# Patient Record
Sex: MALE | Race: BLACK OR AFRICAN AMERICAN | NOT HISPANIC OR LATINO | Employment: FULL TIME | URBAN - METROPOLITAN AREA
[De-identification: names, ages, dates, MRNs, and addresses within clinical notes are randomized per-mention and may not be internally consistent; named-entity substitution may affect disease eponyms.]

---

## 2017-02-06 ENCOUNTER — OFFICE VISIT (OUTPATIENT)
Dept: OTOLARYNGOLOGY | Facility: CLINIC | Age: 42
End: 2017-02-06
Payer: COMMERCIAL

## 2017-02-06 ENCOUNTER — TELEPHONE (OUTPATIENT)
Dept: OTOLARYNGOLOGY | Facility: CLINIC | Age: 42
End: 2017-02-06

## 2017-02-06 ENCOUNTER — HOSPITAL ENCOUNTER (OUTPATIENT)
Dept: RADIOLOGY | Facility: HOSPITAL | Age: 42
Discharge: HOME | End: 2017-02-06
Attending: NURSE PRACTITIONER
Payer: COMMERCIAL

## 2017-02-06 VITALS
BODY MASS INDEX: 27.4 KG/M2 | DIASTOLIC BLOOD PRESSURE: 85 MMHG | HEIGHT: 70 IN | SYSTOLIC BLOOD PRESSURE: 131 MMHG | HEART RATE: 90 BPM | WEIGHT: 191.38 LBS

## 2017-02-06 DIAGNOSIS — J32.9 RECURRENT SINUSITIS: ICD-10-CM

## 2017-02-06 DIAGNOSIS — K21.9 LPRD (LARYNGOPHARYNGEAL REFLUX DISEASE): ICD-10-CM

## 2017-02-06 DIAGNOSIS — R51.9 FACE PAIN: ICD-10-CM

## 2017-02-06 DIAGNOSIS — J31.0 CHRONIC RHINITIS: ICD-10-CM

## 2017-02-06 DIAGNOSIS — R51.9 SINUS HEADACHE: ICD-10-CM

## 2017-02-06 DIAGNOSIS — J32.9 RECURRENT SINUSITIS: Primary | ICD-10-CM

## 2017-02-06 PROCEDURE — 99999 PR PBB SHADOW E&M-EST. PATIENT-LVL IV: CPT | Mod: PBBFAC,,, | Performed by: NURSE PRACTITIONER

## 2017-02-06 PROCEDURE — 99204 OFFICE O/P NEW MOD 45 MIN: CPT | Mod: 25,S$GLB,, | Performed by: NURSE PRACTITIONER

## 2017-02-06 PROCEDURE — 70220 X-RAY EXAM OF SINUSES: CPT | Mod: TC,PO

## 2017-02-06 PROCEDURE — 31575 DIAGNOSTIC LARYNGOSCOPY: CPT | Mod: S$GLB,,, | Performed by: NURSE PRACTITIONER

## 2017-02-06 PROCEDURE — 70220 X-RAY EXAM OF SINUSES: CPT | Mod: 26,,, | Performed by: RADIOLOGY

## 2017-02-06 RX ORDER — PSEUDOEPHEDRINE HYDROCHLORIDE 60 MG/1
60 TABLET ORAL EVERY 4 HOURS PRN
COMMUNITY
End: 2017-11-30 | Stop reason: ALTCHOICE

## 2017-02-06 RX ORDER — AZELASTINE 1 MG/ML
1 SPRAY, METERED NASAL 2 TIMES DAILY
Qty: 30 ML | Refills: 12 | Status: SHIPPED | OUTPATIENT
Start: 2017-02-06 | End: 2017-09-11

## 2017-02-06 RX ORDER — FLUTICASONE PROPIONATE 50 MCG
1 SPRAY, SUSPENSION (ML) NASAL 2 TIMES DAILY
Qty: 16 G | Refills: 12 | Status: SHIPPED | OUTPATIENT
Start: 2017-02-06 | End: 2017-09-11

## 2017-02-06 NOTE — PATIENT INSTRUCTIONS
"DIFFERENT TYPES OF NASAL SPRAYS:  · Flonase (steroid spray) is best for stuffy, pressure, fullness.  · Astelin (antihistamine spray) is best for itchy, drippy, sneezy.  · Atrovent (ipratropium bromide) is best for chronic watery nasal drip ("leaky faucet" nose), which may worse with eating.    Nasal spray instructions:  Blow nose first gently to clean. Keep chin level with the floor (do not tilt head forward or back). Insert nasal spray taking caution to direct it AWAY from the middle wall inside the nose (septum) to avoid irritating nasal septum which could cause nosebleed.  Do not tilt spray up but rather flat and out along the roof of your mouth to spray. Angle the tip of the spray out slightly toward the direction of the ears; then spray. Do not take quick vigorous sniff but rather slow gentle inhalation while waiting for medication to absorb into nasal passages. Then administer second spray in same way.     EUSTACHIAN TUBE INSTRUCTIONS:  Nasal valsalva:  Pinch nose and with closed mouth try to "pop" air into ears through the back of the nose. Attempt this several times a day. The more popping you have, the more likely the fluid will resolve.     Nasal saline rinse kit (use Neti pot or O-CODES sinus rinse kit) -- use sterile water (boiled tap water which has cooled) or distilled bottled water. Add 1/4 teaspoon sea salt and a pinch of baking soda or a mixture packet from the maker of your sinus rinse kit.  Rinse through both sides of nose to cleanse sinus and nasal passages, bending forward with head tilted down. Keep your mouth open, without holding your breath. Squeeze bottle gently until solution starts draining from the opposite nasal passage. After bottle is empty, blow nose very gently, without pinching nose completely, to avoid pressure on eardrums.      Use a humidifier at your bedside to prevent drying and crusting at night.     Ponaris Nasal Emollient is used for the relief of: nasal congestion due to " "colds, nasal irritation, allergy exacerbations, nasal crusting. Specifically prepared iodized organic oils of pine, eucalyptus, peppermint, cajeput, and cottonseed. To order Ponaris: ask your pharmacist to order it for you or we carry it in our pharmacy downstairs on the first floor.     LARYNGOPHARYNGEAL REFLUX  (SILENT OR ATYPICAL REFLUX)    If you have any of the following symptoms you may have laryngopharyngeal reflux (LPR):  hoarseness, thick or too much mucus, chronic throat pain/irritation, chronic throat clearing, chronic cough, especially cough that wake you up from sleep, chronic "postnasal drip" without the need to blow your nose.     Many people with LPR do not have symptoms of heartburn. Compared to the esophagus, the voice box and the back of the throat are significantly more sensitive to the effects of acid on surrounding tissue. Acid passing quickly through the esophagus does not have a chance to irritate the area for too long.  However acid that pools in the throat or voice box can cause prolonged irritation resulting in the symptoms of LPR.    The symptoms of LPR can consist of a dry cough and the sensation of something being stuck in the throat.  Some people will complain of heartburn while others may have intermittent hoarseness or loss of voice.  Another major symptom of LPR is "postnasal drip."  Patients are often told symptoms are due to abnormal nasal drainage or sinus infection; however this is rarely the cause of chronic throat irritation. For post nasal drip to cause the complaints described, signs and symptoms of an active nasal infection should be present.     Treatments for LPR include:  postural changes, weight reduction, diet modification, medication to reduce stomach acid and promote normal motility, and surgery to prevent reflux. Most patients will begin to notice some relief in her symptoms about 2 weeks after starting the medication; however it is generally recommended the " medication should be continued for 2 months. If the symptoms completely resolve, the medication can then be tapered.  Some people will remain symptom free while others may have relapses which required treatment again.    Things you can do to prevent reflux include:  Do not smoke.  Smoking will cause reflux.  Avoid tight fitting clothes or belts around the waist.  Avoid eating at least 2 hours prior to bedtime.  In fact avoid eating your largest meal at night.  Weight loss.  For patient's with recent weight gain, shedding a few pounds is all that is required to improve reflux.  Avoid caffeine, cola beverages, citrus beverages, mints, alcoholic beverages, particularly at night, cheese, fried foods, spicy foods, eggs, and chocolate.  Sleep with the head of bed elevated at least 6 inches.    Take Nexium / Prilosec / Protonix / Zegerid (all PPIs) every morning on an empty stomach (30-60 minutes before eating). Then at bedtime take Zantac (H2-blocker) 300 mg.  All are available over-the-counter without a prescription. And see a Gastro doctor (GI) for further evaluation and continued management.      How Acid Reflux Affects Your Throat    Do you have to clear your throat or cough often? Are you hoarse? Do you have trouble swallowing? If you have these or other throat symptoms, you may have acid reflux. This occurs when stomach acid flows back up and irritates your throat.  Why you have throat symptoms  There are muscles (esophageal sphincters) at both ends of the tube that carries food to your stomach (the esophagus). These muscles relax to let food pass. Then they tighten to keep stomach acid down. When the lower esophageal sphincter (LES) doesnt tighten enough, acid can flow back (reflux) from your stomach into your esophagus. This may cause heartburn. In some cases the upper esophageal sphincter (UES) also doesnt work well. Then acid can travel higher and enter your throat (pharynx). In many cases, this causes throat  symptoms.  Common throat symptoms  · Need to clear your throat often  · Feeling like youre choking  · Long-term (chronic) cough  · Hoarseness  · Trouble swallowing  · Feel like you have a lump in your throat  · Sour or acid taste  · Sore throat that keeps coming back   Date Last Reviewed: 7/1/2016  © 5216-4484 [employer]. [address] [place] [address], [place] [address]. All rights reserved. This information is not intended as a substitute for professional medical care. Always follow your healthcare professional's instructions.

## 2017-02-06 NOTE — PROGRESS NOTES
Subjective:       Patient ID: Reymundo Gutierrez is a 41 y.o. male.    Chief Complaint: Sinus Problem; Facial Pain; Headache; and Otalgia    HPI   Patient reports continuous sinusitis symptoms X 6 months waxing and waning, but never resolves. He was treated for sinusitis by STEPHON Estrada with antibiotic and steroid shot on 12/15/16, then given steroid PO by Dr. Cardona a week later. Still having facial pain maxillary bilateral, headaches frontal, fatigue, nasal congestion, decreased appetite, PND. Taking Tylenol Sinus and Sudafed.     Review of Systems   Constitutional: Positive for appetite change (decrease) and fatigue.   HENT: Positive for congestion, postnasal drip and sinus pressure.         Face pain, bilateral maxillary   Eyes: Negative.    Respiratory: Negative.    Cardiovascular: Negative.    Gastrointestinal: Negative.    Musculoskeletal: Negative.    Skin: Negative.    Neurological: Positive for headaches (frontal).   Hematological: Negative.    Psychiatric/Behavioral: Negative.        Objective:      Physical Exam   Constitutional: He is oriented to person, place, and time. Vital signs are normal. He appears well-developed and well-nourished. He is cooperative. He does not appear ill. No distress.   HENT:   Head: Normocephalic and atraumatic.   Right Ear: Hearing, tympanic membrane, external ear and ear canal normal. Tympanic membrane is not erythematous. No middle ear effusion.   Left Ear: Hearing, tympanic membrane, external ear and ear canal normal. Tympanic membrane is not erythematous.  No middle ear effusion.   Nose: Mucosal edema, rhinorrhea (clear) and septal deviation present. Right sinus exhibits no maxillary sinus tenderness and no frontal sinus tenderness. Left sinus exhibits no maxillary sinus tenderness and no frontal sinus tenderness.   Mouth/Throat: Uvula is midline, oropharynx is clear and moist and mucous membranes are normal. Mucous membranes are not pale, not dry and not cyanotic. No oral  lesions. No oropharyngeal exudate, posterior oropharyngeal edema or posterior oropharyngeal erythema.   Eyes: Conjunctivae, EOM and lids are normal. Pupils are equal, round, and reactive to light. Right eye exhibits no discharge. Left eye exhibits no discharge. No scleral icterus.   Neck: Trachea normal and normal range of motion. Neck supple. No tracheal deviation present. No thyroid mass and no thyromegaly present.   Cardiovascular: Normal rate.    Pulmonary/Chest: Effort normal. No stridor. No respiratory distress. He has no wheezes.   Musculoskeletal: Normal range of motion.   Lymphadenopathy:        Head (right side): No submental, no submandibular, no tonsillar, no preauricular and no posterior auricular adenopathy present.        Head (left side): No submental, no submandibular, no tonsillar, no preauricular and no posterior auricular adenopathy present.     He has no cervical adenopathy.        Right cervical: No superficial cervical and no posterior cervical adenopathy present.       Left cervical: No superficial cervical and no posterior cervical adenopathy present.   Neurological: He is alert and oriented to person, place, and time. He has normal strength. Coordination and gait normal.   Skin: Skin is warm, dry and intact. No lesion and no rash noted. He is not diaphoretic. No cyanosis. No pallor.   Psychiatric: He has a normal mood and affect. His speech is normal and behavior is normal. Judgment and thought content normal. Cognition and memory are normal.   Nursing note and vitals reviewed.      Procedure: Flexible laryngoscopy    In order to fully examine the upper aerodigestive tract, including the larynx, in a patient with a hyperactive gag reflex, and suboptimal visualization with indirect mirror exam,  flexible endoscopy is required.   After explaining the procedure and obtaining verbal consent, a timeout was performed with the patient's participation according to the universal protocol. Both nasal  cavities were anesthetized with 4% Xylocaine spray mixed with Jose-Synephrine. The flexible laryngoscope  was inserted into the nasal cavity and advanced to visualize the nasal cavity, nasopharynx, the posterior oropharynx, hypopharynx, and the endolarynx with the  findings noted. The scope was removed and the procedure terminated. The patient tolerated this procedure well without apparent complication.     OVERALL FINDINGS  Nasopharynx - the torus is clear. There are no lesions of the posterior wall.   Oropharynx - no lesions of the tongue base. There is no obvious fullness or asymmetry.  Hypopharynx - there are no lesions of the pyriform sinuses or postcricoid region   Larynx - there are no lesions of the supraglottic or glottic larynx.  Vocal fold mobility is normal.     SPECIFIC FINDINGS  Adenoid tissue - normal   Nasopharynx & eustachian tube orifices - normal   Posterior pharyngeal wall - normal   Base of tongue - normal   Epiglottis - normal   Valleculae - normal   Pyriform sinuses - normal   False vocal cords - normal   True vocal cords - normal  Arytenoids - normal   Interarytenoid space - normal   Left middle meatus polyp    Left choana    Right middle meatus    Right choana    Larynx    Larynx    Assessment:     Chronic rhinitis    LPRD/GERD  Left nasal polyp  Otalgia, not otogenic, most likely referred from musculoskeletal etiology  Recurrent sinusitis (sinus imaging obtained today)   Plan:     Lauren Hollings[facility]    Sinus x-rays today -- will call with results as soon as available. Explained no mucopus available to culture during nasoendoscopy.  Laryngoscope photos given and reviewed in detail with patient. Recommend over-the-counter esomeprazole or omeprazole daily on an empty stomach and encouraged patient to establish care with GI for continued management. Handouts given on LPRD and GERD, and discussed at length with patient.

## 2017-02-06 NOTE — MR AVS SNAPSHOT
[facility]  [address] [facility] [address]  [facility] [address]  Phone: [phone]  Fax: [phone]                  Reymundo Gutierrez   2017 9:40 AM   Office Visit    Description:  Male : 1975   Provider:  Dimple Baez NP   Department:  [place] - ENT           Reason for Visit     Sinus Problem     Facial Pain     Headache     Otalgia           Diagnoses this Visit        Comments    Recurrent sinusitis    -  Primary     Chronic rhinitis                To Do List           Goals (5 Years of Data)     None       These Medications        Disp Refills Start End    fluticasone (FLONASE) 50 mcg/actuation nasal spray 16 g 12 2017    1 spray by Each Nare route 2 (two) times daily. - Each Nare    Pharmacy: [id number] [place] [address] - [address] Ph #: [phone]       azelastine (ASTELIN) 137 mcg (0.1 %) nasal spray 30 mL 12 2017    1 spray (137 mcg total) by Nasal route 2 (two) times daily. - Nasal    Pharmacy: [id number] [address] [place] - [address] Ph #: [phone]         [facility]     [facility] Nurse Care Line - 24/7 Assistance  Registered nurses in the [facility] provide clinical advisement, health education, appointment booking, and other advisory services.  Call for this free service at [phone].             Medications           Message regarding Medications     Verify the changes and/or additions to your medication regime listed below are the same as discussed with your clinician today.  If any of these changes or additions are incorrect, please notify your healthcare provider.        START taking these NEW medications        Refills    fluticasone (FLONASE) 50 mcg/actuation nasal spray 12    Si spray by Each Nare route 2 (two) times daily.    Class: Normal    Route: Each Nare    azelastine (ASTELIN) 137 mcg (0.1 %) nasal spray 12    Si spray (137 mcg total) by Nasal route 2 (two) times  "daily.    Class: Normal    Route: Nasal           Verify that the below list of medications is an accurate representation of the medications you are currently taking.  If none reported, the list may be blank. If incorrect, please contact your healthcare provider. Carry this list with you in case of emergency.           Current Medications     albuterol 90 mcg/actuation inhaler Inhale 2 puffs into the lungs every 6 (six) hours as needed for Wheezing.    fluticasone-salmeterol 100-50 mcg/dose (ADVAIR DISKUS) 100-50 mcg/dose diskus inhaler INHALE ONE DOSE BY MOUTH TWICE DAILY    GUAIFEN/PHENYLEPH/ACETAMINOPHN (TYLENOL SINUS SEVERE ORAL) Take by mouth once daily.    pseudoephedrine (SUDAFED) 60 MG tablet Take 60 mg by mouth every 4 (four) hours as needed for Congestion.    azelastine (ASTELIN) 137 mcg (0.1 %) nasal spray 1 spray (137 mcg total) by Nasal route 2 (two) times daily.    fluticasone (FLONASE) 50 mcg/actuation nasal spray 1 spray by Each Nare route 2 (two) times daily.           Clinical Reference Information           Your Vitals Were     BP Pulse Height Weight BMI    131/85 90 5' 10" (1.778 m) 86.8 kg (191 lb 5.8 oz) 27.46 kg/m2      Blood Pressure          Most Recent Value    BP  131/85      Allergies as of 2/6/2017     No Known Allergies      Immunizations Administered on Date of Encounter - 2/6/2017     None      Orders Placed During Today's Visit     Future Labs/Procedures Expected by Expires    X-Ray Sinuses Min 3 Views  2/6/2017 2/6/2018      Instructions    DIFFERENT TYPES OF NASAL SPRAYS:  · Flonase (steroid spray) is best for stuffy, pressure, fullness.  · Astelin (antihistamine spray) is best for itchy, drippy, sneezy.  · Atrovent (ipratropium bromide) is best for chronic watery nasal drip ("leaky faucet" nose), which may worse with eating.    Nasal spray instructions:  Blow nose first gently to clean. Keep chin level with the floor (do not tilt head forward or back). Insert nasal spray taking " "caution to direct it AWAY from the middle wall inside the nose (septum) to avoid irritating nasal septum which could cause nosebleed.  Do not tilt spray up but rather flat and out along the roof of your mouth to spray. Angle the tip of the spray out slightly toward the direction of the ears; then spray. Do not take quick vigorous sniff but rather slow gentle inhalation while waiting for medication to absorb into nasal passages. Then administer second spray in same way.     EUSTACHIAN TUBE INSTRUCTIONS:  Nasal valsalva:  Pinch nose and with closed mouth try to "pop" air into ears through the back of the nose. Attempt this several times a day. The more popping you have, the more likely the fluid will resolve.     Nasal saline rinse kit (use Neti pot or [employer] sinus rinse kit) -- use sterile water (boiled tap water which has cooled) or distilled bottled water. Add 1/4 teaspoon sea salt and a pinch of baking soda or a mixture packet from the maker of your sinus rinse kit.  Rinse through both sides of nose to cleanse sinus and nasal passages, bending forward with head tilted down. Keep your mouth open, without holding your breath. Squeeze bottle gently until solution starts draining from the opposite nasal passage. After bottle is empty, blow nose very gently, without pinching nose completely, to avoid pressure on eardrums.      Use a humidifier at your bedside to prevent drying and crusting at night.     Ponaris Nasal Emollient is used for the relief of: nasal congestion due to colds, nasal irritation, allergy exacerbations, nasal crusting. Specifically prepared iodized organic oils of pine, eucalyptus, peppermint, cajeput, and cottonseed. To order Ponaris: ask your pharmacist to order it for you or we carry it in our pharmacy downstairs on the first floor.     LARYNGOPHARYNGEAL REFLUX  (SILENT OR ATYPICAL REFLUX)    If you have any of the following symptoms you may have laryngopharyngeal reflux (LPR):  hoarseness, " "thick or too much mucus, chronic throat pain/irritation, chronic throat clearing, chronic cough, especially cough that wake you up from sleep, chronic "postnasal drip" without the need to blow your nose.     Many people with LPR do not have symptoms of heartburn. Compared to the esophagus, the voice box and the back of the throat are significantly more sensitive to the effects of acid on surrounding tissue. Acid passing quickly through the esophagus does not have a chance to irritate the area for too long.  However acid that pools in the throat or voice box can cause prolonged irritation resulting in the symptoms of LPR.    The symptoms of LPR can consist of a dry cough and the sensation of something being stuck in the throat.  Some people will complain of heartburn while others may have intermittent hoarseness or loss of voice.  Another major symptom of LPR is "postnasal drip."  Patients are often told symptoms are due to abnormal nasal drainage or sinus infection; however this is rarely the cause of chronic throat irritation. For post nasal drip to cause the complaints described, signs and symptoms of an active nasal infection should be present.     Treatments for LPR include:  postural changes, weight reduction, diet modification, medication to reduce stomach acid and promote normal motility, and surgery to prevent reflux. Most patients will begin to notice some relief in her symptoms about 2 weeks after starting the medication; however it is generally recommended the medication should be continued for 2 months. If the symptoms completely resolve, the medication can then be tapered.  Some people will remain symptom free while others may have relapses which required treatment again.    Things you can do to prevent reflux include:  Do not smoke.  Smoking will cause reflux.  Avoid tight fitting clothes or belts around the waist.  Avoid eating at least 2 hours prior to bedtime.  In fact avoid eating your largest meal " at night.  Weight loss.  For patient's with recent weight gain, shedding a few pounds is all that is required to improve reflux.  Avoid caffeine, cola beverages, citrus beverages, mints, alcoholic beverages, particularly at night, cheese, fried foods, spicy foods, eggs, and chocolate.  Sleep with the head of bed elevated at least 6 inches.    Take Nexium / Prilosec / Protonix / Zegerid (all PPIs) every morning on an empty stomach (30-60 minutes before eating). Then at bedtime take Zantac (H2-blocker) 300 mg.  All are available over-the-counter without a prescription. And see a Gastro doctor (GI) for further evaluation and continued management.      How Acid Reflux Affects Your Throat    Do you have to clear your throat or cough often? Are you hoarse? Do you have trouble swallowing? If you have these or other throat symptoms, you may have acid reflux. This occurs when stomach acid flows back up and irritates your throat.  Why you have throat symptoms  There are muscles (esophageal sphincters) at both ends of the tube that carries food to your stomach (the esophagus). These muscles relax to let food pass. Then they tighten to keep stomach acid down. When the lower esophageal sphincter (LES) doesnt tighten enough, acid can flow back (reflux) from your stomach into your esophagus. This may cause heartburn. In some cases the upper esophageal sphincter (UES) also doesnt work well. Then acid can travel higher and enter your throat (pharynx). In many cases, this causes throat symptoms.  Common throat symptoms  · Need to clear your throat often  · Feeling like youre choking  · Long-term (chronic) cough  · Hoarseness  · Trouble swallowing  · Feel like you have a lump in your throat  · Sour or acid taste  · Sore throat that keeps coming back   Date Last Reviewed: 7/1/2016  © 5303-0197 [employer]. [address] [place] [address], [place] [address]. All rights reserved. This information is not intended as a substitute  for professional medical care. Always follow your healthcare professional's instructions.             Language Assistance Services     ATTENTION: Language assistance services are available, free of charge. Please call [phone].      ATENCIÓN: Si habdameon ordonez, tiene a villafuerte disposición servicios gratuitos de asistencia lingüística. Llame al [phone].     CHÚ Ý: N?u b?n nói Ti?ng Vi?t, có các d?ch v? h? tr? ngôn ng? mi?n phí dành cho b?n. G?i s? [phone].         [facility] complies with applicable Federal civil rights laws and does not discriminate on the basis of race, color, national origin, age, disability, or sex.

## 2017-02-06 NOTE — TELEPHONE ENCOUNTER
----- Message from Dimple Baez NP sent at 2/6/2017  1:47 PM CST -----  No sinus infection. Continue both nasal sprays for allergy symptoms.

## 2017-03-09 ENCOUNTER — OFFICE VISIT (OUTPATIENT)
Dept: FAMILY MEDICINE | Facility: CLINIC | Age: 42
End: 2017-03-09
Payer: COMMERCIAL

## 2017-03-09 VITALS
WEIGHT: 190.38 LBS | SYSTOLIC BLOOD PRESSURE: 131 MMHG | TEMPERATURE: 98 F | DIASTOLIC BLOOD PRESSURE: 74 MMHG | HEIGHT: 70 IN | BODY MASS INDEX: 27.25 KG/M2 | HEART RATE: 79 BPM

## 2017-03-09 DIAGNOSIS — J06.9 VIRAL URI WITH COUGH: Primary | ICD-10-CM

## 2017-03-09 DIAGNOSIS — G56.01 CARPAL TUNNEL SYNDROME, RIGHT: ICD-10-CM

## 2017-03-09 PROCEDURE — 99213 OFFICE O/P EST LOW 20 MIN: CPT | Mod: S$GLB,,,

## 2017-03-09 PROCEDURE — 99999 PR PBB SHADOW E&M-EST. PATIENT-LVL III: CPT | Mod: PBBFAC,,,

## 2017-03-09 PROCEDURE — 1160F RVW MEDS BY RX/DR IN RCRD: CPT | Mod: S$GLB,,,

## 2017-03-09 RX ORDER — PROMETHAZINE HYDROCHLORIDE AND DEXTROMETHORPHAN HYDROBROMIDE 6.25; 15 MG/5ML; MG/5ML
5 SYRUP ORAL 3 TIMES DAILY PRN
Qty: 180 ML | Refills: 0 | Status: SHIPPED | OUTPATIENT
Start: 2017-03-09 | End: 2017-03-19

## 2017-03-09 RX ORDER — METHYLPREDNISOLONE 4 MG/1
TABLET ORAL
Qty: 1 PACKAGE | Refills: 0 | Status: SHIPPED | OUTPATIENT
Start: 2017-03-09 | End: 2017-03-15

## 2017-03-09 NOTE — PROGRESS NOTES
Subjective:       Patient ID: Reymundo Gutierrez is a 41 y.o. male.    Chief Complaint: Fever and Generalized Body Aches    HPI   Resents with a one-day complaint of nasal congestion and cough he says his symptoms are intermittent and moderate in intensity.  He voices an associated fever but states he did not check his temperature so is unable to quantify his temperature.  He denies any shortness of breath, wheezing, or LAMB.  He cannot identify any exacerbating or mitigating factors.  He states he was exposed to the flu work.     Patient also voices a several month complaint of right wrist pain he describes as an intermittent moderate to severe intensity burning sensation that extends down into his hands.  He voices some associated numbness in his fingertips.  He says the pain gets worse at night when he is trying to sleep he says the pain wakes him frequently.  He voices a history of doing work that requires repetitive motion.      Review of Systems   Constitutional: Positive for fever. Negative for activity change, appetite change, fatigue and unexpected weight change.   HENT: Positive for congestion.    Eyes: Negative.    Respiratory: Positive for cough. Negative for chest tightness, shortness of breath and wheezing.    Cardiovascular: Negative for chest pain, palpitations and leg swelling.   Gastrointestinal: Negative for constipation, diarrhea, nausea and vomiting.   Endocrine: Negative.    Genitourinary: Negative.    Musculoskeletal: Positive for arthralgias (right wrist ).   Skin: Negative for color change.   Allergic/Immunologic: Negative.    Neurological: Negative for dizziness, weakness and light-headedness.   Hematological: Negative.    Psychiatric/Behavioral: Negative for sleep disturbance.         Objective:      Physical Exam   Constitutional: He is oriented to person, place, and time. He appears well-developed and well-nourished. No distress.   HENT:   Head: Normocephalic and atraumatic. Hair is  normal.   Right Ear: Hearing, tympanic membrane, external ear and ear canal normal.   Left Ear: Hearing, tympanic membrane, external ear and ear canal normal.   Nose: Mucosal edema and rhinorrhea present. No nose lacerations, sinus tenderness, nasal deformity, septal deviation or nasal septal hematoma. No epistaxis.  No foreign bodies. Right sinus exhibits no maxillary sinus tenderness and no frontal sinus tenderness. Left sinus exhibits no maxillary sinus tenderness and no frontal sinus tenderness.   Mouth/Throat: Uvula is midline, oropharynx is clear and moist and mucous membranes are normal.   Eyes: Conjunctivae are normal. Pupils are equal, round, and reactive to light. Right eye exhibits no discharge. Left eye exhibits no discharge.   Neck: Trachea normal, normal range of motion and phonation normal. Neck supple. No tracheal deviation present.   Cardiovascular: Normal rate, regular rhythm, normal heart sounds and intact distal pulses.  Exam reveals no gallop and no friction rub.    No murmur heard.  Pulmonary/Chest: Effort normal and breath sounds normal. No respiratory distress. He has no decreased breath sounds. He has no wheezes. He has no rhonchi. He has no rales. He exhibits no tenderness.   Musculoskeletal: Normal range of motion.        Right wrist: He exhibits normal range of motion, no tenderness, no bony tenderness, no swelling, no effusion, no crepitus, no deformity and no laceration.   positive tinnel's test   Lymphadenopathy:        Head (right side): No submental, no submandibular, no tonsillar, no preauricular, no posterior auricular and no occipital adenopathy present.        Head (left side): No submental, no submandibular, no tonsillar, no preauricular, no posterior auricular and no occipital adenopathy present.     He has no cervical adenopathy.        Right cervical: No superficial cervical, no deep cervical and no posterior cervical adenopathy present.       Left cervical: No superficial  cervical, no deep cervical and no posterior cervical adenopathy present.   Neurological: He is alert and oriented to person, place, and time. He exhibits normal muscle tone. GCS eye subscore is 4. GCS verbal subscore is 5. GCS motor subscore is 6.   Skin: Skin is warm and dry. No rash noted. He is not diaphoretic. No erythema. No pallor.   Psychiatric: He has a normal mood and affect. His speech is normal and behavior is normal. Judgment and thought content normal.       Assessment:       1. Viral URI with cough    2. Carpal tunnel syndrome, right          Plan:   Viral URI with cough  -     POCT INFLUENZA A/B, Negative  -     promethazine-dextromethorphan (PROMETHAZINE-DM) 6.25-15 mg/5 mL Syrp; Take 5 mLs by mouth 3 (three) times daily as needed.  Dispense: 180 mL; Refill: 0  -     methylPREDNISolone (MEDROL DOSEPACK) 4 mg tablet; use as directed  Dispense: 1 Package; Refill: 0    Carpal tunnel syndrome, right  -     Nerve conduction test; Future          Disclaimer: This note is prepared using voice recognition software.  As such there may be errors in the dictation.  It has not been proofread.

## 2017-03-09 NOTE — LETTER
March 9, 2017      [facility]  [address] [address]  Phone: [phone]  Fax: [phone]       Patient: Jarad Gutierrez   YOB: 1975  Date of Visit: 03/09/2017    To Whom It May Concern:    Jarad was at [facility] on 03/09/2017. He may return to work/school on 03/11/2017 with no restrictions. If you have any questions or concerns, or if I can be of further assistance, please do not hesitate to contact me.    Sincerely,    AMY Leal

## 2017-09-11 ENCOUNTER — OFFICE VISIT (OUTPATIENT)
Dept: FAMILY MEDICINE | Facility: CLINIC | Age: 42
End: 2017-09-11
Payer: COMMERCIAL

## 2017-09-11 ENCOUNTER — NURSE TRIAGE (OUTPATIENT)
Dept: ADMINISTRATIVE | Facility: CLINIC | Age: 42
End: 2017-09-11

## 2017-09-11 ENCOUNTER — TELEPHONE (OUTPATIENT)
Dept: FAMILY MEDICINE | Facility: CLINIC | Age: 42
End: 2017-09-11

## 2017-09-11 ENCOUNTER — LAB VISIT (OUTPATIENT)
Dept: LAB | Facility: HOSPITAL | Age: 42
End: 2017-09-11
Attending: NURSE PRACTITIONER
Payer: COMMERCIAL

## 2017-09-11 VITALS
HEIGHT: 71 IN | DIASTOLIC BLOOD PRESSURE: 80 MMHG | HEART RATE: 110 BPM | BODY MASS INDEX: 26.93 KG/M2 | SYSTOLIC BLOOD PRESSURE: 120 MMHG | WEIGHT: 192.38 LBS | TEMPERATURE: 99 F

## 2017-09-11 DIAGNOSIS — R06.02 SOB (SHORTNESS OF BREATH): ICD-10-CM

## 2017-09-11 DIAGNOSIS — R00.0 TACHYCARDIA: ICD-10-CM

## 2017-09-11 DIAGNOSIS — R23.0 CYANOSIS: ICD-10-CM

## 2017-09-11 DIAGNOSIS — D72.829 LEUKOCYTOSIS, UNSPECIFIED TYPE: Primary | ICD-10-CM

## 2017-09-11 DIAGNOSIS — R07.9 CHEST PAIN, UNSPECIFIED TYPE: ICD-10-CM

## 2017-09-11 DIAGNOSIS — J45.901 ASTHMA WITH SEVERE ASTHMA ATTACK: ICD-10-CM

## 2017-09-11 DIAGNOSIS — J45.31 MILD PERSISTENT ASTHMA WITH ACUTE EXACERBATION: ICD-10-CM

## 2017-09-11 DIAGNOSIS — Z09 FOLLOW UP: Primary | ICD-10-CM

## 2017-09-11 LAB
ANION GAP SERPL CALC-SCNC: 13 MMOL/L
BASOPHILS # BLD AUTO: 0 K/UL
BASOPHILS NFR BLD: 0 %
BUN SERPL-MCNC: 15 MG/DL
CALCIUM SERPL-MCNC: 9.8 MG/DL
CHLORIDE SERPL-SCNC: 107 MMOL/L
CO2 SERPL-SCNC: 19 MMOL/L
CREAT SERPL-MCNC: 1.1 MG/DL
DIFFERENTIAL METHOD: ABNORMAL
EOSINOPHIL # BLD AUTO: 0 K/UL
EOSINOPHIL NFR BLD: 0 %
ERYTHROCYTE [DISTWIDTH] IN BLOOD BY AUTOMATED COUNT: 13.1 %
EST. GFR  (AFRICAN AMERICAN): >60 ML/MIN/1.73 M^2
EST. GFR  (NON AFRICAN AMERICAN): >60 ML/MIN/1.73 M^2
GLUCOSE SERPL-MCNC: 123 MG/DL
HCT VFR BLD AUTO: 43.5 %
HGB BLD-MCNC: 15.5 G/DL
LYMPHOCYTES # BLD AUTO: 0.9 K/UL
LYMPHOCYTES NFR BLD: 6.2 %
MCH RBC QN AUTO: 30.6 PG
MCHC RBC AUTO-ENTMCNC: 35.6 G/DL
MCV RBC AUTO: 86 FL
MONOCYTES # BLD AUTO: 0.2 K/UL
MONOCYTES NFR BLD: 1.2 %
NEUTROPHILS # BLD AUTO: 12.9 K/UL
NEUTROPHILS NFR BLD: 92.3 %
PLATELET # BLD AUTO: 254 K/UL
PMV BLD AUTO: 11.1 FL
POTASSIUM SERPL-SCNC: 4.6 MMOL/L
RBC # BLD AUTO: 5.07 M/UL
SODIUM SERPL-SCNC: 139 MMOL/L
TSH SERPL DL<=0.005 MIU/L-ACNC: 0.84 UIU/ML
WBC # BLD AUTO: 14.02 K/UL

## 2017-09-11 PROCEDURE — 85025 COMPLETE CBC W/AUTO DIFF WBC: CPT

## 2017-09-11 PROCEDURE — 93010 ELECTROCARDIOGRAM REPORT: CPT | Mod: S$GLB,,, | Performed by: INTERNAL MEDICINE

## 2017-09-11 PROCEDURE — 99999 PR PBB SHADOW E&M-EST. PATIENT-LVL IV: CPT | Mod: PBBFAC,,, | Performed by: NURSE PRACTITIONER

## 2017-09-11 PROCEDURE — 3008F BODY MASS INDEX DOCD: CPT | Mod: S$GLB,,, | Performed by: NURSE PRACTITIONER

## 2017-09-11 PROCEDURE — 99213 OFFICE O/P EST LOW 20 MIN: CPT | Mod: S$GLB,,, | Performed by: NURSE PRACTITIONER

## 2017-09-11 PROCEDURE — 36415 COLL VENOUS BLD VENIPUNCTURE: CPT | Mod: PO

## 2017-09-11 PROCEDURE — 84443 ASSAY THYROID STIM HORMONE: CPT

## 2017-09-11 PROCEDURE — 80048 BASIC METABOLIC PNL TOTAL CA: CPT

## 2017-09-11 PROCEDURE — 93005 ELECTROCARDIOGRAM TRACING: CPT | Mod: S$GLB,,, | Performed by: NURSE PRACTITIONER

## 2017-09-11 RX ORDER — DOXYCYCLINE 100 MG/1
100 CAPSULE ORAL 2 TIMES DAILY
Qty: 20 CAPSULE | Refills: 0 | Status: SHIPPED | OUTPATIENT
Start: 2017-09-11 | End: 2017-09-21

## 2017-09-11 RX ORDER — FLUTICASONE PROPIONATE AND SALMETEROL 100; 50 UG/1; UG/1
1 POWDER RESPIRATORY (INHALATION) 2 TIMES DAILY
COMMUNITY
End: 2017-09-11

## 2017-09-11 RX ORDER — FLUTICASONE PROPIONATE AND SALMETEROL 250; 50 UG/1; UG/1
1 POWDER RESPIRATORY (INHALATION) 2 TIMES DAILY
Qty: 60 EACH | Refills: 0 | Status: SHIPPED | OUTPATIENT
Start: 2017-09-11 | End: 2019-12-03 | Stop reason: SDUPTHER

## 2017-09-11 NOTE — TELEPHONE ENCOUNTER
BG elevated but not at diabetic level; labs non fasting and pt has had steroids; watch diet, limit steroid use, if possible.

## 2017-09-11 NOTE — PROGRESS NOTES
Subjective:       Patient ID: Reymundo Gutierrez is a 42 y.o. male.    Chief Complaint: Follow-up (asthma)  Pt in today for ER follow up asthma attack. Pt states was seen at [facility] ER last night after experiencing SOB, CP, chest tightness. Pt states given breathing treatments x 3, IV steroids, IV magnesium and had CXR, which was unremarkable. Also used albuterol inhaler and had breathing treatment at home. CBC per [facility] showed elevated WBCs. Pt states advised to stay but chose to be discharged. Pt states this AM lips appeared purple, had SOB, chest tightness, dizziness; did not report to the ER;resolved. Pt states has had 3 different antibiotics in the past 2 w, including levaquin; has also had multiple steroid treatments. Pt uses albuterol, advair currently. Prescribed prednisone, naprosyn at discharge.  Pt advised to report to ER immediately if symptoms persist. Pt has no other complaints today.  Past Medical History:   Diagnosis Date    Asthma      Social History     Social History    Marital status: Single     Spouse name: N/A    Number of children: N/A    Years of education: N/A     Occupational History         Social History Main Topics    Smoking status: Former Smoker    Smokeless tobacco: Never Used    Alcohol use No    Drug use: No    Sexual activity: Not on file     History reviewed. No pertinent surgical history.    HPI  Review of Systems   Constitutional: Negative.    HENT: Negative.    Eyes: Negative.    Respiratory: Positive for cough, chest tightness, shortness of breath and wheezing.    Cardiovascular: Negative.    Gastrointestinal: Negative.    Endocrine: Negative.    Genitourinary: Negative.    Musculoskeletal: Negative.    Skin: Positive for color change (Lips this AM).   Allergic/Immunologic: Negative.    Neurological: Negative.    Psychiatric/Behavioral: Negative.        Objective:      Physical Exam   Constitutional: He is oriented to person, place, and time. He appears well-developed  and well-nourished.   HENT:   Head: Normocephalic.   Right Ear: External ear normal.   Left Ear: External ear normal.   Nose: Nose normal.   Mouth/Throat: Oropharynx is clear and moist.   Eyes: Conjunctivae are normal. Pupils are equal, round, and reactive to light.   Neck: Normal range of motion. Neck supple.   Cardiovascular: Normal rate, regular rhythm and normal heart sounds.    Pulmonary/Chest: Effort normal and breath sounds normal.   Abdominal: Soft. Bowel sounds are normal.   Musculoskeletal: Normal range of motion.   Neurological: He is alert and oriented to person, place, and time.   Skin: Skin is warm and dry.   Psychiatric: He has a normal mood and affect. His behavior is normal. Judgment and thought content normal.   Nursing note and vitals reviewed.      Assessment:       1. Follow up    2. Mild persistent asthma with acute exacerbation    3. Chest pain, unspecified type    4. SOB (shortness of breath)    5. Cyanosis    6. Asthma with severe asthma attack    7. Tachycardia        Plan:           Reymundo was seen today for follow-up.    Diagnoses and all orders for this visit:    Follow up  Mild persistent asthma with acute exacerbation  Chest pain, unspecified type  SOB (shortness of breath)  Cyanosis  Comments:  Lips purple  Asthma with severe asthma attack  Tachycardia  -     CBC auto differential; Future  -     IN OFFICE EKG 12-LEAD (to [place])  -     Ambulatory referral to Pulmonology  -     CT Chest W Wo Contrast; Future  -     Basic metabolic panel; Future  -     TSH; Future  -     fluticasone-salmeterol 250-50 mcg/dose (ADVAIR) 250-50 mcg/dose diskus inhaler; Inhale 1 puff into the lungs 2 (two) times daily. Controller  Report to ER immediately if symptoms persist or worsen  Continue current medication

## 2017-09-11 NOTE — LETTER
September 11, 2017      [facility]  [address]] [address]  Phone: [phone]  Fax: [phone]       Patient: Reymundo Gutierrez   YOB: 1975  Date of Visit: 09/11/2017    To Whom It May Concern:    Jayson Gutierrez  was at [facility] on 09/11/2017. He may return to work/school on 09/12/17 with no restrictions. If you have any questions or concerns, or if I can be of further assistance, please do not hesitate to contact me.    Sincerely,    Taylor Baker LPN

## 2017-09-14 DIAGNOSIS — J01.10 ACUTE FRONTAL SINUSITIS, RECURRENCE NOT SPECIFIED: ICD-10-CM

## 2017-09-14 RX ORDER — ALBUTEROL SULFATE 90 UG/1
2 AEROSOL, METERED RESPIRATORY (INHALATION) EVERY 6 HOURS PRN
Qty: 18 G | Refills: 12 | Status: SHIPPED | OUTPATIENT
Start: 2017-09-14 | End: 2019-05-02

## 2017-09-14 NOTE — TELEPHONE ENCOUNTER
----- Message from Landy Pereira sent at 9/14/2017 11:08 AM CDT -----  Contact: WifeTarik DavilaMyoorb-[phone]   Would like a rx called in for inhale, pt is currently out.  Please call back at [phone].  Thx-AMH           Pt Uses:  .  [facility]  [address]   dameon Hebert

## 2017-11-06 ENCOUNTER — OFFICE VISIT (OUTPATIENT)
Dept: FAMILY MEDICINE | Facility: CLINIC | Age: 42
End: 2017-11-06
Payer: COMMERCIAL

## 2017-11-06 VITALS
WEIGHT: 196 LBS | TEMPERATURE: 98 F | HEART RATE: 89 BPM | HEIGHT: 70 IN | BODY MASS INDEX: 28.06 KG/M2 | DIASTOLIC BLOOD PRESSURE: 80 MMHG | SYSTOLIC BLOOD PRESSURE: 119 MMHG

## 2017-11-06 DIAGNOSIS — J32.9 SINUSITIS, UNSPECIFIED CHRONICITY, UNSPECIFIED LOCATION: Primary | ICD-10-CM

## 2017-11-06 PROCEDURE — 99213 OFFICE O/P EST LOW 20 MIN: CPT | Mod: 25,S$GLB,, | Performed by: NURSE PRACTITIONER

## 2017-11-06 PROCEDURE — 96372 THER/PROPH/DIAG INJ SC/IM: CPT | Mod: S$GLB,,, | Performed by: FAMILY MEDICINE

## 2017-11-06 PROCEDURE — 99999 PR PBB SHADOW E&M-EST. PATIENT-LVL III: CPT | Mod: PBBFAC,,, | Performed by: NURSE PRACTITIONER

## 2017-11-06 RX ORDER — DEXAMETHASONE SODIUM PHOSPHATE 4 MG/ML
4 INJECTION, SOLUTION INTRA-ARTICULAR; INTRALESIONAL; INTRAMUSCULAR; INTRAVENOUS; SOFT TISSUE
Status: COMPLETED | OUTPATIENT
Start: 2017-11-06 | End: 2017-11-06

## 2017-11-06 RX ORDER — METHYLPREDNISOLONE 4 MG/1
TABLET ORAL
Qty: 1 PACKAGE | Refills: 0 | Status: SHIPPED | OUTPATIENT
Start: 2017-11-06 | End: 2017-11-06 | Stop reason: ALTCHOICE

## 2017-11-06 RX ADMIN — DEXAMETHASONE SODIUM PHOSPHATE 4 MG: 4 INJECTION, SOLUTION INTRA-ARTICULAR; INTRALESIONAL; INTRAMUSCULAR; INTRAVENOUS; SOFT TISSUE at 11:11

## 2017-11-06 NOTE — PROGRESS NOTES
Subjective:       Patient ID: Reymundo Gutierrez is a 42 y.o. male.    Chief Complaint: Sinus Problem    Sinus Problem   This is a new problem. The current episode started in the past 7 days. The problem has been gradually worsening since onset. There has been no fever. The pain is mild. Associated symptoms include congestion, coughing, headaches and sinus pressure. Pertinent negatives include no ear pain, shortness of breath or sore throat. Treatments tried: mucinex. The treatment provided no relief.       Review of Systems   Constitutional: Negative for fatigue, fever and unexpected weight change.   HENT: Positive for congestion and sinus pressure. Negative for ear pain and sore throat.    Eyes: Negative.  Negative for pain and visual disturbance.   Respiratory: Positive for cough. Negative for shortness of breath.    Cardiovascular: Negative for chest pain and palpitations.   Gastrointestinal: Negative for abdominal pain, diarrhea, nausea and vomiting.   Genitourinary: Negative for dysuria and frequency.   Musculoskeletal: Negative for arthralgias and myalgias.   Skin: Negative for color change and rash.   Neurological: Positive for headaches. Negative for dizziness.   Psychiatric/Behavioral: Negative for sleep disturbance. The patient is not nervous/anxious.        Vitals:    11/06/17 1124   BP: 119/80   Pulse: 89   Temp: 98.4 °F (36.9 °C)       Objective:     Current Outpatient Prescriptions   Medication Sig Dispense Refill    albuterol 90 mcg/actuation inhaler Inhale 2 puffs into the lungs every 6 (six) hours as needed for Wheezing. 18 g 12    fluticasone-salmeterol 250-50 mcg/dose (ADVAIR) 250-50 mcg/dose diskus inhaler Inhale 1 puff into the lungs 2 (two) times daily. Controller 60 each 0    pseudoephedrine (SUDAFED) 60 MG tablet Take 60 mg by mouth every 4 (four) hours as needed for Congestion.       No current facility-administered medications for this visit.        Physical Exam   Constitutional: He  is oriented to person, place, and time. He appears well-developed. No distress.   HENT:   Head: Normocephalic and atraumatic.   Right Ear: Tympanic membrane is bulging.   Left Ear: Tympanic membrane is bulging.   Nose: Mucosal edema and rhinorrhea present.   Mouth/Throat: Posterior oropharyngeal edema (post nasal mucus) present.   Eyes: EOM are normal. Pupils are equal, round, and reactive to light.   Neck: Normal range of motion. Neck supple.   Cardiovascular: Normal rate and regular rhythm.    Pulmonary/Chest: Effort normal and breath sounds normal.   Musculoskeletal: Normal range of motion.   Neurological: He is alert and oriented to person, place, and time.   Skin: Skin is warm and dry. No rash noted.   Psychiatric: He has a normal mood and affect. Thought content normal.   Nursing note and vitals reviewed.      Assessment:       1. Sinusitis, unspecified chronicity, unspecified location        Plan:   Sinusitis, unspecified chronicity, unspecified location  -     dexamethasone injection 4 mg; Inject 1 mL (4 mg total) into the muscle one time.    Other orders  -     Discontinue: methylPREDNISolone (MEDROL DOSEPACK) 4 mg tablet; use as directed  Dispense: 1 Package; Refill: 0        Return if symptoms worsen or fail to improve.

## 2017-11-30 ENCOUNTER — OFFICE VISIT (OUTPATIENT)
Dept: FAMILY MEDICINE | Facility: CLINIC | Age: 42
End: 2017-11-30
Payer: COMMERCIAL

## 2017-11-30 VITALS
DIASTOLIC BLOOD PRESSURE: 83 MMHG | HEART RATE: 93 BPM | HEIGHT: 70 IN | TEMPERATURE: 98 F | SYSTOLIC BLOOD PRESSURE: 138 MMHG | WEIGHT: 207 LBS | BODY MASS INDEX: 29.63 KG/M2

## 2017-11-30 DIAGNOSIS — J01.01 ACUTE RECURRENT MAXILLARY SINUSITIS: Primary | ICD-10-CM

## 2017-11-30 PROCEDURE — 99999 PR PBB SHADOW E&M-EST. PATIENT-LVL III: CPT | Mod: PBBFAC,,, | Performed by: NURSE PRACTITIONER

## 2017-11-30 PROCEDURE — 99213 OFFICE O/P EST LOW 20 MIN: CPT | Mod: S$GLB,,, | Performed by: NURSE PRACTITIONER

## 2017-11-30 RX ORDER — AMOXICILLIN AND CLAVULANATE POTASSIUM 875; 125 MG/1; MG/1
1 TABLET, FILM COATED ORAL EVERY 12 HOURS
Qty: 20 TABLET | Refills: 0 | Status: SHIPPED | OUTPATIENT
Start: 2017-11-30 | End: 2017-12-10

## 2017-11-30 NOTE — PATIENT INSTRUCTIONS

## 2017-11-30 NOTE — PROGRESS NOTES
"Subjective:      Patient ID: Reymundo Gutierrez is a 42 y.o. male.    Chief Complaint: Sinus Problem    Sinus Problem   This is a recurrent problem. The current episode started more than 1 year ago. The problem has been waxing and waning since onset. There has been no fever. The pain is moderate. Associated symptoms include congestion, coughing (productive of green sputum), ear pain, headaches, a hoarse voice and sinus pressure. Pertinent negatives include no chills, diaphoresis, shortness of breath or sore throat. Past treatments include antibiotics and oral decongestants. The treatment provided mild relief.     Review of Systems   Constitutional: Negative for chills, diaphoresis, fatigue and fever.   HENT: Positive for congestion, ear pain, hoarse voice, postnasal drip, rhinorrhea, sinus pain and sinus pressure. Negative for facial swelling and sore throat.    Eyes: Positive for discharge and redness.   Respiratory: Positive for cough (productive of green sputum). Negative for shortness of breath and wheezing.    Cardiovascular: Negative for chest pain, palpitations and leg swelling.   Gastrointestinal: Negative.    Endocrine: Negative.    Genitourinary: Negative.    Musculoskeletal: Negative.    Skin: Negative for rash and wound.   Neurological: Positive for headaches. Negative for weakness and numbness.   Psychiatric/Behavioral: The patient is not nervous/anxious.        Objective:     /83   Pulse 93   Temp 98.3 °F (36.8 °C) (Oral)   Ht 5' 10" (1.778 m)   Wt 93.9 kg (207 lb 0.2 oz)   BMI 29.70 kg/m²     Physical Exam   Constitutional: He is oriented to person, place, and time. He appears well-developed and well-nourished.   HENT:   Head: Normocephalic.   Right Ear: Tympanic membrane is injected and retracted.   Left Ear: Tympanic membrane is injected, erythematous and bulging. A middle ear effusion is present.   Nose: Mucosal edema (Nasal mucosa erythematous and boggy with exudate.) and rhinorrhea " present. Right sinus exhibits maxillary sinus tenderness. Right sinus exhibits no frontal sinus tenderness. Left sinus exhibits maxillary sinus tenderness. Left sinus exhibits no frontal sinus tenderness.   Mouth/Throat: Posterior oropharyngeal edema (post nasal drainage) present. No oropharyngeal exudate or posterior oropharyngeal erythema.   Eyes: Pupils are equal, round, and reactive to light.   Cardiovascular: Normal rate, regular rhythm and normal heart sounds.    Pulmonary/Chest: Effort normal and breath sounds normal. No respiratory distress. He has no wheezes. He has no rales.   Lymphadenopathy:     He has no cervical adenopathy.   Neurological: He is alert and oriented to person, place, and time.   Skin: Skin is warm and dry. No rash noted.   Psychiatric: He has a normal mood and affect. His behavior is normal. Judgment and thought content normal.   Vitals reviewed.    Assessment:     1. Acute recurrent maxillary sinusitis        Plan:   Acute recurrent maxillary sinusitis  -     Ambulatory referral to ENT  -     brompheniramine-pseudoephedrine-dextromethorphan (DIMETAPP DM) 1-15-5 mg/5 mL Elix; Take 15 mLs by mouth every 6 (six) hours as needed.  Dispense: 600 mL; Refill: 0  -     amoxicillin-clavulanate 875-125mg (AUGMENTIN) 875-125 mg per tablet; Take 1 tablet by mouth every 12 (twelve) hours.  Dispense: 20 tablet; Refill: 0    Symptom care discussed and educational handout provided  Report to ER if symptoms worsen    Return if symptoms worsen or fail to improve.

## 2017-12-01 ENCOUNTER — TELEPHONE (OUTPATIENT)
Dept: FAMILY MEDICINE | Facility: CLINIC | Age: 42
End: 2017-12-01

## 2017-12-01 NOTE — TELEPHONE ENCOUNTER
----- Message from Celeste Medina sent at 11/30/2017  4:48 PM CST -----  Contact: Lola (pt's s/o)   Lola called and stated she needed to speak to the nurse. She stated that she is at the pharmacy and they do not have the Dimetapp Dm in stock and the pt needs an alternate medication.     [facility] [id number]  [facility] - [address]  [address]  [place] [address]  Phone: [phone] Fax: [phone]    She can be reached at [phone].    Thanks,  TF

## 2017-12-06 ENCOUNTER — OFFICE VISIT (OUTPATIENT)
Dept: OTOLARYNGOLOGY | Facility: CLINIC | Age: 42
End: 2017-12-06
Payer: COMMERCIAL

## 2017-12-06 VITALS
HEIGHT: 70 IN | WEIGHT: 213.38 LBS | BODY MASS INDEX: 30.55 KG/M2 | HEART RATE: 97 BPM | SYSTOLIC BLOOD PRESSURE: 148 MMHG | DIASTOLIC BLOOD PRESSURE: 102 MMHG

## 2017-12-06 DIAGNOSIS — G50.1 ATYPICAL FACE PAIN: ICD-10-CM

## 2017-12-06 DIAGNOSIS — K21.9 LPRD (LARYNGOPHARYNGEAL REFLUX DISEASE): ICD-10-CM

## 2017-12-06 DIAGNOSIS — J32.9 RECURRENT SINUS INFECTIONS: Primary | ICD-10-CM

## 2017-12-06 DIAGNOSIS — R09.89 CHRONIC THROAT CLEARING: ICD-10-CM

## 2017-12-06 DIAGNOSIS — G89.29 CHRONIC NONINTRACTABLE HEADACHE, UNSPECIFIED HEADACHE TYPE: ICD-10-CM

## 2017-12-06 DIAGNOSIS — R51.9 CHRONIC NONINTRACTABLE HEADACHE, UNSPECIFIED HEADACHE TYPE: ICD-10-CM

## 2017-12-06 PROCEDURE — 99999 PR PBB SHADOW E&M-EST. PATIENT-LVL IV: CPT | Mod: PBBFAC,,, | Performed by: NURSE PRACTITIONER

## 2017-12-06 PROCEDURE — 99214 OFFICE O/P EST MOD 30 MIN: CPT | Mod: S$GLB,,, | Performed by: NURSE PRACTITIONER

## 2017-12-06 NOTE — LETTER
December 6, 2017      Fco Bonner NP  [address] [address] [address]           [place] [facility]  [address] [facility] [address] [place] [address]  Phone: [phone]  Fax: [phone]          Patient: Reymundo Gutierrez   MR Number: [id number]   YOB: 1975   Date of Visit: 12/6/2017       Dear Fco Bonner:    Thank you for referring Reymundo Gutierrez to me for evaluation. Attached you will find relevant portions of my assessment and plan of care.    If you have questions, please do not hesitate to call me. I look forward to following Reymundo Gutierrez along with you.    Sincerely,    Dimple Baez NP    Enclosure  CC:  No Recipients    If you would like to receive this communication electronically, please contact [email or web address] or [phone] to request more information on [facility] Link access.    For providers and/or their staff who would like to refer a patient to [facility], please contact us through our one-stop-shop provider referral line, Siddharth Chou, at [phone].    If you feel you have received this communication in error or would no longer like to receive these types of communications, please e-mail [email or web address]

## 2017-12-06 NOTE — PATIENT INSTRUCTIONS
We are going to obtain a CT scan of your sinuses to find out whether your symptoms are due to your sinuses.   If your sinuses are shown to be open and clear on the CT scan, then depending on your other symptoms, the best specialist to resolve your symptoms may be pulmonologist, allergist, neurologist or gastroenterologist.     1. Nasal allergies -- Typical constellation of symptoms seen with nasal allergies: itchy, red, watery eyes; itchy, red, watery nose; excessive sneezing; excessive stuffiness. See an allergist or take daily allergy medications.     2. Silent reflux -- Typical constellation of symptoms seen with silent reflux: post-nasal drip sensation with absence of significant runny nose or nasal congestion, sensation of thick or too much mucus in the back of throat, raspy voice, frequent throat clearing, lump in the back of throat, frequent sore throats. See a gastroenterologist or take daily reflux medications.     3. Sinus Infection -- Typical constellation of symptoms seen with acute bacterial sinus infection are:  Green-gold, foul-smelling, foul-tasting mucus from nose and throat, inability to breathe through nose, inability to smell or taste well, facial pain and swelling, dental pain, headaches around eyes, sore throat and productive cough. Sinus imaging is needed to rule out infection.    4. Pulmonary (Lung) issue -- See a pulmonologist (lung specialist) to manage your asthma/breathing issues.    5. Neurologist (headache specialist) -- If your sinuses are clear, yet you are having facial pain and headaches, then you need to see one of our neurologists who specialize in headaches.        Last time you were here, your scope exam revealed evidence of acid reflux around the back of your vocal cords. This is How Acid Reflux Affects Your Throat:    Do you have to clear your throat or cough often? Are you hoarse? Do you have trouble swallowing? If you have these or other throat symptoms, you may have acid  "reflux. This occurs when stomach acid flows back up and irritates your throat.  Why you have throat symptoms  There are muscles (esophageal sphincters) at both ends of the tube that carries food to your stomach (the esophagus). These muscles relax to let food pass. Then they tighten to keep stomach acid down. When the lower esophageal sphincter (LES) doesnt tighten enough, acid can flow back (reflux) from your stomach into your esophagus. This may cause heartburn. In some cases the upper esophageal sphincter (UES) also doesnt work well. Then acid can travel higher and enter your throat (pharynx). In many cases, this causes throat symptoms.  Common throat symptoms  · Need to clear your throat often  · Feeling like youre choking  · Long-term (chronic) cough  · Hoarseness  · Trouble swallowing  · Feel like you have a lump in your throat  · Sour or acid taste  · Sore throat that keeps coming back     LARYNGOPHARYNGEAL REFLUX  (SILENT OR ATYPICAL REFLUX)    If you have any of the following symptoms you may have laryngopharyngeal reflux (LPR):  hoarseness, thick or too much mucus, chronic throat pain/irritation, chronic throat clearing, chronic cough, especially cough that wake you up from sleep, chronic "postnasal drip" without the need to blow your nose.     Many people with LPR do not have symptoms of heartburn. Compared to the esophagus, the voice box and the back of the throat are significantly more sensitive to the effects of acid on surrounding tissue. Acid passing quickly through the esophagus does not have a chance to irritate the area for too long.  However acid that pools in the throat or voice box can cause prolonged irritation resulting in the symptoms of LPR.    The symptoms of LPR can consist of a dry cough and the sensation of something being stuck in the throat.  Some people will complain of heartburn while others may have intermittent hoarseness or loss of voice.  Another major symptom of LPR is " ""postnasal drip."  Patients are often told symptoms are due to abnormal nasal drainage or sinus infection; however this is rarely the cause of chronic throat irritation. For post nasal drip to cause the complaints described, signs and symptoms of an active nasal infection should be present.     Treatments for LPR include:  postural changes, weight reduction, diet modification, medication to reduce stomach acid and promote normal motility, and surgery to prevent reflux. Most patients will begin to notice some relief in her symptoms about 2 weeks after starting the medication; however it is generally recommended the medication should be continued for 2 months. If the symptoms completely resolve, the medication can then be tapered.  Some people will remain symptom free while others may have relapses which required treatment again.    Things you can do to prevent reflux include:  Do not smoke.  Smoking will cause reflux.  Avoid tight fitting clothes or belts around the waist.  Avoid eating at least 2 hours prior to bedtime.  In fact avoid eating your largest meal at night.  Weight loss.  For patient's with recent weight gain, shedding a few pounds is all that is required to improve reflux.  Avoid caffeine, cola beverages, citrus beverages, mints, alcoholic beverages, particularly at night, cheese, fried foods, spicy foods, eggs, and chocolate.  Sleep with the head of bed elevated at least 6 inches.    Recommendations:    Take Nexium / Prilosec (PPI) every morning on an empty stomach (30-60 minutes before eating) 40 MG.   At bedtime take Zantac (H2-blocker) 150-300 mg.    After 4-8 weeks, with significant symptomatic improvement, you may begin weaning your reflux medications down:  Nexium / Prilosec 40 mg --> to 20 mg (over-the-counter strength)  Zantac 300 mg --> to 150 mg (over-the-counter stength)  See a Gastro doctor (GI) for refractory symptoms and continued management.      For sinus relief, there are DIFFERENT " "TYPES OF NASAL SPRAYS prescribed:  · Flonase / fluticasone (steroid spray) is best for stuffy, pressure, fullness.  · Astelin / azelastine (antihistamine spray) is best for itchy, drippy, sneezy.  · Atrovent / ipratropium is best for chronic watery nasal drip ("leaky faucet" nose), which may worsen with eating.    Nasal spray instructions:  Blow nose first gently to clean. Keep chin level with the floor (do not tilt head forward or back). Insert nasal spray taking caution to direct it AWAY from the middle wall inside the nose (septum) to avoid irritating nasal septum which could cause nosebleed.  Do not tilt spray up but rather flat and out along the roof of your mouth to spray. Angle the tip of the spray out slightly toward the direction of the ears; then spray. Do not take quick vigorous sniff but rather slow gentle inhalation while waiting for medication to absorb into nasal passages. Then administer second spray in same way.     Nasal saline rinse kit (use Neti pot or [employer] sinus rinse kit) -- use sterile water (boiled tap water which has cooled) or distilled bottled water. Add 1/4 teaspoon sea salt and a pinch of baking soda or a mixture packet from the maker of your sinus rinse kit.  Rinse through both sides of nose to cleanse sinus and nasal passages, bending forward with head tilted down. Keep your mouth open, without holding your breath. Squeeze bottle gently until solution starts draining from the opposite nasal passage. After bottle is empty, blow nose very gently, without pinching nose completely, to avoid pressure on eardrums.      Ponaris Nasal Emollient is used for the relief of: nasal congestion due to colds, nasal irritation, allergy exacerbations, nasal crusting. Specifically prepared iodized organic oils of pine, eucalyptus, peppermint, cajeput, and cottonseed. To order Ponaris: ask your pharmacist to order it for you or we carry it in our pharmacy downstairs on the first floor.             "

## 2017-12-06 NOTE — PROGRESS NOTES
Subjective:       Patient ID: Reymundo Gutierrez is a 42 y.o. male.    Chief Complaint: No chief complaint on file.    HPI   Patient seen by me 10 months ago for recurrent sinus infections. Sinus imaging was negative for sinusitis at that time. He has continued to take antibiotics recurrently for sinus infections. He has been prescribed 5 antibiotics so far this year for sinusitis.   His symptoms include: facial pain maxillary bilateral, headaches frontal and occipital, fatigue, nasal congestion, decreased appetite, PND, throat clearing, cough, choking. Taking Tylenol Sinus and Sudafed.     Review of Systems   Constitutional: Positive for appetite change (decrease) and fatigue.   HENT: Positive for congestion, postnasal drip, sinus pain and sinus pressure. Negative for facial swelling, rhinorrhea, sneezing and sore throat.         Face pain, bilateral maxillary  Constant throat clearing   Eyes: Negative.  Negative for discharge, redness and itching.   Respiratory: Positive for cough and choking.    Cardiovascular: Negative.    Gastrointestinal: Negative.    Musculoskeletal: Negative.    Skin: Negative.    Neurological: Positive for headaches (frontal).   Hematological: Negative.    Psychiatric/Behavioral: Negative.        Objective:      Physical Exam   Constitutional: He is oriented to person, place, and time. Vital signs are normal. He appears well-developed and well-nourished. He is cooperative. He does not appear ill. No distress.   HENT:   Head: Normocephalic and atraumatic.   Right Ear: Hearing, tympanic membrane, external ear and ear canal normal. Tympanic membrane is not erythematous. No middle ear effusion.   Left Ear: Hearing, tympanic membrane, external ear and ear canal normal. Tympanic membrane is not erythematous.  No middle ear effusion.   Nose: Mucosal edema and septal deviation present. No rhinorrhea. Right sinus exhibits maxillary sinus tenderness and frontal sinus tenderness. Left sinus exhibits  maxillary sinus tenderness and frontal sinus tenderness.   Mouth/Throat: Uvula is midline, oropharynx is clear and moist and mucous membranes are normal. Mucous membranes are not pale, not dry and not cyanotic. No oral lesions. No oropharyngeal exudate, posterior oropharyngeal edema or posterior oropharyngeal erythema.   Eyes: Conjunctivae, EOM and lids are normal. Pupils are equal, round, and reactive to light. Right eye exhibits no discharge. Left eye exhibits no discharge. No scleral icterus.   Neck: Trachea normal and normal range of motion. Neck supple. No tracheal deviation present. No thyroid mass and no thyromegaly present.   Cardiovascular: Normal rate.    Pulmonary/Chest: Effort normal. No stridor. No respiratory distress. He has no wheezes.   Musculoskeletal: Normal range of motion.   Lymphadenopathy:        Head (right side): No submental, no submandibular, no tonsillar, no preauricular and no posterior auricular adenopathy present.        Head (left side): No submental, no submandibular, no tonsillar, no preauricular and no posterior auricular adenopathy present.     He has no cervical adenopathy.        Right cervical: No superficial cervical and no posterior cervical adenopathy present.       Left cervical: No superficial cervical and no posterior cervical adenopathy present.   Neurological: He is alert and oriented to person, place, and time. He has normal strength. Coordination and gait normal.   Skin: Skin is warm, dry and intact. No lesion and no rash noted. He is not diaphoretic. No cyanosis. No pallor.   Psychiatric: He has a normal mood and affect. His speech is normal and behavior is normal. Judgment and thought content normal. Cognition and memory are normal.   Nursing note and vitals reviewed.      Assessment:     Chronic rhinitis    LPRD/GERD  Recurrent sinusitis (sinus imaging obtained today)   Plan:     Continue Flonase & Astelin BID. Daily nasal saline rinsing.     CT Maxillofacial/Sinus  -- will call patient with results as soon as available.   Laryngoscope photos reviewed from February. Recommend GI. Handouts given on LPRD and GERD, and discussed at length with patient.   If CT sinus negative, will need to see neurologist for headache/face pain.   Greater than 50% face-to-face counseling of 35 minute visit spent in review of all of above.

## 2017-12-15 ENCOUNTER — HOSPITAL ENCOUNTER (OUTPATIENT)
Dept: RADIOLOGY | Facility: HOSPITAL | Age: 42
Discharge: HOME | End: 2017-12-15
Attending: NURSE PRACTITIONER
Payer: COMMERCIAL

## 2017-12-15 DIAGNOSIS — J32.9 RECURRENT SINUS INFECTIONS: ICD-10-CM

## 2017-12-15 PROCEDURE — 70486 CT MAXILLOFACIAL W/O DYE: CPT | Mod: TC,PO

## 2017-12-15 PROCEDURE — 70486 CT MAXILLOFACIAL W/O DYE: CPT | Mod: 26,,, | Performed by: RADIOLOGY

## 2019-04-17 ENCOUNTER — TELEPHONE (OUTPATIENT)
Dept: ORTHOPEDICS | Facility: CLINIC | Age: 44
End: 2019-04-17

## 2019-04-17 NOTE — TELEPHONE ENCOUNTER
----- Message from Teresa Whittaker sent at 4/17/2019 12:37 PM CDT -----  Type:  Sooner Apoointment Request    Caller is requesting a sooner appointment.   Name of Caller:  Deanna   When is the first available appointment?     Symptoms:  Former pt   Best Call Back Number:  [phone] Additional Information:  Worker comp

## 2019-05-02 ENCOUNTER — OFFICE VISIT (OUTPATIENT)
Dept: ORTHOPEDICS | Facility: CLINIC | Age: 44
End: 2019-05-02
Payer: COMMERCIAL

## 2019-05-02 VITALS
HEIGHT: 70 IN | WEIGHT: 213.38 LBS | BODY MASS INDEX: 30.55 KG/M2 | SYSTOLIC BLOOD PRESSURE: 143 MMHG | HEART RATE: 99 BPM | DIASTOLIC BLOOD PRESSURE: 95 MMHG

## 2019-05-02 DIAGNOSIS — M24.532 CONTRACTURE OF LEFT WRIST JOINT: Primary | ICD-10-CM

## 2019-05-02 PROCEDURE — 99213 OFFICE O/P EST LOW 20 MIN: CPT | Mod: S$GLB,,, | Performed by: ORTHOPAEDIC SURGERY

## 2019-05-02 PROCEDURE — 99999 PR PBB SHADOW E&M-EST. PATIENT-LVL III: CPT | Mod: PBBFAC,,, | Performed by: ORTHOPAEDIC SURGERY

## 2019-05-02 PROCEDURE — 99213 PR OFFICE/OUTPT VISIT, EST, LEVL III, 20-29 MIN: ICD-10-PCS | Mod: S$GLB,,, | Performed by: ORTHOPAEDIC SURGERY

## 2019-05-02 PROCEDURE — 99999 PR PBB SHADOW E&M-EST. PATIENT-LVL III: ICD-10-PCS | Mod: PBBFAC,,, | Performed by: ORTHOPAEDIC SURGERY

## 2019-05-02 RX ORDER — PROMETHAZINE HYDROCHLORIDE 25 MG/1
25 TABLET ORAL
COMMUNITY
Start: 2019-03-18 | End: 2019-12-03 | Stop reason: ALTCHOICE

## 2019-05-02 RX ORDER — DULOXETIN HYDROCHLORIDE 60 MG/1
60 CAPSULE, DELAYED RELEASE ORAL
COMMUNITY
Start: 2019-03-25 | End: 2019-12-03 | Stop reason: ALTCHOICE

## 2019-05-02 RX ORDER — KETOCONAZOLE 20 MG/ML
SHAMPOO, SUSPENSION TOPICAL
COMMUNITY
Start: 2018-12-17 | End: 2019-12-03 | Stop reason: ALTCHOICE

## 2019-05-02 RX ORDER — FLUTICASONE PROPIONATE 50 MCG
1 SPRAY, SUSPENSION (ML) NASAL
COMMUNITY
Start: 2019-01-07 | End: 2019-09-30 | Stop reason: SDUPTHER

## 2019-05-02 RX ORDER — ALBUTEROL SULFATE 0.83 MG/ML
2.5 SOLUTION RESPIRATORY (INHALATION)
COMMUNITY
Start: 2017-05-21 | End: 2021-02-25 | Stop reason: SDUPTHER

## 2019-05-03 NOTE — PROGRESS NOTES
Mr. Gutierrez returns to clinic today.  Has a history of a left hand crush injury with contractures of fingers and his wrist.  He is here today for further evaluation.  He is still having some pain to his wrist as well as dysfunction in the hand    Physical exam:  Examination of the left wrist and hand reveals that he does have well-healed incisions overlying the wrist and hand.  He still holds his hand in a slightly flexed position.  His wrist is also contracted in approximately 30° of flexion. He is able to flex his fingers to within 1 cm of his distal palmar crease but lacks full extension by approximately 20-30 degrees. He is very limited to no range of motion of the wrist. He does report grossly intact sensation in the median radial and ulnar distributions.  He has capillary refill less than 2 sec in his fingers.    Assessment:  Left wrist contracture post crush injury    Plan:    1.  I have discussed long-term plans with the patient. I have discussed the fact that I feel that he is nearing the end of his treatment possibilities.  The only further treatment that I feel may be of any benefit to him would be a wrist arthrodesis with his wrist in 5-10 degrees of extension rather than in 30° of flexion.  I feel that this may improve his overall function of his hand but that this would be a limited improvement.  He states that he would like to consider surgery at a further date and therefore we will continue to follow this    2.  To follow up with me in approximately 2-3 months.

## 2019-05-07 ENCOUNTER — PATIENT MESSAGE (OUTPATIENT)
Dept: ORTHOPEDICS | Facility: CLINIC | Age: 44
End: 2019-05-07

## 2019-07-10 ENCOUNTER — OFFICE VISIT (OUTPATIENT)
Dept: ORTHOPEDICS | Facility: CLINIC | Age: 44
End: 2019-07-10
Payer: COMMERCIAL

## 2019-07-10 ENCOUNTER — HOSPITAL ENCOUNTER (OUTPATIENT)
Dept: RADIOLOGY | Facility: HOSPITAL | Age: 44
Discharge: HOME | End: 2019-07-10
Attending: ORTHOPAEDIC SURGERY
Payer: COMMERCIAL

## 2019-07-10 VITALS
BODY MASS INDEX: 30.49 KG/M2 | DIASTOLIC BLOOD PRESSURE: 86 MMHG | SYSTOLIC BLOOD PRESSURE: 140 MMHG | WEIGHT: 213 LBS | HEIGHT: 70 IN | HEART RATE: 94 BPM

## 2019-07-10 DIAGNOSIS — S67.42XD CRUSHING INJURY OF LEFT WRIST AND HAND, SUBSEQUENT ENCOUNTER: Primary | ICD-10-CM

## 2019-07-10 DIAGNOSIS — M19.132 POST-TRAUMATIC ARTHRITIS OF WRIST, LEFT: ICD-10-CM

## 2019-07-10 DIAGNOSIS — S67.42XD CRUSHING INJURY OF LEFT WRIST AND HAND, SUBSEQUENT ENCOUNTER: ICD-10-CM

## 2019-07-10 PROBLEM — S67.42XA: Status: ACTIVE | Noted: 2019-07-10

## 2019-07-10 PROCEDURE — 99213 OFFICE O/P EST LOW 20 MIN: CPT | Mod: S$GLB,,, | Performed by: ORTHOPAEDIC SURGERY

## 2019-07-10 PROCEDURE — 99999 PR PBB SHADOW E&M-EST. PATIENT-LVL IV: ICD-10-PCS | Mod: PBBFAC,,, | Performed by: ORTHOPAEDIC SURGERY

## 2019-07-10 PROCEDURE — 99999 PR PBB SHADOW E&M-EST. PATIENT-LVL IV: CPT | Mod: PBBFAC,,, | Performed by: ORTHOPAEDIC SURGERY

## 2019-07-10 PROCEDURE — 73110 XR WRIST COMPLETE 3 VIEWS LEFT: ICD-10-PCS | Mod: 26,LT,, | Performed by: RADIOLOGY

## 2019-07-10 PROCEDURE — 73110 X-RAY EXAM OF WRIST: CPT | Mod: 26,LT,, | Performed by: RADIOLOGY

## 2019-07-10 PROCEDURE — 99213 PR OFFICE/OUTPT VISIT, EST, LEVL III, 20-29 MIN: ICD-10-PCS | Mod: S$GLB,,, | Performed by: ORTHOPAEDIC SURGERY

## 2019-07-10 PROCEDURE — 73110 X-RAY EXAM OF WRIST: CPT | Mod: TC,PO,LT

## 2019-07-10 RX ORDER — MUPIROCIN 20 MG/G
OINTMENT TOPICAL
Status: CANCELLED | OUTPATIENT
Start: 2019-07-10

## 2019-07-10 RX ORDER — LIDOCAINE HYDROCHLORIDE 10 MG/ML
1 INJECTION, SOLUTION EPIDURAL; INFILTRATION; INTRACAUDAL; PERINEURAL ONCE
Status: CANCELLED | OUTPATIENT
Start: 2019-07-10 | End: 2019-07-10

## 2019-07-10 NOTE — PATIENT INSTRUCTIONS
Surgery Instructions:     Your surgery is scheduled on 8/6/2019  at the [facility] [address] [facility] 1st floor, second entrance.    The pre-op department will be in contact with you prior to your procedure to review medications and instructions.       Nothing to eat or drink after midnight prior to day of surgery.    The surgery center will contact you the day prior to surgery to advise you of your arrival time for surgery.     Your post op appointment is scheduled on 8/21/2019 at 9:00 AM.

## 2019-07-10 NOTE — LETTER
Work Status Summary - Page 1 of 2  ______________________________________________________________________    Date :  July 10, 2019 Carrier :    To :  Fax # :    ______________________________________________________________________    Patient Name: Reymundo Gutierrez   YOB: 1975   Employer:    Occupation:    Date of Injury:    Diagnosis:    ______________________________________________________________________     [   ] ABLE to work (pre-injury work level / full duty)    [   ] NOT ABLE to work at present  Estimated release to return to work:      [   ] ABLE to work --- transitional duty (as follows):   [   ] Sedentary Work: Lifting 10 lbs. maximum and occasionally lifting and/or  carrying articles such as dockets, ledgers and small tools. Although a sedentary  job is defined as one which involves sitting, a certain amount of walking and  standing is often necessary in carrying out job duties. Jobs are sedentary if  walking and standing are required only occasionally and other sedentary criteria  are met.      [   ] Light Work: Lifting 20 lbs. maximum with frequent lifting and/or carrying of  objects weighing up to 10 lbs. Even though the weight lifted may be only a  negligible amount, a job is in this category when it requires walking or standing  to a significant degree, or when it involves sitting most of the time with a degree  of pushing and pulling of arm and/or leg controls.      [   ] Medium Work: Lifting 50 lbs. maximum with frequent lifting and/or carrying  of objects weighing up to 25 lbs.      [   ] Heavy Work: Lifting 100 lbs. maximum with frequent lifting and/or carrying  of objects weighing up to 50 lbs.      [   ] Very Heavy Work:  Lifting objects in excess of 100 lbs. with frequent lifting  and/or carrying of objects weighing 50 lbs or more.                   THERAPY RECOMMENDATIONS:   [   ] Physical Therapy     Visits per week:   Duration (in weeks):        [   ] Occupational  Therapy  Visits per week:   Duration (in weeks):        Recommended Follow Up:    Primary Care Physician in:   days General Surgeon in:   days   Orthopedist in:   days Ophthalmologist in:   days     Other:  (list other follow up recommendation here)   days     Prescribed Medications:       Comments:           ______________________________________________________________________  Provider Signature / Print Name / Date / Time       Work Status Summary - Page 2 of 2    Form No. 3291   (Rev 6/21/16)   [employer]

## 2019-07-10 NOTE — H&P
7/10/2019    Chief Complaint:  Chief Complaint   Patient presents with    Wrist Problem     fu left wrist : contracture after crush injury       HPI:  Reymundo Gutierrez is a 44 y.o. male, who presents to clinic today has a history of a crushing injury to his left hand and wrist. He has undergone significant courses of therapy and multiple surgical procedures to improve his pain and function.  He continues to have a flexion deformity of the wrist and significant pain in his wrist any time he attempts to flex or extend it.  He is here today for further evaluation and treatment options.    PMHX:  Past Medical History:   Diagnosis Date    Asthma        PSHX:  History reviewed. No pertinent surgical history.    FMHX:  History reviewed. No pertinent family history.    SOCHX:  Social History     Tobacco Use    Smoking status: Former Smoker    Smokeless tobacco: Never Used   Substance Use Topics    Alcohol use: No       ALLERGIES:  Patient has no known allergies.    CURRENT MEDICATIONS:  Current Outpatient Medications on File Prior to Visit   Medication Sig Dispense Refill    albuterol (PROVENTIL) 2.5 mg /3 mL (0.083 %) nebulizer solution Inhale 2.5 mg into the lungs.      DULoxetine (CYMBALTA) 60 MG capsule Take 60 mg by mouth.      fluticasone propionate (FLONASE ALLERGY RELIEF) 50 mcg/actuation nasal spray 1 spray by Nasal route.      ketoconazole (NIZORAL) 2 % shampoo Apply topically.      promethazine (PHENERGAN) 25 MG tablet Take 25 mg by mouth.      fluticasone-salmeterol 250-50 mcg/dose (ADVAIR) 250-50 mcg/dose diskus inhaler Inhale 1 puff into the lungs 2 (two) times daily. Controller 60 each 0     No current facility-administered medications on file prior to visit.        REVIEW OF SYSTEMS:  Review of Systems   Constitutional: Negative.    HENT: Negative for hearing loss, nosebleeds and sore throat.    Respiratory: Negative for shortness of breath and wheezing.    Cardiovascular: Negative for chest  "pain and palpitations.   Gastrointestinal: Negative for heartburn, nausea and vomiting.   Genitourinary: Negative for dysuria and urgency.   Skin: Negative.    Neurological: Negative for seizures, loss of consciousness and weakness.       GENERAL PHYSICAL EXAM:   BP (!) 140/86   Pulse 94   Ht 5' 10" (1.778 m)   Wt 96.6 kg (213 lb)   BMI 30.56 kg/m²    GEN: well developed, well nourished, no acute distress   HENT: Normocephalic, atraumatic   EYES: No discharge, conjunctiva normal   NECK: Supple, non-tender   PULM: No wheezing, no respiratory distress   CV: RRR   ABD: Soft, non-tender    ORTHO EXAM:   Examination of the left wrist and hand reveals that all wounds are well healed.  There is no edema.  There is no erythema.  Palpation produces mild tenderness over the carpus itself.  There is no other tenderness. He does have sensation which is intact in the median radial and ulnar distributions.  His capillary refill less than 2 sec in all the digits.  Wrist range of motion is limited.  It is -20 degrees of extension to 30° of flexion. This leaves him with approximately 10 degree arc of motion. The flex position of the wrist is causing difficulty with flexion of his fingers and tight grasp.    RADIOLOGY:   X-rays of the left wrist were taken in clinic today. He is noted have evidence of the previous fractures.  There is significant changes to the articular surfaces of the radial carpal joint. This is consistent with severe posttraumatic arthritis.    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  I have discussed treatment with the patient. I have discussed the possibility of arthrodesis of the left wrist in a more functional position to both improve function and to decrease pain. I had a long discussion with the patient about the risks associated with this procedure including nonunion of the fusion, recurrence of complex regional pain syndrome, failure of the plate " or screws, and no improvement in function or actually decrease in function.  The patient has expressed an understanding of all these risks and has provided informed consent    2.  I will proceed with left wrist arthrodesis under general anesthesia    3.  Will follow up with me 2 weeks postoperatively

## 2019-08-05 ENCOUNTER — ANESTHESIA EVENT (OUTPATIENT)
Dept: SURGERY | Facility: HOSPITAL | Age: 44
End: 2019-08-05
Payer: COMMERCIAL

## 2019-08-05 DIAGNOSIS — S67.42XD CRUSHING INJURY OF LEFT WRIST AND HAND, SUBSEQUENT ENCOUNTER: ICD-10-CM

## 2019-08-05 DIAGNOSIS — M25.532 LEFT WRIST PAIN: Primary | ICD-10-CM

## 2019-08-06 ENCOUNTER — HOSPITAL ENCOUNTER (OUTPATIENT)
Facility: HOSPITAL | Age: 44
Discharge: HOME | End: 2019-08-06
Attending: ORTHOPAEDIC SURGERY | Admitting: ORTHOPAEDIC SURGERY
Payer: COMMERCIAL

## 2019-08-06 ENCOUNTER — ANESTHESIA (OUTPATIENT)
Dept: SURGERY | Facility: HOSPITAL | Age: 44
End: 2019-08-06
Payer: COMMERCIAL

## 2019-08-06 ENCOUNTER — HOSPITAL ENCOUNTER (OUTPATIENT)
Dept: RADIOLOGY | Facility: HOSPITAL | Age: 44
Discharge: HOME | End: 2019-08-06
Attending: ORTHOPAEDIC SURGERY | Admitting: ORTHOPAEDIC SURGERY
Payer: COMMERCIAL

## 2019-08-06 DIAGNOSIS — M19.132 POST-TRAUMATIC ARTHRITIS OF WRIST, LEFT: ICD-10-CM

## 2019-08-06 DIAGNOSIS — M25.532 LEFT WRIST PAIN: ICD-10-CM

## 2019-08-06 DIAGNOSIS — S67.42XD CRUSHING INJURY OF LEFT WRIST AND HAND, SUBSEQUENT ENCOUNTER: ICD-10-CM

## 2019-08-06 PROCEDURE — C1713 ANCHOR/SCREW BN/BN,TIS/BN: HCPCS | Mod: PO | Performed by: ORTHOPAEDIC SURGERY

## 2019-08-06 PROCEDURE — 27200750 HC INSULATED NEEDLE/ STIMUPLEX: Mod: PO | Performed by: ANESTHESIOLOGY

## 2019-08-06 PROCEDURE — 76942 ECHO GUIDE FOR BIOPSY: CPT | Mod: PO | Performed by: ANESTHESIOLOGY

## 2019-08-06 PROCEDURE — 87075 CULTR BACTERIA EXCEPT BLOOD: CPT

## 2019-08-06 PROCEDURE — 25000003 PHARM REV CODE 250: Mod: PO | Performed by: ANESTHESIOLOGY

## 2019-08-06 PROCEDURE — 63600175 PHARM REV CODE 636 W HCPCS: Mod: PO | Performed by: NURSE ANESTHETIST, CERTIFIED REGISTERED

## 2019-08-06 PROCEDURE — D9220A PRA ANESTHESIA: Mod: ,,, | Performed by: ANESTHESIOLOGY

## 2019-08-06 PROCEDURE — 63600175 PHARM REV CODE 636 W HCPCS: Mod: PO | Performed by: ANESTHESIOLOGY

## 2019-08-06 PROCEDURE — 71000033 HC RECOVERY, INTIAL HOUR: Mod: PO | Performed by: ORTHOPAEDIC SURGERY

## 2019-08-06 PROCEDURE — 76000 FLUOROSCOPY <1 HR PHYS/QHP: CPT | Mod: TC,PO

## 2019-08-06 PROCEDURE — 36000709 HC OR TIME LEV III EA ADD 15 MIN: Mod: PO | Performed by: ORTHOPAEDIC SURGERY

## 2019-08-06 PROCEDURE — 27800903 OPTIME MED/SURG SUP & DEVICES OTHER IMPLANTS: Mod: PO | Performed by: ORTHOPAEDIC SURGERY

## 2019-08-06 PROCEDURE — 20680 REMOVAL OF IMPLANT DEEP: CPT | Mod: 51,,, | Performed by: ORTHOPAEDIC SURGERY

## 2019-08-06 PROCEDURE — 76942 ECHO GUIDE FOR BIOPSY: CPT | Mod: 26,,, | Performed by: ANESTHESIOLOGY

## 2019-08-06 PROCEDURE — 25000003 PHARM REV CODE 250: Mod: PO | Performed by: NURSE ANESTHETIST, CERTIFIED REGISTERED

## 2019-08-06 PROCEDURE — S0020 INJECTION, BUPIVICAINE HYDRO: HCPCS | Mod: PO | Performed by: ANESTHESIOLOGY

## 2019-08-06 PROCEDURE — 27200651 HC AIRWAY, LMA: Mod: PO | Performed by: NURSE ANESTHETIST, CERTIFIED REGISTERED

## 2019-08-06 PROCEDURE — 37000009 HC ANESTHESIA EA ADD 15 MINS: Mod: PO | Performed by: ORTHOPAEDIC SURGERY

## 2019-08-06 PROCEDURE — 71000015 HC POSTOP RECOV 1ST HR: Mod: PO | Performed by: ORTHOPAEDIC SURGERY

## 2019-08-06 PROCEDURE — 71000016 HC POSTOP RECOV ADDL HR: Mod: PO | Performed by: ORTHOPAEDIC SURGERY

## 2019-08-06 PROCEDURE — 37000008 HC ANESTHESIA 1ST 15 MINUTES: Mod: PO | Performed by: ORTHOPAEDIC SURGERY

## 2019-08-06 PROCEDURE — 25810 PR FUSION/GRAFT WRIST JOINT: ICD-10-PCS | Mod: LT,,, | Performed by: ORTHOPAEDIC SURGERY

## 2019-08-06 PROCEDURE — 87070 CULTURE OTHR SPECIMN AEROBIC: CPT

## 2019-08-06 PROCEDURE — 36000708 HC OR TIME LEV III 1ST 15 MIN: Mod: PO | Performed by: ORTHOPAEDIC SURGERY

## 2019-08-06 PROCEDURE — 64415 PR NERVE BLOCK INJ, ANES/STEROID, BRACHIAL PLEXUS, INCL IMAG GUIDANCE: ICD-10-PCS | Mod: 59,LT,, | Performed by: ANESTHESIOLOGY

## 2019-08-06 PROCEDURE — A4216 STERILE WATER/SALINE, 10 ML: HCPCS | Mod: PO | Performed by: ANESTHESIOLOGY

## 2019-08-06 PROCEDURE — 64415 NJX AA&/STRD BRCH PLXS IMG: CPT | Mod: 59,LT,, | Performed by: ANESTHESIOLOGY

## 2019-08-06 PROCEDURE — 20680 PR REMOVAL DEEP IMPLANT: ICD-10-PCS | Mod: 51,,, | Performed by: ORTHOPAEDIC SURGERY

## 2019-08-06 PROCEDURE — 76942 PR U/S GUIDANCE FOR NEEDLE GUIDANCE: ICD-10-PCS | Mod: 26,,, | Performed by: ANESTHESIOLOGY

## 2019-08-06 PROCEDURE — D9220A PRA ANESTHESIA: ICD-10-PCS | Mod: ,,, | Performed by: ANESTHESIOLOGY

## 2019-08-06 PROCEDURE — 27201423 OPTIME MED/SURG SUP & DEVICES STERILE SUPPLY: Mod: PO | Performed by: ORTHOPAEDIC SURGERY

## 2019-08-06 PROCEDURE — 25810 ARTHRD WRST ILIAC/OTH AGRFT: CPT | Mod: LT,,, | Performed by: ORTHOPAEDIC SURGERY

## 2019-08-06 PROCEDURE — 64415 NJX AA&/STRD BRCH PLXS IMG: CPT | Mod: PO | Performed by: ANESTHESIOLOGY

## 2019-08-06 PROCEDURE — C9290 INJ, BUPIVACAINE LIPOSOME: HCPCS | Mod: PO | Performed by: ANESTHESIOLOGY

## 2019-08-06 DEVICE — SCREW CORTEX 3.5MM X 18MM: Type: IMPLANTABLE DEVICE | Site: WRIST | Status: FUNCTIONAL

## 2019-08-06 DEVICE — SCREW CORTEX 3.5MM X 20MM: Type: IMPLANTABLE DEVICE | Site: WRIST | Status: FUNCTIONAL

## 2019-08-06 DEVICE — SCREW CORTEX 2.7X18MM: Type: IMPLANTABLE DEVICE | Site: WRIST | Status: FUNCTIONAL

## 2019-08-06 DEVICE — IMPLANTABLE DEVICE: Type: IMPLANTABLE DEVICE | Site: WRIST | Status: FUNCTIONAL

## 2019-08-06 DEVICE — SCREW BONE CORTICAL ST 2.7X24: Type: IMPLANTABLE DEVICE | Site: WRIST | Status: FUNCTIONAL

## 2019-08-06 DEVICE — SCREW STRDRV REC T8 2.7X14 SS: Type: IMPLANTABLE DEVICE | Site: WRIST | Status: FUNCTIONAL

## 2019-08-06 DEVICE — SCREW CORTEX 3.5 X 24: Type: IMPLANTABLE DEVICE | Site: WRIST | Status: FUNCTIONAL

## 2019-08-06 DEVICE — SCREW BONE CORTICAL ST 2.7X22: Type: IMPLANTABLE DEVICE | Site: WRIST | Status: FUNCTIONAL

## 2019-08-06 DEVICE — PUTTY DBX 2.5CC: Type: IMPLANTABLE DEVICE | Site: WRIST | Status: FUNCTIONAL

## 2019-08-06 RX ORDER — GLYCOPYRROLATE 0.2 MG/ML
INJECTION INTRAMUSCULAR; INTRAVENOUS
Status: DISCONTINUED | OUTPATIENT
Start: 2019-08-06 | End: 2019-08-06

## 2019-08-06 RX ORDER — ONDANSETRON 2 MG/ML
INJECTION INTRAMUSCULAR; INTRAVENOUS
Status: DISCONTINUED | OUTPATIENT
Start: 2019-08-06 | End: 2019-08-06

## 2019-08-06 RX ORDER — HYDROMORPHONE HYDROCHLORIDE 2 MG/ML
0.2 INJECTION, SOLUTION INTRAMUSCULAR; INTRAVENOUS; SUBCUTANEOUS EVERY 5 MIN PRN
Status: DISCONTINUED | OUTPATIENT
Start: 2019-08-06 | End: 2019-08-06 | Stop reason: HOSPADM

## 2019-08-06 RX ORDER — AMITRIPTYLINE HYDROCHLORIDE 10 MG/1
10 TABLET, FILM COATED ORAL NIGHTLY
Qty: 21 TABLET | Refills: 0 | Status: SHIPPED | OUTPATIENT
Start: 2019-08-06 | End: 2019-08-28 | Stop reason: SDUPTHER

## 2019-08-06 RX ORDER — LIDOCAINE HYDROCHLORIDE 10 MG/ML
1 INJECTION, SOLUTION EPIDURAL; INFILTRATION; INTRACAUDAL; PERINEURAL ONCE
Status: DISCONTINUED | OUTPATIENT
Start: 2019-08-06 | End: 2019-08-06 | Stop reason: HOSPADM

## 2019-08-06 RX ORDER — LIDOCAINE HCL/PF 100 MG/5ML
SYRINGE (ML) INTRAVENOUS
Status: DISCONTINUED | OUTPATIENT
Start: 2019-08-06 | End: 2019-08-06

## 2019-08-06 RX ORDER — MIDAZOLAM HYDROCHLORIDE 1 MG/ML
INJECTION, SOLUTION INTRAMUSCULAR; INTRAVENOUS
Status: DISCONTINUED | OUTPATIENT
Start: 2019-08-06 | End: 2019-08-06

## 2019-08-06 RX ORDER — BUPIVACAINE HYDROCHLORIDE 2.5 MG/ML
INJECTION, SOLUTION EPIDURAL; INFILTRATION; INTRACAUDAL
Status: DISCONTINUED | OUTPATIENT
Start: 2019-08-06 | End: 2019-08-06 | Stop reason: HOSPADM

## 2019-08-06 RX ORDER — LIDOCAINE HYDROCHLORIDE 10 MG/ML
INJECTION, SOLUTION EPIDURAL; INFILTRATION; INTRACAUDAL; PERINEURAL
Status: DISCONTINUED | OUTPATIENT
Start: 2019-08-06 | End: 2019-08-06 | Stop reason: HOSPADM

## 2019-08-06 RX ORDER — PROPOFOL 10 MG/ML
VIAL (ML) INTRAVENOUS
Status: DISCONTINUED | OUTPATIENT
Start: 2019-08-06 | End: 2019-08-06

## 2019-08-06 RX ORDER — OXYCODONE HYDROCHLORIDE 5 MG/1
5 TABLET ORAL
Status: DISCONTINUED | OUTPATIENT
Start: 2019-08-06 | End: 2019-08-06 | Stop reason: HOSPADM

## 2019-08-06 RX ORDER — SODIUM CHLORIDE, SODIUM LACTATE, POTASSIUM CHLORIDE, CALCIUM CHLORIDE 600; 310; 30; 20 MG/100ML; MG/100ML; MG/100ML; MG/100ML
10 INJECTION, SOLUTION INTRAVENOUS CONTINUOUS
Status: DISCONTINUED | OUTPATIENT
Start: 2019-08-06 | End: 2019-08-06 | Stop reason: HOSPADM

## 2019-08-06 RX ORDER — OXYCODONE HYDROCHLORIDE 15 MG/1
15 TABLET ORAL EVERY 4 HOURS PRN
Qty: 22 TABLET | Refills: 0 | Status: SHIPPED | OUTPATIENT
Start: 2019-08-06 | End: 2019-08-12 | Stop reason: SDUPTHER

## 2019-08-06 RX ORDER — ONDANSETRON 4 MG/1
8 TABLET, ORALLY DISINTEGRATING ORAL EVERY 8 HOURS PRN
Qty: 6 TABLET | Refills: 0 | Status: SHIPPED | OUTPATIENT
Start: 2019-08-06 | End: 2019-12-03 | Stop reason: ALTCHOICE

## 2019-08-06 RX ORDER — KETAMINE HYDROCHLORIDE 100 MG/ML
INJECTION, SOLUTION INTRAMUSCULAR; INTRAVENOUS
Status: DISCONTINUED | OUTPATIENT
Start: 2019-08-06 | End: 2019-08-06

## 2019-08-06 RX ORDER — MUPIROCIN 20 MG/G
OINTMENT TOPICAL
Status: DISCONTINUED | OUTPATIENT
Start: 2019-08-06 | End: 2019-08-06 | Stop reason: HOSPADM

## 2019-08-06 RX ORDER — SODIUM CHLORIDE 0.9 % (FLUSH) 0.9 %
3 SYRINGE (ML) INJECTION EVERY 8 HOURS
Status: DISCONTINUED | OUTPATIENT
Start: 2019-08-06 | End: 2019-08-06 | Stop reason: HOSPADM

## 2019-08-06 RX ORDER — FENTANYL CITRATE 50 UG/ML
INJECTION, SOLUTION INTRAMUSCULAR; INTRAVENOUS
Status: DISCONTINUED | OUTPATIENT
Start: 2019-08-06 | End: 2019-08-06

## 2019-08-06 RX ORDER — CEFAZOLIN SODIUM 2 G/50ML
2 SOLUTION INTRAVENOUS
Status: DISCONTINUED | OUTPATIENT
Start: 2019-08-06 | End: 2019-08-06 | Stop reason: HOSPADM

## 2019-08-06 RX ORDER — ASCORBIC ACID 500 MG
500 TABLET ORAL DAILY
Qty: 21 TABLET | Refills: 0 | COMMUNITY
Start: 2019-08-06 | End: 2019-08-08

## 2019-08-06 RX ORDER — PHENYLEPHRINE HYDROCHLORIDE 10 MG/ML
INJECTION INTRAVENOUS
Status: DISCONTINUED | OUTPATIENT
Start: 2019-08-06 | End: 2019-08-06

## 2019-08-06 RX ORDER — FENTANYL CITRATE 50 UG/ML
25 INJECTION, SOLUTION INTRAMUSCULAR; INTRAVENOUS EVERY 5 MIN PRN
Status: COMPLETED | OUTPATIENT
Start: 2019-08-06 | End: 2019-08-06

## 2019-08-06 RX ORDER — ACETAMINOPHEN 10 MG/ML
INJECTION, SOLUTION INTRAVENOUS
Status: DISCONTINUED | OUTPATIENT
Start: 2019-08-06 | End: 2019-08-06

## 2019-08-06 RX ORDER — DIPHENHYDRAMINE HYDROCHLORIDE 50 MG/ML
25 INJECTION INTRAMUSCULAR; INTRAVENOUS EVERY 6 HOURS PRN
Status: DISCONTINUED | OUTPATIENT
Start: 2019-08-06 | End: 2019-08-06 | Stop reason: HOSPADM

## 2019-08-06 RX ORDER — MIDAZOLAM HYDROCHLORIDE 1 MG/ML
2 INJECTION INTRAMUSCULAR; INTRAVENOUS ONCE
Status: COMPLETED | OUTPATIENT
Start: 2019-08-06 | End: 2019-08-06

## 2019-08-06 RX ORDER — SODIUM CHLORIDE, SODIUM LACTATE, POTASSIUM CHLORIDE, CALCIUM CHLORIDE 600; 310; 30; 20 MG/100ML; MG/100ML; MG/100ML; MG/100ML
500 INJECTION, SOLUTION INTRAVENOUS ONCE
Status: COMPLETED | OUTPATIENT
Start: 2019-08-06 | End: 2019-08-06

## 2019-08-06 RX ORDER — SODIUM CHLORIDE, SODIUM LACTATE, POTASSIUM CHLORIDE, CALCIUM CHLORIDE 600; 310; 30; 20 MG/100ML; MG/100ML; MG/100ML; MG/100ML
INJECTION, SOLUTION INTRAVENOUS CONTINUOUS PRN
Status: DISCONTINUED | OUTPATIENT
Start: 2019-08-06 | End: 2019-08-06

## 2019-08-06 RX ORDER — BUPIVACAINE HYDROCHLORIDE 5 MG/ML
INJECTION, SOLUTION EPIDURAL; INTRACAUDAL
Status: COMPLETED | OUTPATIENT
Start: 2019-08-06 | End: 2019-08-06

## 2019-08-06 RX ORDER — MEPERIDINE HYDROCHLORIDE 50 MG/ML
12.5 INJECTION INTRAMUSCULAR; INTRAVENOUS; SUBCUTANEOUS ONCE AS NEEDED
Status: DISCONTINUED | OUTPATIENT
Start: 2019-08-06 | End: 2019-08-06 | Stop reason: HOSPADM

## 2019-08-06 RX ORDER — CEFAZOLIN SODIUM 1 G/3ML
INJECTION, POWDER, FOR SOLUTION INTRAMUSCULAR; INTRAVENOUS
Status: DISCONTINUED | OUTPATIENT
Start: 2019-08-06 | End: 2019-08-06

## 2019-08-06 RX ORDER — FENTANYL CITRATE 50 UG/ML
50 INJECTION, SOLUTION INTRAMUSCULAR; INTRAVENOUS ONCE
Status: COMPLETED | OUTPATIENT
Start: 2019-08-06 | End: 2019-08-06

## 2019-08-06 RX ORDER — EPHEDRINE SULFATE 50 MG/ML
INJECTION, SOLUTION INTRAVENOUS
Status: DISCONTINUED | OUTPATIENT
Start: 2019-08-06 | End: 2019-08-06

## 2019-08-06 RX ADMIN — PHENYLEPHRINE HYDROCHLORIDE 50 MCG: 10 INJECTION, SOLUTION INTRAMUSCULAR; INTRAVENOUS; SUBCUTANEOUS at 08:08

## 2019-08-06 RX ADMIN — PROPOFOL 50 MG: 10 INJECTION, EMULSION INTRAVENOUS at 08:08

## 2019-08-06 RX ADMIN — BUPIVACAINE HYDROCHLORIDE 10 ML: 5 INJECTION, SOLUTION EPIDURAL; INTRACAUDAL; PERINEURAL at 08:08

## 2019-08-06 RX ADMIN — PHENYLEPHRINE HYDROCHLORIDE 100 MCG: 10 INJECTION, SOLUTION INTRAMUSCULAR; INTRAVENOUS; SUBCUTANEOUS at 10:08

## 2019-08-06 RX ADMIN — ACETAMINOPHEN 1000 MG: 10 INJECTION, SOLUTION INTRAVENOUS at 08:08

## 2019-08-06 RX ADMIN — OXYCODONE HYDROCHLORIDE 5 MG: 5 TABLET ORAL at 12:08

## 2019-08-06 RX ADMIN — PHENYLEPHRINE HYDROCHLORIDE 100 MCG: 10 INJECTION, SOLUTION INTRAMUSCULAR; INTRAVENOUS; SUBCUTANEOUS at 08:08

## 2019-08-06 RX ADMIN — PHENYLEPHRINE HYDROCHLORIDE 100 MCG: 10 INJECTION, SOLUTION INTRAMUSCULAR; INTRAVENOUS; SUBCUTANEOUS at 09:08

## 2019-08-06 RX ADMIN — FENTANYL CITRATE 25 MCG: 50 INJECTION, SOLUTION INTRAMUSCULAR; INTRAVENOUS at 10:08

## 2019-08-06 RX ADMIN — MIDAZOLAM HYDROCHLORIDE 2 MG: 1 INJECTION, SOLUTION INTRAMUSCULAR; INTRAVENOUS at 08:08

## 2019-08-06 RX ADMIN — LIDOCAINE HYDROCHLORIDE 75 MG: 20 INJECTION PARENTERAL at 08:08

## 2019-08-06 RX ADMIN — ONDANSETRON 4 MG: 2 INJECTION, SOLUTION INTRAMUSCULAR; INTRAVENOUS at 10:08

## 2019-08-06 RX ADMIN — EPHEDRINE SULFATE 5 MG: 50 INJECTION, SOLUTION INTRAMUSCULAR; INTRAVENOUS; SUBCUTANEOUS at 10:08

## 2019-08-06 RX ADMIN — SODIUM CHLORIDE, SODIUM LACTATE, POTASSIUM CHLORIDE, AND CALCIUM CHLORIDE: .6; .31; .03; .02 INJECTION, SOLUTION INTRAVENOUS at 08:08

## 2019-08-06 RX ADMIN — PROPOFOL 100 MG: 10 INJECTION, EMULSION INTRAVENOUS at 09:08

## 2019-08-06 RX ADMIN — FENTANYL CITRATE 25 MCG: 50 INJECTION INTRAMUSCULAR; INTRAVENOUS at 12:08

## 2019-08-06 RX ADMIN — FENTANYL CITRATE: 50 INJECTION INTRAMUSCULAR; INTRAVENOUS at 07:08

## 2019-08-06 RX ADMIN — BUPIVACAINE 20 ML: 13.3 INJECTION, SUSPENSION, LIPOSOMAL INFILTRATION at 08:08

## 2019-08-06 RX ADMIN — CEFAZOLIN 2 G: 1 INJECTION, POWDER, FOR SOLUTION INTRAVENOUS at 08:08

## 2019-08-06 RX ADMIN — FENTANYL CITRATE 50 MCG: 50 INJECTION, SOLUTION INTRAMUSCULAR; INTRAVENOUS at 08:08

## 2019-08-06 RX ADMIN — GLYCOPYRROLATE 0.2 MG: 0.2 INJECTION, SOLUTION INTRAMUSCULAR; INTRAVENOUS at 09:08

## 2019-08-06 RX ADMIN — FENTANYL CITRATE 50 MCG: 50 INJECTION, SOLUTION INTRAMUSCULAR; INTRAVENOUS at 10:08

## 2019-08-06 RX ADMIN — SODIUM CHLORIDE, SODIUM LACTATE, POTASSIUM CHLORIDE, AND CALCIUM CHLORIDE 500 ML: .6; .31; .03; .02 INJECTION, SOLUTION INTRAVENOUS at 08:08

## 2019-08-06 RX ADMIN — PROPOFOL 250 MG: 10 INJECTION, EMULSION INTRAVENOUS at 08:08

## 2019-08-06 RX ADMIN — KETAMINE HYDROCHLORIDE 25 MG: 100 INJECTION, SOLUTION, CONCENTRATE INTRAMUSCULAR; INTRAVENOUS at 08:08

## 2019-08-06 RX ADMIN — EPHEDRINE SULFATE 10 MG: 50 INJECTION, SOLUTION INTRAMUSCULAR; INTRAVENOUS; SUBCUTANEOUS at 09:08

## 2019-08-06 RX ADMIN — MUPIROCIN: 20 OINTMENT TOPICAL at 07:08

## 2019-08-06 RX ADMIN — MIDAZOLAM HYDROCHLORIDE 2 MG: 1 INJECTION, SOLUTION INTRAMUSCULAR; INTRAVENOUS at 07:08

## 2019-08-06 RX ADMIN — KETAMINE HYDROCHLORIDE 25 MG: 100 INJECTION, SOLUTION, CONCENTRATE INTRAMUSCULAR; INTRAVENOUS at 09:08

## 2019-08-06 NOTE — DISCHARGE INSTRUCTIONS
Procedure: Left wrist arthrodesis/fusion    1. Keep the splint clean, dry, and in place until follow-up. Do not take it off and do not get it wet.    2. Please keep the left upper extremity elevated for the 1st 24-48 hours to prevent further swelling    3. Flexion and extension of the exposed fingers is allowed but do not attempt to lift or push off with the left arm or hand    4.  Please keep the sling to the left arm in place for the 1st 48-72 hours to protect your arm and hand.  He can remove the sling whenever you have complete control of the left arm and hand.  Please to not allow the arm to hang in a dependent position for more than 10-15 minutes at a time to prevent swelling.    5. Pain medication and nausea medication have been prescribed. Please take them as necessary.      Exparel(bupivacaine) has been injected to provide approximately 72 hours of reduced pain after your surgery.  Do not remove the bracelet for five days  Report to your doctor as soon as possible the following:   Restlessness   Anxiety   Speech problems,    Lightheadedness   Numbness and tingling of the mouth and lips   Seizures    Metallic taste   Blurred vision   Tremors    Twitching   Depression   Extreme drowsiness  Avoid additional use of local anesthetics (such as dental procedures) for five days (96 hours)     6. If there are any questions or concerns please call Dr. Colvin's office.    7. Follow-up with Dr. Colvin in 2 weeks

## 2019-08-06 NOTE — ANESTHESIA POSTPROCEDURE EVALUATION
Anesthesia Post Evaluation    Patient: Reymundo Gutierrez    Procedure(s) Performed: Procedure(s) (LRB):  FUSION, JOINT, WRIST (Left)  BONE GRAFT LEG (Left)    Final Anesthesia Type: general  Patient location during evaluation: PACU  Patient participation: Yes- Able to Participate  Level of consciousness: sedated and awake  Post-procedure vital signs: reviewed and stable  Pain management: adequate  Airway patency: patent  PONV status at discharge: No PONV  Anesthetic complications: no      Cardiovascular status: blood pressure returned to baseline  Respiratory status: spontaneous ventilation  Hydration status: euvolemic  Follow-up not needed.          Vitals Value Taken Time   /70 8/6/2019 11:52 AM   Temp 36.6 °C (97.8 °F) 8/6/2019 11:37 AM   Pulse 90 8/6/2019 11:52 AM   Resp 16 8/6/2019 11:52 AM   SpO2 98 % 8/6/2019 11:52 AM         No case tracking events are documented in the log.      Pain/Kalie Score: Pain Rating Prior to Med Admin: 0 (8/6/2019  7:36 AM)  Kalie Score: 9 (8/6/2019 11:37 AM)

## 2019-08-06 NOTE — INTERVAL H&P NOTE
The patient has been examined and the H&P has been reviewed:    I concur with the findings and no changes have occurred since H&P was written.    Anesthesia/Surgery risks, benefits and alternative options discussed and understood by patient/family.          Active Hospital Problems    Diagnosis  POA    Crushing injury of left wrist and hand [S67.42XA]  Yes      Resolved Hospital Problems   No resolved problems to display.

## 2019-08-06 NOTE — DISCHARGE SUMMARY
[facility]  Discharge Note  Short Stay    Admit Date: 8/6/2019    Discharge Date and Time: No discharge date for patient encounter.     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       Discharged Condition: good    Hospital Course: Patient was admitted for an outpatient procedure and tolerated the procedure well with no complications.    Final Diagnoses: Same as principal problem.    Disposition: Home or Self Care    Follow up/Patient Instructions:    Medications:  Reconciled Home Medications:      Medication List      START taking these medications    amitriptyline 10 MG tablet  Commonly known as:  ELAVIL  Take 1 tablet (10 mg total) by mouth every evening for 21 days     ascorbic acid (vitamin C) 500 MG tablet  Commonly known as:  VITAMIN C  Take 1 tablet (500 mg total) by mouth once daily. for 21 days     ondansetron 4 MG Tbdl  Commonly known as:  ZOFRAN-ODT  Take 2 tablets (8 mg total) by mouth every 8 (eight) hours as needed.     oxyCODONE 15 MG Tab  Commonly known as:  ROXICODONE  Take 1 tablet (15 mg total) by mouth every 4 (four) hours as needed for Pain.        CONTINUE taking these medications    albuterol 2.5 mg /3 mL (0.083 %) nebulizer solution  Commonly known as:  PROVENTIL  Inhale 2.5 mg into the lungs.     DULoxetine 60 MG capsule  Commonly known as:  CYMBALTA  Take 60 mg by mouth.     FLONASE ALLERGY RELIEF 50 mcg/actuation nasal spray  Generic drug:  fluticasone propionate  1 spray by Nasal route.     fluticasone-salmeterol 250-50 mcg/dose 250-50 mcg/dose diskus inhaler  Commonly known as:  ADVAIR  Inhale 1 puff into the lungs 2 (two) times daily. Controller     ketoconazole 2 % shampoo  Commonly known as:   NIZORAL  Apply topically.     promethazine 25 MG tablet  Commonly known as:  PHENERGAN  Take 25 mg by mouth.          Discharge Procedure Orders   [facility] FOR HOME USE     Order Specific Question Answer Comments   Vendor: Other (use comments) [facility]   Expected Date of Delivery: 8/6/2019      Diet general     Activity as tolerated     Keep surgical extremity elevated     Lifting restrictions     Call MD for:  temperature >100.4     Call MD for:  persistent nausea and vomiting     Call MD for:  severe uncontrolled pain     Call MD for:  difficulty breathing, headache or visual disturbances     Call MD for:  redness, tenderness, or signs of infection (pain, swelling, redness, odor or green/yellow discharge around incision site)     Call MD for:  hives     Call MD for:  persistent dizziness or light-headedness     Call MD for:  extreme fatigue     Leave dressing on - Keep it clean, dry, and intact until clinic visit         Discharge Procedure Orders (must include Diet, Follow-up, Activity):   Discharge Procedure Orders (must include Diet, Follow-up, Activity)   [facility] FOR HOME USE     Order Specific Question Answer Comments   Vendor: Other (use comments) [facility]   Expected Date of Delivery: 8/6/2019      Diet general     Activity as tolerated     Keep surgical extremity elevated     Lifting restrictions     Call MD for:  temperature >100.4     Call MD for:  persistent nausea and vomiting     Call MD for:  severe uncontrolled pain     Call MD for:  difficulty breathing, headache or visual disturbances     Call MD for:  redness, tenderness, or signs of infection (pain, swelling, redness, odor or green/yellow discharge around incision site)     Call MD for:  hives     Call MD for:  persistent dizziness or light-headedness     Call MD for:  extreme fatigue     Leave dressing on - Keep it clean, dry, and intact until clinic visit

## 2019-08-06 NOTE — TRANSFER OF CARE
"Anesthesia Transfer of Care Note    Patient: Reymundo Gutierrez    Procedure(s) Performed: Procedure(s) (LRB):  FUSION, JOINT, WRIST (Left)  BONE GRAFT LEG (Left)    Patient location: PACU    Anesthesia Type: general    Transport from OR: Transported from OR on room air with adequate spontaneous ventilation    Post pain: adequate analgesia    Post assessment: no apparent anesthetic complications and tolerated procedure well    Post vital signs: stable    Level of consciousness: awake    Nausea/Vomiting: no nausea/vomiting    Complications: none    Transfer of care protocol was followed      Last vitals:   Visit Vitals  /79 (BP Location: Right arm, Patient Position: Lying)   Pulse 88   Temp 36.2 °C (97.1 °F) (Temporal)   Resp 16   Ht 5' 10" (1.778 m)   Wt 98.4 kg (217 lb)   SpO2 98%   BMI 31.14 kg/m²     "

## 2019-08-06 NOTE — ANESTHESIA PREPROCEDURE EVALUATION
08/06/2019  Reymundo Gutierrez is a 44 y.o., male.    Anesthesia Evaluation    I have reviewed the Patient Summary Reports.    I have reviewed the Nursing Notes.      Review of Systems  Anesthesia Hx:  No problems with previous Anesthesia Denies Hx of Anesthetic complications    Social:  Non-Smoker    Cardiovascular:   Denies Hypertension.  Denies MI.  Denies CAD.    Denies CABG/stent.   Denies Angina.    Pulmonary:   Denies COPD.  Denies Asthma.  Denies Recent URI.    Renal/:   Denies Chronic Renal Disease.     Hepatic/GI:   Denies GERD. Denies Liver Disease.    Neurological:   Denies TIA. Denies CVA. Denies Seizures.    Endocrine:   Denies Diabetes. Denies Hypothyroidism.    Psych:   Denies Psychiatric History.          Physical Exam  General:  Well nourished    Airway/Jaw/Neck:  Airway Findings: Mouth Opening: Normal Tongue: Normal  General Airway Assessment: Adult, Good  Mallampati: II  Improves to II with phonation.  TM Distance: 4-6 cm      Dental:  Dental Findings: In tact   Chest/Lungs:  Chest/Lungs Findings: Clear to auscultation, Normal Respiratory Rate     Heart/Vascular:  Heart Findings: Rate: Normal  Rhythm: Regular Rhythm  Sounds: Normal  Heart murmur: negative       Mental Status:  Mental Status Findings:  Cooperative, Alert and Oriented         Anesthesia Plan  Type of Anesthesia, risks & benefits discussed:  Anesthesia Type:  general, regional  Patient's Preference:   Intra-op Monitoring Plan: standard ASA monitors  Intra-op Monitoring Plan Comments:   Post Op Pain Control Plan:   Post Op Pain Control Plan Comments:   Induction:   IV  Beta Blocker:  Patient is not currently on a Beta-Blocker (No further documentation required).       Informed Consent: Patient understands risks and agrees with Anesthesia plan.  Questions answered. Anesthesia consent signed with patient.  ASA Score: 2      Day of Surgery Review of History & Physical: I have interviewed and examined the patient. I have reviewed the patient's H&P dated:  There are no significant changes.  H&P update referred to the surgeon.         Ready For Surgery From Anesthesia Perspective.

## 2019-08-06 NOTE — OP NOTE
Reymundo Gutierrez  1975    DATE OF SURGERY: 8/6/2019     PRE-OPERATIVE DIAGNOSIS:  Posttraumatic left wrist arthritis    POST-OPERATIVE DIAGNOSIS:  Posttraumatic left wrist arthritis     ANESTHESIA TYPE:  General with a single-shot supraclavicular block    BLOOD LOSS:  Approximately 25-50 cc    TOURNIQUET TIME:  2 hr and 13 min for the left arm, 11 min for the left leg    SURGEON: Dr Colvin    ASSISTANT:  Radha Lockett    PROCEDURE:    1.Left total wrist arthrodesis with allograft from the left proximal tibia  2.  Left wrist hardware removal deep  3.  Left wrist extensor tenolysis    IMPLANTS:  Synthes short angle locking wrist arthrodesis plate     SPECIMENS:  Left wrist hardware for culture    INDICATION:     Mr. Gutierrez has a history of a crushing injury to his left wrist and hand.  He had pain in his wrist with an inability to flex or extend.  He was contracted with 30° of flexion at the wrist causing him difficulty with daily activities.  Due to the pain in his difficulty in flexing and extending his wrist and hand I discussed the possibility of wrist arthrodesis.  After discussion of the risks and benefits of that surgical procedure informed consent was obtained.    PROCEDURE IN DETAIL:     Mr. Gutierrez was transported to the operating room and was placed supine on the operating room table. All appropriate points were padded. The left arm and hand as well as his left leg was prepped and draped in the normal sterile fashion. Time out was called. The correct patient, correct operative site, correct procedure, antibiotic administration which consisted of 2 g of Ancef, and allergies to medications which are to Patient has no known allergies.  were reviewed. Time in was then called.     Attention was turned to the left wrist. An incision was made over the dorsal wrist. This encompassed the previous incision and wound.  Incision was carried through the skin.  Subcutaneous tissues were dissected sharply.   The extensor retinaculum and extensors were identified.  There was severe scarring around the extensor tendons.  Extensor tenolysis was performed at the level of the wrist. The retinaculum and joint capsule were then incised.  Over the distal radius there was noted to be a dorsal plate.  The dorsal plate was removed and sent for culture.  The EPL tendon was also identified. The 4th extensor compartment tendons were freed up.  The EPL was also freed up and a tenolysis was performed of the EPL tendon as well. The joint capsule and retinaculum were elevated radially and ulnarly.  The radiocarpal, midcarpal and carpometacarpal joint of the 3rd metacarpal were all visualized.  They were all freed up.  There was noted to be severe arthritis overlying all the surfaces of each of these bones.  A high-speed bur and rongeur were used to decorticate the radiocarpal joint, the midcarpal joint, and the 3rd CMC joint. With good decortication performed the wrist was placed in a position of approximately 20° of extension. A short angle wrist arthrodesis plate was positioned over the 3rd metacarpal and radius.  There was noted be excellent positioning of the wrist as well as the plate.    At that point attention was turned to the left knee. A 2-3 cm incision was made over Gerdy's tubercle.  The incision was carried through the skin.  Subcutaneous tissues were dissected with tenotomy scissors.  A 3 sided osteotomy was made directly over Gerdy's tubercle in the proximal tibia. This was elevated up in a trapdoor method.  Approximately 5-10 cc of cancellous bone graft were harvested from the proximal tibia. The harvest site was then filled with 5 cc of DBX putty.  This filled the void relatively well.  The trapdoor was then replaced and was fixed in place with 0 Vicryl suture in the periosteum.  The wound was then gently irrigated.  The wound was closed with a combination of 2-0 Vicryl subcutaneous sutures and 2-0 nylon superficial  sutures.  The wound was dressed with Xeroform, gauze pad, and Tegaderm dressing.    Attention was turned back to the wrist.  The bone graft which was harvested from the knee was then packed into the radiocarpal, midcarpal and carpometacarpal joints. The decorticated portion of the dorsum of all these bones were also packed with bone graft.  The plate was then positioned overlying the 3rd metacarpal and radius.  A single screw was placed into the 3rd metacarpal and into the radius proximally.  Plate position wrist position and compression were all confirmed under fluoroscopy.  Three additional proximal screws were placed 2 additional distal screws were placed.  All these had excellent purchase.  A final screw was placed into the capitate through the plate.  With the construct completed fluoroscopic imaging was obtained.  Plate position, screw length, wrist position and compression across the wrist joint was all excellent. The tourniquet was let down at that point hemostasis was obtained.  The periosteum and joint capsule were closed over a portion of the plate to provide a smooth surface for the tendons to glide.  The extensor retinaculum was then repaired with 0 Vicryl suture. The wound was then closed with a combination of 3-0 Vicryl subcutaneous sutures and 3-0 nylon superficial sutures. The wound was then dressed with Xeroform, gauze padding, cast padding and a short-arm volar splint was placed.      The patient was awakened from anesthesia and was transported to the recovery room in stable condition. All lap, needle, sponge, and equipment counts were correct at the end of the case.    POST-OPERATIVE PLAN:     Patient will keep the splint in place full time for 2 weeks which time he will follow-up with me.  His sutures will be removed at that time. I will place him into a short-arm cast.  We will begin range of motion of the fingers with therapy within the 1st 2 weeks.

## 2019-08-06 NOTE — ANESTHESIA PROCEDURE NOTES
Peripheral Block    Patient location during procedure: holding area   Block not for primary anesthetic.  Reason for block: at surgeon's request and post-op pain management   Post-op Pain Location: ARTHRITIS OF LEFT WRIST, FUSION OF LEFT WRIST   Post-traumatic arthritis of wrist, left (M19.132)  Start time: 8/6/2019 7:30 AM  Timeout: 8/6/2019 7:30 AM   End time: 8/6/2019 7:40 AM    Staffing  Authorizing Provider: Akshat Faye MD  Performing Provider: Akshat Faye MD    Preanesthetic Checklist  Completed: patient identified, site marked, surgical consent, pre-op evaluation, timeout performed, IV checked, risks and benefits discussed and monitors and equipment checked  Peripheral Block  Patient position: supine  Prep: ChloraPrep  Patient monitoring: cardiac monitor, heart rate, continuous pulse ox, continuous capnometry and frequent blood pressure checks  Block type: supraclavicular  Laterality: left  Injection technique: single shot  Needle  Needle type: Stimuplex   Needle gauge: 21 G  Needle length: 2 in  Needle localization: ultrasound guidance  Needle insertion depth: 2 cm   -ultrasound image captured on disc.  Assessment  Injection assessment: negative parasthesia, local visualized surrounding nerve and negative aspiration  Paresthesia pain: none  Heart rate change: no  Slow fractionated injection: yes  Additional Notes  EXPAREL 20 ml

## 2019-08-07 VITALS
DIASTOLIC BLOOD PRESSURE: 74 MMHG | HEART RATE: 80 BPM | SYSTOLIC BLOOD PRESSURE: 109 MMHG | BODY MASS INDEX: 31.07 KG/M2 | TEMPERATURE: 97 F | HEIGHT: 70 IN | RESPIRATION RATE: 16 BRPM | OXYGEN SATURATION: 98 % | WEIGHT: 217 LBS

## 2019-08-08 ENCOUNTER — TELEPHONE (OUTPATIENT)
Dept: ORTHOPEDICS | Facility: CLINIC | Age: 44
End: 2019-08-08

## 2019-08-08 ENCOUNTER — OFFICE VISIT (OUTPATIENT)
Dept: ORTHOPEDICS | Facility: CLINIC | Age: 44
End: 2019-08-08
Payer: COMMERCIAL

## 2019-08-08 ENCOUNTER — PATIENT MESSAGE (OUTPATIENT)
Dept: ORTHOPEDICS | Facility: CLINIC | Age: 44
End: 2019-08-08

## 2019-08-08 VITALS
WEIGHT: 216.94 LBS | SYSTOLIC BLOOD PRESSURE: 129 MMHG | TEMPERATURE: 99 F | DIASTOLIC BLOOD PRESSURE: 87 MMHG | HEART RATE: 114 BPM | BODY MASS INDEX: 31.06 KG/M2 | HEIGHT: 70 IN

## 2019-08-08 DIAGNOSIS — M19.132 POST-TRAUMATIC ARTHRITIS OF WRIST, LEFT: Primary | ICD-10-CM

## 2019-08-08 PROCEDURE — 99999 PR PBB SHADOW E&M-EST. PATIENT-LVL III: ICD-10-PCS | Mod: PBBFAC,,, | Performed by: ORTHOPAEDIC SURGERY

## 2019-08-08 PROCEDURE — 99999 PR PBB SHADOW E&M-EST. PATIENT-LVL III: CPT | Mod: PBBFAC,,, | Performed by: ORTHOPAEDIC SURGERY

## 2019-08-08 PROCEDURE — 99024 PR POST-OP FOLLOW-UP VISIT: ICD-10-PCS | Mod: S$GLB,,, | Performed by: ORTHOPAEDIC SURGERY

## 2019-08-08 PROCEDURE — 99024 POSTOP FOLLOW-UP VISIT: CPT | Mod: S$GLB,,, | Performed by: ORTHOPAEDIC SURGERY

## 2019-08-08 NOTE — TELEPHONE ENCOUNTER
I called pt and asked him to come in now to see Dr Colvin to address patient's concerns for bleeding from surgical site and leg hematoma.  Pt stated he cannot come in now because he is in [place].  Pt stated he can come in to clinic by 2 pm.  I informed pt that I will call him back after seeking advice from Dr Colvin in regards to seeing Dr Colvin today.  Pt verbalized understanding.

## 2019-08-08 NOTE — TELEPHONE ENCOUNTER
I called pt and informed him to come in today at 3pm to see Dr Colvin.  Pt verbalized understanding and no questions.

## 2019-08-08 NOTE — PROGRESS NOTES
Mr. Gutierrez returns to clinic today.  He is approximately 2 days status post left wrist arthrodesis with bone graft from the left knee.  He had some bleeding through his dressing on his wrist and he had a hematoma to his left knee prompting his visit today.  States that both the wrist and his knee are stable.  He is having mild to moderate pain. He is here today for evaluation due to the bleeding through his dressing on his wrist    Physical exam:  Examination of the left upper extremity reveals that there is a well-fitting volar splint in place.  There is a moderate amount of sanguinous drainage on the dressing. This is all hole drainage there appears to be no new drainage.  The area of drainage is approximately half-dollar size.  He does have mild edema over the fingers and over the forearm.  There is no skin erythema noted. He does have sensation intact in the median radial and ulnar distributions.    Examination of the left lower extremity reveals that the dressing to the left leg has a very small amount of sanguinous drainage.  There is a small hematoma around the incision site.  There is no tension of any of the compartments of the leg.  There is no surrounding erythema but there is mild ecchymosis.  Palpation over the calf does not produce significant tenderness.    Assessment:  Status post left wrist arthrodesis with bone graft from the left knee    Plan:    1.  The dressings to the left wrist and knee were overwrapped today.    2.  He will continue limited to nonweightbearing to the left upper extremity.    3.  Will continue weight-bearing as tolerated to the left lower extremity    4.  Will follow up with me in 10-12 days for repeat evaluation of his left wrist and knee with an x-ray of the left wrist out of his splint

## 2019-08-09 LAB — BACTERIA SPEC AEROBE CULT: NO GROWTH

## 2019-08-12 ENCOUNTER — PATIENT MESSAGE (OUTPATIENT)
Dept: ORTHOPEDICS | Facility: CLINIC | Age: 44
End: 2019-08-12

## 2019-08-12 ENCOUNTER — NURSE TRIAGE (OUTPATIENT)
Dept: ADMINISTRATIVE | Facility: CLINIC | Age: 44
End: 2019-08-12

## 2019-08-12 RX ORDER — OXYCODONE HYDROCHLORIDE 15 MG/1
15 TABLET ORAL EVERY 4 HOURS PRN
Qty: 22 TABLET | Refills: 0 | Status: SHIPPED | OUTPATIENT
Start: 2019-08-12 | End: 2019-08-19 | Stop reason: SDUPTHER

## 2019-08-12 RX ORDER — OXYCODONE HYDROCHLORIDE 15 MG/1
15 TABLET ORAL EVERY 4 HOURS PRN
Qty: 22 TABLET | Refills: 0 | Status: CANCELLED | OUTPATIENT
Start: 2019-08-12

## 2019-08-13 LAB — BACTERIA SPEC ANAEROBE CULT: NORMAL

## 2019-08-13 NOTE — TELEPHONE ENCOUNTER
Patient called to report the following:     -would like to know where medication was sent   -pt advised     Reason for Disposition   Caller has medication question only, adult not sick, and triager answers question    Protocols used: MEDICATION QUESTION CALL-A-AH

## 2019-08-19 ENCOUNTER — PATIENT MESSAGE (OUTPATIENT)
Dept: ORTHOPEDICS | Facility: CLINIC | Age: 44
End: 2019-08-19

## 2019-08-19 ENCOUNTER — TELEPHONE (OUTPATIENT)
Dept: FAMILY MEDICINE | Facility: CLINIC | Age: 44
End: 2019-08-19

## 2019-08-19 DIAGNOSIS — Z00.00 ROUTINE HEALTH MAINTENANCE: Primary | ICD-10-CM

## 2019-08-19 RX ORDER — OXYCODONE HYDROCHLORIDE 15 MG/1
15 TABLET ORAL EVERY 4 HOURS PRN
Qty: 22 TABLET | Refills: 0 | Status: CANCELLED | OUTPATIENT
Start: 2019-08-19

## 2019-08-19 NOTE — TELEPHONE ENCOUNTER
Pt requesting refill of pain medication stating he has 7 pills left and does not want to run completely out.  Pt has a follow up appointment on 8/21.

## 2019-08-20 ENCOUNTER — PATIENT MESSAGE (OUTPATIENT)
Dept: ORTHOPEDICS | Facility: CLINIC | Age: 44
End: 2019-08-20

## 2019-08-20 RX ORDER — OXYCODONE HYDROCHLORIDE 15 MG/1
15 TABLET ORAL EVERY 4 HOURS PRN
Qty: 22 TABLET | Refills: 0 | Status: CANCELLED | OUTPATIENT
Start: 2019-08-19

## 2019-08-21 ENCOUNTER — OFFICE VISIT (OUTPATIENT)
Dept: ORTHOPEDICS | Facility: CLINIC | Age: 44
End: 2019-08-21
Payer: COMMERCIAL

## 2019-08-21 ENCOUNTER — HOSPITAL ENCOUNTER (OUTPATIENT)
Dept: RADIOLOGY | Facility: HOSPITAL | Age: 44
Discharge: HOME | End: 2019-08-21
Attending: ORTHOPAEDIC SURGERY
Payer: COMMERCIAL

## 2019-08-21 VITALS
WEIGHT: 216 LBS | SYSTOLIC BLOOD PRESSURE: 138 MMHG | DIASTOLIC BLOOD PRESSURE: 93 MMHG | BODY MASS INDEX: 30.92 KG/M2 | HEIGHT: 70 IN | HEART RATE: 100 BPM

## 2019-08-21 DIAGNOSIS — M25.532 LEFT WRIST PAIN: ICD-10-CM

## 2019-08-21 DIAGNOSIS — M19.132 POST-TRAUMATIC ARTHRITIS OF WRIST, LEFT: ICD-10-CM

## 2019-08-21 DIAGNOSIS — Z98.1 HX OF ARTHRODESIS: Primary | ICD-10-CM

## 2019-08-21 PROCEDURE — 73110 X-RAY EXAM OF WRIST: CPT | Mod: TC,PO,LT

## 2019-08-21 PROCEDURE — 73110 XR WRIST COMPLETE 3 VIEWS LEFT: ICD-10-PCS | Mod: 26,LT,, | Performed by: RADIOLOGY

## 2019-08-21 PROCEDURE — 99999 PR PBB SHADOW E&M-EST. PATIENT-LVL III: ICD-10-PCS | Mod: PBBFAC,,, | Performed by: ORTHOPAEDIC SURGERY

## 2019-08-21 PROCEDURE — 29075 PR APPLY FOREARM CAST: ICD-10-PCS | Mod: 58,LT,S$GLB, | Performed by: ORTHOPAEDIC SURGERY

## 2019-08-21 PROCEDURE — 99999 PR PBB SHADOW E&M-EST. PATIENT-LVL III: CPT | Mod: PBBFAC,,, | Performed by: ORTHOPAEDIC SURGERY

## 2019-08-21 PROCEDURE — 29075 APPL CST ELBW FNGR SHORT ARM: CPT | Mod: 58,LT,S$GLB, | Performed by: ORTHOPAEDIC SURGERY

## 2019-08-21 PROCEDURE — 99024 PR POST-OP FOLLOW-UP VISIT: ICD-10-PCS | Mod: S$GLB,,, | Performed by: ORTHOPAEDIC SURGERY

## 2019-08-21 PROCEDURE — 99024 POSTOP FOLLOW-UP VISIT: CPT | Mod: S$GLB,,, | Performed by: ORTHOPAEDIC SURGERY

## 2019-08-21 PROCEDURE — 73110 X-RAY EXAM OF WRIST: CPT | Mod: 26,LT,, | Performed by: RADIOLOGY

## 2019-08-21 RX ORDER — OXYCODONE HYDROCHLORIDE 5 MG/1
5 TABLET ORAL EVERY 4 HOURS PRN
Qty: 22 TABLET | Refills: 0 | Status: SHIPPED | OUTPATIENT
Start: 2019-08-21 | End: 2019-08-21

## 2019-08-21 RX ORDER — OXYCODONE HYDROCHLORIDE 5 MG/1
5 TABLET ORAL EVERY 4 HOURS PRN
Qty: 22 TABLET | Refills: 0 | Status: SHIPPED | OUTPATIENT
Start: 2019-08-21 | End: 2019-08-28 | Stop reason: SDUPTHER

## 2019-08-21 NOTE — TELEPHONE ENCOUNTER
----- Message from Laz Basilio sent at 8/21/2019  8:53 AM CDT -----  Contact: Jordi Garcia  Type:  Pharmacy Calling to Clarify an RX    Name of Caller:  Jordi  Pharmacy Name:    [id number] - [place], [place] - [address]  [address]  [place] [address]  Phone: [phone] Fax: [phone]  Prescription Name:  oxyCODONE (ROXICODONE) 5 MG immediate release tablet  What do they need to clarify?:  Does not have medication in stock until Friday, potentially later  Best Call Back Number:  [phone]  Additional Information:  NA

## 2019-08-22 NOTE — PROGRESS NOTES
Mr. Gutierrez returns to clinic today.  He is approximately 2 weeks status post left wrist arthrodesis.  States that he is still having pain but is slowly improving.    Physical exam:  Examination of the left wrist reveals that there is mild edema.  The incision is healing well.  There is no erythema or drainage at the incision site.  There are sutures in place. He does report intact sensation in the median radial and ulnar distributions.    Examination the left knee reveals that there are sutures in place.  There is minimal edema.  There is no erythema or drainage.    Radiology:  X-rays of the left wrist were taken in clinic today. He is noted have a well-positioned wrist arthrodesis plate.  The arthrodesis of the wrist remains stable.    Assessment:  Status post left wrist arthrodesis    Plan:    1.  I did refill his pain medication    2.  Sutures were removed and Steri-Strips are placed    3.  Was placed into a short-arm cast    4.  He will begin therapy for range of motion of his fingers    5.  He will follow up with me in 2-3 weeks for repeat evaluation with an x-ray of the left wrist out of the cast

## 2019-08-23 ENCOUNTER — TELEPHONE (OUTPATIENT)
Dept: ORTHOPEDICS | Facility: CLINIC | Age: 44
End: 2019-08-23

## 2019-08-23 NOTE — TELEPHONE ENCOUNTER
----- Message from Jen Padilla sent at 8/23/2019  3:38 PM CDT -----  Good afternoon,  Please see the email below from the patients workers comp carrier.    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 This work status wasn't specific, so just wanted to ensure we clarify that. Please advise when time permits, and have a great weekend.            BLAKE VALENTINE  Technical  I  [employer]  [place]  (P) [phone] (F) [phone]   [email or web address]  Mailing Address: [address], [place], [place], [address]

## 2019-08-26 ENCOUNTER — PATIENT MESSAGE (OUTPATIENT)
Dept: ORTHOPEDICS | Facility: CLINIC | Age: 44
End: 2019-08-26

## 2019-08-26 ENCOUNTER — TELEPHONE (OUTPATIENT)
Dept: ORTHOPEDICS | Facility: CLINIC | Age: 44
End: 2019-08-26

## 2019-08-26 DIAGNOSIS — M19.132 POST-TRAUMATIC ARTHRITIS OF WRIST, LEFT: ICD-10-CM

## 2019-08-26 DIAGNOSIS — S67.42XD CRUSHING INJURY OF LEFT WRIST AND HAND, SUBSEQUENT ENCOUNTER: Primary | ICD-10-CM

## 2019-08-26 RX ORDER — OXYCODONE HYDROCHLORIDE 5 MG/1
5 TABLET ORAL EVERY 4 HOURS PRN
Qty: 22 TABLET | Refills: 0 | Status: CANCELLED | OUTPATIENT
Start: 2019-08-26

## 2019-08-26 RX ORDER — AMITRIPTYLINE HYDROCHLORIDE 10 MG/1
10 TABLET, FILM COATED ORAL NIGHTLY
Qty: 21 TABLET | Refills: 0 | Status: CANCELLED | OUTPATIENT
Start: 2019-08-26 | End: 2019-09-16

## 2019-08-26 NOTE — TELEPHONE ENCOUNTER
Please see attached message from patient.  Please advise:    ---- Message -----     From: Reymundo Gutierrez     Sent: 8/26/2019 10:44 AM CDT       To: Escobar Colvin MD  Subject: Prescription    Good morning, by him going down on my dosage for my pain pills from 15 to 5 mg, I've been hurting more and and trying to take more tylenol to keep from taking all my pain meds,  but it's not working, it still hurts... I would like to recommend a referral for pain management  and a refill on my pain medication thank you

## 2019-08-27 ENCOUNTER — TELEPHONE (OUTPATIENT)
Dept: REHABILITATION | Facility: HOSPITAL | Age: 44
End: 2019-08-27

## 2019-08-27 RX ORDER — AMITRIPTYLINE HYDROCHLORIDE 10 MG/1
10 TABLET, FILM COATED ORAL NIGHTLY
Qty: 21 TABLET | Refills: 0 | Status: CANCELLED | OUTPATIENT
Start: 2019-08-27 | End: 2019-09-17

## 2019-08-27 RX ORDER — OXYCODONE HYDROCHLORIDE 5 MG/1
5 TABLET ORAL EVERY 4 HOURS PRN
Qty: 22 TABLET | Refills: 0 | Status: CANCELLED | OUTPATIENT
Start: 2019-08-27

## 2019-08-27 NOTE — TELEPHONE ENCOUNTER
I informed pt that Dr Colvin submitted referral to pain management per patient's request.  Dr Colvin asking if pt wants to be referred to [facility] or [facility].  Pt stated he wanted to go to [facility] for pain management.  I informed pt that Dr Colvin has patient's referral in our system.  Pt verbalized understanding and had no other questions.

## 2019-08-28 ENCOUNTER — PATIENT MESSAGE (OUTPATIENT)
Dept: ORTHOPEDICS | Facility: CLINIC | Age: 44
End: 2019-08-28

## 2019-08-28 ENCOUNTER — CLINICAL SUPPORT (OUTPATIENT)
Dept: REHABILITATION | Facility: HOSPITAL | Age: 44
End: 2019-08-28
Attending: ORTHOPAEDIC SURGERY
Payer: COMMERCIAL

## 2019-08-28 DIAGNOSIS — R60.0 EDEMA OF HAND: ICD-10-CM

## 2019-08-28 DIAGNOSIS — M19.132 POST-TRAUMATIC ARTHRITIS OF WRIST, LEFT: Primary | ICD-10-CM

## 2019-08-28 DIAGNOSIS — M25.642 STIFFNESS OF FINGER JOINT OF LEFT HAND: ICD-10-CM

## 2019-08-28 DIAGNOSIS — R29.898 WEAKNESS OF LEFT HAND: ICD-10-CM

## 2019-08-28 DIAGNOSIS — S67.42XD CRUSHING INJURY OF LEFT WRIST AND HAND, SUBSEQUENT ENCOUNTER: ICD-10-CM

## 2019-08-28 DIAGNOSIS — S67.42XS CRUSHING INJURY OF LEFT WRIST AND HAND, SEQUELA: ICD-10-CM

## 2019-08-28 PROCEDURE — 97166 OT EVAL MOD COMPLEX 45 MIN: CPT | Mod: PO

## 2019-08-28 PROCEDURE — 97110 THERAPEUTIC EXERCISES: CPT | Mod: PO

## 2019-08-28 RX ORDER — OXYCODONE HYDROCHLORIDE 5 MG/1
5 TABLET ORAL EVERY 4 HOURS PRN
Qty: 22 TABLET | Refills: 0 | Status: CANCELLED | OUTPATIENT
Start: 2019-08-28

## 2019-08-28 RX ORDER — AMITRIPTYLINE HYDROCHLORIDE 10 MG/1
10 TABLET, FILM COATED ORAL NIGHTLY
Qty: 21 TABLET | Refills: 0 | Status: SHIPPED | OUTPATIENT
Start: 2019-08-28 | End: 2019-10-01 | Stop reason: SDUPTHER

## 2019-08-28 RX ORDER — AMITRIPTYLINE HYDROCHLORIDE 10 MG/1
10 TABLET, FILM COATED ORAL NIGHTLY
Qty: 21 TABLET | Refills: 0 | Status: CANCELLED | OUTPATIENT
Start: 2019-08-28 | End: 2019-09-18

## 2019-08-28 RX ORDER — OXYCODONE HYDROCHLORIDE 5 MG/1
5 TABLET ORAL EVERY 4 HOURS PRN
Qty: 22 TABLET | Refills: 0 | Status: SHIPPED | OUTPATIENT
Start: 2019-08-28 | End: 2019-09-11 | Stop reason: ALTCHOICE

## 2019-08-28 NOTE — PLAN OF CARE
[facility] Occupational Therapy  Initial Evaluation     Date: 8/28/2019  Patient: Reymundo Gutierrez  Chart Number: [id number]    Therapy Diagnosis: Stiffness L hand, edema L hand, weakness L hand,       Physician: Escobar Colvin MD    Physician Orders:   Note    Please begin range of motion of the fingers of his left hand.     Frequency:  2 times per week     Duration:  4 weeks     Diagnosis:  Status post left wrist arthrodesis          Medical Diagnosis: Z98.1 (ICD-10-CM) - Hx of arthrodesis  Evaluation Date: 8/28/2019  Insurance Authorization period Expiration: 8/20/20  Plan of Care Expiration Period: 9/24/19   Next Re-assessment: 30 days (9/27/19) and/or 10th visit   Date of Return to MD: 9/11/19    Visit # / Visits Authortized: 1 / 12  Time In: 0806  Time Out: 0846  Total Billable Time: 46 minutes    Precautions: Standard and short arm cast in place LUE.  Surgery: 8/06/19  PROCEDURE:    1.  Left total wrist arthrodesis with allograft from the left proximal tibia  2.  Left wrist hardware removal deep  3.  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    COMPARISON:  7/10/2019    FINDINGS:  There is been revision of the internal fixation at the wrist.  The anterior plate and screws remain in place.  The posterior plate has been replaced by long plate that extends from distal shaft of the radius to proximal 3rd metacarpal  fixed to the radius and metacarpal 5 multiple screws and a mid screw appearing fixed to the carpals.  There is further narrowing of the space between the distal ulna and the carpals.  Ununited displaced fracture of the ulnar styloid noted as before.  The radial fracture is partially obscured by the plates but does appear to have undergone some interval healing since the prior exam being not as apparent today.Distal radius is also somewhat indistinguishable from some of the proximal carpals and could be fused.      Impression     Findings as detailed above.  RevIsion of the dorsal/posterior plate.  Further narrowing of the ulnar carpal joint       Previous Therapy: O/P hand therapy at another facility prior.    Patient's Goals for Therapy: Get L hand/UE more functional    Pain:  Functional Pain Scale Rating 0-10:   7-8/10 on average  5/10 at best  9/10 at worst  Location: L wrist ulnar and radial side and dorsum L wrist and thumb  Description: ache/throbbing  Aggravating Factors: Movement  Easing Factors: Elevation/rest.    Occupation:    Title: [employer] work, [employer]  Former work status: Full   Current work status: Not working (light duty not being offered at this time)  Physical demand: very heavy      Functional Limitations/Social History:    Previous functional status includes: Independent with all ADLs/IADLs.    Current FunctionalStatus   Home/Living environment : Pt lives with family    Limitation of Functional Status as follows:   ADLs/IADLs: severed difficulty overall reported with basic ADL/IADL tasks.               Pt reports the most difficulty with any bilateral hand tasks, cutting food, unable/not attempting any carrying with L hand.   Also reports significant difficulty with if he were to attempt any of his previous Work and or Leisure tasks with LUE.  See Quick Dash results below for further related to UE function.    Past Medical History/Physical Systems Review:   Reymundo Gutierrez   has a past medical history of Asthma.    Reymundo Gutierrez  has a past surgical history that includes Wrist surgery (Left) and Bone graft (Left, 8/6/2019).    Reymundo has a current medication list which includes the following prescription(s): albuterol, amitriptyline, duloxetine, fluticasone propionate, fluticasone-salmeterol 250-50 mcg/dose, ketoconazole, ondansetron, oxycodone, and promethazine.    Review of patient's allergies indicates:  No Known Allergies       Objective     Observation/Inspection:  Previous incisions healed, cast in place    Sensation: Some decreased light touch reported L hand palmar surfaces  Wound/Incision: unable to assess, cast in place  Scar: unable to assess, cast in place       Edema:          Circumferential (in cm) L R             Long Proximal Phalanx (mid) 7.0 6.7      AROM Hand: ext/flex IP level (unble to assess MCP level (cast in place)   Left PIPs Left DIPs   1st +20/15 -   2nd -30/75  0/25   3rd -35/97  0/25   4th -40/90 0/15   5th -50/75 0/35       AROM Wrist/Forearm:               Left     Wrist Ext/Flex: cast/fusion NT    Wrist RD/UD: cast/fusion NT    Forearm Pron/Sup: cast in place NT      AROM Elbow:                Left          Elbow Ext/Flex: WNL              Manual Muscle Test: NT                                       and Pinch Strength: NTat this time due to post operative status.       Quick DASH Survey    Therapist reviewed Quick DASH scores for Reymundo Gutierrez on 8/28/2019.   Quick DASH documents entered into [employer] - see Media section for original.    The Quick DASH Questionnaire- The following scores are based on patient reported assessment at the time of the initial occupational therapy evaluation:    Activity:  1. Open a tight jar: 4 Difficulty  2. Do heavy household chores: 3 Difficulty  3. Carry a shopping bag or briefcase: 2 Difficulty  4. Wash your back: 5 Difficulty  5.Use a knife to cut food: 5 Difficulty  6.. Recreational activities requiring  force through arm: 5 Difficulty  7. Social Limitation: 5 Limited  8. Work/ADL Limitation: 5 Limited  Severity of Symptoms (over the past week)  9. Pain: 4  10. [place]lin  11. Sleeping Limitation: 4 Difficulty    Limitation Score: 79.54%    Scale  1= NO (Difficulty or Limitatons)  2= Mild (Difficulty/Limitations)  3= Moderate (Difficulty/Limitations)  4= Severe (Difficulty/Limitations)  5= Extreme/Unable and/or can't sleep (Difficulty/Limitations)           Treatment     Treatment Time In: 820  Treatment Time Out: 846  Total Treatment time separate from Evaluation time: 26 min    Reymundo received therapeutic exercises for 25 minutes including PROM digits 1-5 L hand x10 each jt flex and ext, and Initial Home Exercise Program Instruction.      Home Exercise Program/Education:  Issued HEP (see patient instructions in EMR) and educated on modality use for pain management . Exercises were reviewed and Reymundo was able to demonstrate them prior to the end of the session.   Pt received a written copy of exercises to perform at home. Reymundo demonstrated good  understanding of the education provided.  Pt was advised to perform these exercises free of pain, and to stop performing them if pain occurs.    Patient/Family Education: role of OT, goals for OT, scheduling/cancellations - pt verbalized understanding. Discussed insurance limitations with patient.    Additional Education provided: elevate LUE prn for edema and light retrograde massage (no lotion)    Assessment     Reymundo Gutierrez is a 44 y.o. male referred to outpatient occupational/hand therapy and presents with a medical diagnosis of L wrist Hx of arthrodesis, resulting in, pain, edema, decreased A/PROM, strength, and functional use of LUE and demonstrates limitations as described in the chart below.     The patient's rehab potential is good for the goals listed below.    No anticipated barriers to occupational therapy.  Pt has no cultural, educational or language  barriers to learning provided.    Profile and History Assessment of Occupational Performance Level of Clinical Decision Making Complexity Score   Occupational Profile:   Reymundo Gutierrez is a 44 y.o. male who was Independent with all ADL/IADL prior to onset of symptoms/injury . Pt is currently  severly limited with LUE. Reymundo Gutierrez  has difficulty with most ADL and IADL tasks affecting his/her daily functional abilities. His/her main goal for therapy is Regain Independent use of LUE.    Comorbidities:   N/A  Medical and Therapy History Review:   Expanded               Performance Deficits    Physical:  Joint Mobility  Muscle Power/Strength  Muscle Endurance  Skin Integrity/Scar Formation  Edema   Strength  Pinch Strength  Gross Motor Coordination  Fine Motor Coordination    Cognitive:  No Deficits    Psychosocial:    No Deficits     Clinical Decision Making:  moderate    Assessment Process:  Detailed Assessments    Modification/Need for Assistance:  Minimal-Moderate Modifications/Assistance    Intervention Selection:  Several Treatment Options       moderate  Based on PMHX, co morbidities , data from assessments and functional level of assistance required with task and clinical presentation directly impacting function.       The following goals were discussed with the patient and patient is in agreement with them as to be addressed in the treatment plan.     Goals:     Short Term Goals: (30 days, 9/27/19, or 10th visit) unless otherwise noted below.  1. Pt will be independent with HEP in 2 visits.  2. Pt will report decreased pain to a 6/10 at worst in LUE with ADLs in order to increase function/use of UE.   3. Pt will increase PIP level AROM flexion and ext L hand digits 2-5 by 10 degrees each to increase function L hand.  4. Pt will increase L thumb IP flexion ext AROM by 5 degrees each to increase function L hand.    Long Term Goals: (by discharge)  1. Pt will report decreased pain to 3/10 with  ADLs to allow for increased function/use of UE.   2. Pt will exhibit grossly WNL AROM of L hand to allow for Independent use of for all ADL/IADL tasks.  3. Pt will exhibit WFL  strength to allow a firm grasp during ADL/IADL (cooking utensils, tool use, carrying groceries, steering wheel, etc.)  4. Pt will exhibit WFL lateral pinch strength to allow writing,opening containers, and turning keys.  5. Pt will report no greater than Min difficulty with all Quick DASH assessment items.    Plan   Plan of care expiration:  9/27/19     Outpatient Occupational Therapy 2 times weekly through current poc expiration date, to include the following interventions:     Moist heat, cold packs, paraffin, fluidotherapy, edema control, scar mobilization/scar massage, manual therapy/joint mobilizations,A/PROM, therapeutic exercises/activities, strengthening, desensitization/sensory re-education, orthotic fitting/fabrication/training PRN, joint protections/energry conservation/adaptive equipment/activity modification, HEP/education as well as any other treatments deemed necessary based on the patient's needs or progress.     Updates Next Visit: To review HEP understanding and compliance and progress with OT tx as tolerated.    YURY Escamilla, BENIT

## 2019-08-28 NOTE — PROGRESS NOTES
Occupational Therapy Daily Treatment Note     Name: Reymundo Pichardo [facility] Number: [id number]    Therapy Diagnosis:  Stiffness L hand, edema L hand, weakness L hand,      Encounter Diagnoses   Name Primary?    Stiffness of finger joint of left hand Yes    Edema of hand     Weakness of left hand     Post-traumatic arthritis of wrist, left     Crushing injury of left wrist and hand, sequela      Physician: Escobar Colvin MD    Physician orders:   Note     Please begin range of motion of the fingers of his left hand.     Frequency:  2 times per week     Duration:  4 weeks     Diagnosis:  Status post left wrist arthrodesis             Visit Date: 8/30/2019    Medical Diagnosis: Z98.1 (ICD-10-CM) - Hx of arthrodesis  Evaluation Date: 8/28/2019  Insurance Authorization period Expiration: 8/20/20  Plan of Care Expiration Period: 9/24/19   Next Re-assessment: 30 days (9/27/19) and/or 10th visit   Date of Return to MD: 9/11/19     Visit # / Visits Authortized: 2 / 12  Time In: 0807  Time Out: 0854    Total Billable Time: 47 minutes with 47 min one on one.    Surgical Procedure and Date: Surgery: 8/06/19  PROCEDURE:    1.  Left total wrist arthrodesis with allograft from the left proximal tibia  2.  Left wrist hardware removal deep  3.  Left wrist extensor tenolysis     IMPLANTS:  Synthes short angle locking wrist arthrodesis plate                 Precautions: Standard and short arm cast in place LUE.    S/P sx: 3 weeks, 3 days    Subjective     Pt reports: Hand still feels very stiff  he was compliant with HEP.   Response to previous treatment: Good tolerance.    Functional change: None reported    Pain:  Functional Pain Scale Rating 0-10:   7-8/10 on average  5/10 at best  9/10 at worst  Location: L wrist ulnar and radial side and dorsum L wrist and thumb  Description: ache/throbbing  Aggravating Factors: Movement  Easing Factors: Elevation/rest.       Objective     Reymundo received the following manual therapy  techniques for capsular massage and STM volar plate digits 1-5 x 5 min     Reymundo received therapeutic exercises for 40 minutes, including:  Place and hold ext digits over red flexbar 3 min  Place and hold composite fist on towel 3 min   3x30 sec PROM flex and ext each digit all jts R hand   Towel scrunches x50 reps      AROM Hand: ext/flex IP level (unble to assess MCP level (cast in place)    Left PIPs Left DIPs   1st +22/15 (+2, 0) -   2nd -28/80 (+2,+2) NT LM  0/25   3rd -35/97 (+7, -5) NT LM 0/25   4th -40/90 (0, 0) NT LM  0/15   5th -50/84 (0, +9) NT LM 0/35          Home Exercises and Education Provided     Education provided:    Cont current HEP.    Written Home Exercises Provided:  Patient instructed to cont prior HEP.    Exercises were reviewed and Reymundo was able to demonstrate them prior to the end of the session.  Reymundo demonstrated good  understanding of the education provided.   .   See EMR under Media for exercises provided today and/or prior visit..        Assessment     Pt would continue to benefit from skilled OT. Pt with improved AROM overall and tolerating tx well with no increased pain reported.    - Progress towards goals: STG #1 has been met.    Reymundo is progressing well towards his goals and there are no updates to goals at this time. Pt prognosis continues with good rehab potential.     Pt will continue to benefit from skilled outpatient occupational therapy to address the deficits listed in the problem list on initial evaluation in order to maximize pt's level of independence in the home and community.     Anticipated barriers to occupational therapy: None    Pt's spiritual, cultural and educational needs considered and pt agreeable to plan of care and goals.    Goals:    Short Term Goals: (30 days, 9/27/19, or 10th visit) unless otherwise noted below.  1. Pt will be independent with HEP in 2 visits. (Met 8/30/19).  2. Pt will report decreased pain to a 6/10 at worst in LUE with ADLs in  order to increase function/use of UE.   3. Pt will increase PIP level AROM flexion and ext L hand digits 2-5 by 10 degrees each to increase function L hand.  4. Pt will increase L thumb IP flexion ext AROM by 5 degrees each to increase function L hand.     Long Term Goals: (by discharge)  1. Pt will report decreased pain to 3/10 with ADLs to allow for increased function/use of UE.   2. Pt will exhibit grossly WNL AROM of L hand to allow for Independent use of for all ADL/IADL tasks.  3. Pt will exhibit WFL  strength to allow a firm grasp during ADL/IADL (cooking utensils, tool use, carrying groceries, steering wheel, etc.)  4. Pt will exhibit WFL lateral pinch strength to allow writing,opening containers, and turning keys.  5. Pt will report no greater than Min difficulty with all Quick DASH assessment items.       Plan   Continue Occupational Therapy 2  times per week through current [facility] expiration date of 9/27/19, in order to to decrease pain and edema, and increase A/PROM, strength, and functional use of  Left upper extremity.    Updates/Grading for next session: Cont to progress with ROM L hand    YURY Escamilla, CODY

## 2019-08-30 ENCOUNTER — CLINICAL SUPPORT (OUTPATIENT)
Dept: REHABILITATION | Facility: HOSPITAL | Age: 44
End: 2019-08-30
Payer: COMMERCIAL

## 2019-08-30 DIAGNOSIS — M19.132 POST-TRAUMATIC ARTHRITIS OF WRIST, LEFT: ICD-10-CM

## 2019-08-30 DIAGNOSIS — R29.898 WEAKNESS OF LEFT HAND: ICD-10-CM

## 2019-08-30 DIAGNOSIS — M25.642 STIFFNESS OF FINGER JOINT OF LEFT HAND: Primary | ICD-10-CM

## 2019-08-30 DIAGNOSIS — S67.42XS CRUSHING INJURY OF LEFT WRIST AND HAND, SEQUELA: ICD-10-CM

## 2019-08-30 DIAGNOSIS — R60.0 EDEMA OF HAND: ICD-10-CM

## 2019-08-30 PROCEDURE — 97110 THERAPEUTIC EXERCISES: CPT | Mod: PO

## 2019-08-30 NOTE — PROGRESS NOTES
Occupational Therapy Daily Treatment Note     Name: Reymundo Pichardo [facility] Number: [id number]    Therapy Diagnosis: Stiffness L hand, edema L hand, weakness L hand,        Encounter Diagnoses   Name Primary?    Stiffness of finger joint of left hand Yes    Edema of hand     Weakness of left hand     Post-traumatic arthritis of wrist, left     Crushing injury of left wrist and hand, sequela      Physician: Escobar Colvin MD    Physician orders:   Note     Please begin range of motion of the fingers of his left hand.     Frequency:  2 times per week     Duration:  4 weeks     Diagnosis:  Status post left wrist arthrodesis            Visit Date: 9/3/2019    Medical Diagnosis: Z98.1 (ICD-10-CM) - Hx of arthrodesis  Evaluation Date: 8/28/2019  Insurance Authorization period Expiration: 8/20/20  Plan of Care Expiration Period: 9/24/19   Next Re-assessment: 30 days (9/27/19) and/or 10th visit   Date of Return to MD: 9/11/19     Visit # / Visits Authortized: 3 / 12  Time In: 0814  Time Out: 0900    Total Billable Time: 46minutes with 46 min one on one.    Surgical Procedure and Date: Surgery: 8/06/19  PROCEDURE:    1.  Left total wrist arthrodesis with allograft from the left proximal tibia  2.  Left wrist hardware removal deep  3.  Left wrist extensor tenolysis     IMPLANTS:  Synthes short angle locking wrist arthrodesis plate                 Precautions: Standard and short arm cast in place LUE.    S/P sx: 4 weeks    Subjective     Pt reports: His pain is decreased some and he can use is hand better for very light tasks.    he was compliant with HEP.   Response to previous treatment: Good tolerance.    Functional change: Pt. can use is hand better for very light tasks.  Pain:  Functional Pain Scale Rating 0-10:   7/10 on average  5/10 at best  8/10 at worst  Location: L wrist dorsal forearm and hand.  Description: ache/throbbing  Aggravating Factors: Movement  Easing Factors: Elevation/rest.       Objective      Reymundo received the following manual therapy techniques for capsular massage and STM volar plate digits 1-5 x 3 min     Reymundo received therapeutic exercises for 25 minutes, including:  Place and hold composite ext and flexion/hook fist 2x10 each.  PROM each digit 1-5 flexion and ext each jt 2x10 sec each  Towel scrunches x50 reps    Pt performed therapeutic activities x 15 min, including:  Transfer into container with large, med and small pom poms x 3 each     AROM Hand: ext/flex IP level (unble to assess MCP level (cast in place)    Left PIPs Left DIPs   1st +22/20 (0, 0) -   2nd -33/81 (-5,+1) NT LM  0/25   3rd -20/97 (+15, 0) NT LM 0/25   4th -45/95 (-5, +5) NT LM  0/15   5th -55/84 (-5, +11) NT LM 0/35          Home Exercises and Education Provided     Education provided:    Cont current HEP.    Written Home Exercises Provided:  Patient instructed to cont prior HEP.    Exercises were reviewed and Reymundo was able to demonstrate them prior to the end of the session.  Reymundo demonstrated good  understanding of the education provided.   .   See EMR under Media for exercises provided today and/or prior visit..        Assessment     Pt would continue to benefit from skilled OT. Pt with improved AROM with flexion most digits, but some loss of extension pre-tx today. Additional place and hold ex for ext and PROM completed this date in order to address further.    - Progress towards goals: STG #1 has been met.    Reymundo is progressing well towards his goals and there are no updates to goals at this time. Pt prognosis continues with good rehab potential.     Pt will continue to benefit from skilled outpatient occupational therapy to address the deficits listed in the problem list on initial evaluation in order to maximize pt's level of independence in the home and community.     Anticipated barriers to occupational therapy: None    Pt's spiritual, cultural and educational needs considered and pt agreeable to plan of care  and goals.    Goals:    Short Term Goals: (30 days, 9/27/19, or 10th visit) unless otherwise noted below.  1. Pt will be independent with HEP in 2 visits. (Met 8/30/19).  2. Pt will report decreased pain to a 6/10 at worst in LUE with ADLs in order to increase function/use of UE.   3. Pt will increase PIP level AROM flexion and ext L hand digits 2-5 by 10 degrees each to increase function L hand.  4. Pt will increase L thumb IP flexion ext AROM by 5 degrees each to increase function L hand.     Long Term Goals: (by discharge)  1. Pt will report decreased pain to 3/10 with ADLs to allow for increased function/use of UE.   2. Pt will exhibit grossly WNL AROM of L hand to allow for Independent use of for all ADL/IADL tasks.  3. Pt will exhibit WFL  strength to allow a firm grasp during ADL/IADL (cooking utensils, tool use, carrying groceries, steering wheel, etc.)  4. Pt will exhibit WFL lateral pinch strength to allow writing,opening containers, and turning keys.  5. Pt will report no greater than Min difficulty with all Quick DASH assessment items.       Plan   Continue Occupational Therapy 2  times per week through current poc expiration date of 9/27/19, in order to to decrease pain and edema, and increase A/PROM, strength, and functional use of  Left upper extremity.    Updates/Grading for next session: Cont to progress with ROM L hand    YURY Escamilla, CODY

## 2019-09-02 NOTE — TELEPHONE ENCOUNTER
----- Message from Yessy Madrigal sent at 9/11/2017  4:52 PM CDT -----  Contact: self   Patient would like to know test results. Please call back at [phone].      Thanks,  Yessy Madrigal     Upper Respiratory Infection :  Viral sinusitis  Possible strep         If fever or symptoms persist  or worsen over next 48h return to UC or ER and follow-up with PCP with in 5-7 days     RX:  await throat swab   FLONASE  Opposite HAND opposite nostril   OTC  Nasal saline 2-3 puffs 3-4 times per day ×5 days  Mucinex/guaifenesin 400 mg 3 times a day ×7 days    Increase fluid : NOT water, NOT Gatorade  for 48 h  V8 splash   Pedialyte 6-8 ounces every hour ×1-2 days  Clear soup 3-4 times per day x 3 day ( caution if on  Salt restricted diet)  Coconut water    Probiotics:  Choose one, take 2 time per day for length of Rx  Florostor  florogjen  Acidophilus    Patient Education     Sinus Headache    The sinuses are air-filled spaces within the bones of the face. They connect to the inside of the nose. Sinusitis is an inflammation of the tissue lining the sinus cavity. Sinus inflammation can occur during a cold or hay fever (allergies to pollens and other particles in the air) and cause symptoms of sinus congestion and fullness and perhaps a low-grade fever. An infection is usually present when there is also facial pain or headache and green or yellow drainage from the nose or into the back of the throat (postnasal drip). Antibiotics are often prescribed to treat this condition.  Sinus headache may cause pain in different places, depending on which sinuses are infected. There may be pain in the temples, forehead, top of the head, behind or around the eye, across the cheekbone, or into the upper teeth.  You may find that changing your position, sitting upright or lying down, will bring some relief.  Home care  The following guidelines will help you care for yourself at home:  · Drink plenty of water, hot tea, and other liquids to stay well hydrated. This thins the mucus and helps your sinuses drain.  · Apply heat to the painful areas of the face. Use a towel soaked in hot water. Or  the shower with the  hot spray on your face. This is a good way to inhale warm water vapor and get heat on your face at the same time. Cover your mouth and nose with your hands so you can still breathe as you do this.  · Use a cool mist vaporizer at night. Suck on peppermint, menthol, or eucalyptus hard candies during the day.  · An expectorant containing guaifenesin helps thin the mucus. It also helps your sinuses drain.  · You may use over-the-counter decongestants unless a similar medicine was prescribed. Nasal sprays or drops work the fastest. Use one that contains phenylephrine or oxymetazoline. First blow your nose gently to remove mucus. Then apply the spray or drops. Don't use decongestant nasal sprays or drops more often than the label says or for more than 3 days. This can make symptoms worse. Nasal sprays or drops prescribed by your doctor typically do not have these limits. Check with your doctor or pharmacist. You may also use oral tablets containing pseudoephedrine. Side effects from oral decongestants tend to be worse than with nasal sprays or drops, and may keep you from using them. Many sinus remedies combine ingredients, which may increase side effects. Also, if you are taking a combination medicine with another medicine, be sure you are not taking a double dose of anything by mistake. Read the labels or ask the pharmacist for help. Talk with your doctor before using decongestants if you have high blood pressure, heart disease, glaucoma, or prostate trouble.  · Antihistamines may help if allergies are causing your sinusitis. You can get chlorpheniramine and diphenhydramine over the counter, but these can cause drowsiness. Don't use these if you have glaucoma or if you are a man with trouble urinating due to an enlarged prostate. Over-the-counter antihistamines containing loratidine and cetirizine cause less drowsiness and may be a better choice for daytime use.  · When allergies cause your sinusitis, a saline nasal  rinse may give relief. A saline nasal rinse reduces swelling and clears excess mucus. This allows sinuses to drain. Prepackaged kits are available at most [facility]. These contain premixed salt packets and an irrigation device. If antibiotics have been prescribed to treat an acute sinus infection, talk with your doctor before using a nasal rinse to be sure it is safe for you.  · You may use over-the-counter medicine to control pain and fever, unless another pain medicine was prescribed. Talk with your doctor before using acetaminophen or ibuprofen if you have chronic liver or kidney disease. Also talk with your doctor if you have ever had a stomach ulcer. Aspirin should never be used in anyone under 18 years of age who has a fever. It may cause a life-threatening condition called Reye syndrome.  · If antibiotics were given, finish all of them, even if you are feeling better after a few days.  Follow-up care  Follow up with your healthcare provider, or as advised if your symptoms aren't better in 1 week.  Call 911  Call 911 if any of these occur:  · Unusual drowsiness or confusion  · Swelling of the forehead or eyelids  · Vision problems including blurred or double vision  · Seizure  When to seek medical advice  Call your healthcare provider right away if any of these occur:  · Facial pain or headache becomes more severe  · Stiff neck  · Fever of 100.4º F (38º C) or higher, or as directed by your healthcare provider  · Bleeding from the nose or throat  Date Last Reviewed: 10/1/2016  © 3497-9411 [employer]. [address] [place] [address]. All rights reserved. This information is not intended as a substitute for professional medical care. Always follow your healthcare professional's instructions.         Patient Education     Anatomy of the Inner Ear    There are two parts of the inner ear. One part (hearing canal) is for hearing. The other part (balance canal) is for balance.  The canals are  filled with a fluid called endolymph. The level of this fluid is maintained by a small organ called the endolymphatic sac. In the hearing canal, sound waves cause vibrations in the endolymph. The inner ear detects these sound waves and sends nerve impulses to the brain. The sound we hear is a result of the brain's interpretation of these nerve impulses.  In the balance canals, change in position causes movement of the fluid. This movement is detected in the balance portion of the inner ear, and nerve impulses are sent to the brain.  The endolymphatic sac keeps inner ear fluid at a constant level. The balance canals collect balance information. The hearing canal collects sound information. The hearing and balance nerves carry information to the brain from both parts of the inner ear.  Date Last Reviewed: 10/1/2016  © 2151-8929 [employer]. [address] [place] [address]. All rights reserved. This information is not intended as a substitute for professional medical care. Always follow your healthcare professional's instructions.

## 2019-09-03 ENCOUNTER — PATIENT MESSAGE (OUTPATIENT)
Dept: ORTHOPEDICS | Facility: CLINIC | Age: 44
End: 2019-09-03

## 2019-09-03 ENCOUNTER — TELEPHONE (OUTPATIENT)
Dept: ORTHOPEDICS | Facility: CLINIC | Age: 44
End: 2019-09-03

## 2019-09-03 ENCOUNTER — CLINICAL SUPPORT (OUTPATIENT)
Dept: REHABILITATION | Facility: HOSPITAL | Age: 44
End: 2019-09-03
Payer: COMMERCIAL

## 2019-09-03 DIAGNOSIS — M19.132 POST-TRAUMATIC ARTHRITIS OF WRIST, LEFT: ICD-10-CM

## 2019-09-03 DIAGNOSIS — R60.0 EDEMA OF HAND: ICD-10-CM

## 2019-09-03 DIAGNOSIS — M25.642 STIFFNESS OF FINGER JOINT OF LEFT HAND: Primary | ICD-10-CM

## 2019-09-03 DIAGNOSIS — S67.42XS CRUSHING INJURY OF LEFT WRIST AND HAND, SEQUELA: ICD-10-CM

## 2019-09-03 DIAGNOSIS — R29.898 WEAKNESS OF LEFT HAND: ICD-10-CM

## 2019-09-03 PROCEDURE — 97110 THERAPEUTIC EXERCISES: CPT | Mod: PO

## 2019-09-03 PROCEDURE — 97530 THERAPEUTIC ACTIVITIES: CPT | Mod: PO

## 2019-09-03 NOTE — TELEPHONE ENCOUNTER
I informed pt that he can come in tomorrow at 9:00 AM to have cast replaced by Dr Colvin.  Pt agreed to come in for 9:00 AM for cast replacement.

## 2019-09-03 NOTE — PROGRESS NOTES
Occupational Therapy Daily Treatment Note     Name: Reymundo Pichardo [facility] Number: [id number]    Therapy Diagnosis:  Stiffness L hand, edema L hand, weakness L hand,      Physician: Escobar Colvin MD    Physician orders:   Note     Please begin range of motion of the fingers of his left hand.     Frequency:  2 times per week     Duration:  4 weeks     Diagnosis:  Status post left wrist arthrodesis            Visit Date: 9/6/2019    Medical Diagnosis: Z98.1 (ICD-10-CM) - Hx of arthrodesis  Evaluation Date: 8/28/2019  Insurance Authorization period Expiration: 8/20/20  Plan of Care Expiration Period: 9/24/19   Next Re-assessment: 30 days (9/27/19) and/or 10th visit   Date of Return to MD: 9/11/19     Visit # / Visits Authortized: 4 / 12  Time In: 0808  Time Out: 0850    Total Billable Time: 42 minutes with 42 min one on one.    Surgical Procedure and Date: Surgery: 8/06/19  PROCEDURE:    1.  Left total wrist arthrodesis with allograft from the left proximal tibia  2.  Left wrist hardware removal deep  3.  Left wrist extensor tenolysis     IMPLANTS:  Synthes short angle locking wrist arthrodesis plate                 Precautions: Standard and short arm cast in place LUE.    S/P sx: 4 weeks, 3 days    Subjective     Pt reports: Had his cast replaced early due to some discomfort at thumb.  he was compliant with HEP.   Response to previous treatment: Good tolerance.    Functional change: Pt. can use is hand better for very light tasks.  Pain:  Functional Pain Scale Rating 0-10:   7/10 on average  5/10 at best  8/10 at worst  Location: L wrist dorsal forearm and hand.  Description: ache/throbbing  Aggravating Factors: Movement  Easing Factors: Elevation/rest.       Objective   Reymundo received therapeutic exercises for 30 minutes, including:  Place and hold composite ext and flexion/hook fist 2x10 each.  PROM each digit 1-5 flexion and ext each jt 2x10 sec each  Towel scrunches x50 reps into max digit flexion and  x50 reps into max digit ext  Static stretch composite ext digits 2-5 x 3 min.  Static stretch composite flexion/hook fist digits 2-5 x 3 min.      AROM Hand: ext/flex IP level (unble to assess MCP level (cast in place)    Left PIPs Left DIPs   1st +26/22 (+4, +2) -   2nd -32/88 (+1,+7) NT LM  0/25   3rd LM -20/97 (+15, 0) NT LM 0/25   4th LM -45/95 (-5, +5) NT LM  0/15   5th  LM -55/84 (-5, +11) NT LM 0/35          Home Exercises and Education Provided     Education provided:    Cont current HEP.    Written Home Exercises Provided:  Patient instructed to cont prior HEP.    Exercises were reviewed and Reymundo was able to demonstrate them prior to the end of the session.  Reymundo demonstrated good  understanding of the education provided.   .   See EMR under Media for exercises provided today and/or prior visit..        Assessment     Pt would continue to benefit from skilled OT. Pt with some continued improvement at 1st and second digits with both flexion and extension per ROM measurements taken this date. .     - Progress towards goals: STG #1 has been met.    Reymundo is progressing well towards his goals and there are no updates to goals at this time. Pt prognosis continues with good rehab potential.     Pt will continue to benefit from skilled outpatient occupational therapy to address the deficits listed in the problem list on initial evaluation in order to maximize pt's level of independence in the home and community.     Anticipated barriers to occupational therapy: None    Pt's spiritual, cultural and educational needs considered and pt agreeable to plan of care and goals.    Goals:    Short Term Goals: (30 days, 9/27/19, or 10th visit) unless otherwise noted below.  1. Pt will be independent with HEP in 2 visits. (Met 8/30/19).  2. Pt will report decreased pain to a 6/10 at worst in LUE with ADLs in order to increase function/use of UE.   3. Pt will increase PIP level AROM flexion and ext L hand digits 2-5 by 10  degrees each to increase function L hand.  4. Pt will increase L thumb IP flexion ext AROM by 5 degrees each to increase function L hand.     Long Term Goals: (by discharge)  1. Pt will report decreased pain to 3/10 with ADLs to allow for increased function/use of UE.   2. Pt will exhibit grossly WNL AROM of L hand to allow for Independent use of for all ADL/IADL tasks.  3. Pt will exhibit WFL  strength to allow a firm grasp during ADL/IADL (cooking utensils, tool use, carrying groceries, steering wheel, etc.)  4. Pt will exhibit WFL lateral pinch strength to allow writing,opening containers, and turning keys.  5. Pt will report no greater than Min difficulty with all Quick DASH assessment items.       Plan   Continue Occupational Therapy 2  times per week through current [facility] expiration date of 9/27/19, in order to to decrease pain and edema, and increase A/PROM, strength, and functional use of  Left upper extremity.    Updates/Grading for next session: Cont to progress with ROM L hand    YURY Escamilla, BENIT

## 2019-09-04 ENCOUNTER — PATIENT MESSAGE (OUTPATIENT)
Dept: ORTHOPEDICS | Facility: CLINIC | Age: 44
End: 2019-09-04

## 2019-09-06 ENCOUNTER — CLINICAL SUPPORT (OUTPATIENT)
Dept: REHABILITATION | Facility: HOSPITAL | Age: 44
End: 2019-09-06
Payer: COMMERCIAL

## 2019-09-06 DIAGNOSIS — R29.898 WEAKNESS OF LEFT HAND: ICD-10-CM

## 2019-09-06 DIAGNOSIS — M19.132 POST-TRAUMATIC ARTHRITIS OF WRIST, LEFT: ICD-10-CM

## 2019-09-06 DIAGNOSIS — S67.42XS CRUSHING INJURY OF LEFT WRIST AND HAND, SEQUELA: ICD-10-CM

## 2019-09-06 DIAGNOSIS — M25.642 STIFFNESS OF FINGER JOINT OF LEFT HAND: Primary | ICD-10-CM

## 2019-09-06 DIAGNOSIS — R60.0 EDEMA OF HAND: ICD-10-CM

## 2019-09-06 PROCEDURE — 97110 THERAPEUTIC EXERCISES: CPT | Mod: PO

## 2019-09-06 NOTE — PROGRESS NOTES
Occupational Therapy Daily Treatment Note     Name: Reymundo Pichardo [facility] Number: [id number]    Therapy Diagnosis:  Stiffness L hand, edema L hand, weakness L hand,      Physician: Esocbar Colvin MD    Physician orders:   Note     Please begin range of motion of the fingers of his left hand.     Frequency:  2 times per week     Duration:  4 weeks     Diagnosis:  Status post left wrist arthrodesis            Visit Date: 9/10/2019    Medical Diagnosis: Z98.1 (ICD-10-CM) - Hx of arthrodesis  Evaluation Date: 8/28/2019  Insurance Authorization period Expiration: 8/20/20  Plan of Care Expiration Period: 9/24/19   Next Re-assessment: 30 days (9/27/19) and/or 10th visit   Date of Return to MD: 9/11/19     Visit # / Visits Authortized: 5 / 12  Time In: 0810  Time Out: 0853  Total Billable Time: 43 minutes with 43 min of one on one.    Surgical Procedure and Date: Surgery: 8/06/19  PROCEDURE:    1.  Left total wrist arthrodesis with allograft from the left proximal tibia  2.  Left wrist hardware removal deep  3.  Left wrist extensor tenolysis     IMPLANTS:  Synthes short angle locking wrist arthrodesis plate                 Precautions: Standard and short arm cast in place LUE.    S/P sx: 5 weeks    Subjective     Pt reports: Pain levels unchanged per pt report.  he was compliant with HEP.   Response to previous treatment: Good tolerance.    Functional change: None reported this date    Pain:  Functional Pain Scale Rating 0-10:   7/10 on average  5/10 at best  8/10 at worst  Location: L wrist dorsal forearm and hand.  Description: ache/throbbing  Aggravating Factors: Movement  Easing Factors: Elevation/rest.       Objective   Reymundo received therapeutic exercises for 30 minutes, including:  Place and hold composite ext and flexion/hook fist 2x10 each.  PROM each digit 1-5 flexion and ext each jt 2x10 sec each  Towel scrunches x50 reps into max digit flexion and x50 reps into max digit ext  Static stretch composite  ext digits 2-5 x 3 min.  Static stretch composite flexion/hook fist digits 2-5 x 3 min.      AROM Hand: ext/flex IP level (unble to assess MCP level (cast in place)    Left PIPs Left DIPs   1st LM+26/22 (+4,+2) -   2nd -31/89 (+1,+1) NT LM  0/25   3rd LM -20/97 (+15, 0) NT LM 0/25   4th LM -45/95 (-5, +5) NT LM  0/15   5th  LM -55/84 (-5, +11) NT LM 0/35          Home Exercises and Education Provided     Education provided:    Cont current HEP.    Written Home Exercises Provided:  Patient instructed to cont prior HEP.    Exercises were reviewed and Reymundo was able to demonstrate them prior to the end of the session.  Reymundo demonstrated good  understanding of the education provided.   .   See EMR under Media for exercises provided today and/or prior visit..        Assessment     Pt would continue to benefit from skilled OT. Pt continues with some improved AROM 1 degree with flexion and ext of at 2nd digit PIP.     - Progress towards goals: STG #1 has been met.    Reymundo is progressing well towards his goals and there are no updates to goals at this time. Pt prognosis continues with good rehab potential.     Pt will continue to benefit from skilled outpatient occupational therapy to address the deficits listed in the problem list on initial evaluation in order to maximize pt's level of independence in the home and community.     Anticipated barriers to occupational therapy: None    Pt's spiritual, cultural and educational needs considered and pt agreeable to plan of care and goals.    Goals:    Short Term Goals: (30 days, 9/27/19, or 10th visit) unless otherwise noted below.  1. Pt will be independent with HEP in 2 visits. (Met 8/30/19).  2. Pt will report decreased pain to a 6/10 at worst in LUE with ADLs in order to increase function/use of UE.   3. Pt will increase PIP level AROM flexion and ext L hand digits 2-5 by 10 degrees each to increase function L hand.  4. Pt will increase L thumb IP flexion ext AROM by 5  degrees each to increase function L hand.     Long Term Goals: (by discharge)  1. Pt will report decreased pain to 3/10 with ADLs to allow for increased function/use of UE.   2. Pt will exhibit grossly WNL AROM of L hand to allow for Independent use of for all ADL/IADL tasks.  3. Pt will exhibit WFL  strength to allow a firm grasp during ADL/IADL (cooking utensils, tool use, carrying groceries, steering wheel, etc.)  4. Pt will exhibit WFL lateral pinch strength to allow writing,opening containers, and turning keys.  5. Pt will report no greater than Min difficulty with all Quick DASH assessment items.       Plan   Continue Occupational Therapy 2  times per week through current [facility] expiration date of 9/27/19, in order to to decrease pain and edema, and increase A/PROM, strength, and functional use of  Left upper extremity.    Updates/Grading for next session: Cont to progress with ROM L hand    YURY Escamilla, BENIT

## 2019-09-09 NOTE — PROGRESS NOTES
Occupational Therapy Daily Treatment Note     Name: Reymundo Pichardo [facility] Number: [id number]    Therapy Diagnosis:  Stiffness L hand, edema L hand, weakness L hand,      Physician: Escobar Colvin MD    Physician orders:   Note     Please begin range of motion of the fingers of his left hand.     Frequency:  2 times per week     Duration:  4 weeks     Diagnosis:  Status post left wrist arthrodesis            Visit Date: 9/11/2019    Medical Diagnosis: Z98.1 (ICD-10-CM) - Hx of arthrodesis  Evaluation Date: 8/28/2019  Insurance Authorization period Expiration: 8/20/20  Plan of Care Expiration Period: 9/24/19   Next Re-assessment: 30 days (9/27/19) and/or 10th visit   Date of Return to MD: 9/11/19     Visit # / Visits Authortized: 5 / 12  Time In: 0808  Time Out: 0850    Total Billable Time: 42 minutes with 42 min one on one.    Surgical Procedure and Date: Surgery: 8/06/19  PROCEDURE:    1.  Left total wrist arthrodesis with allograft from the left proximal tibia  2.  Left wrist hardware removal deep  3.  Left wrist extensor tenolysis     IMPLANTS:  Synthes short angle locking wrist arthrodesis plate                 Precautions: Standard and short arm cast in place LUE.    S/P sx: 4 weeks, 3 days    Subjective     Pt reports: Had his cast replaced early due to some discomfort at thumb.  he was compliant with HEP.   Response to previous treatment: Good tolerance.    Functional change: Pt. can use is hand better for very light tasks.  Pain:  Functional Pain Scale Rating 0-10:   7/10 on average  5/10 at best  8/10 at worst  Location: L wrist dorsal forearm and hand.  Description: ache/throbbing  Aggravating Factors: Movement  Easing Factors: Elevation/rest.       Objective   Reymundo received therapeutic exercises for 30 minutes, including:  Place and hold composite ext and flexion/hook fist 2x10 each.  PROM each digit 1-5 flexion and ext each jt 2x10 sec each  Towel scrunches x50 reps into max digit flexion and  x50 reps into max digit ext  Static stretch composite ext digits 2-5 x 3 min.  Static stretch composite flexion/hook fist digits 2-5 x 3 min.      AROM Hand: ext/flex IP level (unble to assess MCP level (cast in place)    Left PIPs Left DIPs   1st +26/22 (+4, +2) -   2nd -32/88 (+1,+7) NT LM  0/25   3rd LM -20/97 (+15, 0) NT LM 0/25   4th LM -45/95 (-5, +5) NT LM  0/15   5th  LM -55/84 (-5, +11) NT LM 0/35          Home Exercises and Education Provided     Education provided:    Cont current HEP.    Written Home Exercises Provided:  Patient instructed to cont prior HEP.    Exercises were reviewed and Reymundo was able to demonstrate them prior to the end of the session.  Reymundo demonstrated good  understanding of the education provided.   .   See EMR under Media for exercises provided today and/or prior visit..        Assessment     Pt would continue to benefit from skilled OT. Pt with some continued improvement at 1st and second digits with both flexion and extension per ROM measurements taken this date. .     - Progress towards goals: STG #1 has been met.    Reymundo is progressing well towards his goals and there are no updates to goals at this time. Pt prognosis continues with good rehab potential.     Pt will continue to benefit from skilled outpatient occupational therapy to address the deficits listed in the problem list on initial evaluation in order to maximize pt's level of independence in the home and community.     Anticipated barriers to occupational therapy: None    Pt's spiritual, cultural and educational needs considered and pt agreeable to plan of care and goals.    Goals:    Short Term Goals: (30 days, 9/27/19, or 10th visit) unless otherwise noted below.  1. Pt will be independent with HEP in 2 visits. (Met 8/30/19).  2. Pt will report decreased pain to a 6/10 at worst in LUE with ADLs in order to increase function/use of UE.   3. Pt will increase PIP level AROM flexion and ext L hand digits 2-5 by 10  degrees each to increase function L hand.  4. Pt will increase L thumb IP flexion ext AROM by 5 degrees each to increase function L hand.     Long Term Goals: (by discharge)  1. Pt will report decreased pain to 3/10 with ADLs to allow for increased function/use of UE.   2. Pt will exhibit grossly WNL AROM of L hand to allow for Independent use of for all ADL/IADL tasks.  3. Pt will exhibit WFL  strength to allow a firm grasp during ADL/IADL (cooking utensils, tool use, carrying groceries, steering wheel, etc.)  4. Pt will exhibit WFL lateral pinch strength to allow writing,opening containers, and turning keys.  5. Pt will report no greater than Min difficulty with all Quick DASH assessment items.       Plan   Continue Occupational Therapy 2  times per week through current [facility] expiration date of 9/27/19, in order to to decrease pain and edema, and increase A/PROM, strength, and functional use of  Left upper extremity.    Updates/Grading for next session: Cont to progress with ROM L hand    YURY Hunt, BNEIT

## 2019-09-10 ENCOUNTER — CLINICAL SUPPORT (OUTPATIENT)
Dept: REHABILITATION | Facility: HOSPITAL | Age: 44
End: 2019-09-10
Payer: COMMERCIAL

## 2019-09-10 DIAGNOSIS — S67.42XS CRUSHING INJURY OF LEFT WRIST AND HAND, SEQUELA: ICD-10-CM

## 2019-09-10 DIAGNOSIS — M25.642 STIFFNESS OF FINGER JOINT OF LEFT HAND: Primary | ICD-10-CM

## 2019-09-10 DIAGNOSIS — Z98.1 HX OF ARTHRODESIS: ICD-10-CM

## 2019-09-10 DIAGNOSIS — R60.0 EDEMA OF HAND: ICD-10-CM

## 2019-09-10 DIAGNOSIS — M19.132 POST-TRAUMATIC ARTHRITIS OF WRIST, LEFT: ICD-10-CM

## 2019-09-10 DIAGNOSIS — S67.42XD CRUSHING INJURY OF LEFT WRIST AND HAND, SUBSEQUENT ENCOUNTER: Primary | ICD-10-CM

## 2019-09-10 DIAGNOSIS — R29.898 WEAKNESS OF LEFT HAND: ICD-10-CM

## 2019-09-10 PROCEDURE — 97110 THERAPEUTIC EXERCISES: CPT | Mod: PO

## 2019-09-10 NOTE — PROGRESS NOTES
Occupational Therapy Daily Treatment Note     Name: Reymundo Pichardo [facility] [place] Number: [id number]    Therapy Diagnosis:  Stiffness L hand, edema L hand, weakness L hand,      Physician: Escobar Colvin MD    Physician orders:   Note     Please begin range of motion of the fingers of his left hand.     Frequency:  2 times per week     Duration:  4 weeks     Diagnosis:  Status post left wrist arthrodesis            Visit Date: 9/11/2019    Medical Diagnosis: Z98.1 (ICD-10-CM) - Hx of arthrodesis  Evaluation Date: 8/28/2019  Insurance Authorization period Expiration: 8/20/20  Plan of Care Expiration Period: 9/24/19   Next Re-assessment: 30 days (9/27/19) and/or 10th visit   Date of Return to MD: 9/11/19     Visit # / Visits Authortized: 6/ 12  Time In: 0808  Time Out: 0842  Total Billable Time: 30 minutes with 30 min of one on one.    Surgical Procedure and Date: Surgery: 8/06/19  PROCEDURE:    1.  Left total wrist arthrodesis with allograft from the left proximal tibia  2.  Left wrist hardware removal deep  3.  Left wrist extensor tenolysis     IMPLANTS:  Synthes short angle locking wrist arthrodesis plate                 Precautions: Standard and short arm cast in place LUE.    S/P sx: (5 weeks and 1 day)    Subjective     Pt reports: Pain levels unchanged per pt report.  he was compliant with HEP.   Response to previous treatment: Good tolerance.    Functional change: None reported this date    Pain:  Functional Pain Scale Rating 0-10:   7/10 on average  5/10 at best  8/10 at worst  Location: L wrist dorsal forearm and hand.  Description: ache/throbbing  Aggravating Factors: Movement  Easing Factors: Elevation/rest.       Objective   Reymundo received  therapeutic exercises for 25 minutes, including:  Place and hold composite ext and flexion/hook fist 2x10 each.  PROM each digit 1-5 flexion and ext each jt 2x10 sec each  Towel scrunches x50 reps into max digit flexion and x50 reps into max digit ext  Static  stretch composite ext digits 2-5 x 3 min.  Static stretch composite flexion/hook fist digits 2-5 x 3 min.  Digit ab/add 5x10.  Fingerlifts 5x10    AROM Hand: ext/flex IP level (unble to assess MCP level (cast in place)    Left PIPs Left DIPs   1st LM+26/22 (+4,+2) -   2nd NT/-31/90 (NT,+1) NT LM  0/25   3rd LM -20/97 (+15, 0) NT LM 0/25   4th LM -45/95 (-5, +5) NT LM  0/15   5th  LM -55/84 (-5, +11) NT LM 0/35          Home Exercises and Education Provided     Education provided:    Cont current HEP.    Written Home Exercises Provided:  Patient instructed to cont prior HEP.    Exercises were reviewed and Reymundo was able to demonstrate them prior to the end of the session.  Reymundo demonstrated good  understanding of the education provided.   .   See EMR under Media for exercises provided today and/or prior visit.       Assessment     Pt would continue to benefit from skilled OT. Pt continues with some improved AROM and additional degree flexion and ext of at 2nd digit PIP.     - Progress towards goals: STG #1 has been met.    Reymundo is progressing well towards his goals and there are no updates to goals at this time. Pt prognosis continues with good rehab potential.     Pt will continue to benefit from skilled outpatient occupational therapy to address the deficits listed in the problem list on initial evaluation in order to maximize pt's level of independence in the home and community.     Anticipated barriers to occupational therapy: None    Pt's spiritual, cultural and educational needs considered and pt agreeable to plan of care and goals.    Goals:  Short Term Goals: (30 days, 9/27/19, or 10th visit) unless otherwise noted below.  1. Pt will be independent with HEP in 2 visits. (Met 8/30/19).  2. Pt will report decreased pain to a 6/10 at worst in LUE with ADLs in order to increase function/use of UE.   3. Pt will increase PIP level AROM flexion and ext L hand digits 2-5 by 10 degrees each to increase function L  hand.  4. Pt will increase L thumb IP flexion ext AROM by 5 degrees each to increase function L hand.     Long Term Goals: (by discharge)  1. Pt will report decreased pain to 3/10 with ADLs to allow for increased function/use of UE.   2. Pt will exhibit grossly WNL AROM of L hand to allow for Independent use of for all ADL/IADL tasks.  3. Pt will exhibit WFL  strength to allow a firm grasp during ADL/IADL (cooking utensils, tool use, carrying groceries, steering wheel, etc.)  4. Pt will exhibit WFL lateral pinch strength to allow writing,opening containers, and turning keys.  5. Pt will report no greater than Min difficulty with all Quick DASH assessment items.       Plan   Continue Occupational Therapy 2  times per week through current poc expiration date of 9/27/19, in order to to decrease pain and edema, and increase A/PROM, strength, and functional use of  Left upper extremity.    Updates/Grading for next session: Pt for follow-up with MD later this date. To follow for any further orders post visit. Cont to progress with ROM L hand    YURY Escamilla, CODY

## 2019-09-11 ENCOUNTER — OFFICE VISIT (OUTPATIENT)
Dept: ORTHOPEDICS | Facility: CLINIC | Age: 44
End: 2019-09-11
Payer: COMMERCIAL

## 2019-09-11 ENCOUNTER — CLINICAL SUPPORT (OUTPATIENT)
Dept: REHABILITATION | Facility: HOSPITAL | Age: 44
End: 2019-09-11
Payer: COMMERCIAL

## 2019-09-11 ENCOUNTER — HOSPITAL ENCOUNTER (OUTPATIENT)
Dept: RADIOLOGY | Facility: HOSPITAL | Age: 44
Discharge: HOME | End: 2019-09-11
Attending: ORTHOPAEDIC SURGERY
Payer: COMMERCIAL

## 2019-09-11 VITALS
WEIGHT: 216.06 LBS | BODY MASS INDEX: 30.93 KG/M2 | DIASTOLIC BLOOD PRESSURE: 95 MMHG | SYSTOLIC BLOOD PRESSURE: 144 MMHG | HEIGHT: 70 IN | HEART RATE: 102 BPM

## 2019-09-11 DIAGNOSIS — M19.132 POST-TRAUMATIC ARTHRITIS OF WRIST, LEFT: ICD-10-CM

## 2019-09-11 DIAGNOSIS — S67.42XS CRUSHING INJURY OF LEFT WRIST AND HAND, SEQUELA: ICD-10-CM

## 2019-09-11 DIAGNOSIS — S67.42XS CRUSHING INJURY OF LEFT WRIST AND HAND, SEQUELA: Primary | ICD-10-CM

## 2019-09-11 DIAGNOSIS — M25.642 STIFFNESS OF FINGER JOINT OF LEFT HAND: Primary | ICD-10-CM

## 2019-09-11 DIAGNOSIS — R29.898 WEAKNESS OF LEFT HAND: ICD-10-CM

## 2019-09-11 DIAGNOSIS — S67.42XD CRUSHING INJURY OF LEFT WRIST AND HAND, SUBSEQUENT ENCOUNTER: ICD-10-CM

## 2019-09-11 DIAGNOSIS — R60.0 EDEMA OF HAND: ICD-10-CM

## 2019-09-11 DIAGNOSIS — Z98.1 HX OF ARTHRODESIS: ICD-10-CM

## 2019-09-11 PROCEDURE — 97110 THERAPEUTIC EXERCISES: CPT | Mod: PO

## 2019-09-11 PROCEDURE — 99024 POSTOP FOLLOW-UP VISIT: CPT | Mod: S$GLB,,, | Performed by: ORTHOPAEDIC SURGERY

## 2019-09-11 PROCEDURE — 99999 PR PBB SHADOW E&M-EST. PATIENT-LVL III: CPT | Mod: PBBFAC,,, | Performed by: ORTHOPAEDIC SURGERY

## 2019-09-11 PROCEDURE — 99024 PR POST-OP FOLLOW-UP VISIT: ICD-10-PCS | Mod: S$GLB,,, | Performed by: ORTHOPAEDIC SURGERY

## 2019-09-11 PROCEDURE — 73110 X-RAY EXAM OF WRIST: CPT | Mod: TC,PO,LT

## 2019-09-11 PROCEDURE — 73110 X-RAY EXAM OF WRIST: CPT | Mod: 26,LT,, | Performed by: RADIOLOGY

## 2019-09-11 PROCEDURE — 99999 PR PBB SHADOW E&M-EST. PATIENT-LVL III: ICD-10-PCS | Mod: PBBFAC,,, | Performed by: ORTHOPAEDIC SURGERY

## 2019-09-11 PROCEDURE — 73110 XR WRIST COMPLETE 3 VIEWS LEFT: ICD-10-PCS | Mod: 26,LT,, | Performed by: RADIOLOGY

## 2019-09-11 RX ORDER — HYDROCODONE BITARTRATE AND ACETAMINOPHEN 7.5; 325 MG/1; MG/1
1 TABLET ORAL EVERY 6 HOURS PRN
Qty: 22 TABLET | Refills: 0 | Status: SHIPPED | OUTPATIENT
Start: 2019-09-11 | End: 2019-09-11

## 2019-09-11 RX ORDER — HYDROCODONE BITARTRATE AND ACETAMINOPHEN 7.5; 325 MG/1; MG/1
1 TABLET ORAL EVERY 6 HOURS PRN
Qty: 22 TABLET | Refills: 0 | Status: SHIPPED | OUTPATIENT
Start: 2019-09-11 | End: 2019-09-18

## 2019-09-12 RX ORDER — OXYCODONE HYDROCHLORIDE 5 MG/1
5 TABLET ORAL EVERY 4 HOURS PRN
Qty: 22 TABLET | Refills: 0 | OUTPATIENT
Start: 2019-09-12

## 2019-09-12 NOTE — PROGRESS NOTES
Mr Gutierrez returns to clinic today.  Has a history of left wrist arthrodesis due to a crush injury to his left hand.  He states that he has been taking vitamin-C, amitriptyline, and his pain medications.  He states that he is still having significant pain about his wrist and hand.  He has been working with therapy and feels as if he is gaining significant amount of function to his hand.    Physical exam:  Examination left forearm wrist and hand reveals that all the incisions are well healed.  There is still mild edema.  There is no erythema or wound breakdown.  He does have mild tenderness to palpation over the dorsum and volar side of the hand and wrist. Wrist position is maintained.  He is able to flex and extend his fingers with significantly more motion that was previously possible.  He does have capillary refill less than 2 sec in all the digits.    Radiology:  X-rays of the left hand/wrist were taken in clinic today. He is noted to have arthrodesis plate which is well fixed in position. The carpus appears to be healing across the arthrodesis sites.    Assessment:  Status post left wrist arthrodesis    Plan:    1.  He was placed into a Velcro splint today    2.  He will continue to work with therapy aggressively on his motion    3.  He does have an appointment with pain management next week    4.  I will refill his pain medication today to allow him to get to pain management next week    5.  He will also begin taking his previously prescribed gabapentin    6.  He will follow up with me in 3-4 weeks for repeat evaluation with an x-ray of the left wrist

## 2019-09-16 NOTE — PROGRESS NOTES
Occupational Therapy Daily Treatment Note     Name: Reymundo Pichardo [facility] Number: [id number]    Therapy Diagnosis:  Stiffness L hand, edema L hand, weakness L hand,      Physician: Escobar Colvin MD    Physician orders:   Note     Please begin range of motion of the fingers of his left hand.     Frequency:  2 times per week     Duration:  4 weeks     Diagnosis:  Status post left wrist arthrodesis            Visit Date: 9/17/2019    Medical Diagnosis: Z98.1 (ICD-10-CM) - Hx of arthrodesis  Evaluation Date: 8/28/2019  Insurance Authorization period Expiration: 8/20/20  Plan of Care Expiration Period: 9/24/19   Next Re-assessment: 30 days (9/27/19) and/or 10th visit   Date of Return to MD: 10/9/19     Visit # / Visits Authortized: 7 / 12  Time In: 0810  Time Out: 0855  Total Billable Time: 40  minutes with 40 of 1 on 1.    Surgical Procedure and Date: Surgery: 8/06/19  PROCEDURE:    1.  Left total wrist arthrodesis with allograft from the left proximal tibia  2.  Left wrist hardware removal deep  3.  Left wrist extensor tenolysis     IMPLANTS:  Synthes short angle locking wrist arthrodesis plate                 Precautions: Standard and velcro splint now being used.  S/P sx: 6 weeks    Subjective     Pt reports: Pain levels unchanged per pt report.  he was compliant with HEP.   Response to previous treatment: Good tolerance.    Functional change: None reported this date    Pain:  Functional Pain Scale Rating 0-10:   7/10 on average  5/10 at best  710 at worst  Location: L wrist dorsal forearm and hand.  Description: ache/throbbing/burning  Aggravating Factors: Movement  Easing Factors: Elevation/rest.       Objective   Moist heat x 5 min pre    Reymundo received  therapeutic exercises for 30 minutes, including:  Place and hold composite ext and flexion/hook fist 2x10 each.  PROM each digit 1-5 flexion and ext each jt 2x10 sec each  Towel scrunches x50 reps into max digit flexion and x50 reps into max digit  ext  Static stretch composite ext digits 2-5 x 3 min.  Static stretch composite flexion/hook fist digits 2-5 x 3 min.  1 min holds composite fist and ext x 5      AROM Hand: ext/flex IP level (unble to assess MCP level (cast in place)  Eval date  Left PIPs Left DIPs   1st +20/15 -   2nd -30/75  0/25   3rd -35/97  0/25   4th -40/90 0/15   5th -50/75 0/35                  MCPS           PIPs  DIPs   1st -25/50           +20/25 -   2nd -9/43             - /80   0/25   3rd -23/55            -/100 0/30   4th -5/48              -/100 0/30   5th +15/22           -/100 0/42          Home Exercises and Education Provided     Education provided:    Cont current HEP. And begin more complete/full composite flexion and extension of digits now that vasu has been removed    Written Home Exercises Provided:  Patient instructed to cont prior HEP.    Exercises were reviewed and Reymundo was able to demonstrate them prior to the end of the session.  Reymundo demonstrated good  understanding of the education provided.   .   See EMR under Media for exercises provided today and/or prior visit.       Assessment     Pt would continue to benefit from skilled OT.Cast has been removed and MCP level AROM assessed this date. PT with good response to tx with some improved mobility noted post tx today.    - Progress towards goals: STG #1 has been met.    Reymundo is progressing well towards his goals and there are no updates to goals at this time. Pt prognosis continues with good rehab potential.     Pt will continue to benefit from skilled outpatient occupational therapy to address the deficits listed in the problem list on initial evaluation in order to maximize pt's level of independence in the home and community.     Anticipated barriers to occupational therapy: None    Pt's spiritual, cultural and educational needs considered and pt agreeable to plan of care and goals.    Goals:  Short Term Goals: (30 days, 9/27/19, or 10th visit) unless otherwise  noted below.  1. Pt will be independent with HEP in 2 visits. (Met 8/30/19).  2. Pt will report decreased pain to a 6/10 at worst in LUE with ADLs in order to increase function/use of UE.   3. Pt will increase PIP level AROM flexion and ext L hand digits 2-5 by 10 degrees each to increase function L hand.  4. Pt will increase L thumb IP flexion ext AROM by 5 degrees each to increase function L hand.     Long Term Goals: (by discharge)  1. Pt will report decreased pain to 3/10 with ADLs to allow for increased function/use of UE.   2. Pt will exhibit grossly WNL AROM of L hand to allow for Independent use of for all ADL/IADL tasks.  3. Pt will exhibit WFL  strength to allow a firm grasp during ADL/IADL (cooking utensils, tool use, carrying groceries, steering wheel, etc.)  4. Pt will exhibit WFL lateral pinch strength to allow writing,opening containers, and turning keys.  5. Pt will report no greater than Min difficulty with all Quick DASH assessment items.       Plan   Continue Occupational Therapy 2  times per week through current poc expiration date of 9/27/19, in order to to decrease pain and edema, and increase A/PROM, strength, and functional use of  Left upper extremity.    Updates/Grading for next session: Cont to progress with ROM L hand    YURY Escamilla, CODY

## 2019-09-17 ENCOUNTER — CLINICAL SUPPORT (OUTPATIENT)
Dept: REHABILITATION | Facility: HOSPITAL | Age: 44
End: 2019-09-17
Payer: COMMERCIAL

## 2019-09-17 DIAGNOSIS — R29.898 WEAKNESS OF LEFT HAND: ICD-10-CM

## 2019-09-17 DIAGNOSIS — R60.0 EDEMA OF HAND: ICD-10-CM

## 2019-09-17 DIAGNOSIS — S67.42XS CRUSHING INJURY OF LEFT WRIST AND HAND, SEQUELA: ICD-10-CM

## 2019-09-17 DIAGNOSIS — M25.642 STIFFNESS OF FINGER JOINT OF LEFT HAND: Primary | ICD-10-CM

## 2019-09-17 DIAGNOSIS — M19.132 POST-TRAUMATIC ARTHRITIS OF WRIST, LEFT: ICD-10-CM

## 2019-09-17 PROCEDURE — 97110 THERAPEUTIC EXERCISES: CPT | Mod: PO

## 2019-09-18 ENCOUNTER — OFFICE VISIT (OUTPATIENT)
Dept: PAIN MEDICINE | Facility: CLINIC | Age: 44
End: 2019-09-18
Payer: COMMERCIAL

## 2019-09-18 VITALS
SYSTOLIC BLOOD PRESSURE: 156 MMHG | WEIGHT: 216.06 LBS | DIASTOLIC BLOOD PRESSURE: 100 MMHG | BODY MASS INDEX: 30.93 KG/M2 | HEIGHT: 70 IN | HEART RATE: 107 BPM

## 2019-09-18 DIAGNOSIS — G90.512 COMPLEX REGIONAL PAIN SYNDROME TYPE 1 OF LEFT UPPER EXTREMITY: Primary | ICD-10-CM

## 2019-09-18 DIAGNOSIS — G89.4 CHRONIC PAIN SYNDROME: ICD-10-CM

## 2019-09-18 PROCEDURE — 99203 OFFICE O/P NEW LOW 30 MIN: CPT | Mod: S$GLB,,, | Performed by: ANESTHESIOLOGY

## 2019-09-18 PROCEDURE — 99999 PR PBB SHADOW E&M-EST. PATIENT-LVL III: CPT | Mod: PBBFAC,,, | Performed by: ANESTHESIOLOGY

## 2019-09-18 PROCEDURE — 99203 PR OFFICE/OUTPT VISIT, NEW, LEVL III, 30-44 MIN: ICD-10-PCS | Mod: S$GLB,,, | Performed by: ANESTHESIOLOGY

## 2019-09-18 PROCEDURE — 99999 PR PBB SHADOW E&M-EST. PATIENT-LVL III: ICD-10-PCS | Mod: PBBFAC,,, | Performed by: ANESTHESIOLOGY

## 2019-09-18 RX ORDER — HYDROCODONE BITARTRATE AND ACETAMINOPHEN 5; 325 MG/1; MG/1
1 TABLET ORAL
Qty: 5 TABLET | Refills: 0 | Status: SHIPPED | OUTPATIENT
Start: 2019-09-18 | End: 2019-09-23

## 2019-09-18 RX ORDER — HYDROCODONE BITARTRATE AND ACETAMINOPHEN 5; 325 MG/1; MG/1
1 TABLET ORAL EVERY 12 HOURS PRN
Qty: 14 TABLET | Refills: 0 | Status: SHIPPED | OUTPATIENT
Start: 2019-09-18 | End: 2019-09-25

## 2019-09-18 RX ORDER — IBUPROFEN 800 MG/1
800 TABLET ORAL 2 TIMES DAILY PRN
Qty: 40 TABLET | Refills: 1 | Status: SHIPPED | OUTPATIENT
Start: 2019-09-18 | End: 2019-10-28 | Stop reason: SDUPTHER

## 2019-09-18 NOTE — PROGRESS NOTES
[facility] New Patient Evaluation    Referred by: Dr. Colvin  Reason for referral: wrist pain    CC:   Chief Complaint   Patient presents with    Crushing injury left wrist, pain      No flowsheet data found.    HPI:   Reymundo Gutierrez is a 44 y.o. male who complains of wrist pain     Has a history of left wrist arthrodesis due to a crush injury to his left hand.  He states that he has been taking vitamin-C, amitriptyline, and his pain medications.  He states that he is still having significant pain about his wrist and hand.  He has been working with therapy and feels as if he is gaining significant amount of function to his hand.    Onset: 12/27/17  Inciting Event: crush injury between fork lift and door of a truck If yes, Date of injury: 12/27/17  Progression: since onset, pain is stable  Current Pain Score: 8/10  Typical Range: 6-8/10  Timing: constant  Quality: aching, burning, electric, hot, tight  Radiation: no  Associated numbness or weakness: yes numbness, tingling, weakness  Exacerbated by: touching, extension, flexion  Allievated by: laying down, medications, PT  Is Pain Level Acceptable?: No    Previous Therapies:  PT/OT: yes, PT and desensitization  HEP:   Interventions:   - left stellate ganglion block with Dr. Jarrell on 5/21/18, 6/18/18, and 7/23/18  Surgery:  8/6/19 - Left total wrist arthrodesis with allograft from the left proximal tibia  Medications:   - NSAIDS:   - MSK Relaxants:   - TCAs: amitriptyline  - SNRIs:   - Topicals: cymbalta (stopped)  - Anticonvulsants: gabapentin 300mg TID  - Opioids: hydrocodone 7.5/325mg    History:    Current Outpatient Medications:     albuterol (PROVENTIL) 2.5 mg /3 mL (0.083 %) nebulizer solution, Inhale 2.5 mg into the lungs., Disp: , Rfl:     amitriptyline (ELAVIL) 10 MG tablet, Take 1 tablet (10 mg total) by mouth every evening for 21 days, Disp: 21 tablet, Rfl: 0    buPROPion (WELLBUTRIN XL) 300 MG 24 hr tablet, Take 1 tablet (300 mg  total) by mouth every morning, Disp: 30 tablet, Rfl: 2    DULoxetine (CYMBALTA) 60 MG capsule, Take 60 mg by mouth., Disp: , Rfl:     DULoxetine (CYMBALTA) 60 MG capsule, Take 1 capsule (60 mg total) by mouth every morning, Disp: 30 capsule, Rfl: 2    fluticasone propionate (FLONASE ALLERGY RELIEF) 50 mcg/actuation nasal spray, 1 spray by Nasal route., Disp: , Rfl:     ketoconazole (NIZORAL) 2 % shampoo, Apply topically., Disp: , Rfl:     promethazine (PHENERGAN) 25 MG tablet, Take 25 mg by mouth., Disp: , Rfl:     fluticasone-salmeterol 250-50 mcg/dose (ADVAIR) 250-50 mcg/dose diskus inhaler, Inhale 1 puff into the lungs 2 (two) times daily. Controller, Disp: 60 each, Rfl: 0    HYDROcodone-acetaminophen (NORCO) 5-325 mg per tablet, Take 1 tablet by mouth every 12 (twelve) hours as needed for Pain., Disp: 14 tablet, Rfl: 0    HYDROcodone-acetaminophen (NORCO) 5-325 mg per tablet, Take 1 tablet by mouth every 24 hours as needed for Pain., Disp: 5 tablet, Rfl: 0    ibuprofen (ADVIL,MOTRIN) 800 MG tablet, Take 1 tablet (800 mg total) by mouth 2 (two) times daily as needed for Pain., Disp: 40 tablet, Rfl: 1    ondansetron (ZOFRAN-ODT) 4 MG TbDL, Take 2 tablets (8 mg total) by mouth every 8 (eight) hours as needed., Disp: 6 tablet, Rfl: 0    Past Medical History:   Diagnosis Date    Asthma     Crushing injury of left wrist and hand 7/10/2019       Past Surgical History:   Procedure Laterality Date    BONE GRAFT LEG Left 8/6/2019    Performed by [facility]vin [facility] at [facility]    FUSION, JOINT, WRIST Left 8/6/2019    Performed by [facility] at [facility] OR    WRIST SURGERY Left     x3       History reviewed. No pertinent family history.    Social History     Socioeconomic History    Marital status: Single     Spouse name: Not on file    Number of children: Not on file    Years of education: Not on file    Highest education level: Not on file   Occupational History    Not on file   Social Needs  "   Financial resource strain: Not on file    Food insecurity:     Worry: Not on file     Inability: Not on file    Transportation needs:     Medical: Not on file     Non-medical: Not on file   Tobacco Use    Smoking status: Former Smoker    Smokeless tobacco: Never Used   Substance and Sexual Activity    Alcohol use: No    Drug use: No    Sexual activity: Not on file   Lifestyle    Physical activity:     Days per week: Not on file     Minutes per session: Not on file    Stress: Not on file   Relationships    Social connections:     Talks on phone: Not on file     Gets together: Not on file     Attends Christianity service: Not on file     Active member of club or organization: Not on file     Attends meetings of clubs or organizations: Not on file     Relationship status: Not on file   Other Topics Concern    Not on file   Social History Narrative    Not on file       Review of patient's allergies indicates:  No Known Allergies    Review of Systems:  General ROS: positive for  - fatigue  Psychological ROS: negative for - hostility  Hematological and Lymphatic ROS: negative for - bleeding problems  Endocrine ROS: negative for - unexpected weight changes  Respiratory ROS: positive for - cough  Cardiovascular ROS: no chest pain or dyspnea on exertion  Gastrointestinal ROS: no abdominal pain, change in bowel habits, or black or bloody stools  Musculoskeletal ROS: positive for - muscular weakness  Neurological ROS: negative for - bowel and bladder control changes  Dermatological ROS: negative for rash    Physical Exam:  Vitals:    09/18/19 1003   BP: (!) 156/100   Pulse: 107   Weight: 98 kg (216 lb 0.8 oz)   Height: 5' 10" (1.778 m)   PainSc:   8   PainLoc: Wrist     Body mass index is 31 kg/m².     Gen: NAD  Psych: mood appropriate for given condition  CV: 2+ radial pulse  HEENT: anicteric   Respiratory: non labored  Abd: soft nt, nd  Skin: intact, + allodynia, + temperature asymmetry  Sensation: intact to lt " touch bilaterally in c4-t1   ROM: decreased hand intrinsics on the left  Tone:  Normal at elbow, wrist and shoulder   Palpation: tender cervical paraspinals, levator scapula and trapezius non tender bicipital tendon    Imaging:  Xray left wrist 9/11/19  FINDINGS:  There has been ORIF of the left wrist, with volar plate screw device of the distal radius, and a dorsal plate screw device extending from the distal radial shaft to the mid shaft of the 3rd metacarpal.  Chronic distal radius and ulnar styloid fractures are present.  All hardware components are intact and engaged.  There is no change in alignment.  Moderate dorsal soft tissue swelling is present which has decreased.    Labs:  BMP  Lab Results   Component Value Date     09/11/2017    K 3.9 09/11/2017     09/11/2017    CO2 22 09/11/2017    BUN 21 09/11/2017    CREATININE 1.06 09/11/2017    CALCIUM 9.5 09/11/2017    ANIONGAP 9 09/11/2017    ESTGFRAFRICA >60 09/11/2017    EGFRNONAA >60 09/11/2017     Lab Results   Component Value Date    ALT 36 09/11/2017    AST 21 09/11/2017    ALKPHOS 42 09/11/2017    BILITOT 0.6 09/11/2017       Assessment:  Problem List Items Addressed This Visit        Neuro    Complex regional pain syndrome type 1 of left upper extremity - Primary    Chronic pain syndrome          Treatment Plan:  44 y.o. year old male presents to the office with left hand pain.  He injured his hand on 12/27/17 when his hand suffered crush injury between a forklift and a truck.  He is s/p left total wrist arthrodesis with allograft from the left proximal tibia on 8/6/19.  He reports good relief of his pain that he was feeling prior to the surgery, but now continues to have his typical baseline pain which was present since the injury.  He is s/p left stellate ganglion block with Dr. Jarrell on 5/21/18, 6/18/18, and 7/23/18.  Per progress notes it appears that his symptoms of CRPS improved following the injections.  Today he subjectively c/o  hyperesthesia, temperature asymmetry, decreased ROM, weakness, and clamminess/sweating changes in his left hand.  On exam I appreciate temperature asymmetry, contracture with decreased ROM of his digits, and allodynia to light touch.  He is currently doing PT with desensitization therapy.      In terms of medication management, he says he ran out of hydrocodone that was previously prescribed on 9/11/19 and says he has taken one of his mother's morphine tablets because he was in pain but ran out of his hydrocodone yesterday.  Also says he would likely test positive for marijuana and ectasy as he was around people who were smoking and someone may have slipped a pill into his drink.  He defers UDS today.  I don't recommend further opioid treatment chronically.  Discussed that opioids are not indicated for chronic non-malignant pain as the risks of dependence, addiction, potential hyperalgesia, sedation, medication interactions, respiratory depression and even death outweigh any benefit.  Given that he has been taking 15mg of hydrocodone and does show some signs of withdrawal today with tachycardia, runny nose, cough, and fatigue will do taper off of opioids: hydrocodone 5mg po bid prn x7 days followed by hydrocodone 5mg po qdaily prn x5 days then stop.   I offered referral to addiction services but he declines.  He will continue amitriptyline 10mg qhs and increase gabapentin from 300mg TID to 600mg TID.  Recommend to repeat stellate ganglion block as it appears to work in the past.  Also discussed SCS trial with henny BRITTANY for his CRPS and gave him information regarding it today.     Procedures: recommend to repeat stellate ganglion injection  PT/OT/HEP: continue PT and desensitization therapy  Medications: taper hydrocodone 5mg po bid prn x7 days followed by hydrocodone 5mg po qdaily prn x5 days then stop.  Start ibuprofen 800mg po bid prn.  Continue amitriptyline 10mg qhs and increase gabapentin from 300mg to 600mg  TID  Labs: Reviewed and medications are appropriately dosed for current hepatorenal function.  Imaging: No additional recommended at this time.    : Reviewed    Vincent Alejandre M.D.  Interventional Pain Medicine / Anesthesiology    PRESCRIPTIONS  Total Prescriptions: 31   Total Private Pay: 0   Fill Date ID Written Drug Qty Days Prescriber Rx # Pharmacy Refill Daily Dose * Pymt Type    09/11/2019  2   09/11/2019  Hydrocodone-Acetamin 7.5-325  22.00 6 Br Dud  [id number]   [facility] (1869)  0  27.50 [place]   08/29/2019  2   08/28/2019  Oxycodone Hcl 5 MG Tablet  22.00 4 Br Dud  [id number]   [facility] (1869)  0  41.25 [place]   08/21/2019  2   08/21/2019  Oxycodone Hcl 5 MG Tablet  22.00 4 Br Dud  [id number]   [facility] (1869)  0  41.25 [place]   08/13/2019  2   08/12/2019  Oxycodone Hcl 15 MG Tablet  22.00 4 Br Dud  [id number]   [facility] (1869)  0  123.75 [place]   08/06/2019  2   08/06/2019  Oxycodone Hcl 15 MG Tablet  22.00 4 Br Dud  [id number]   [facility] (1869)  0  123.75 [place]   02/07/2019  1   02/05/2019  Hydrocodone-Acetamin 7.5-325  22.00 8 Ja Mor  [id number]   [place] (0065)  0  20.62 [place]   01/25/2019  1   01/24/2019  Hydrocodone-Acetamin 7.5-325  22.00 4 Br Dud  [id number]   [place] (0065)  0  41.25 [place]   01/11/2019  1   01/10/2019  Oxycodone Hcl 5 MG Tablet  22.00 4 Ja Mor  [id number]   [place] (0065)  0  41.25 [place]   01/04/2019  1   01/03/2019  Oxycodone Hcl 5 MG Tablet  22.00 4 Br Dud  [id number]   [place] (0065)  0  41.25 [place]   12/19/2018  1   12/18/2018  Oxycodone Hcl 5 MG Tablet  33.00 6 Br Dud  [id number]   [place] (0065)  0  41.25 [place]   12/11/2018  1   12/10/2018  Oxycodone Hcl 10 MG Tablet  33.00 6 Br Dud  [id number]   [place] (0065)  0  82.50 MME  [place]   11/28/2018  1   11/28/2018  Oxycodone Hcl 10 MG Tablet  44.00 8 Br Dud  [id number]   [place] (0065)  0  82.50 MME  [place]   11/02/2018  1   11/01/2018   Oxycodone-Acetaminophen 5-325  90.00 30 Ja Jc  [id number]   [place] (0065)  0  22.50 [place]   10/04/2018  1   10/04/2018  Oxycodone-Acetaminophen 5-325  90.00 30 Ja Jc  [id number]   [place] (0065)  0  22.50 MME  [place]   09/04/2018  1   09/04/2018  Oxycodone-Acetaminophen 5-325  90.00 30 Ja Jc  [id number]   [place] (0065)  0  22.50 MME  [place]   08/03/2018  1   08/03/2018  Oxycodone-Acetaminophen 5-325  90.00 30 Ja Jc  [id number]   [place] (0065)  0  22.50 MME  [place]   07/05/2018  1   07/03/2018  Oxycodone-Acetaminophen 5-325  90.00 30 Ja Jc  [id number]   [place] (0065)  0  22.50 MME  [place]   06/06/2018  1   06/05/2018  Oxycodone-Acetaminophen 5-325  90.00 30 Ja Jc  [id number]   [place] (0065)  0  22.50 MME  [place]   05/09/2018  1   05/09/2018  Oxycodone-Acetaminophen 5-325  90.00 30 Ja Jc  [id number]   [place] (0065)  0  22.50 MME  [place]   04/23/2018  1   04/10/2018  Oxycodone-Acetaminophen 5-325  45.00 15 Ja Jc  [id number]   [place] (0065)  0  22.50 MME  [place]   03/22/2018  1   03/22/2018  Oxycodone-Acetaminophen 5-325  90.00 30 Ja Jc  [id number]   [place] (0065)  0  22.50 MME  [place]   03/14/2018  1   03/13/2018  Oxycodone Hcl 5 MG Tablet  22.00 8 Br Dud  [id number]   [place] (0065)  0  20.62 MME  [place]   03/08/2018  1   03/07/2018  Hydrocodone-Acetamin 7.5-325  12.00 4 Be Sma  [id number]   [place] (0065)  0  22.50 MME  [place]   02/27/2018  1   02/27/2018  Hydrocodone-Acetamin 7.5-325  22.00 4 Br Dud  [id number]   [place] (0065)  0  41.25 MME  [place]   02/21/2018  1   02/20/2018  Tramadol Hcl 50 MG Tablet  33.00 11 Br Dud  [id number]   [place] (0065)  0  15.00 MME  [place]   02/15/2018  1   02/15/2018  Hydrocodone-Acetamin 7.5-325  22.00 3 Br Dud  [id number]   [place] (0065)  0  55.00 MME  [place]   02/07/2018  1   02/06/2018  Oxycodone Hcl 5 MG Tablet  33.00 6 Br Dud  [id number]   [place] (0065)  0  41.25 [place]   01/31/2018  1   01/30/2018  Oxycodone Hcl 5 MG  Tablet  33.00 6 Br Dud  [id number]   [place] (0065)  0  41.25 MME  [place]   01/19/2018  1   01/16/2018  Oxycodone Hcl 15 MG Tablet  44.00 8 Br Dud  [id number]   [place] (0065)  0  123.75 MME  [place]   01/12/2018  1   01/09/2018  Oxycodone Hcl 15 MG Tablet  44.00 8 Br Dud  [id number]   [place] (0065)  0  123.75 MME  [place]   01/04/2018  1   01/04/2018  Oxycodone Hcl 15 MG Tablet  44.00 8 Br Dud  [id number]   [place] (0065)  0  123.75 MME  [place]

## 2019-09-20 ENCOUNTER — CLINICAL SUPPORT (OUTPATIENT)
Dept: REHABILITATION | Facility: HOSPITAL | Age: 44
End: 2019-09-20
Payer: COMMERCIAL

## 2019-09-20 DIAGNOSIS — R29.898 WEAKNESS OF LEFT HAND: ICD-10-CM

## 2019-09-20 DIAGNOSIS — R60.0 EDEMA OF HAND: ICD-10-CM

## 2019-09-20 DIAGNOSIS — S67.42XS CRUSHING INJURY OF LEFT WRIST AND HAND, SEQUELA: ICD-10-CM

## 2019-09-20 DIAGNOSIS — M19.132 POST-TRAUMATIC ARTHRITIS OF WRIST, LEFT: ICD-10-CM

## 2019-09-20 DIAGNOSIS — M25.642 STIFFNESS OF FINGER JOINT OF LEFT HAND: Primary | ICD-10-CM

## 2019-09-20 PROCEDURE — 97140 MANUAL THERAPY 1/> REGIONS: CPT | Mod: PO

## 2019-09-20 PROCEDURE — 97110 THERAPEUTIC EXERCISES: CPT | Mod: PO

## 2019-09-20 NOTE — PROGRESS NOTES
Occupational Therapy Daily Treatment Note     Name: Reymundo [facility] [id number]    Therapy Diagnosis:  Stiffness L hand, edema L hand, weakness L hand,      Physician: Escobar Colvin MD    Physician orders:   Note     Please begin range of motion of the fingers of his left hand.     Frequency:  2 times per week     Duration:  4 weeks     Diagnosis:  Status post left wrist arthrodesis            Visit Date: 9/20/2019    Medical Diagnosis: Z98.1 (ICD-10-CM) - Hx of arthrodesis  Evaluation Date: 8/28/2019  Insurance Authorization period Expiration: 8/20/20  Plan of Care Expiration Period: 9/24/19   Next Re-assessment: 30 days (9/27/19) and/or 10th visit   Date of Return to MD: 10/9/19     Visit # / Visits Authortized: 8 / 12  Time In: 0808  Time Out: 0842  Total Billable Time: 30  minutes with 30 of 1 on 1.    Surgical Procedure and Date: Surgery: 8/06/19  PROCEDURE:    1.  Left total wrist arthrodesis with allograft from the left proximal tibia  2.  Left wrist hardware removal deep  3.  Left wrist extensor tenolysis     IMPLANTS:  Synthes short angle locking wrist arthrodesis plate                 Precautions: Standard and velcro splint now being used.  S/P sx: 6 weeks    Subjective     Pt reports: Pain levels unchanged per pt report.  he was compliant with HEP.   Response to previous treatment: Good tolerance.    Functional change: None reported this date    Pain:  Functional Pain Scale Rating 0-10:   7/10 on average  5/10 at best  710 at worst  Location: L wrist dorsal forearm and hand.  Description: ache/throbbing/burning  Aggravating Factors: Movement  Easing Factors: Elevation/rest.       Objective   Moist heat x 5 min pre with hand propped in composite fist for gentle stretch and 5 min cold pack post.    Manual therapy x 8 min, scar massage/desensitization tech.    Reymundo received  therapeutic exercises for 15 minutes, including:  Place and hold composite ext and flexion/hook fist 2x10  each.  PROM each digit 1-5 flexion and ext each jt 2x10 sec each  Towel scrunches x50 reps into max digit flexion and x50 reps into max digit ext (NT)  Static stretch composite ext digits 2-5 x 3 min.  Static stretch composite flexion/hook fist digits 2-5 x 3 min.  1 min holds composite fist and ext x 5 (NT)    5th MCP flexion increased from 22 to 40 degrees this date.    Home Exercises and Education Provided     Education provided:    Cont current HEP. And begin more complete/full composite flexion and extension of digits now that vasu has been removed    Written Home Exercises Provided:  Patient instructed to cont prior HEP.    Exercises were reviewed and Reymundo was able to demonstrate them prior to the end of the session.  Reymundo demonstrated good  understanding of the education provided.   .   See EMR under Media for exercises provided today and/or prior visit.       Assessment     Pt would continue to benefit from skilled OT.Cast has been removed and MCP level AROM assessed this date. PT with good response to tx with some improved mobility noted post tx today.    - Progress towards goals: STG #1 has been met.    Reymundo is progressing well towards his goals and there are no updates to goals at this time. Pt prognosis continues with good rehab potential.     Pt will continue to benefit from skilled outpatient occupational therapy to address the deficits listed in the problem list on initial evaluation in order to maximize pt's level of independence in the home and community.     Anticipated barriers to occupational therapy: None    Pt's spiritual, cultural and educational needs considered and pt agreeable to plan of care and goals.    Goals:  Short Term Goals: (30 days, 9/27/19, or 10th visit) unless otherwise noted below.  1. Pt will be independent with HEP in 2 visits. (Met 8/30/19).  2. Pt will report decreased pain to a 6/10 at worst in LUE with ADLs in order to increase function/use of UE.   3. Pt will increase  PIP level AROM flexion and ext L hand digits 2-5 by 10 degrees each to increase function L hand.  4. Pt will increase L thumb IP flexion ext AROM by 5 degrees each to increase function L hand.     Long Term Goals: (by discharge)  1. Pt will report decreased pain to 3/10 with ADLs to allow for increased function/use of UE.   2. Pt will exhibit grossly WNL AROM of L hand to allow for Independent use of for all ADL/IADL tasks.  3. Pt will exhibit WFL  strength to allow a firm grasp during ADL/IADL (cooking utensils, tool use, carrying groceries, steering wheel, etc.)  4. Pt will exhibit WFL lateral pinch strength to allow writing,opening containers, and turning keys.  5. Pt will report no greater than Min difficulty with all Quick DASH assessment items.       Plan   Continue Occupational Therapy 2  times per week through current [facility] expiration date of 9/27/19, in order to to decrease pain and edema, and increase A/PROM, strength, and functional use of  Left upper extremity.    Updates/Grading for next session: Cont to progress with ROM L hand    YURY Escamilla, CODY

## 2019-09-26 ENCOUNTER — PATIENT MESSAGE (OUTPATIENT)
Dept: PAIN MEDICINE | Facility: CLINIC | Age: 44
End: 2019-09-26

## 2019-09-27 ENCOUNTER — CLINICAL SUPPORT (OUTPATIENT)
Dept: REHABILITATION | Facility: HOSPITAL | Age: 44
End: 2019-09-27
Payer: COMMERCIAL

## 2019-09-27 ENCOUNTER — LAB VISIT (OUTPATIENT)
Dept: LAB | Facility: HOSPITAL | Age: 44
End: 2019-09-27
Attending: FAMILY MEDICINE
Payer: MEDICAID

## 2019-09-27 DIAGNOSIS — M25.642 STIFFNESS OF FINGER JOINT OF LEFT HAND: Primary | ICD-10-CM

## 2019-09-27 DIAGNOSIS — Z00.00 ROUTINE HEALTH MAINTENANCE: ICD-10-CM

## 2019-09-27 DIAGNOSIS — S67.42XS CRUSHING INJURY OF LEFT WRIST AND HAND, SEQUELA: ICD-10-CM

## 2019-09-27 DIAGNOSIS — M19.132 POST-TRAUMATIC ARTHRITIS OF WRIST, LEFT: ICD-10-CM

## 2019-09-27 DIAGNOSIS — R60.0 EDEMA OF HAND: ICD-10-CM

## 2019-09-27 DIAGNOSIS — R29.898 WEAKNESS OF LEFT HAND: ICD-10-CM

## 2019-09-27 LAB
ALBUMIN SERPL BCP-MCNC: 4 G/DL (ref 3.5–5.2)
ALP SERPL-CCNC: 78 U/L (ref 55–135)
ALT SERPL W/O P-5'-P-CCNC: 22 U/L (ref 10–44)
ANION GAP SERPL CALC-SCNC: 8 MMOL/L (ref 8–16)
AST SERPL-CCNC: 16 U/L (ref 10–40)
BASOPHILS # BLD AUTO: 0.04 K/UL (ref 0–0.2)
BASOPHILS NFR BLD: 0.4 % (ref 0–1.9)
BILIRUB SERPL-MCNC: 0.5 MG/DL (ref 0.1–1)
BUN SERPL-MCNC: 10 MG/DL (ref 6–20)
CALCIUM SERPL-MCNC: 9.5 MG/DL (ref 8.7–10.5)
CHLORIDE SERPL-SCNC: 107 MMOL/L (ref 95–110)
CHOLEST SERPL-MCNC: 153 MG/DL (ref 120–199)
CHOLEST/HDLC SERPL: 3.6 {RATIO} (ref 2–5)
CO2 SERPL-SCNC: 23 MMOL/L (ref 23–29)
CREAT SERPL-MCNC: 1.2 MG/DL (ref 0.5–1.4)
DIFFERENTIAL METHOD: NORMAL
EOSINOPHIL # BLD AUTO: 0.5 K/UL (ref 0–0.5)
EOSINOPHIL NFR BLD: 4.8 % (ref 0–8)
ERYTHROCYTE [DISTWIDTH] IN BLOOD BY AUTOMATED COUNT: 13 % (ref 11.5–14.5)
EST. GFR  (AFRICAN AMERICAN): >60 ML/MIN/1.73 M^2
EST. GFR  (NON AFRICAN AMERICAN): >60 ML/MIN/1.73 M^2
GLUCOSE SERPL-MCNC: 96 MG/DL (ref 70–110)
HCT VFR BLD AUTO: 43.4 % (ref 40–54)
HDLC SERPL-MCNC: 43 MG/DL (ref 40–75)
HDLC SERPL: 28.1 % (ref 20–50)
HGB BLD-MCNC: 14.8 G/DL (ref 14–18)
IMM GRANULOCYTES # BLD AUTO: 0.04 K/UL (ref 0–0.04)
IMM GRANULOCYTES NFR BLD AUTO: 0.4 % (ref 0–0.5)
LDLC SERPL CALC-MCNC: 91.2 MG/DL (ref 63–159)
LYMPHOCYTES # BLD AUTO: 2.6 K/UL (ref 1–4.8)
LYMPHOCYTES NFR BLD: 27.2 % (ref 18–48)
MCH RBC QN AUTO: 30.9 PG (ref 27–31)
MCHC RBC AUTO-ENTMCNC: 34.1 G/DL (ref 32–36)
MCV RBC AUTO: 91 FL (ref 82–98)
MONOCYTES # BLD AUTO: 0.9 K/UL (ref 0.3–1)
MONOCYTES NFR BLD: 9.6 % (ref 4–15)
NEUTROPHILS # BLD AUTO: 5.6 K/UL (ref 1.8–7.7)
NEUTROPHILS NFR BLD: 57.6 % (ref 38–73)
NONHDLC SERPL-MCNC: 110 MG/DL
NRBC BLD-RTO: 0 /100 WBC
PLATELET # BLD AUTO: 256 K/UL (ref 150–350)
PMV BLD AUTO: 11.5 FL (ref 9.2–12.9)
POTASSIUM SERPL-SCNC: 4.1 MMOL/L (ref 3.5–5.1)
PROT SERPL-MCNC: 7.3 G/DL (ref 6–8.4)
RBC # BLD AUTO: 4.79 M/UL (ref 4.6–6.2)
SODIUM SERPL-SCNC: 138 MMOL/L (ref 136–145)
TRIGL SERPL-MCNC: 94 MG/DL (ref 30–150)
TSH SERPL DL<=0.005 MIU/L-ACNC: 1.17 UIU/ML (ref 0.4–4)
WBC # BLD AUTO: 9.67 K/UL (ref 3.9–12.7)

## 2019-09-27 PROCEDURE — 85025 COMPLETE CBC W/AUTO DIFF WBC: CPT

## 2019-09-27 PROCEDURE — 84443 ASSAY THYROID STIM HORMONE: CPT

## 2019-09-27 PROCEDURE — 36415 COLL VENOUS BLD VENIPUNCTURE: CPT | Mod: PO

## 2019-09-27 PROCEDURE — 97110 THERAPEUTIC EXERCISES: CPT | Mod: PO

## 2019-09-27 PROCEDURE — 80061 LIPID PANEL: CPT

## 2019-09-27 PROCEDURE — 80053 COMPREHEN METABOLIC PANEL: CPT

## 2019-09-27 RX ORDER — HYDROCODONE BITARTRATE AND ACETAMINOPHEN 5; 325 MG/1; MG/1
1 TABLET ORAL
Qty: 3 TABLET | Refills: 0 | Status: SHIPPED | OUTPATIENT
Start: 2019-09-27 | End: 2019-09-30

## 2019-09-27 NOTE — PLAN OF CARE
Occupational Therapy Daily Treatment/Reassessment Note     Name: Reymundo Pichardo [facility] Number: [id number]    Therapy Diagnosis:  Stiffness L hand, edema L hand, weakness L hand,      Physician: Escobar Colvin MD    Physician orders:   Note     Please begin range of motion of the fingers of his left hand.     Frequency:  2 times per week     Duration:  4 weeks     Diagnosis:  Status post left wrist arthrodesis            Visit Date: 9/27/2019    Medical Diagnosis: Z98.1 (ICD-10-CM) - Hx of arthrodesis  Evaluation Date: 8/28/2019  Insurance Authorization period Expiration: 8/20/20  Plan of Care Expiration Period: 10/22/19  Next Re-assessment: 30 days (10/27/19) and/or 10th visit   Date of Return to MD: 10/9/19     Visit # / Visits Authortized: 9 / 12  Time In: 1504  Time Out: 1554  Total Billable Time: 50 minutes - 10 min hot/cold packs = 40 min of 1 on 1.    Surgical Procedure and Date: Surgery: 8/06/19  PROCEDURE:    1.  Left total wrist arthrodesis with allograft from the left proximal tibia  2.  Left wrist hardware removal deep  3.  Left wrist extensor tenolysis     IMPLANTS:  Synthes short angle locking wrist arthrodesis plate                 Precautions: Standard and velcro splint now being used.  S/P sx: 7 weeks and 3 days.    Subjective     Pt reports: Pain levels unchanged per pt report.  he was compliant with HEP.   Response to previous treatment: Good tolerance.    Functional change: None reported this date    Pain:  Functional Pain Scale Rating 0-10:   7/10 on average  5/10 at best  710 at worst  Location: L wrist dorsal forearm and hand.  Description: ache/throbbing/burning  Aggravating Factors: Movement  Easing Factors: Elevation/rest.       Objective   Moist heat x 5 min pre with hand propped in composite fist for gentle stretch and 5 min cold pack post.    Reymundo received  therapeutic exercises for 25 minutes, including:  Place and hold composite ext and flexion/hook fist 2x10 each.  PROM  each digit 1-5 flexion and ext each jt 2x10 sec each  Towel scrunches x50 reps into max digit flexion and x50 reps into max digit ext   Static stretch composite ext digits 2-5 x 3 min.  Static stretch composite flexion/hook fist digits 2-5 x 3 min.  1 min holds composite fist and ext x 5.  In hand manipulation tip to palm 3 beans x 10 sets, with 2 sets of palm to tip.    AROM 5th MCP flexion increased from 40 degrees at best last visit and 45 degrees today.    AROM Hand: flex PIP/DIPlevel    Left PIPs Left DIPs   1st 30 -   2nd 85  26   3rd 107 30   4th 104 20   5th 100 40          Home Exercises and Education Provided     Education provided:  Cont current HEP.     Written Home Exercises Provided:  Patient instructed to cont prior HEP.    Exercises were reviewed and Reymundo was able to demonstrate them prior to the end of the session.  Reymundo demonstrated good  understanding of the education provided.   .   See EMR under Media for exercises provided today and/or prior visit.       Assessment     Pt would continue to benefit from skilled OT. Good overall gains cont. To focus more on DIP level flexion now.  - Progress towards goals: see Reassessment below for current goal status and new STGs set in order today in order to further progress.    Reymundo is progressing well towards his goals and there are no updates to goals at this time. Pt prognosis continues with good rehab potential.     Pt will continue to benefit from skilled outpatient occupational therapy to address the deficits listed in the problem list on initial evaluation in order to maximize pt's level of independence in the home and community.     Anticipated barriers to occupational therapy: None    Pt's spiritual, cultural and educational needs considered and pt agreeable to plan of care and goals.    Goals:  Short Term Goals: (30 days, 9/27/19, or 10th visit) unless otherwise noted below.  1. Pt will be independent with HEP in 2 visits. (Met 8/30/19).  2. Pt  will report decreased pain to a 6/10 at worst in LUE with ADLs in order to increase function/use of UE (Not met; still up to 7/10)  3. Pt will increase PIP level AROM flexion and ext L hand digits 2-5 by 10 degrees each to increase function L hand. (Met)  4. Pt will increase L thumb IP flexion ext AROM by 5 degrees each to increase function L hand. (Met)    New STGs to be met in the next 30 days (or by 10/27/19 or 10 more visits, 19th visit)  5. Pt will increase DIP level AROM flexion L hand digits 2-5 by 10 degrees each (36, 40, 30, 50) to increase function L hand.   6. Pt will increase L thumb IP flexion AROM by 10 more degrees (to 40 degrees) to increase function L hand.    Long Term Goals: (by discharge)  1. Pt will report decreased pain to 3/10 with ADLs to allow for increased function/use of UE. (Ongoing)  2. Pt will exhibit grossly WNL AROM of L hand to allow for Independent use of for all ADL/IADL tasks.(Ongoing)  3. Pt will exhibit WFL  strength to allow a firm grasp during ADL/IADL (cooking utensils, tool use, carrying groceries, steering wheel, etc.) (Ongoing)  4. Pt will exhibit WFL lateral pinch strength to allow writing,opening containers, and turning keys. (Ongoing)  5. Pt will report no greater than Min difficulty with all Quick DASH assessment items. (Ongoing)       Plan   Continue Occupational Therapy 2  times per week for 4 more weeks effective 9/25/19, in order to to decrease pain and edema, and increase A/PROM, strength, and functional use of  Left upper extremity.    Updates/Grading for next session: Cont to progress with ROM L hand    YURY Escamilla, BENIT

## 2019-09-27 NOTE — TELEPHONE ENCOUNTER
reviewed. He should have been due for refill on the 25th.  So if he just ran out today he must have been taking less than bid prn.  I will finish taper but will be for hydrocodone 5/325mg qdaily prn #3 tabs then should stop.

## 2019-09-30 ENCOUNTER — CLINICAL SUPPORT (OUTPATIENT)
Dept: REHABILITATION | Facility: HOSPITAL | Age: 44
End: 2019-09-30
Attending: ORTHOPAEDIC SURGERY
Payer: COMMERCIAL

## 2019-09-30 ENCOUNTER — OFFICE VISIT (OUTPATIENT)
Dept: FAMILY MEDICINE | Facility: CLINIC | Age: 44
End: 2019-09-30
Payer: MEDICAID

## 2019-09-30 VITALS
HEART RATE: 93 BPM | WEIGHT: 217 LBS | TEMPERATURE: 98 F | DIASTOLIC BLOOD PRESSURE: 87 MMHG | HEIGHT: 70 IN | SYSTOLIC BLOOD PRESSURE: 136 MMHG | BODY MASS INDEX: 31.07 KG/M2

## 2019-09-30 DIAGNOSIS — Z00.00 ROUTINE CHECK-UP: Primary | ICD-10-CM

## 2019-09-30 DIAGNOSIS — M25.642 STIFFNESS OF FINGER JOINT OF LEFT HAND: Primary | ICD-10-CM

## 2019-09-30 DIAGNOSIS — R29.898 WEAKNESS OF LEFT HAND: ICD-10-CM

## 2019-09-30 DIAGNOSIS — R60.0 EDEMA OF HAND: ICD-10-CM

## 2019-09-30 DIAGNOSIS — Z23 IMMUNIZATION DUE: ICD-10-CM

## 2019-09-30 DIAGNOSIS — M19.132 POST-TRAUMATIC ARTHRITIS OF WRIST, LEFT: ICD-10-CM

## 2019-09-30 DIAGNOSIS — S67.42XS CRUSHING INJURY OF LEFT WRIST AND HAND, SEQUELA: ICD-10-CM

## 2019-09-30 PROCEDURE — 99999 PR PBB SHADOW E&M-EST. PATIENT-LVL III: CPT | Mod: PBBFAC,,, | Performed by: FAMILY MEDICINE

## 2019-09-30 PROCEDURE — 90471 IMMUNIZATION ADMIN: CPT | Mod: PBBFAC,PO

## 2019-09-30 PROCEDURE — 99396 PR PREVENTIVE VISIT,EST,40-64: ICD-10-PCS | Mod: S$PBB,,, | Performed by: FAMILY MEDICINE

## 2019-09-30 PROCEDURE — 99213 OFFICE O/P EST LOW 20 MIN: CPT | Mod: PBBFAC,PO | Performed by: FAMILY MEDICINE

## 2019-09-30 PROCEDURE — 97110 THERAPEUTIC EXERCISES: CPT | Mod: PO

## 2019-09-30 PROCEDURE — 99396 PREV VISIT EST AGE 40-64: CPT | Mod: S$PBB,,, | Performed by: FAMILY MEDICINE

## 2019-09-30 PROCEDURE — 99999 PR PBB SHADOW E&M-EST. PATIENT-LVL III: ICD-10-PCS | Mod: PBBFAC,,, | Performed by: FAMILY MEDICINE

## 2019-09-30 PROCEDURE — 97140 MANUAL THERAPY 1/> REGIONS: CPT | Mod: PO

## 2019-09-30 RX ORDER — MONTELUKAST SODIUM 10 MG/1
10 TABLET ORAL NIGHTLY
Qty: 30 TABLET | Refills: 11 | Status: SHIPPED | OUTPATIENT
Start: 2019-09-30 | End: 2019-10-31

## 2019-09-30 RX ORDER — ALBUTEROL SULFATE 90 UG/1
2 AEROSOL, METERED RESPIRATORY (INHALATION) EVERY 6 HOURS PRN
Qty: 18 G | Refills: 11 | Status: SHIPPED | OUTPATIENT
Start: 2019-09-30 | End: 2020-03-12 | Stop reason: SDUPTHER

## 2019-09-30 RX ORDER — FLUTICASONE PROPIONATE 50 MCG
2 SPRAY, SUSPENSION (ML) NASAL DAILY
Qty: 16 G | Refills: 11 | Status: SHIPPED | OUTPATIENT
Start: 2019-09-30 | End: 2020-11-30 | Stop reason: SDUPTHER

## 2019-09-30 NOTE — PROGRESS NOTES
The patient presents today for general health evaluation and counseling      Past Medical History:  Past Medical History:   Diagnosis Date    Asthma     Crushing injury of left wrist and hand 7/10/2019     Past Surgical History:   Procedure Laterality Date    BONE GRAFT Left 8/6/2019    Procedure: BONE GRAFT LEG;  Surgeon: Escobar Colvin MD;  Location: [facility];  Service: Orthopedics;  Laterality: Left;    WRIST SURGERY Left     x3     Review of patient's allergies indicates:  No Known Allergies  Current Outpatient Medications on File Prior to Visit   Medication Sig Dispense Refill    albuterol (PROVENTIL) 2.5 mg /3 mL (0.083 %) nebulizer solution Inhale 2.5 mg into the lungs.      buPROPion (WELLBUTRIN XL) 300 MG 24 hr tablet Take 1 tablet (300 mg total) by mouth every morning 30 tablet 2    DULoxetine (CYMBALTA) 60 MG capsule Take 60 mg by mouth.      HYDROcodone-acetaminophen (NORCO) 5-325 mg per tablet Take 1 tablet by mouth every 24 hours as needed for Pain. 3 tablet 0    ibuprofen (ADVIL,MOTRIN) 800 MG tablet Take 1 tablet (800 mg total) by mouth 2 (two) times daily as needed for Pain. 40 tablet 1    ketoconazole (NIZORAL) 2 % shampoo Apply topically.      [DISCONTINUED] fluticasone propionate (FLONASE ALLERGY RELIEF) 50 mcg/actuation nasal spray 1 spray by Nasal route.      amitriptyline (ELAVIL) 10 MG tablet Take 1 tablet (10 mg total) by mouth every evening for 21 days 21 tablet 0    DULoxetine (CYMBALTA) 60 MG capsule Take 1 capsule (60 mg total) by mouth every morning (Patient not taking: Reported on 9/30/2019) 30 capsule 2    fluticasone-salmeterol 250-50 mcg/dose (ADVAIR) 250-50 mcg/dose diskus inhaler Inhale 1 puff into the lungs 2 (two) times daily. Controller 60 each 0    ondansetron (ZOFRAN-ODT) 4 MG TbDL Take 2 tablets (8 mg total) by mouth every 8 (eight) hours as needed. (Patient not taking: Reported on 9/30/2019) 6 tablet 0    promethazine (PHENERGAN) 25 MG tablet Take 25 mg  by mouth.       No current facility-administered medications on file prior to visit.      Social History     Socioeconomic History    Marital status: Single     Spouse name: Not on file    Number of children: Not on file    Years of education: Not on file    Highest education level: Not on file   Occupational History    Not on file   Social Needs    Financial resource strain: Not on file    Food insecurity:     Worry: Not on file     Inability: Not on file    Transportation needs:     Medical: Not on file     Non-medical: Not on file   Tobacco Use    Smoking status: Former Smoker    Smokeless tobacco: Never Used   Substance and Sexual Activity    Alcohol use: No    Drug use: No    Sexual activity: Not on file   Lifestyle    Physical activity:     Days per week: Not on file     Minutes per session: Not on file    Stress: Not on file   Relationships    Social connections:     Talks on phone: Not on file     Gets together: Not on file     Attends Voodoo service: Not on file     Active member of club or organization: Not on file     Attends meetings of clubs or organizations: Not on file     Relationship status: Not on file   Other Topics Concern    Not on file   Social History Narrative    Not on file     History reviewed. No pertinent family history.      ROS:GENERAL: No fever, chills, fatigability or weight loss.  SKIN: No rashes, itching or changes in color or texture of skin.  HEAD: No headaches or recent head trauma.EYES: Visual acuity fine. No photophobia, ocular pain or diplopia.EARS: Denies ear pain, discharge or vertigo.NOSE: No loss of smell, no epistaxis or postnasal drip.MOUTH & THROAT: No hoarseness or change in voice. No excessive gum bleeding.NODES: Denies swollen glands.  CHEST: Denies LAMB, cyanosis, wheezing, cough and sputum production.  CARDIOVASCULAR: Denies chest pain, PND, orthopnea or reduced exercise tolerance.  ABDOMEN: Appetite fine. No weight loss. Denies diarrhea,  abdominal pain, hematemesis or blood in stool.  URINARY: No flank pain, dysuria or hematuria.  PERIPHERAL VASCULAR: No claudication or cyanosis.  MUSCULOSKELETAL: See above.  NEUROLOGIC: No history of seizures, paralysis, alteration of gait or coordination.  PE:    HEAD: Normocephalic, atraumatic.EYES: PERRL. EOMI.   EARS: TM's intact. Light reflex normal. No retraction or perforation.   NOSE: Mucosa pink. Airway clear.MOUTH & THROAT: No tonsillar enlargement. No pharyngeal erythema or exudate. No stridor.  NODES: No cervical, axillary or inguinal lymph node enlargement.  CHEST: Lungs clear to auscultation.  CARDIOVASCULAR: Normal S1, S2. No rubs, murmurs or gallops.  ABDOMEN: Bowel sounds normal. Not distended. Soft. No tenderness or masses.  MUSCULOSKELETAL: Left forearm wrist deformity NEUROLOGIC: Cranial Nerves: II-XII grossly intact.  Motor: 5/5 strength major flexors/extensors.  DTR's: Knees, Ankles 2+ and equal bilaterally; downgoing toes.  Sensory: Intact to light touch distally.  Gait & Posture: Normal gait and fine motion. No cerebellar signs.   LUZ: No masses   Impression:Routine health check  Plan:Lab eval  Rec diet and ex recs  Rev age appropriate screenings    There are no preventive care reminders to display for this patient.

## 2019-09-30 NOTE — PROGRESS NOTES
Occupational Therapy Daily Treatment/Reassessment Note      Name: Reymundo Pichardo [facility] Number: [id number]     Therapy Diagnosis:  Stiffness L hand, edema L hand, weakness L hand,       Physician: Escobar Colvin MD     Physician orders:       Note     Please begin range of motion of the fingers of his left hand.     Frequency:  2 times per week     Duration:  4 weeks     Diagnosis:  Status post left wrist arthrodesis               Visit Date: 10/2/19     Medical Diagnosis: Z98.1 (ICD-10-CM) - Hx of arthrodesis  Evaluation Date: 8/28/2019  Insurance Authorization period Expiration: 8/20/20  Plan of Care Expiration Period: 10/22/19  Next Re-assessment: 30 days (10/27/19) and/or 10th visit   Date of Return to MD: 10/9/19    Visit # / Visits Authortized: 11 / 12  Time In: 1540  Time Out: 1635  Total Billable Time:  55 minutes  min.     Surgical Procedure and Date: Surgery: 8/06/19  PROCEDURE:    1.  Left total wrist arthrodesis with allograft from the left proximal tibia  2.  Left wrist hardware removal deep  3.  Left wrist extensor tenolysis     IMPLANTS:  Synthes short angle locking wrist arthrodesis plate                                                  Precautions: Standard and velcro splint now being used.  S/P sx: 7 weeks and 5 days     Subjective      Pt reports: Pain levels unchanged per pt report.  he was compliant with HEP.   Response to previous treatment: Good tolerance.     Functional change: None reported this date     Pain:  Functional Pain Scale Rating 0-10:   7/10 on average  5/10 at best  710 at worst  Location: L wrist dorsal forearm and hand.  Description: ache/throbbing/burning  Aggravating Factors: Movement  Easing Factors: Elevation/rest.        Objective   Fluidotherapy 10 min L wrist/hand.    Manual therapy capsular massage for 8 min  MCPs and PIPs L hand.     Reymundo received  therapeutic exercises for 15 minutes, including:  Place and hold composite ext and flexion/hook fist 2x10  each.  PROM each digit 1-5 flexion and ext each jt 3x10 sec each  Towel scrunches x50 reps into max digit flexion and x50 reps into max digit ext   1 min holds composite fist and ext x 5.  Jt blcoking PIP/DIP flexion with medium dowel x 25 each..  Yellow foam for digit adduction 5x10.     AROM Hand: ext PIP levelflex DIPlevel     Left PIP ext Left DIP flex   1st NT -   2nd -25 30   3rd -30 33   4th -35 33   5th -45 58            Home Exercises and Education Provided      Education provided:  Cont current HEP.      Written Home Exercises Provided:  Patient instructed to cont prior HEP.     Exercises were reviewed and Reymundo was able to demonstrate them prior to the end of the session.  Reymundo demonstrated good  understanding of the education provided.   .   See EMR under Media for exercises provided today and/or prior visit.        Assessment      Pt would continue to benefit from skilled OT. Good overall gains cont; good response to tx today..  - Progress towards goals:     Reymundo is progressing well towards his goals and there are no updates to goals at this time. Pt prognosis continues with good rehab potential.      Pt will continue to benefit from skilled outpatient occupational therapy to address the deficits listed in the problem list on initial evaluation in order to maximize pt's level of independence in the home and community.      Anticipated barriers to occupational therapy: None     Pt's spiritual, cultural and educational needs considered and pt agreeable to plan of care and goals.     Goals:  Short Term Goals: (30 days, 9/27/19, or 10th visit) unless otherwise noted below.  1. Pt will be independent with HEP in 2 visits. (Met 8/30/19).  2. Pt will report decreased pain to a 6/10 at worst in LUE with ADLs in order to increase function/use of UE (Not met; still up to 7/10)  3. Pt will increase PIP level AROM flexion and ext L hand digits 2-5 by 10 degrees each to increase function L hand. (Met)  4. Pt will  increase L thumb IP flexion ext AROM by 5 degrees each to increase function L hand. (Met)     New STGs to be met in the next 30 days (or by 10/27/19 or 10 more visits, 19th visit)  5. Pt will increase DIP level AROM flexion L hand digits 2-5 by 10 degrees each (36, 40, 30, 50) to increase function L hand.   6. Pt will increase L thumb IP flexion AROM by 10 more degrees (to 40 degrees) to increase function L hand.     Long Term Goals: (by discharge)  1. Pt will report decreased pain to 3/10 with ADLs to allow for increased function/use of UE. (Ongoing)  2. Pt will exhibit grossly WNL AROM of L hand to allow for Independent use of for all ADL/IADL tasks.(Ongoing)  3. Pt will exhibit WFL  strength to allow a firm grasp during ADL/IADL (cooking utensils, tool use, carrying groceries, steering wheel, etc.) (Ongoing)  4. Pt will exhibit WFL lateral pinch strength to allow writing,opening containers, and turning keys. (Ongoing)  5. Pt will report no greater than Min difficulty with all Quick DASH assessment items. (Ongoing)        Plan   Continue Occupational Therapy 2  times per week through current poc expiration of 10/22/19, in order to to decrease pain and edema, and increase A/PROM, strength, and functional use of  Left upper extremity.     Updates/Grading for next session: Cont to progress with ROM L hand     YURY Escamilla, CODY

## 2019-09-30 NOTE — PROGRESS NOTES
Occupational Therapy Daily Treatment/Reassessment Note      Name: Reymundo Pichardo [facility] Number: [id number]     Therapy Diagnosis:  Stiffness L hand, edema L hand, weakness L hand,       Physician: Escobar Colvin MD     Physician orders:       Note     Please begin range of motion of the fingers of his left hand.     Frequency:  2 times per week     Duration:  4 weeks     Diagnosis:  Status post left wrist arthrodesis               Visit Date: 9/27/2019     Medical Diagnosis: Z98.1 (ICD-10-CM) - Hx of arthrodesis  Evaluation Date: 8/28/2019  Insurance Authorization period Expiration: 8/20/20  Plan of Care Expiration Period: 10/22/19  Next Re-assessment: 30 days (10/27/19) and/or 10th visit   Date of Return to MD: 10/9/19     Visit # / Visits Authortized: 10 / 12  Time In: 0809  Time Out: 0859  Total Billable Time: 30 minutes - 5 min moist heat  =25  min.     Surgical Procedure and Date: Surgery: 8/06/19  PROCEDURE:    1.  Left total wrist arthrodesis with allograft from the left proximal tibia  2.  Left wrist hardware removal deep  3.  Left wrist extensor tenolysis     IMPLANTS:  Synthes short angle locking wrist arthrodesis plate                                                  Precautions: Standard and velcro splint now being used.  S/P sx: 7 weeks and 5 days     Subjective      Pt reports: Pain levels unchanged per pt report.  he was compliant with HEP.   Response to previous treatment: Good tolerance.     Functional change: None reported this date     Pain:  Functional Pain Scale Rating 0-10:   7/10 on average  5/10 at best  710 at worst  Location: L wrist dorsal forearm and hand.  Description: ache/throbbing/burning  Aggravating Factors: Movement  Easing Factors: Elevation/rest.        Objective   Moist heat x 5 min pre with hand propped in composite fist for gentle stretch    Manual therapy capsular massage x 8 min  MCPs and PIPs L hand.     Reymundo received  therapeutic exercises for 10 minutes,  including:  Place and hold composite ext and flexion/hook fist 2x10 each.  PROM each digit 1-5 flexion and ext each jt 2x10 sec each  Towel scrunches x50 reps into max digit flexion and x50 reps into max digit ext   Static stretch composite ext digits 2-5 x 3 min.  Static stretch composite flexion/hook fist digits 2-5 x 3 min.  1 min holds composite fist and ext x 5.  Jt blcoking PIP/DIP flexion with medium dowel x 25 each..     AROM Hand: flex PIP/DIPlevel     Left PIPs Left DIPs   1st     2nd 90 post    3rd     4th     5th              Home Exercises and Education Provided      Education provided:  Cont current HEP.      Written Home Exercises Provided:  Patient instructed to cont prior HEP.     Exercises were reviewed and Reymundo was able to demonstrate them prior to the end of the session.  Reymundo demonstrated good  understanding of the education provided.   .   See EMR under Media for exercises provided today and/or prior visit.        Assessment      Pt would continue to benefit from skilled OT. Good overall gains cont; good response to jt blocking exercises today.  - Progress towards goals:     Reymundo is progressing well towards his goals and there are no updates to goals at this time. Pt prognosis continues with good rehab potential.      Pt will continue to benefit from skilled outpatient occupational therapy to address the deficits listed in the problem list on initial evaluation in order to maximize pt's level of independence in the home and community.      Anticipated barriers to occupational therapy: None     Pt's spiritual, cultural and educational needs considered and pt agreeable to plan of care and goals.     Goals:  Short Term Goals: (30 days, 9/27/19, or 10th visit) unless otherwise noted below.  1. Pt will be independent with HEP in 2 visits. (Met 8/30/19).  2. Pt will report decreased pain to a 6/10 at worst in LUE with ADLs in order to increase function/use of UE (Not met; still up to 7/10)  3. Pt  will increase PIP level AROM flexion and ext L hand digits 2-5 by 10 degrees each to increase function L hand. (Met)  4. Pt will increase L thumb IP flexion ext AROM by 5 degrees each to increase function L hand. (Met)     New STGs to be met in the next 30 days (or by 10/27/19 or 10 more visits, 19th visit)  5. Pt will increase DIP level AROM flexion L hand digits 2-5 by 10 degrees each (36, 40, 30, 50) to increase function L hand.   6. Pt will increase L thumb IP flexion AROM by 10 more degrees (to 40 degrees) to increase function L hand.     Long Term Goals: (by discharge)  1. Pt will report decreased pain to 3/10 with ADLs to allow for increased function/use of UE. (Ongoing)  2. Pt will exhibit grossly WNL AROM of L hand to allow for Independent use of for all ADL/IADL tasks.(Ongoing)  3. Pt will exhibit WFL  strength to allow a firm grasp during ADL/IADL (cooking utensils, tool use, carrying groceries, steering wheel, etc.) (Ongoing)  4. Pt will exhibit WFL lateral pinch strength to allow writing,opening containers, and turning keys. (Ongoing)  5. Pt will report no greater than Min difficulty with all Quick DASH assessment items. (Ongoing)        Plan   Continue Occupational Therapy 2  times per week through current poc expiration of 10/22/19, in order to to decrease pain and edema, and increase A/PROM, strength, and functional use of  Left upper extremity.     Updates/Grading for next session: Cont to progress with ROM L hand     YURY Escamilla, BENIT

## 2019-10-01 RX ORDER — AMITRIPTYLINE HYDROCHLORIDE 10 MG/1
10 TABLET, FILM COATED ORAL NIGHTLY
Qty: 21 TABLET | Refills: 0 | Status: CANCELLED | OUTPATIENT
Start: 2019-10-01 | End: 2019-10-22

## 2019-10-02 ENCOUNTER — CLINICAL SUPPORT (OUTPATIENT)
Dept: REHABILITATION | Facility: HOSPITAL | Age: 44
End: 2019-10-02
Payer: COMMERCIAL

## 2019-10-02 DIAGNOSIS — S67.42XS CRUSHING INJURY OF LEFT WRIST AND HAND, SEQUELA: ICD-10-CM

## 2019-10-02 DIAGNOSIS — R60.0 EDEMA OF HAND: ICD-10-CM

## 2019-10-02 DIAGNOSIS — M25.642 STIFFNESS OF FINGER JOINT OF LEFT HAND: Primary | ICD-10-CM

## 2019-10-02 DIAGNOSIS — M19.132 POST-TRAUMATIC ARTHRITIS OF WRIST, LEFT: ICD-10-CM

## 2019-10-02 DIAGNOSIS — R29.898 WEAKNESS OF LEFT HAND: ICD-10-CM

## 2019-10-02 PROCEDURE — 97140 MANUAL THERAPY 1/> REGIONS: CPT | Mod: PO

## 2019-10-02 PROCEDURE — 97110 THERAPEUTIC EXERCISES: CPT | Mod: PO

## 2019-10-02 PROCEDURE — 97022 WHIRLPOOL THERAPY: CPT | Mod: PO

## 2019-10-04 RX ORDER — AMITRIPTYLINE HYDROCHLORIDE 10 MG/1
10 TABLET, FILM COATED ORAL NIGHTLY
Qty: 21 TABLET | Refills: 0 | Status: SHIPPED | OUTPATIENT
Start: 2019-10-04 | End: 2019-12-27

## 2019-10-07 ENCOUNTER — PATIENT MESSAGE (OUTPATIENT)
Dept: PAIN MEDICINE | Facility: CLINIC | Age: 44
End: 2019-10-07

## 2019-10-08 ENCOUNTER — TELEPHONE (OUTPATIENT)
Dept: PAIN MEDICINE | Facility: CLINIC | Age: 44
End: 2019-10-08

## 2019-10-08 DIAGNOSIS — S67.42XS CRUSHING INJURY OF LEFT WRIST AND HAND, SEQUELA: Primary | ICD-10-CM

## 2019-10-08 NOTE — TELEPHONE ENCOUNTER
Recommend stellate ganglion injection.  If he is taking gabapentin 600mg TID, he can increase to 900mg TID as tolerated.

## 2019-10-08 NOTE — TELEPHONE ENCOUNTER
Patient states that he is on Ibprofen and it's not helping he has appointments on tomorrow with Orthopedic and Physical Therapy. He wants to know if he can have something else.

## 2019-10-08 NOTE — TELEPHONE ENCOUNTER
From Dr Alejandre :  Please let Charlie Matt know if he would like stellate block as it had worked well for him in the past we are happy to offer it.  Will be left side.

## 2019-10-09 ENCOUNTER — HOSPITAL ENCOUNTER (OUTPATIENT)
Dept: RADIOLOGY | Facility: HOSPITAL | Age: 44
Discharge: HOME | End: 2019-10-09
Attending: ORTHOPAEDIC SURGERY
Payer: COMMERCIAL

## 2019-10-09 ENCOUNTER — OFFICE VISIT (OUTPATIENT)
Dept: ORTHOPEDICS | Facility: CLINIC | Age: 44
End: 2019-10-09
Payer: COMMERCIAL

## 2019-10-09 ENCOUNTER — CLINICAL SUPPORT (OUTPATIENT)
Dept: REHABILITATION | Facility: HOSPITAL | Age: 44
End: 2019-10-09
Payer: COMMERCIAL

## 2019-10-09 VITALS
DIASTOLIC BLOOD PRESSURE: 99 MMHG | HEART RATE: 81 BPM | WEIGHT: 216.94 LBS | SYSTOLIC BLOOD PRESSURE: 148 MMHG | HEIGHT: 70 IN | BODY MASS INDEX: 31.06 KG/M2

## 2019-10-09 DIAGNOSIS — R29.898 WEAKNESS OF LEFT HAND: ICD-10-CM

## 2019-10-09 DIAGNOSIS — M19.132 POST-TRAUMATIC ARTHRITIS OF WRIST, LEFT: ICD-10-CM

## 2019-10-09 DIAGNOSIS — S67.42XS CRUSHING INJURY OF LEFT WRIST AND HAND, SEQUELA: ICD-10-CM

## 2019-10-09 DIAGNOSIS — M25.642 STIFFNESS OF FINGER JOINT OF LEFT HAND: Primary | ICD-10-CM

## 2019-10-09 DIAGNOSIS — R60.0 EDEMA OF HAND: ICD-10-CM

## 2019-10-09 DIAGNOSIS — M19.132 POST-TRAUMATIC ARTHRITIS OF WRIST, LEFT: Primary | ICD-10-CM

## 2019-10-09 PROCEDURE — 73110 X-RAY EXAM OF WRIST: CPT | Mod: TC,PO,LT

## 2019-10-09 PROCEDURE — 97530 THERAPEUTIC ACTIVITIES: CPT | Mod: PO

## 2019-10-09 PROCEDURE — 73110 XR WRIST COMPLETE 3 VIEWS LEFT: ICD-10-PCS | Mod: 26,LT,, | Performed by: RADIOLOGY

## 2019-10-09 PROCEDURE — 99999 PR PBB SHADOW E&M-EST. PATIENT-LVL III: CPT | Mod: PBBFAC,,, | Performed by: ORTHOPAEDIC SURGERY

## 2019-10-09 PROCEDURE — 99024 PR POST-OP FOLLOW-UP VISIT: ICD-10-PCS | Mod: S$GLB,,, | Performed by: ORTHOPAEDIC SURGERY

## 2019-10-09 PROCEDURE — 99024 POSTOP FOLLOW-UP VISIT: CPT | Mod: S$GLB,,, | Performed by: ORTHOPAEDIC SURGERY

## 2019-10-09 PROCEDURE — 97110 THERAPEUTIC EXERCISES: CPT | Mod: PO

## 2019-10-09 PROCEDURE — 73110 X-RAY EXAM OF WRIST: CPT | Mod: 26,LT,, | Performed by: RADIOLOGY

## 2019-10-09 PROCEDURE — 97018 PARAFFIN BATH THERAPY: CPT | Mod: PO

## 2019-10-09 PROCEDURE — 99999 PR PBB SHADOW E&M-EST. PATIENT-LVL III: ICD-10-PCS | Mod: PBBFAC,,, | Performed by: ORTHOPAEDIC SURGERY

## 2019-10-09 NOTE — PLAN OF CARE
Occupational Therapy Daily Treatment and Updated POC      Name: Reymundo Pichardo [facility] Number: [id number]     Therapy Diagnosis:  Stiffness L hand, edema L hand, weakness L hand,       Physician: Escobar Colvin MD     Physician orders:       Note     Please begin range of motion of the fingers of his left hand.     Frequency:  2 times per week     Duration:  4 weeks     Diagnosis:  Status post left wrist arthrodesis         See Updated POC, fq/duration below.     Visit Date: 10/9/19     Medical Diagnosis: Z98.1 (ICD-10-CM) - Hx of arthrodesis  Evaluation Date: 8/28/2019  Insurance Authorization period Expiration: 8/20/20  Plan of Care Expiration Period: 10/22/19  Next Re-assessment: 30 days (10/27/19) and/or 10th visit   Date of Return to MD: Pt saw MD earlier this date. Cont OT Tx recommended. Pt will follow-up with MD in approx 8 weeks.    Visit # / Visits Authortized: 12 / 12  Time In: 0837  Time Out: 0930  Total Billable Time: 53 minutes.     Surgical Procedure and Date: Surgery: 8/06/19  PROCEDURE:    1.  Left total wrist arthrodesis with allograft from the left proximal tibia  2.  Left wrist hardware removal deep  3.  Left wrist extensor tenolysis     IMPLANTS:  Synthes short angle locking wrist arthrodesis plate                                                  Precautions: Standard and velcro splint now being used.  S/P sx: > 8 weeks     Subjective      Pt reports: Pain levels unchanged per pt report.  he was compliant with HEP.   Response to previous treatment: Good tolerance.     Functional change: None reported this date     Pain:   Functional Pain Scale Rating 0-10:   7/10 on average  5/10 at best  710 at worst  Location: L wrist dorsal forearm and hand.  Description: ache/throbbing/burning  Aggravating Factors: Movement  Easing Factors: Elevation/rest.        Objective   Paraffin:  10 min L wrist/hand.     Reymundo received  therapeutic exercises for 10 minutes, including:  Place and hold composite  extension/composite fist, and hook fist x 3 min each in wax post removal.  PROM each digit 1-5 flexion and ext each jt 3x10 sec each    Reymundo received  therapeutic activities x 10 minutes, including  -Hook fist grasp with yellow foam block 5x10  -PHG level 1 silver spring 5x10  -Yellow clothespin lateral pinch 5x10  -Yellow clothespin tripod pinch pinch 5x10     and Pinch Strength (in pounds, psi's):   Left Right    II 31 90    III     Lateral     Tripod     Tip          Home Exercises and Education Provided      Education provided:  Cont current HEP.      Written Home Exercises Provided:  Patient instructed to cont prior HEP.     Exercises were reviewed and Reymundo was able to demonstrate them prior to the end of the session.  Reymundo demonstrated good  understanding of the education provided.   .   See EMR under Media for exercises provided today and/or prior visit.        Assessment      Pt would continue to benefit from skilled OT. Pt with significant impairments with L hand strength. Grasp ability/ROM continues to improve; will cont to progress with ROM nd strengthening as tolerated/able.    - Progress towards goals: see below     Reymundo is progressing well towards his goals. New STG #7 below and LTG #5 below established this date in order to progress pt with functional strength L hand.  Pt prognosis continues with good rehab potential.      Pt will continue to benefit from skilled outpatient occupational therapy to address the deficits listed in the problem list on initial evaluation in order to maximize pt's level of independence in the home and community.      Anticipated barriers to occupational therapy: None     Pt's spiritual, cultural and educational needs considered and pt agreeable to plan of care and goals.     Goals:  Short Term Goals: (30 days, 9/27/19, or 10th visit) unless otherwise noted below.  1. Pt will be independent with HEP in 2 visits. (Met 8/30/19).  2. Pt will report decreased  pain to a 6/10 at worst in LUE with ADLs in order to increase function/use of UE (Not met; still up to 7/10)  3. Pt will increase PIP level AROM flexion and ext L hand digits 2-5 by 10 degrees each to increase function L hand. (Met)  4. Pt will increase L thumb IP flexion ext AROM by 5 degrees each to increase function L hand. (Met)    New STGs to be met in the next 30 days (or by 10/27/19 or 10 more visits, 19th visit)  5. Pt will increase DIP level AROM flexion L hand digits 2-5 by 10 degrees each (36, 40, 30, 50) to increase function L hand. (ongoing)  6. Pt will increase L thumb IP flexion AROM by 10 more degrees (to 40 degrees) to increase function L hand. (ongoing)  7. Pt will at least 40# max  strength L hand in order to increase function with grasp during ADL/IADL tasks (cooking utensils, tool use, carrying groceries, steering wheel, etc.) (new/established 10/9/19)      Long Term Goals: (by discharge)  1. Pt will report decreased pain to 3/10 with ADLs to allow for increased function/use of UE. (Ongoing)  2. Pt will exhibit grossly WNL AROM of L hand to allow for Independent use of for all ADL/IADL tasks.(Ongoing)  3. Pt will exhibit WFL  strength to allow a firm grasp during ADL/IADL (cooking utensils, tool use, carrying groceries, steering wheel, etc.) (Ongoing)  4. Pt will exhibit WFL lateral pinch strength to allow writing,opening containers, and turning keys. (Ongoing)  5. Pt will report no greater than Min difficulty with all Quick DASH assessment items. (Ongoing)  6. Pt will at least 50# max  strength L hand in order to increase function with grasp during ADL/IADL tasks (cooking utensils, tool use, carrying groceries, steering wheel, etc.) (new/established 10/9/19)     Plan   Updated POC: Continue Occupational Therapy 2  times per week for 6 more weeks effective 10/9/19 (through 11/20/19)  in order to to decrease pain and edema, and increase A/PROM, strength, and functional use of Left  upper extremity.     Updates/Grading for next session: Post re-auth/approval, cont to progress with ROM and strength of L hand     YURY Escamilla, CHT

## 2019-10-09 NOTE — PROGRESS NOTES
Mr. Gutierrez returns to clinic today.  Has a history of left wrist arthrodesis.  He is slowly improving with therapy.  He has been sent to pain management in a are considering a stellate ganglion block. He has no new complaints    Physical exam:  Examination of the left wrist and hand reveals that the incisions are well healed.  There is no significant edema.  There is no erythema.  Palpation produces mild to moderate tenderness throughout the wrist and hand.  He is able to flex to within 1 cm of his distal palmar crease and he is able to extend to within 30° of full extension. He does have capillary refill less than 2 sec in all the digits.    Radiology:  X-rays of the left wrist were taken in clinic today.  He is noted to have arthrodesis plate in place.  There is progression of the arthrodesis across the carpus.  The volar plate remains well fixed    Assessment:  Status post left wrist arthrodesis    Plan:    1.  He will continue to work with therapy for desensitization strengthening and range of motion    2.  Will continue to follow-up with Pain Management    3.  He will follow up with me in 8 weeks with repeat x-ray of the left wrist at which point I feel that the patient will most likely be at his maximum medical improvement

## 2019-10-10 ENCOUNTER — PATIENT MESSAGE (OUTPATIENT)
Dept: FAMILY MEDICINE | Facility: CLINIC | Age: 44
End: 2019-10-10

## 2019-10-10 DIAGNOSIS — G90.519 CRPS (COMPLEX REGIONAL PAIN SYNDROME), UPPER LIMB: ICD-10-CM

## 2019-10-10 DIAGNOSIS — G90.512 COMPLEX REGIONAL PAIN SYNDROME TYPE 1 OF LEFT UPPER EXTREMITY: Primary | ICD-10-CM

## 2019-10-10 DIAGNOSIS — J32.9 SINUSITIS, UNSPECIFIED CHRONICITY, UNSPECIFIED LOCATION: Primary | ICD-10-CM

## 2019-10-10 RX ORDER — SODIUM CHLORIDE, SODIUM LACTATE, POTASSIUM CHLORIDE, CALCIUM CHLORIDE 600; 310; 30; 20 MG/100ML; MG/100ML; MG/100ML; MG/100ML
INJECTION, SOLUTION INTRAVENOUS CONTINUOUS
Status: CANCELLED | OUTPATIENT
Start: 2019-10-22

## 2019-10-21 DIAGNOSIS — M51.36 DDD (DEGENERATIVE DISC DISEASE), LUMBAR: Primary | ICD-10-CM

## 2019-10-21 NOTE — PROGRESS NOTES
Occupational Therapy Daily Treatment Note     Name: Reymundo Pichardo [facility] Number: [id number]    Therapy Diagnosis:   Encounter Diagnoses   Name Primary?    Stiffness of finger joint of left hand Yes    Edema of hand     Weakness of left hand     Post-traumatic arthritis of wrist, left     Crushing injury of left wrist and hand, sequela      Physician: Escobar Colvin MD    Visit Date: 10/23/2019    Physician orders:         Note     Please begin range of motion of the fingers of his left hand.     Frequency:  2 times per week     Duration:  4 weeks     Diagnosis:  Status post left wrist arthrodesis         See Updated POC, fq/duration below.     Visit Date: 10/9/19     Medical Diagnosis: Z98.1 (ICD-10-CM) - Hx of arthrodesis  Evaluation Date: 8/28/2019  Insurance Authorization period Expiration: 8/20/20  Plan of Care Expiration Period: 10/22/19  Next Re-assessment: 30 days (10/27/19) and/or 10th visit   Date of Return to MD: Pt saw MD earlier this date. Cont OT Tx recommended. Pt will follow-up with MD in approx 8 weeks.     Visit # / Visits Authortized: 13 / 24  Time In: 0820 (pt arrived 20 min this am)  Time Out: 0900  Total Billable Time: 30 minutes.     Surgical Procedure and Date: Surgery: 8/06/19  PROCEDURE:    1.  Left total wrist arthrodesis with allograft from the left proximal tibia  2.  Left wrist hardware removal deep  3.  Left wrist extensor tenolysis     IMPLANTS:  Synthes short angle locking wrist arthrodesis plate               Precautions: Standard and velcro splint now being used.  S/P sx: > 8 weeks     Subjective      Pt reports: Pain levels unchanged per pt report.  he was compliant with HEP.   Response to previous treatment: Good tolerance.     Functional change: None reported this date     Pain:   Functional Pain Scale Rating 0-10:   7/10 on average  5/10 at best  710 at worst  Location: L wrist dorsal forearm and hand.  Description: ache/throbbing/burning  Aggravating Factors:  Movement  Easing Factors: Elevation/rest.        Objective     Moist heat x8 min with hand in composite fist.     Reymundo received  therapeutic exercises for 10 minutes, including:  Pace and hold composite extension/composite fist 3x10 each  PROM each digit 1-5 flexion and ext each jt 3x10 sec each      Reymundo received  therapeutic activities x 10 minutes, including  -Grasp dowel x50 large dowel and small dowel.  - yellow digiciser 5x10  - red clothespin lateral pinch 5x10  - red clothespin tripod pinch pinch 5x10  - thumbciser 5x10      and Pinch Strength (in pounds, psi's):    Left Right    II LM 31 90    III       Lateral       Tripod       Tip             Home Exercises and Education Provided      Education provided:  Cont current HEP.      Written Home Exercises Provided:  Patient instructed to cont prior HEP.     Exercises were reviewed and Reymundo was able to demonstrate them prior to the end of the session.  Reymundo demonstrated good  understanding of the education provided.   .   See EMR under Media for exercises provided today and/or prior visit.        Assessment      Pt would continue to benefit from skilled OT.   - Progress towards goals: see below     Reymundo is progressing well towards his goals.      Pt will continue to benefit from skilled outpatient occupational therapy to address the deficits listed in the problem list on initial evaluation in order to maximize pt's level of independence in the home and community.      Anticipated barriers to occupational therapy: None     Pt's spiritual, cultural and educational needs considered and pt agreeable to plan of care and goals.     Goals:  Short Term Goals: (30 days, 9/27/19, or 10th visit) unless otherwise noted below.  1. Pt will be independent with HEP in 2 visits. (Met 8/30/19).  2. Pt will report decreased pain to a 6/10 at worst in LUE with ADLs in order to increase function/use of UE (Not met; still up to 7/10)  3. Pt will increase PIP level  AROM flexion and ext L hand digits 2-5 by 10 degrees each to increase function L hand. (Met)  4. Pt will increase L thumb IP flexion ext AROM by 5 degrees each to increase function L hand. (Met)     New STGs to be met in the next 30 days (or by 10/27/19 or 10 more visits, 19th visit)  5. Pt will increase DIP level AROM flexion L hand digits 2-5 by 10 degrees each (36, 40, 30, 50) to increase function L hand. (ongoing)  6. Pt will increase L thumb IP flexion AROM by 10 more degrees (to 40 degrees) to increase function L hand. (ongoing)  7. Pt will at least 40# max  strength L hand in order to increase function with grasp during ADL/IADL tasks (cooking utensils, tool use, carrying groceries, steering wheel, etc.) (new/established 10/9/19)        Long Term Goals: (by discharge)  1. Pt will report decreased pain to 3/10 with ADLs to allow for increased function/use of UE. (Ongoing)  2. Pt will exhibit grossly WNL AROM of L hand to allow for Independent use of for all ADL/IADL tasks.(Ongoing)  3. Pt will exhibit WFL  strength to allow a firm grasp during ADL/IADL (cooking utensils, tool use, carrying groceries, steering wheel, etc.) (Ongoing)  4. Pt will exhibit WFL lateral pinch strength to allow writing,opening containers, and turning keys. (Ongoing)  5. Pt will report no greater than Min difficulty with all Quick DASH assessment items. (Ongoing)  6. Pt will at least 50# max  strength L hand in order to increase function with grasp during ADL/IADL tasks (cooking utensils, tool use, carrying groceries, steering wheel, etc.) (new/established 10/9/19)     Plan   Updated POC: Continue Occupational Therapy 2  times per week for 6 more weeks effective 10/9/19 (through 11/20/19)  in order to to decrease pain and edema, and increase A/PROM, strength, and functional use of Left upper extremity.     Updates/Grading for next session: Progress with OT as tolereated.

## 2019-10-22 ENCOUNTER — HOSPITAL ENCOUNTER (OUTPATIENT)
Dept: RADIOLOGY | Facility: HOSPITAL | Age: 44
Discharge: HOME | End: 2019-10-22
Attending: ANESTHESIOLOGY | Admitting: ANESTHESIOLOGY
Payer: MEDICAID

## 2019-10-22 ENCOUNTER — HOSPITAL ENCOUNTER (OUTPATIENT)
Facility: HOSPITAL | Age: 44
Discharge: HOME | End: 2019-10-22
Attending: ANESTHESIOLOGY | Admitting: ANESTHESIOLOGY
Payer: MEDICAID

## 2019-10-22 DIAGNOSIS — G90.519 CRPS (COMPLEX REGIONAL PAIN SYNDROME), UPPER LIMB: Primary | ICD-10-CM

## 2019-10-22 DIAGNOSIS — M51.36 DDD (DEGENERATIVE DISC DISEASE), LUMBAR: ICD-10-CM

## 2019-10-22 DIAGNOSIS — G90.512 COMPLEX REGIONAL PAIN SYNDROME TYPE 1 OF LEFT UPPER EXTREMITY: ICD-10-CM

## 2019-10-22 PROCEDURE — 25000003 PHARM REV CODE 250: Mod: PO | Performed by: ANESTHESIOLOGY

## 2019-10-22 PROCEDURE — 76000 FLUOROSCOPY <1 HR PHYS/QHP: CPT | Mod: TC,PO

## 2019-10-22 PROCEDURE — 99152 MOD SED SAME PHYS/QHP 5/>YRS: CPT | Mod: ,,, | Performed by: ANESTHESIOLOGY

## 2019-10-22 PROCEDURE — 99152 PR MOD CONSCIOUS SEDATION, SAME PHYS, 5+ YRS, FIRST 15 MIN: ICD-10-PCS | Mod: ,,, | Performed by: ANESTHESIOLOGY

## 2019-10-22 PROCEDURE — 64510 N BLOCK STELLATE GANGLION: CPT | Mod: LT,,, | Performed by: ANESTHESIOLOGY

## 2019-10-22 PROCEDURE — 64510 N BLOCK STELLATE GANGLION: CPT | Mod: PO | Performed by: ANESTHESIOLOGY

## 2019-10-22 PROCEDURE — 63600175 PHARM REV CODE 636 W HCPCS: Mod: PO | Performed by: ANESTHESIOLOGY

## 2019-10-22 PROCEDURE — 64510 PR INJECT NERV BLCK,STELLATE GANGLION: ICD-10-PCS | Mod: LT,,, | Performed by: ANESTHESIOLOGY

## 2019-10-22 RX ORDER — LIDOCAINE HYDROCHLORIDE AND EPINEPHRINE 10; 10 MG/ML; UG/ML
INJECTION, SOLUTION INFILTRATION; PERINEURAL
Status: DISCONTINUED | OUTPATIENT
Start: 2019-10-22 | End: 2019-10-22 | Stop reason: HOSPADM

## 2019-10-22 RX ORDER — MIDAZOLAM HYDROCHLORIDE 2 MG/2ML
INJECTION, SOLUTION INTRAMUSCULAR; INTRAVENOUS
Status: DISCONTINUED | OUTPATIENT
Start: 2019-10-22 | End: 2019-10-22 | Stop reason: HOSPADM

## 2019-10-22 RX ORDER — SODIUM CHLORIDE, SODIUM LACTATE, POTASSIUM CHLORIDE, CALCIUM CHLORIDE 600; 310; 30; 20 MG/100ML; MG/100ML; MG/100ML; MG/100ML
INJECTION, SOLUTION INTRAVENOUS CONTINUOUS
Status: DISCONTINUED | OUTPATIENT
Start: 2019-10-22 | End: 2019-10-22 | Stop reason: HOSPADM

## 2019-10-22 RX ORDER — LIDOCAINE HYDROCHLORIDE 10 MG/ML
INJECTION, SOLUTION EPIDURAL; INFILTRATION; INTRACAUDAL; PERINEURAL
Status: DISCONTINUED | OUTPATIENT
Start: 2019-10-22 | End: 2019-10-22 | Stop reason: HOSPADM

## 2019-10-22 RX ORDER — BUPIVACAINE HYDROCHLORIDE 2.5 MG/ML
INJECTION, SOLUTION EPIDURAL; INFILTRATION; INTRACAUDAL
Status: DISCONTINUED | OUTPATIENT
Start: 2019-10-22 | End: 2019-10-22 | Stop reason: HOSPADM

## 2019-10-22 RX ADMIN — SODIUM CHLORIDE, SODIUM LACTATE, POTASSIUM CHLORIDE, AND CALCIUM CHLORIDE: .6; .31; .03; .02 INJECTION, SOLUTION INTRAVENOUS at 08:10

## 2019-10-22 NOTE — OP NOTE
PROCEDURE DATE: 10/22/2019    PROCEDURE: left side stellate ganglion block utilizing fluoroscopy    DIAGNOSIS: CRPS type 1 left upper extremity  Post Op diagnosis: Same    PHYSICIAN: Vincent Alejandre MD    MEDICATIONS INJECTED:    1) Test dose:  Lidocaine 1% with 1:200,000 epinephrine, 4 ml total  2) Treatment dose: Bupivicane 0.25%, 5ml total    LOCAL ANESTHETIC INJECTED:  Lidocaine 1%. 1ml per site.    SEDATION MEDICATIONS: versed 2mg    ESTIMATED BLOOD LOSS:  none    COMPLICATIONS:  none    TECHNIQUE:   A time-out taken to identify patient and procedure side prior to starting the procedure. The patient was placed in supine position, prepped and draped in the usual sterile fashion using ChloraPrep and sterile towels.  The area to be injected was determined under fluoroscopic guidance in AP and oblique view. The fluoroscope was rotated to a left-side oblique view at which point the uncinate process of the C6 vertebra was identified. Carotid artery was palpated and insured not in path of needle placement. Local anesthetic was given by raising a wheel and going down to the hub of a 25-gauge 1.5 inch needle.  In oblique view, a 3.5 inch 25-gauge bent-tip spinal needle was introduced and followed until it made contact with the uncinate process just anterior to the C6 foramen. After negative aspiration for blood or air, contrast dye was injected to confirm appropriate placement and that there was no vascular uptake, and this was also performed under live digital subtraction.  The test dose as noted above was given over 4 minutes and there were no signs of HR or BP changes, no tinnitus or circumoral numbness. Again, aspiration was negative for blood or air and the treatment medication was then given slowly over 5 minutes. The patient tolerated the procedure well.    The patient was monitored after the procedure. The patient was given post procedure and discharge instructions to follow at home. The patient was discharged in a  stable condition.

## 2019-10-22 NOTE — H&P
[facility]  History & Physical - Short Stay  Pain Management       SUBJECTIVE:     Procedure: Procedure(s) (LRB):  Block, Nerve STELLATE GANGLION (Left)    Chief Complaint/Reason for Admission:  Complex regional pain syndrome type 1 of left upper extremity [G90.512]    PTA Medications   Medication Sig    albuterol (PROVENTIL) 2.5 mg /3 mL (0.083 %) nebulizer solution Inhale 2.5 mg into the lungs.    albuterol (VENTOLIN HFA) 90 mcg/actuation inhaler Inhale 2 puffs into the lungs every 6 (six) hours as needed for Wheezing. Rescue    amitriptyline (ELAVIL) 10 MG tablet Take 1 tablet (10 mg total) by mouth every evening for 21 days    fluticasone propionate (FLONASE ALLERGY RELIEF) 50 mcg/actuation nasal spray Use 2 sprays (100 mcg total) by Each Nostril route once daily.    fluticasone-salmeterol 250-50 mcg/dose (ADVAIR) 250-50 mcg/dose diskus inhaler Inhale 1 puff into the lungs 2 (two) times daily. Controller    ibuprofen (ADVIL,MOTRIN) 800 MG tablet Take 1 tablet (800 mg total) by mouth 2 (two) times daily as needed for Pain.    ketoconazole (NIZORAL) 2 % shampoo Apply topically.    montelukast (SINGULAIR) 10 mg tablet Take 1 tablet (10 mg total) by mouth every evening.    ondansetron (ZOFRAN-ODT) 4 MG TbDL Take 2 tablets (8 mg total) by mouth every 8 (eight) hours as needed.    promethazine (PHENERGAN) 25 MG tablet Take 25 mg by mouth.    buPROPion (WELLBUTRIN XL) 300 MG 24 hr tablet Take 1 tablet (300 mg total) by mouth every morning    DULoxetine (CYMBALTA) 60 MG capsule Take 60 mg by mouth.    DULoxetine (CYMBALTA) 60 MG capsule Take 1 capsule (60 mg total) by mouth every morning       Review of patient's allergies indicates:  No Known Allergies    Past Medical History:   Diagnosis Date    Asthma     Crushing injury of left wrist and hand 7/10/2019     Past Surgical History:   Procedure Laterality Date    BONE GRAFT Left 8/6/2019    Procedure: BONE GRAFT LEG;  Surgeon: Escobar  DILLON Colvin MD;  Location: [facility];  Service: Orthopedics;  Laterality: Left;    FRACTURE SURGERY      WRIST SURGERY Left     x3     History reviewed. No pertinent family history.  Social History     Tobacco Use    Smoking status: Former Smoker    Smokeless tobacco: Never Used   Substance Use Topics    Alcohol use: No    Drug use: No        Current Facility-Administered Medications:     lactated ringers infusion, , Intravenous, Continuous, Vincent Alejandre MD, Last Rate: 25 mL/hr at 10/22/19 0850    Review of Systems:  General ROS: negative for - fever  Musculoskeletal ROS: negative for - muscular weakness  Dermatological ROS: negative for rash    OBJECTIVE:     Vital Signs (Most Recent):  Temp: 97.5 °F (36.4 °C) (10/22/19 0836)  Pulse: 79 (10/22/19 0836)  Resp: 16 (10/22/19 0836)  BP: (!) 146/90 (10/22/19 0836)  SpO2: 98 % (10/22/19 0836)  Body mass index is 31.42 kg/m².    Physical Exam:  General appearance - alert, well appearing, and in no distress  Mental status - AOx3  Eyes - pupils equal and reactive, extraocular eye movements intact  Heart - normal rate, regular rhythm, normal S1, S2, no murmurs, rubs, clicks or gallops  Chest - clear to auscultation, no wheezes, rales or rhonchi, symmetric air entry  Abdomen - soft, nontender, nondistended, no masses or organomegaly  Neurological - alert, oriented, normal speech, no focal findings or movement disorder noted  Extremities - peripheral pulses normal, no pedal edema, no clubbing or cyanosis      ASSESSMENT/PLAN:     Active Hospital Problems    Diagnosis  POA    CRPS (complex regional pain syndrome), upper limb [G90.519]  Yes      Resolved Hospital Problems   No resolved problems to display.      44 y.o. year old male presents to the office with left hand pain.  He injured his hand on 12/27/17 when his hand suffered crush injury between a forklift and a truck.  He is s/p left total wrist arthrodesis with allograft from the left proximal tibia on 8/6/19.   He reports good relief of his pain that he was feeling prior to the surgery, but now continues to have his typical baseline pain which was present since the injury.  He is s/p left stellate ganglion block with Dr. Jarrell on 5/21/18, 6/18/18, and 7/23/18.  Per progress notes it appears that his symptoms of CRPS improved following the injections.  Today he subjectively c/o hyperesthesia, temperature asymmetry, decreased ROM, weakness, and clamminess/sweating changes in his left hand.  On exam I appreciate temperature asymmetry, contracture with decreased ROM of his digits, and allodynia to light touch.  He is currently doing PT with desensitization therapy.  Repeat stellate ganglion block as it appears to work in the past. The risks and benefits of this intervention, and alternative therapies were discussed with the patient.  The discussion of risks included infection, bleeding, need for additional procedures or surgery, nerve damage, paralysis, adverse medication reaction(s), stroke, and/or death.  Questions regarding the procedure, risks, expected outcome, and possible side effects were solicited and answered to the patient's satisfaction.  Reymundo wishes to proceed with the injection.  Verbal and written consent were verified.    ASA 2, MP II      Proceed with intervention as scheduled.    Vincent Alejandre M.D.  Interventional Pain Medicine / Anesthesiology

## 2019-10-22 NOTE — DISCHARGE SUMMARY
[facility]  Discharge Note  Short Stay    Admit Date: 10/22/2019    Discharge Date: 10/22/2019    Attending Physician: Vincent Alejandre     Discharge Provider: Vincent Alejandre    Diagnoses:  Active Hospital Problems    Diagnosis  POA    CRPS (complex regional pain syndrome), upper limb [G90.519]  Yes      Resolved Hospital Problems   No resolved problems to display.       Discharged Condition: Good    Final Diagnoses: Complex regional pain syndrome type 1 of left upper extremity [G90.512]    Disposition: Home or Self Care    Hospital Course: No complications, uneventful    Outcome of Hospitalization, Treatment, Procedure, or Surgery:  Patient was admitted for outpatient interventional pain management procedure. The patient tolerated the procedure well with no complications.    Follow up/Patient Instructions:  Follow up as scheduled in Pain Management office in 3-4 weeks.  Patient has received instructions and follow up date and time.    Medications:  Continue previous medications    Discharge Procedure Orders   Notify your health care provider if you experience any of the following:  temperature >100.4     Notify your health care provider if you experience any of the following:  persistent nausea and vomiting or diarrhea     Notify your health care provider if you experience any of the following:  severe uncontrolled pain     Notify your health care provider if you experience any of the following:  redness, tenderness, or signs of infection (pain, swelling, redness, odor or green/yellow discharge around incision site)     Notify your health care provider if you experience any of the following:  difficulty breathing or increased cough     Notify your health care provider if you experience any of the following:  severe persistent headache     Notify your health care provider if you experience any of the following:  worsening rash     Notify your health care provider if you experience any of the following:   persistent dizziness, light-headedness, or visual disturbances     Notify your health care provider if you experience any of the following:  increased confusion or weakness     Activity as tolerated         Discharge Procedure Orders (must include Diet, Follow-up, Activity):   Discharge Procedure Orders (must include Diet, Follow-up, Activity)   Notify your health care provider if you experience any of the following:  temperature >100.4     Notify your health care provider if you experience any of the following:  persistent nausea and vomiting or diarrhea     Notify your health care provider if you experience any of the following:  severe uncontrolled pain     Notify your health care provider if you experience any of the following:  redness, tenderness, or signs of infection (pain, swelling, redness, odor or green/yellow discharge around incision site)     Notify your health care provider if you experience any of the following:  difficulty breathing or increased cough     Notify your health care provider if you experience any of the following:  severe persistent headache     Notify your health care provider if you experience any of the following:  worsening rash     Notify your health care provider if you experience any of the following:  persistent dizziness, light-headedness, or visual disturbances     Notify your health care provider if you experience any of the following:  increased confusion or weakness     Activity as tolerated

## 2019-10-22 NOTE — DISCHARGE INSTRUCTIONS
PAIN MANAGEMENT    Home care instructions   Apply ice pack to the injection site for 20 minute prior for the first 24 hours for soreness/discomfort at injection site   DO NOT USE HEAT FOR 24 HOURS   Keep site clean and dry for 24 hours, remove bandaid when desired   Do not drive until tomorrow  Take care when walking after a lumbar injection     STEROIDS OR RADIOFREQUENCY    May take 10-14 days for full effects  Avoid strenuous exercises for 2 days        Resume Aspirin, Plavix, or Coumadin the day after the procedure unless other wise instructed  Resume home medication as prescribed today      CALL PHYSICIAN FOR:   Severe increase in your usual pain or appearance of new pain   Prolonged or increasing weakness or numbness in the legs or arms   Fever greater then 100 degrees F..   Drainage from the incision site, redness, active bleeding or increased swelling at the injection site   Headache that increases when your head is upright and decreases when you lie flat    FOR EMERGENCIES:   Go directly to Emergency Department for Shortness of breath, chest pain, or problems breathing      Recovery After Procedural Sedation (Adult)  You have been given medicine by vein to make you sleep during your surgery. This may have included both a pain medicine and sleeping medicine. Most of the effects have worn off. But you may still have some drowsiness for the next 6 to 8 hours.  Home care  Follow these guidelines when you get home:  · For the next 8 hours, you should be watched by a responsible adult. This person should make sure your condition is not getting worse.  · Don't drink any alcohol for the next 24 hours.  · Don't drive, operate dangerous machinery, or make important business or personal decisions during the next 24 hours.  Note: Your healthcare provider may tell you not to take any medicine by mouth for pain or sleep in the next 4 hours. These medicines may react with the medicines you were given in the  hospital. This could cause a much stronger response than usual.  Follow-up care  Follow up with your healthcare provider if you are not alert and back to your usual level of activity within 12 hours.  When to seek medical advice  Call your healthcare provider right away if any of these occur:  · Drowsiness gets worse  · Weakness or dizziness gets worse  · Repeated vomiting  · You can't be awakened   Date Last Reviewed: 10/18/2016  © 8230-3987 [employer]. [address] [place] [address], [place] [address]. All rights reserved. This information is not intended as a substitute for professional medical care. Always follow your healthcare professional's instructions.

## 2019-10-22 NOTE — PROGRESS NOTES
Pt discharged to home.  Discharge instructions given, pt stated understanding.   IV removed.  Pt left via wheelchair with son to home.

## 2019-10-23 ENCOUNTER — CLINICAL SUPPORT (OUTPATIENT)
Dept: REHABILITATION | Facility: HOSPITAL | Age: 44
End: 2019-10-23
Payer: COMMERCIAL

## 2019-10-23 VITALS
BODY MASS INDEX: 31.35 KG/M2 | SYSTOLIC BLOOD PRESSURE: 142 MMHG | HEART RATE: 84 BPM | TEMPERATURE: 98 F | RESPIRATION RATE: 18 BRPM | HEIGHT: 70 IN | WEIGHT: 219 LBS | OXYGEN SATURATION: 98 % | DIASTOLIC BLOOD PRESSURE: 96 MMHG

## 2019-10-23 DIAGNOSIS — R29.898 WEAKNESS OF LEFT HAND: ICD-10-CM

## 2019-10-23 DIAGNOSIS — R60.0 EDEMA OF HAND: ICD-10-CM

## 2019-10-23 DIAGNOSIS — M25.642 STIFFNESS OF FINGER JOINT OF LEFT HAND: Primary | ICD-10-CM

## 2019-10-23 DIAGNOSIS — M19.132 POST-TRAUMATIC ARTHRITIS OF WRIST, LEFT: ICD-10-CM

## 2019-10-23 DIAGNOSIS — S67.42XS CRUSHING INJURY OF LEFT WRIST AND HAND, SEQUELA: ICD-10-CM

## 2019-10-23 PROCEDURE — 97530 THERAPEUTIC ACTIVITIES: CPT | Mod: PO

## 2019-10-23 PROCEDURE — 97110 THERAPEUTIC EXERCISES: CPT | Mod: PO

## 2019-10-25 ENCOUNTER — CLINICAL SUPPORT (OUTPATIENT)
Dept: REHABILITATION | Facility: HOSPITAL | Age: 44
End: 2019-10-25
Payer: MEDICAID

## 2019-10-25 DIAGNOSIS — G89.4 CHRONIC PAIN SYNDROME: ICD-10-CM

## 2019-10-25 DIAGNOSIS — R60.0 EDEMA OF HAND: ICD-10-CM

## 2019-10-25 DIAGNOSIS — G90.512 COMPLEX REGIONAL PAIN SYNDROME TYPE 1 OF LEFT UPPER EXTREMITY: ICD-10-CM

## 2019-10-25 DIAGNOSIS — R29.898 WEAKNESS OF LEFT HAND: ICD-10-CM

## 2019-10-25 DIAGNOSIS — M25.642 STIFFNESS OF FINGER JOINT OF LEFT HAND: Primary | ICD-10-CM

## 2019-10-25 DIAGNOSIS — M19.132 POST-TRAUMATIC ARTHRITIS OF WRIST, LEFT: ICD-10-CM

## 2019-10-25 DIAGNOSIS — S67.42XS CRUSHING INJURY OF LEFT WRIST AND HAND, SEQUELA: ICD-10-CM

## 2019-10-25 PROCEDURE — 97018 PARAFFIN BATH THERAPY: CPT | Mod: PO

## 2019-10-25 PROCEDURE — 97760 ORTHOTIC MGMT&TRAING 1ST ENC: CPT | Mod: PO

## 2019-10-25 PROCEDURE — 97530 THERAPEUTIC ACTIVITIES: CPT | Mod: PO

## 2019-10-25 NOTE — PROGRESS NOTES
Occupational Therapy Reassessment and Daily Treatment Note     Name: Reymundo Gutierrez  [facility] Number: [id number]    Therapy Diagnosis:   Encounter Diagnoses   Name Primary?    Stiffness of finger joint of left hand Yes    Edema of hand     Weakness of left hand     Post-traumatic arthritis of wrist, left     Crushing injury of left wrist and hand, sequela      Physician: Escobar Colvin MD    Visit Date: 10/25/2019    Physician orders:         Note     Please begin range of motion of the fingers of his left hand.     Frequency:  2 times per week     Duration:  4 weeks     Diagnosis:  Status post left wrist arthrodesis          Medical Diagnosis: Z98.1 (ICD-10-CM) - Hx of arthrodesis  Evaluation Date: 8/28/2019  Insurance Authorization period Expiration: 8/20/20  Plan of Care Expiration Period: 11/20/19   Next Re-assessment: 30 days (11/24/23724) and/or 10th visit   Date of Return to MD: Pt saw MD earlier this date. Cont OT Tx recommended. Pt will follow-up with MD in approx 8 weeks.     Visit # / Visits Authortized: 14 / 24  Time In: 0805   Time Out: 0905   Total Billable Time: 38 minutes.     Surgical Procedure and Date: Surgery: 8/06/19  PROCEDURE:    1.  Left total wrist arthrodesis with allograft from the left proximal tibia  2.  Left wrist hardware removal deep  3.  Left wrist extensor tenolysis     IMPLANTS:  Synthes short angle locking wrist arthrodesis plate               Precautions: Standard and velcro splint now being used.  S/P sx: > 8 weeks     Subjective      Pt reports: Pain levels unchanged per pt report.  he was compliant with HEP.   Response to previous treatment: Good tolerance.     Functional change: None reported this date     Pain:   Functional Pain Scale Rating 0-10:   7/10 on average  5/10 at best  710 at worst  Location: L wrist dorsal forearm and hand.  Description: ache/throbbing/burning  Aggravating Factors: Movement  Easing Factors: Elevation/rest.        Objective      Paraffin x8 min with hand in composite extension.     Reymundo received skilled instruction for orthotic mgmt and training x 10 min for jt royer for L small and ring fingers 15-30 min 2 x day each finger (3 turns of screw for now) as tolerated. Initial wear tolerated well.     Reymundo received  therapeutic activities x 10 minutes, including  -Grasp dowel x50 large dowel and small dowel.  - PHG level 2 black spring 5x5  - red digiciser 5x10  - green clothespin lateral pinch 4x10  - green clothespin tripod pinch pinch 4x10    AROM DIP flexion L hand digits 1-5: 40, 26, 32, 32 and 48 degrees    AROM PIP ext L 4th and 5th digits -45 and -70 degrees. Post 10 mi each jt royer (4th = 40 degree and 5th = 42 degrees).      and Pinch Strength (in pounds, psi's):    Left Right    II 40 90    III       Lateral       Tripod       Tip             Home Exercises and Education Provided      Education provided:  Cont current HEP. Begin jt royer wear per above recommendations as tolerated.     Written Home Exercises Provided:  Patient instructed to cont prior HEP.     Exercises were reviewed and Reymundo was able to demonstrate them prior to the end of the session.  Reymundo demonstrated good  understanding of the education provided.   .   See EMR under Media for exercises provided today and/or prior visit.        Assessment      Pt would continue to benefit from skilled OT.     - Progress towards goals: see below     Reymundo is progressing well towards his goals.      Pt will continue to benefit from skilled outpatient occupational therapy to address the deficits listed in the problem list on initial evaluation in order to maximize pt's level of independence in the home and community.      Anticipated barriers to occupational therapy: None     Pt's spiritual, cultural and educational needs considered and pt agreeable to plan of care and goals.     Goals:  Short Term Goals: (30 days, 9/27/19, or 10th visit) unless otherwise noted  below.  1. Pt will be independent with HEP in 2 visits. (Met 8/30/19).  2. Pt will report decreased pain to a 6/10 at worst in LUE with ADLs in order to increase function/use of UE (Not met; still up to 7/10)  3. Pt will increase PIP level AROM flexion and ext L hand digits 2-5 by 10 degrees each to increase function L hand. (Met)  4. Pt will increase L thumb IP flexion ext AROM by 5 degrees each to increase function L hand. (Met)     New STGs to be met in the next 30 days (or by 10/27/19 or 10 more visits, 19th visit)   5. Pt will increase DIP level AROM flexion L hand digits 2-5 by 10 degrees each (36, 40, 30, 50) to increase function L hand. (Not met); Reason not met: possible plateau with DIP level flexion.  6. Pt will increase L thumb IP flexion AROM by 10 more degrees (to 40 degrees) to increase function L hand. (Not met)  7. Pt will at least 40# max  strength L hand in order to increase function with grasp during ADL/IADL tasks (cooking utensils, tool use, carrying groceries, steering wheel, etc.) (Met 10/25/19 )    New STGs to be met in the next 30 days (11/24/19)  or 10 more visits, 24th visit)  8. Pt will demo at least 50# max  strength L hand  to increase function with grasp during ADL/IADL tasks (cooking utensils, tool use, carrying groceries, steering wheel, etc.)   9. Pt will demo at least 20 degrees of improved AROM ext L hand PIPs digits 4 and 5 in order to increase function for ADL/IADL.     Long Term Goals: (by discharge)  1. Pt will report decreased pain to 3/10 with ADLs to allow for increased function/use of UE. (Ongoing)  2. Pt will exhibit grossly WNL AROM of L hand to allow for Independent use of for all ADL/IADL tasks.(Ongoing)  3. Pt will exhibit WFL  strength to allow a firm grasp during ADL/IADL (cooking utensils, tool use, carrying groceries, steering wheel, etc.) (Ongoing)  4. Pt will exhibit WFL lateral pinch strength to allow writing,opening containers, and turning keys.  (Ongoing)  5. Pt will report no greater than Min difficulty with all Quick DASH assessment items. (Ongoing)  6. Pt will at least 50# max  strength L hand in order to increase function with grasp during ADL/IADL tasks (cooking utensils, tool use, carrying groceries, steering wheel, etc.) (new/established 10/9/19) (Ongoing)     Plan   Updated POC: Continue Occupational Therapy 2  times per week for 6 more weeks effective 10/9/19 (through 11/20/19)  in order to to decrease pain and edema, and increase A/PROM, strength, and functional use of Left upper extremity.     Updates/Grading for next session: Progress with OT as tolereated.

## 2019-10-28 ENCOUNTER — PATIENT MESSAGE (OUTPATIENT)
Dept: PAIN MEDICINE | Facility: CLINIC | Age: 44
End: 2019-10-28

## 2019-10-29 RX ORDER — IBUPROFEN 800 MG/1
800 TABLET ORAL 2 TIMES DAILY PRN
Qty: 40 TABLET | Refills: 1 | Status: SHIPPED | OUTPATIENT
Start: 2019-10-29 | End: 2019-11-13 | Stop reason: SDUPTHER

## 2019-10-30 NOTE — PROGRESS NOTES
Occupational Therapy  Daily Treatment Note     Name: Reymundo Pichardo [facility] Number: [id number]    Therapy Diagnosis:   Encounter Diagnoses   Name Primary?    Stiffness of finger joint of left hand Yes    Edema of hand     Weakness of left hand     Post-traumatic arthritis of wrist, left     Crushing injury of left wrist and hand, sequela      Physician: Escobra Colvin MD    Visit Date: 11/1/2019    Physician orders:         Note     Please begin range of motion of the fingers of his left hand.     Frequency:  2 times per week     Duration:  4 weeks     Diagnosis:  Status post left wrist arthrodesis          Medical Diagnosis: Z98.1 (ICD-10-CM) - Hx of arthrodesis  Evaluation Date: 8/28/2019  Insurance Authorization period Expiration: 8/20/20  Plan of Care Expiration Period: 11/20/19   Next Re-assessment: 30 days (11/24/12019) and/or 10th visit   Date of Return to MD:  12/9/19   Visit # / Visits Authortized: 15 / 24  Time In: 0806  Time Out: 0855  Total Billable Time: 30 minutes- 5 min moist heat = 25 min.     Surgical Procedure and Date: Surgery: 8/06/19  PROCEDURE:    1.  Left total wrist arthrodesis with allograft from the left proximal tibia  2.  Left wrist hardware removal deep  3.  Left wrist extensor tenolysis     IMPLANTS:  Synthes short angle locking wrist arthrodesis plate               Precautions: Standard and velcro splint now being used.  S/P sx: > 8 weeks     Subjective      Pt reports: Pain levels unchanged per pt report.  he was compliant with HEP.   Response to previous treatment: Good tolerance.     Functional change: None reported this date     Pain:   Functional Pain Scale Rating 0-10:   7/10 on average  5/10 at best  710 at worst  Location: L wrist dorsal forearm and hand.  Description: ache/throbbing/burning  Aggravating Factors: Movement  Easing Factors: Elevation/rest.        Objective     Moist heat x 5 min pre tx.    Manual therapy soft tissue and capsular massage L hand PIPs  and volar plates x 8 min.      Reymundo received  therapeutic activities x 10 minutes, including  - Green flex bar isogrip L  Hand with pronation with RUE 4x10.  - PHG level 2 black spring 3x10  - green clothespin lateral pinch 4x10  - green clothespin tripod pinch pinch 4x10    AROM DIP flexion L hand digits: LM 1-5: 40, 26, 32, 32 and 48 degrees    AROM PIP ext L 4th and 5th digits:  Post therex today (4th = 37 (+3 degrees) and 5th = 40 (+2 degrees).      and Pinch Strength (in pounds, psi's):    Left Right    II 41 (+1#) 90    III       Lateral       Tripod       Tip             Home Exercises and Education Provided      Education provided:  Cont current HEP. Cont with jt royer wear per above recommendations as tolerated.     Written Home Exercises Provided:  Patient instructed to cont prior HEP.     Exercises were reviewed and Reymundo was able to demonstrate them prior to the end of the session.  Reymundo demonstrated good  understanding of the education provided.   .   See EMR under Media for exercises provided today and/or prior visit.        Assessment      Pt would continue to benefit from skilled OT.     - Progress towards goals: see below     Reymundo is progressing well towards his goals.      Pt will continue to benefit from skilled outpatient occupational therapy to address the deficits listed in the problem list on initial evaluation in order to maximize pt's level of independence in the home and community.      Anticipated barriers to occupational therapy: None     Pt's spiritual, cultural and educational needs considered and pt agreeable to plan of care and goals.     Goals:  Short Term Goals: (30 days, 9/27/19, or 10th visit) unless otherwise noted below.  1. Pt will be independent with HEP in 2 visits. (Met 8/30/19).  2. Pt will report decreased pain to a 6/10 at worst in LUE with ADLs in order to increase function/use of UE (Not met; still up to 7/10)  3. Pt will increase PIP level AROM flexion  and ext L hand digits 2-5 by 10 degrees each to increase function L hand. (Met)  4. Pt will increase L thumb IP flexion ext AROM by 5 degrees each to increase function L hand. (Met)     New STGs to be met in the next 30 days (or by 10/27/19 or 10 more visits, 19th visit)   5. Pt will increase DIP level AROM flexion L hand digits 2-5 by 10 degrees each (36, 40, 30, 50) to increase function L hand. (Not met); Reason not met: possible plateau with DIP level flexion.  6. Pt will increase L thumb IP flexion AROM by 10 more degrees (to 40 degrees) to increase function L hand. (Not met)  7. Pt will at least 40# max  strength L hand in order to increase function with grasp during ADL/IADL tasks (cooking utensils, tool use, carrying groceries, steering wheel, etc.) (Met 10/25/19 )    New STGs to be met in the next 30 days (11/24/19)  or 10 more visits, 24th visit)  8. Pt will demo at least 50# max  strength L hand  to increase function with grasp during ADL/IADL tasks (cooking utensils, tool use, carrying groceries, steering wheel, etc.)   9. Pt will demo at least 20 degrees of improved AROM ext L hand PIPs digits 4 and 5 in order to increase function for ADL/IADL.     Long Term Goals: (by discharge)  1. Pt will report decreased pain to 3/10 with ADLs to allow for increased function/use of UE. (Ongoing)  2. Pt will exhibit grossly WNL AROM of L hand to allow for Independent use of for all ADL/IADL tasks.(Ongoing)  3. Pt will exhibit WFL  strength to allow a firm grasp during ADL/IADL (cooking utensils, tool use, carrying groceries, steering wheel, etc.) (Ongoing)  4. Pt will exhibit WFL lateral pinch strength to allow writing,opening containers, and turning keys. (Ongoing)  5. Pt will report no greater than Min difficulty with all Quick DASH assessment items. (Ongoing)  6. Pt will at least 50# max  strength L hand in order to increase function with grasp during ADL/IADL tasks (cooking utensils, tool use,  carrying groceries, steering wheel, etc.) (new/established 10/9/19) (Ongoing)     Plan   Updated POC: Continue Occupational Therapy 2  times per week for 6 more weeks effective 10/9/19 (through 11/20/19)  in order to to decrease pain and edema, and increase A/PROM, strength, and functional use of Left upper extremity.     Updates/Grading for next session: Progress with OT as tolereated.

## 2019-11-01 ENCOUNTER — CLINICAL SUPPORT (OUTPATIENT)
Dept: REHABILITATION | Facility: HOSPITAL | Age: 44
End: 2019-11-01
Payer: COMMERCIAL

## 2019-11-01 DIAGNOSIS — M25.642 STIFFNESS OF FINGER JOINT OF LEFT HAND: Primary | ICD-10-CM

## 2019-11-01 DIAGNOSIS — R60.0 EDEMA OF HAND: ICD-10-CM

## 2019-11-01 DIAGNOSIS — R29.898 WEAKNESS OF LEFT HAND: ICD-10-CM

## 2019-11-01 DIAGNOSIS — S67.42XS CRUSHING INJURY OF LEFT WRIST AND HAND, SEQUELA: ICD-10-CM

## 2019-11-01 DIAGNOSIS — G89.4 CHRONIC PAIN SYNDROME: ICD-10-CM

## 2019-11-01 DIAGNOSIS — M19.132 POST-TRAUMATIC ARTHRITIS OF WRIST, LEFT: ICD-10-CM

## 2019-11-01 PROCEDURE — 97530 THERAPEUTIC ACTIVITIES: CPT | Mod: PO

## 2019-11-01 PROCEDURE — 97140 MANUAL THERAPY 1/> REGIONS: CPT | Mod: PO

## 2019-11-04 NOTE — PROGRESS NOTES
Occupational Therapy  Daily Treatment Note     Name: Reymundo Pichardo [facility] Number: [id number]    Therapy Diagnosis:   Encounter Diagnoses   Name Primary?    Stiffness of finger joint of left hand Yes    Edema of hand     Weakness of left hand     Post-traumatic arthritis of wrist, left     Crushing injury of left wrist and hand, sequela      Physician: Escobar Colvin MD    Visit Date: 11/6/2019    Physician orders:         Note     Please begin range of motion of the fingers of his left hand.     Frequency:  2 times per week     Duration:  4 weeks     Diagnosis:  Status post left wrist arthrodesis          Medical Diagnosis: Z98.1 (ICD-10-CM) - Hx of arthrodesis  Evaluation Date: 8/28/2019  Insurance Authorization period Expiration: 8/20/20  Plan of Care Expiration Period: 11/20/19   Next Re-assessment: 30 days (11/24/12019) and/or 10th visit   Date of Return to MD:  12/9/19   Visit # / Visits Authortized: 16 / 24  Time In: 0855  Time Out: 0925  Total Billable Time: 30 minutes- 5 min moist heat = 25 min.     Surgical Procedure and Date: Surgery: 8/06/19  PROCEDURE:    1.  Left total wrist arthrodesis with allograft from the left proximal tibia  2.  Left wrist hardware removal deep  3.  Left wrist extensor tenolysis     IMPLANTS:  Synthes short angle locking wrist arthrodesis plate               Precautions: Standard and velcro splint now being used.  S/P sx: > 8 weeks     Subjective      Pt reports: Pain levels unchanged per pt report.  he was compliant with HEP.   Response to previous treatment: Good tolerance.     Functional change: None reported this date     Pain:   Functional Pain Scale Rating 0-10:   7/10 on average  5/10 at best  710 at worst  Location: L wrist dorsal forearm and hand.  Description: ache/throbbing/burning  Aggravating Factors: Movement  Easing Factors: Elevation/rest.        Objective     Moist heat x 5 min pre tx.      Reymundo received  therapeutic activities x 24 minutes,  including  - Green flex bar isogrip L Hand with pronation with RUE 4x10.  - PHG level 3 [place] spring 5x5   - green clothespin lateral pinch 4x10  - red clothespin tripod pinch pinch 3x10 with 3 sec holds  - PROM composite flexion and ext 2x20 sec each digit  - AROM jt blocking MCP level with fingertip touch to medium dowel 4x10.   - Place and hold max digit ext over green flexbar with 2 min hold.    AROM DIP flexion L hand digits: LM 1-5: 40, 26, 32, 32 and 48 degrees    AROM PIP ext L 4th and 5th digits: LM (4th = 37 (+3 degrees) and 5th = 40 (+2 degrees).      and Pinch Strength (in pounds, psi's):    Left Right    II 35 (-6#) 90    III       Lateral       Tripod       Tip             Home Exercises and Education Provided      Education provided:  Cont current HEP. Cont with jt royer wear per above recommendations as tolerated.     Written Home Exercises Provided:  Patient instructed to cont prior HEP.     Exercises were reviewed and Reymundo was able to demonstrate them prior to the end of the session.  Reymundo demonstrated good understanding of the education provided.   .   See EMR under Media for exercises provided today and/or prior visit.        Assessment      Pt would continue to benefit from skilled OT. ([place] due to an accident[place] Some decreased  strength and recurring stiffness L hand. Pt tolerated additional stretching L hand flex and ext well today.    - Progress towards goals: possible plateau; to cont to monitor and progress as able.    Pt will continue to benefit from skilled outpatient occupational therapy to address the deficits listed in the problem list on initial evaluation in order to maximize pt's level of independence in the home and community.      Anticipated barriers to occupational therapy: None     Pt's spiritual, cultural and educational needs considered and pt agreeable to plan of care and  goals.     Goals:  Short Term Goals: (30 days, 9/27/19, or 10th visit) unless otherwise noted below.  1. Pt will be independent with HEP in 2 visits. (Met 8/30/19).  2. Pt will report decreased pain to a 6/10 at worst in LUE with ADLs in order to increase function/use of UE (Not met; still up to 7/10)  3. Pt will increase PIP level AROM flexion and ext L hand digits 2-5 by 10 degrees each to increase function L hand. (Met)  4. Pt will increase L thumb IP flexion ext AROM by 5 degrees each to increase function L hand. (Met)     New STGs to be met in the next 30 days (or by 10/27/19 or 10 more visits, 19th visit)   5. Pt will increase DIP level AROM flexion L hand digits 2-5 by 10 degrees each (36, 40, 30, 50) to increase function L hand. (Not met); Reason not met: possible plateau with DIP level flexion.  6. Pt will increase L thumb IP flexion AROM by 10 more degrees (to 40 degrees) to increase function L hand. (Not met)  7. Pt will at least 40# max  strength L hand in order to increase function with grasp during ADL/IADL tasks (cooking utensils, tool use, carrying groceries, steering wheel, etc.) (Met 10/25/19 )    New STGs to be met in the next 30 days (11/24/19)  or 10 more visits, 24th visit)  8. Pt will demo at least 50# max  strength L hand  to increase function with grasp during ADL/IADL tasks (cooking utensils, tool use, carrying groceries, steering wheel, etc.)   9. Pt will demo at least 20 degrees of improved AROM ext L hand PIPs digits 4 and 5 in order to increase function for ADL/IADL.     Long Term Goals: (by discharge)  1. Pt will report decreased pain to 3/10 with ADLs to allow for increased function/use of UE. (Ongoing)  2. Pt will exhibit grossly WNL AROM of L hand to allow for Independent use of for all ADL/IADL tasks.(Ongoing)  3. Pt will exhibit WFL  strength to allow a firm grasp during ADL/IADL (cooking utensils, tool use, carrying groceries, steering wheel, etc.) (Ongoing)  4. Pt  will exhibit WFL lateral pinch strength to allow writing,opening containers, and turning keys. (Ongoing)  5. Pt will report no greater than Min difficulty with all Quick DASH assessment items. (Ongoing)  6. Pt will at least 50# max  strength L hand in order to increase function with grasp during ADL/IADL tasks (cooking utensils, tool use, carrying groceries, steering wheel, etc.) (new/established 10/9/19) (Ongoing)     Plan   Updated POC: Continue Occupational Therapy 2  times per week for 6 more weeks effective 10/9/19 (through 11/20/19)  in order to to decrease pain and edema, and increase A/PROM, strength, and functional use of Left upper extremity.     Updates/Grading for next session: Progress with OT as tolereated.

## 2019-11-05 ENCOUNTER — PATIENT MESSAGE (OUTPATIENT)
Dept: PAIN MEDICINE | Facility: CLINIC | Age: 44
End: 2019-11-05

## 2019-11-05 NOTE — TELEPHONE ENCOUNTER
I would rec we try one more time to try and get more sustained relief.  And if we don't get the relief we will discuss SCS trial

## 2019-11-06 ENCOUNTER — CLINICAL SUPPORT (OUTPATIENT)
Dept: REHABILITATION | Facility: HOSPITAL | Age: 44
End: 2019-11-06
Payer: COMMERCIAL

## 2019-11-06 DIAGNOSIS — M19.132 POST-TRAUMATIC ARTHRITIS OF WRIST, LEFT: ICD-10-CM

## 2019-11-06 DIAGNOSIS — M25.642 STIFFNESS OF FINGER JOINT OF LEFT HAND: Primary | ICD-10-CM

## 2019-11-06 DIAGNOSIS — G90.512 COMPLEX REGIONAL PAIN SYNDROME TYPE 1 OF LEFT UPPER EXTREMITY: ICD-10-CM

## 2019-11-06 DIAGNOSIS — R60.0 EDEMA OF HAND: ICD-10-CM

## 2019-11-06 DIAGNOSIS — R29.898 WEAKNESS OF LEFT HAND: ICD-10-CM

## 2019-11-06 DIAGNOSIS — G89.4 CHRONIC PAIN SYNDROME: ICD-10-CM

## 2019-11-06 DIAGNOSIS — S67.42XS CRUSHING INJURY OF LEFT WRIST AND HAND, SEQUELA: ICD-10-CM

## 2019-11-06 PROCEDURE — 97110 THERAPEUTIC EXERCISES: CPT | Mod: PO

## 2019-11-12 ENCOUNTER — CLINICAL SUPPORT (OUTPATIENT)
Dept: REHABILITATION | Facility: HOSPITAL | Age: 44
End: 2019-11-12
Payer: COMMERCIAL

## 2019-11-12 DIAGNOSIS — R60.0 EDEMA OF HAND: ICD-10-CM

## 2019-11-12 DIAGNOSIS — G89.4 CHRONIC PAIN SYNDROME: ICD-10-CM

## 2019-11-12 DIAGNOSIS — M25.642 STIFFNESS OF FINGER JOINT OF LEFT HAND: Primary | ICD-10-CM

## 2019-11-12 DIAGNOSIS — R29.898 WEAKNESS OF LEFT HAND: ICD-10-CM

## 2019-11-12 DIAGNOSIS — S67.42XS CRUSHING INJURY OF LEFT WRIST AND HAND, SEQUELA: ICD-10-CM

## 2019-11-12 DIAGNOSIS — M19.132 POST-TRAUMATIC ARTHRITIS OF WRIST, LEFT: ICD-10-CM

## 2019-11-12 PROCEDURE — 97022 WHIRLPOOL THERAPY: CPT | Mod: PO

## 2019-11-12 PROCEDURE — 97110 THERAPEUTIC EXERCISES: CPT | Mod: PO

## 2019-11-12 PROCEDURE — 97530 THERAPEUTIC ACTIVITIES: CPT | Mod: PO

## 2019-11-12 NOTE — PROGRESS NOTES
Occupational Therapy  Daily Treatment Note     Name: Reymundo Pichardo [facility] Number: [id number]    Therapy Diagnosis:   Encounter Diagnoses   Name Primary?    Chronic pain syndrome     Stiffness of finger joint of left hand Yes    Edema of hand     Weakness of left hand     Post-traumatic arthritis of wrist, left     Crushing injury of left wrist and hand, sequela      Physician: Escobar Colvin MD    Visit Date: 11/12/2019    Physician orders:         Note     Please begin range of motion of the fingers of his left hand.     Frequency:  2 times per week     Duration:  4 weeks     Diagnosis:  Status post left wrist arthrodesis          Medical Diagnosis: Z98.1 (ICD-10-CM) - Hx of arthrodesis  Evaluation Date: 8/28/2019  Insurance Authorization period Expiration: 8/20/20  Plan of Care Expiration Period: 11/20/19   Next Re-assessment: 30 days (11/24/12019) and/or 10th visit   Date of Return to MD:  12/9/19   Visit # / Visits Authortized: 17 / 24  Time In: 0815  Time Out: 0908  Total Billable Time: 53 minutes     Surgical Procedure and Date: Surgery: 8/06/19  PROCEDURE:    1.  Left total wrist arthrodesis with allograft from the left proximal tibia  2.  Left wrist hardware removal deep  3.  Left wrist extensor tenolysis     IMPLANTS:  Synthes short angle locking wrist arthrodesis plate               Precautions: Standard and velcro splint now being used.  S/P sx: > 8 weeks     Subjective      Pt reports: Pain levels unchanged per pt report.  he was compliant with HEP.   Response to previous treatment: Good tolerance.     Functional change: None reported this date     Pain:   Functional Pain Scale Rating 0-10:   7/10 on average  5/10 at best  710 at worst  Location: L wrist dorsal forearm and hand.  Description: ache/throbbing/burning  Aggravating Factors: Movement  Easing Factors: Elevation/rest.        Objective   Fluidotherapy x 10 min. Composite flex and ext L hand and hook fist       Reymundo received   therapeutic activities x 10 minutes, including  - PHG level 2 Black spring 5x5 3 sec holds.  - green clothespin lateral pinch 5x5 wit 3 sec holds.  - Towel scrunches 2x50    Therapeutic exercises x 15 minutes, including  - PROM composite flexion and ext 2x20 sec each digit  - AROM jt blocking MCP level with fingertip touch to medium dowel 4x10  - Place and hold max digit ext over 3# DB x 2 min.  - ORL stretch x10 each finger 2-5      AROM DIP flexion L hand digits: LM 1-5: 40, 26, 32, 32 and 48 degrees    AROM PIP ext L 4th and 5th digits: (4th = 36 (+1 degree) and 5th = 38 (+2 degrees).      and Pinch Strength (in pounds, psi's):    Left Right    II 35 (no change) 90    III       Lateral       Tripod       Tip             Home Exercises and Education Provided      Education provided:  Cont current HEP. Cont with jt royer wear as tolerated.     Written Home Exercises Provided:  Patient instructed to cont prior HEP.     Exercises were reviewed and Reymundo was able to demonstrate them prior to the end of the session.  Reymundo demonstrated good understanding of the education provided.   .   See EMR under Media for exercises provided today and/or prior visit.        Assessment      Pt would continue to benefit from skilled OT.    - Progress towards goals: possible plateau with  strength remains at 35# max to advance as tolerates as/able..     Pt will continue to benefit from skilled outpatient occupational therapy to address the deficits listed in the problem list on initial evaluation in order to maximize pt's level of independence in the home and community.      Anticipated barriers to occupational therapy: None     Pt's spiritual, cultural and educational needs considered and pt agreeable to plan of care and goals.     Goals:  Short Term Goals: (30 days, 9/27/19, or 10th visit) unless otherwise noted below.  1. Pt will be independent with HEP in 2 visits. (Met 8/30/19).  2. Pt will report decreased pain  to a 6/10 at worst in LUE with ADLs in order to increase function/use of UE (Not met; still up to 7/10)  3. Pt will increase PIP level AROM flexion and ext L hand digits 2-5 by 10 degrees each to increase function L hand. (Met)  4. Pt will increase L thumb IP flexion ext AROM by 5 degrees each to increase function L hand. (Met)     New STGs to be met in the next 30 days (or by 10/27/19 or 10 more visits, 19th visit)   5. Pt will increase DIP level AROM flexion L hand digits 2-5 by 10 degrees each (36, 40, 30, 50) to increase function L hand. (Not met); Reason not met: possible plateau with DIP level flexion.  6. Pt will increase L thumb IP flexion AROM by 10 more degrees (to 40 degrees) to increase function L hand. (Not met)  7. Pt will at least 40# max  strength L hand in order to increase function with grasp during ADL/IADL tasks (cooking utensils, tool use, carrying groceries, steering wheel, etc.) (Met 10/25/19 )    New STGs to be met in the next 30 days (11/24/19)  or 10 more visits, 24th visit)  8. Pt will demo at least 50# max  strength L hand  to increase function with grasp during ADL/IADL tasks (cooking utensils, tool use, carrying groceries, steering wheel, etc.)   9. Pt will demo at least 20 degrees of improved AROM ext L hand PIPs digits 4 and 5 in order to increase function for ADL/IADL.     Long Term Goals: (by discharge)  1. Pt will report decreased pain to 3/10 with ADLs to allow for increased function/use of UE. (Ongoing)  2. Pt will exhibit grossly WNL AROM of L hand to allow for Independent use of for all ADL/IADL tasks.(Ongoing)  3. Pt will exhibit WFL  strength to allow a firm grasp during ADL/IADL (cooking utensils, tool use, carrying groceries, steering wheel, etc.) (Ongoing)  4. Pt will exhibit WFL lateral pinch strength to allow writing,opening containers, and turning keys. (Ongoing)  5. Pt will report no greater than Min difficulty with all Quick DASH assessment items.  (Ongoing)  6. Pt will at least 50# max  strength L hand in order to increase function with grasp during ADL/IADL tasks (cooking utensils, tool use, carrying groceries, steering wheel, etc.) (new/established 10/9/19) (Ongoing)     Plan   Updated POC: Continue Occupational Therapy 2  times per week for 6 more weeks effective 10/9/19 (through 11/20/19)  in order to to decrease pain and edema, and increase A/PROM, strength, and functional use of Left upper extremity.     Updates/Grading for next session: Progress with OT as tolereated.

## 2019-11-13 ENCOUNTER — CLINICAL SUPPORT (OUTPATIENT)
Dept: REHABILITATION | Facility: HOSPITAL | Age: 44
End: 2019-11-13
Payer: COMMERCIAL

## 2019-11-13 ENCOUNTER — OFFICE VISIT (OUTPATIENT)
Dept: PAIN MEDICINE | Facility: CLINIC | Age: 44
End: 2019-11-13
Payer: COMMERCIAL

## 2019-11-13 VITALS
BODY MASS INDEX: 31.85 KG/M2 | SYSTOLIC BLOOD PRESSURE: 144 MMHG | WEIGHT: 222 LBS | DIASTOLIC BLOOD PRESSURE: 92 MMHG | OXYGEN SATURATION: 97 % | HEART RATE: 99 BPM | TEMPERATURE: 96 F | RESPIRATION RATE: 18 BRPM

## 2019-11-13 DIAGNOSIS — G90.512 COMPLEX REGIONAL PAIN SYNDROME TYPE 1 OF LEFT UPPER EXTREMITY: Primary | ICD-10-CM

## 2019-11-13 DIAGNOSIS — M19.132 POST-TRAUMATIC ARTHRITIS OF WRIST, LEFT: ICD-10-CM

## 2019-11-13 DIAGNOSIS — M25.642 STIFFNESS OF FINGER JOINT OF LEFT HAND: Primary | ICD-10-CM

## 2019-11-13 DIAGNOSIS — G89.4 CHRONIC PAIN SYNDROME: ICD-10-CM

## 2019-11-13 DIAGNOSIS — R60.0 EDEMA OF HAND: ICD-10-CM

## 2019-11-13 DIAGNOSIS — R29.898 WEAKNESS OF LEFT HAND: ICD-10-CM

## 2019-11-13 DIAGNOSIS — S67.42XS CRUSHING INJURY OF LEFT WRIST AND HAND, SEQUELA: ICD-10-CM

## 2019-11-13 PROCEDURE — 99213 OFFICE O/P EST LOW 20 MIN: CPT | Mod: PBBFAC,PN | Performed by: ANESTHESIOLOGY

## 2019-11-13 PROCEDURE — 99213 OFFICE O/P EST LOW 20 MIN: CPT | Mod: S$GLB,,, | Performed by: ANESTHESIOLOGY

## 2019-11-13 PROCEDURE — 99999 PR PBB SHADOW E&M-EST. PATIENT-LVL III: CPT | Mod: PBBFAC,,, | Performed by: ANESTHESIOLOGY

## 2019-11-13 PROCEDURE — 99999 PR PBB SHADOW E&M-EST. PATIENT-LVL III: ICD-10-PCS | Mod: PBBFAC,,, | Performed by: ANESTHESIOLOGY

## 2019-11-13 PROCEDURE — 97110 THERAPEUTIC EXERCISES: CPT | Mod: PO

## 2019-11-13 PROCEDURE — 99213 PR OFFICE/OUTPT VISIT, EST, LEVL III, 20-29 MIN: ICD-10-PCS | Mod: S$GLB,,, | Performed by: ANESTHESIOLOGY

## 2019-11-13 PROCEDURE — 97022 WHIRLPOOL THERAPY: CPT | Mod: PO

## 2019-11-13 PROCEDURE — 97530 THERAPEUTIC ACTIVITIES: CPT | Mod: PO

## 2019-11-13 RX ORDER — IBUPROFEN 800 MG/1
800 TABLET ORAL 2 TIMES DAILY PRN
Qty: 40 TABLET | Refills: 2 | Status: SHIPPED | OUTPATIENT
Start: 2019-11-13 | End: 2019-12-31 | Stop reason: SDUPTHER

## 2019-11-13 RX ORDER — SODIUM CHLORIDE, SODIUM LACTATE, POTASSIUM CHLORIDE, CALCIUM CHLORIDE 600; 310; 30; 20 MG/100ML; MG/100ML; MG/100ML; MG/100ML
INJECTION, SOLUTION INTRAVENOUS CONTINUOUS
Status: CANCELLED | OUTPATIENT
Start: 2019-11-20

## 2019-11-13 NOTE — PROGRESS NOTES
Occupational Therapy  Daily Treatment Note     Name: Reymundo Pichardo [facility] Number: [id number]    Therapy Diagnosis:   Encounter Diagnoses   Name Primary?    Chronic pain syndrome     Stiffness of finger joint of left hand Yes    Edema of hand     Weakness of left hand     Post-traumatic arthritis of wrist, left     Crushing injury of left wrist and hand, sequela      Physician: Escobar Colvin MD    Visit Date: 11/13/2019    Physician orders:         Note     Please begin range of motion of the fingers of his left hand.     Frequency:  2 times per week     Duration:  4 weeks     Diagnosis:  Status post left wrist arthrodesis          Medical Diagnosis: Z98.1 (ICD-10-CM) - Hx of arthrodesis  Evaluation Date: 8/28/2019  Insurance Authorization period Expiration: 8/20/20  Plan of Care Expiration Period: 11/20/19   Next Re-assessment: 30 days (11/24/12019) and/or 10th visit   Date of Return to MD:  12/9/19   Visit # / Visits Authortized: 18 / 24  Time In: 0900  Time Out: 0945   Total billable time= 45 min    Surgical Procedure and Date: Surgery: 8/06/19  PROCEDURE:    1.  Left total wrist arthrodesis with allograft from the left proximal tibia  2.  Left wrist hardware removal deep  3.  Left wrist extensor tenolysis     IMPLANTS:  Synthes short angle locking wrist arthrodesis plate               Precautions: Standard and velcro splint now being used.  S/P sx: > 8 weeks     Subjective      Pt reports: Pain levels unchanged per pt report.  he was compliant with HEP.   Response to previous treatment: Good tolerance.     Functional change: None reported this date     Pain:   Functional Pain Scale Rating 0-10:   7/10 on average  5/10 at best  710 at worst  Location: L wrist dorsal forearm and hand.  Description: ache/throbbing/burning  Aggravating Factors: Movement  Easing Factors: Elevation/rest.        Objective   Fluidotherapy x 15 min. Composite flex and ext L hand and hook fist, thumb opposition        Reymundo received  therapeutic activities x 15 minutes, including  - PHG level 2 Silver spring spring 5x5 3 sec holds.  - green clothespin lateral pinch 5x5 wit 3 sec holds.  - Towel scrunches 2x50  - Isogrips 5x5 3 sec holds using green flexbar    Therapeutic exercises x 10 minutes, including  - PROM composite flexion and ext 2x20 sec each digit  - Place and hold max digit ext over green flexbar x 2 min.  - PROM composite ext each digit x10 x10 sec.      AROM DIP flexion L hand digits: LM 1-5: 40, 26, 32, 32 and 48 degrees    AROM PIP ext L 4th and 5th digits: LM (4th = 36 (+1 degree) and 5th = 38 (+2 degrees).      and Pinch Strength (in pounds, psi's):    Left Right    II 40 (+5 since last visit) 90    III       Lateral 18#      Tripod       Tip             Home Exercises and Education Provided      Education provided:  Cont current HEP. Cont with jt royer wear as tolerated.     Written Home Exercises Provided:  Patient instructed to cont prior HEP.     Exercises were reviewed and Reymundo was able to demonstrate them prior to the end of the session.  Reymundo demonstrated good understanding of the education provided.   .   See EMR under Media for exercises provided today and/or prior visit.        Assessment      Pt would continue to benefit from skilled OT.    - Progress towards goals: possible with improved strength to 40# level  L hand to cont to advance as pt tolerates, as/able..     Pt will continue to benefit from skilled outpatient occupational therapy to address the deficits listed in the problem list on initial evaluation in order to maximize pt's level of independence in the home and community.      Anticipated barriers to occupational therapy: None     Pt's spiritual, cultural and educational needs considered and pt agreeable to plan of care and goals.     Goals:  Short Term Goals: (30 days, 9/27/19, or 10th visit) unless otherwise noted below.  1. Pt will be independent with HEP in 2 visits.  (Met 8/30/19).  2. Pt will report decreased pain to a 6/10 at worst in LUE with ADLs in order to increase function/use of UE (Not met; still up to 7/10)  3. Pt will increase PIP level AROM flexion and ext L hand digits 2-5 by 10 degrees each to increase function L hand. (Met)  4. Pt will increase L thumb IP flexion ext AROM by 5 degrees each to increase function L hand. (Met)     New STGs to be met in the next 30 days (or by 10/27/19 or 10 more visits, 19th visit)   5. Pt will increase DIP level AROM flexion L hand digits 2-5 by 10 degrees each (36, 40, 30, 50) to increase function L hand. (Not met); Reason not met: possible plateau with DIP level flexion.  6. Pt will increase L thumb IP flexion AROM by 10 more degrees (to 40 degrees) to increase function L hand. (Not met)  7. Pt will at least 40# max  strength L hand in order to increase function with grasp during ADL/IADL tasks (cooking utensils, tool use, carrying groceries, steering wheel, etc.) (Met 10/25/19 )    New STGs to be met in the next 30 days (11/24/19)  or 10 more visits, 24th visit)  8. Pt will demo at least 50# max  strength L hand  to increase function with grasp during ADL/IADL tasks (cooking utensils, tool use, carrying groceries, steering wheel, etc.)   9. Pt will demo at least 20 degrees of improved AROM ext L hand PIPs digits 4 and 5 in order to increase function for ADL/IADL.     Long Term Goals: (by discharge)  1. Pt will report decreased pain to 3/10 with ADLs to allow for increased function/use of UE. (Ongoing)  2. Pt will exhibit grossly WNL AROM of L hand to allow for Independent use of for all ADL/IADL tasks.(Ongoing)  3. Pt will exhibit WFL  strength to allow a firm grasp during ADL/IADL (cooking utensils, tool use, carrying groceries, steering wheel, etc.) (Ongoing)  4. Pt will exhibit WFL lateral pinch strength to allow writing,opening containers, and turning keys. (Ongoing)  5. Pt will report no greater than Min  difficulty with all Quick DASH assessment items. (Ongoing)  6. Pt will at least 50# max  strength L hand in order to increase function with grasp during ADL/IADL tasks (cooking utensils, tool use, carrying groceries, steering wheel, etc.) (new/established 10/9/19) (Ongoing)     Plan   Updated POC: Continue Occupational Therapy 2  times per week for 6 more weeks effective 10/9/19 (through 11/20/19)  in order to to decrease pain and edema, and increase A/PROM, strength, and functional use of Left upper extremity.     Updates/Grading for next session: Progress with OT as tolereated.

## 2019-11-13 NOTE — PROGRESS NOTES
[facility] Follow Up Evaluation    Referred by: Dr. Colvin  Reason for referral: wrist pain    CC:   Chief Complaint   Patient presents with    Follow-up      Last 3 PDI Scores 11/13/2019   Pain Disability Index (PDI) 16       Interval HPI 11/13/19: Mr. Gutierrez returns to the office for follow up.  He is s/p left stellate ganglion block on 10/22/19 with about 75% of his pain for 2-3 days following injection but then return of his typical pain.  Today his pain is 6/10, constant, burning, tingling.  He continues gabapentin 600mg BID and ibuprofen prn.  He has restarted amitriptyline 10mg qhs.      HPI:   Reymundo Gutierrez is a 44 y.o. male who complains of wrist pain     Has a history of left wrist arthrodesis due to a crush injury to his left hand.  He states that he has been taking vitamin-C, amitriptyline, and his pain medications.  He states that he is still having significant pain about his wrist and hand.  He has been working with therapy and feels as if he is gaining significant amount of function to his hand.    Onset: 12/27/17  Inciting Event: crush injury between fork lift and door of a truck If yes, Date of injury: 12/27/17  Progression: since onset, pain is stable  Current Pain Score: 8/10  Typical Range: 6-8/10  Timing: constant  Quality: aching, burning, electric, hot, tight  Radiation: no  Associated numbness or weakness: yes numbness, tingling, weakness  Exacerbated by: touching, extension, flexion  Allievated by: laying down, medications, PT  Is Pain Level Acceptable?: No    Previous Therapies:  PT/OT: yes, PT and desensitization  HEP:   Interventions:   - left stellate ganglion block with Dr. Jarrell on 5/21/18, 6/18/18, and 7/23/18  Surgery:  8/6/19 - Left total wrist arthrodesis with allograft from the left proximal tibia  Medications:   - NSAIDS:   - MSK Relaxants:   - TCAs: amitriptyline  - SNRIs:   - Topicals: cymbalta (stopped)  - Anticonvulsants: gabapentin 300mg TID  -  Opioids: hydrocodone 7.5/325mg    History:    Current Outpatient Medications:     albuterol (PROVENTIL) 2.5 mg /3 mL (0.083 %) nebulizer solution, Inhale 2.5 mg into the lungs., Disp: , Rfl:     albuterol (VENTOLIN HFA) 90 mcg/actuation inhaler, Inhale 2 puffs into the lungs every 6 (six) hours as needed for Wheezing. Rescue, Disp: 18 g, Rfl: 11    amitriptyline (ELAVIL) 10 MG tablet, Take 1 tablet (10 mg total) by mouth every evening for 21 days, Disp: 21 tablet, Rfl: 0    buPROPion (WELLBUTRIN XL) 300 MG 24 hr tablet, Take 1 tablet (300 mg total) by mouth every morning, Disp: 30 tablet, Rfl: 2    DULoxetine (CYMBALTA) 60 MG capsule, Take 60 mg by mouth., Disp: , Rfl:     DULoxetine (CYMBALTA) 60 MG capsule, Take 1 capsule (60 mg total) by mouth every morning, Disp: 30 capsule, Rfl: 2    fluticasone propionate (FLONASE ALLERGY RELIEF) 50 mcg/actuation nasal spray, Use 2 sprays (100 mcg total) by Each Nostril route once daily., Disp: 16 g, Rfl: 11    fluticasone-salmeterol 250-50 mcg/dose (ADVAIR) 250-50 mcg/dose diskus inhaler, Inhale 1 puff into the lungs 2 (two) times daily. Controller, Disp: 60 each, Rfl: 0    ibuprofen (ADVIL,MOTRIN) 800 MG tablet, Take 1 tablet (800 mg total) by mouth 2 (two) times daily as needed for Pain., Disp: 40 tablet, Rfl: 2    ketoconazole (NIZORAL) 2 % shampoo, Apply topically., Disp: , Rfl:     ondansetron (ZOFRAN-ODT) 4 MG TbDL, Take 2 tablets (8 mg total) by mouth every 8 (eight) hours as needed., Disp: 6 tablet, Rfl: 0    promethazine (PHENERGAN) 25 MG tablet, Take 25 mg by mouth., Disp: , Rfl:     Past Medical History:   Diagnosis Date    Asthma     Crushing injury of left wrist and hand 7/10/2019       Past Surgical History:   Procedure Laterality Date    BONE GRAFT Left 8/6/2019    Procedure: BONE GRAFT LEG;  Surgeon: Escobar Colvin MD;  Location: [facility];  Service: Orthopedics;  Laterality: Left;    FRACTURE SURGERY      INJECTION OF ANESTHETIC AGENT  AROUND NERVE Left 10/22/2019    Procedure: Block, Nerve STELLATE GANGLION;  Surgeon: Vincent Alejandre MD;  Location: [facility] OR;  Service: Pain Management;  Laterality: Left;    WRIST SURGERY Left     x3       History reviewed. No pertinent family history.    Social History     Socioeconomic History    Marital status: Single     Spouse name: Not on file    Number of children: Not on file    Years of education: Not on file    Highest education level: Not on file   Occupational History    Not on file   Social Needs    Financial resource strain: Not on file    Food insecurity:     Worry: Not on file     Inability: Not on file    Transportation needs:     Medical: Not on file     Non-medical: Not on file   Tobacco Use    Smoking status: Former Smoker    Smokeless tobacco: Never Used   Substance and Sexual Activity    Alcohol use: No    Drug use: No    Sexual activity: Not on file   Lifestyle    Physical activity:     Days per week: Not on file     Minutes per session: Not on file    Stress: Not on file   Relationships    Social connections:     Talks on phone: Not on file     Gets together: Not on file     Attends Mandaen service: Not on file     Active member of club or organization: Not on file     Attends meetings of clubs or organizations: Not on file     Relationship status: Not on file   Other Topics Concern    Not on file   Social History Narrative    Not on file       Review of patient's allergies indicates:  No Known Allergies    Review of Systems:  General ROS: positive for  - fatigue  Psychological ROS: negative for - hostility  Hematological and Lymphatic ROS: negative for - bleeding problems  Endocrine ROS: negative for - unexpected weight changes  Respiratory ROS: positive for - cough  Cardiovascular ROS: no chest pain or dyspnea on exertion  Gastrointestinal ROS: no abdominal pain, change in bowel habits, or black or bloody stools  Musculoskeletal ROS: positive for - muscular  weakness  Neurological ROS: negative for - bowel and bladder control changes  Dermatological ROS: negative for rash    Physical Exam:  Vitals:    11/13/19 0822   BP: (!) 144/92   Pulse: 99   Resp: 18   Temp: 96.4 °F (35.8 °C)   TempSrc: Oral   SpO2: 97%   Weight: 100.7 kg (222 lb 0.1 oz)   PainSc:   4     Body mass index is 31.85 kg/m².     Gen: NAD  Psych: mood appropriate for given condition  CV: 2+ radial pulse  HEENT: anicteric   Respiratory: non labored  Abd: soft nt, nd  Skin: intact, + allodynia, + temperature asymmetry  Sensation: intact to lt touch bilaterally in c4-t1   ROM: decreased hand intrinsics on the left  Tone:  Normal at elbow, wrist and shoulder   Palpation: tender cervical paraspinals, levator scapula and trapezius non tender bicipital tendon    Imaging:  Xray left wrist 9/11/19  FINDINGS:  There has been ORIF of the left wrist, with volar plate screw device of the distal radius, and a dorsal plate screw device extending from the distal radial shaft to the mid shaft of the 3rd metacarpal.  Chronic distal radius and ulnar styloid fractures are present.  All hardware components are intact and engaged.  There is no change in alignment.  Moderate dorsal soft tissue swelling is present which has decreased.    Labs:  BMP  Lab Results   Component Value Date     09/27/2019    K 4.1 09/27/2019     09/27/2019    CO2 23 09/27/2019    BUN 10 09/27/2019    CREATININE 1.2 09/27/2019    CALCIUM 9.5 09/27/2019    ANIONGAP 8 09/27/2019    ESTGFRAFRICA >60.0 09/27/2019    EGFRNONAA >60.0 09/27/2019     Lab Results   Component Value Date    ALT 22 09/27/2019    AST 16 09/27/2019    ALKPHOS 78 09/27/2019    BILITOT 0.5 09/27/2019       Assessment:  Problem List Items Addressed This Visit        Neuro    Complex regional pain syndrome type 1 of left upper extremity - Primary    Chronic pain syndrome          Treatment Plan:  44 y.o. year old male presents to the office with left hand pain.  He injured  his hand on 12/27/17 when his hand suffered crush injury between a forklift and a truck.  He is s/p left total wrist arthrodesis with allograft from the left proximal tibia on 8/6/19.  He reports good relief of his pain that he was feeling prior to the surgery, but now continues to have his typical baseline pain which was present since the injury.  He is s/p left stellate ganglion block with Dr. Jarrell on 5/21/18, 6/18/18, and 7/23/18.  Per progress notes it appears that his symptoms of CRPS improved following the injections.  Today he subjectively c/o hyperesthesia, temperature asymmetry, decreased ROM, weakness, and clamminess/sweating changes in his left hand.  On exam I appreciate temperature asymmetry, contracture with decreased ROM of his digits, and allodynia to light touch.  He is currently doing PT with desensitization therapy.      In terms of medication management, he says he ran out of hydrocodone that was previously prescribed on 9/11/19 and says he has taken one of his mother's morphine tablets because he was in pain but ran out of his hydrocodone yesterday.  Also says he would likely test positive for marijuana and ectasy as he was around people who were smoking and someone may have slipped a pill into his drink.  He defers UDS today.  I don't recommend further opioid treatment chronically.  Discussed that opioids are not indicated for chronic non-malignant pain as the risks of dependence, addiction, potential hyperalgesia, sedation, medication interactions, respiratory depression and even death outweigh any benefit.  Given that he has been taking 15mg of hydrocodone and does show some signs of withdrawal today with tachycardia, runny nose, cough, and fatigue will do taper off of opioids: hydrocodone 5mg po bid prn x7 days followed by hydrocodone 5mg po qdaily prn x5 days then stop.   I offered referral to addiction services but he declines.  He will continue amitriptyline 10mg qhs and increase  gabapentin from 300mg TID to 600mg TID.  Recommend to repeat stellate ganglion block as it appears to work in the past.  Also discussed SCS trial with henny SOTO for his CRPS and gave him information regarding it today.     11/13/19 - Mr. Gutierrez returns to the office for follow up.  He is s/p left stellate ganglion block on 10/22/19 with about 75% of his pain for 2-3 days following injection but then return of his typical pain.  Today his pain is 6/10, constant, burning, tingling.  He continues gabapentin 600mg BID and ibuprofen prn.  He has restarted amitriptyline 10mg qhs.  We will repeat stellate ganglion block and try and get him more prolonged relief so that he can continue PT with less pain.  I've also discussed SCS with him and gave him information about HF10 therapy.  He will follow up 2 weeks post injection.      Procedures: recommend to repeat stellate ganglion injection  PT/OT/HEP: continue PT and desensitization therapy  Medications: refill ibuprofen 800mg po bid prn.  Continue amitriptyline 10mg qhs and increase gabapentin 600mg BID to 600/900  Labs: Reviewed and medications are appropriately dosed for current hepatorenal function.  Imaging: No additional recommended at this time.    : Reviewed    Vincent Alejandre M.D.  Interventional Pain Medicine / Anesthesiology

## 2019-11-13 NOTE — H&P (VIEW-ONLY)
[facility] Follow Up Evaluation    Referred by: Dr. Colvin  Reason for referral: wrist pain    CC:   Chief Complaint   Patient presents with    Follow-up      Last 3 PDI Scores 11/13/2019   Pain Disability Index (PDI) 16       Interval HPI 11/13/19: Mr. Gutierrez returns to the office for follow up.  He is s/p left stellate ganglion block on 10/22/19 with about 75% of his pain for 2-3 days following injection but then return of his typical pain.  Today his pain is 6/10, constant, burning, tingling.  He continues gabapentin 600mg BID and ibuprofen prn.  He has restarted amitriptyline 10mg qhs.      HPI:   Reymundo Gutierrez is a 44 y.o. male who complains of wrist pain     Has a history of left wrist arthrodesis due to a crush injury to his left hand.  He states that he has been taking vitamin-C, amitriptyline, and his pain medications.  He states that he is still having significant pain about his wrist and hand.  He has been working with therapy and feels as if he is gaining significant amount of function to his hand.    Onset: 12/27/17  Inciting Event: crush injury between fork lift and door of a truck If yes, Date of injury: 12/27/17  Progression: since onset, pain is stable  Current Pain Score: 8/10  Typical Range: 6-8/10  Timing: constant  Quality: aching, burning, electric, hot, tight  Radiation: no  Associated numbness or weakness: yes numbness, tingling, weakness  Exacerbated by: touching, extension, flexion  Allievated by: laying down, medications, PT  Is Pain Level Acceptable?: No    Previous Therapies:  PT/OT: yes, PT and desensitization  HEP:   Interventions:   - left stellate ganglion block with Dr. Jarrell on 5/21/18, 6/18/18, and 7/23/18  Surgery:  8/6/19 - Left total wrist arthrodesis with allograft from the left proximal tibia  Medications:   - NSAIDS:   - MSK Relaxants:   - TCAs: amitriptyline  - SNRIs:   - Topicals: cymbalta (stopped)  - Anticonvulsants: gabapentin 300mg TID  -  Opioids: hydrocodone 7.5/325mg    History:    Current Outpatient Medications:     albuterol (PROVENTIL) 2.5 mg /3 mL (0.083 %) nebulizer solution, Inhale 2.5 mg into the lungs., Disp: , Rfl:     albuterol (VENTOLIN HFA) 90 mcg/actuation inhaler, Inhale 2 puffs into the lungs every 6 (six) hours as needed for Wheezing. Rescue, Disp: 18 g, Rfl: 11    amitriptyline (ELAVIL) 10 MG tablet, Take 1 tablet (10 mg total) by mouth every evening for 21 days, Disp: 21 tablet, Rfl: 0    buPROPion (WELLBUTRIN XL) 300 MG 24 hr tablet, Take 1 tablet (300 mg total) by mouth every morning, Disp: 30 tablet, Rfl: 2    DULoxetine (CYMBALTA) 60 MG capsule, Take 60 mg by mouth., Disp: , Rfl:     DULoxetine (CYMBALTA) 60 MG capsule, Take 1 capsule (60 mg total) by mouth every morning, Disp: 30 capsule, Rfl: 2    fluticasone propionate (FLONASE ALLERGY RELIEF) 50 mcg/actuation nasal spray, Use 2 sprays (100 mcg total) by Each Nostril route once daily., Disp: 16 g, Rfl: 11    fluticasone-salmeterol 250-50 mcg/dose (ADVAIR) 250-50 mcg/dose diskus inhaler, Inhale 1 puff into the lungs 2 (two) times daily. Controller, Disp: 60 each, Rfl: 0    ibuprofen (ADVIL,MOTRIN) 800 MG tablet, Take 1 tablet (800 mg total) by mouth 2 (two) times daily as needed for Pain., Disp: 40 tablet, Rfl: 2    ketoconazole (NIZORAL) 2 % shampoo, Apply topically., Disp: , Rfl:     ondansetron (ZOFRAN-ODT) 4 MG TbDL, Take 2 tablets (8 mg total) by mouth every 8 (eight) hours as needed., Disp: 6 tablet, Rfl: 0    promethazine (PHENERGAN) 25 MG tablet, Take 25 mg by mouth., Disp: , Rfl:     Past Medical History:   Diagnosis Date    Asthma     Crushing injury of left wrist and hand 7/10/2019       Past Surgical History:   Procedure Laterality Date    BONE GRAFT Left 8/6/2019    Procedure: BONE GRAFT LEG;  Surgeon: Escobar Colvin MD;  Location: [facility];  Service: Orthopedics;  Laterality: Left;    FRACTURE SURGERY      INJECTION OF ANESTHETIC AGENT  AROUND NERVE Left 10/22/2019    Procedure: Block, Nerve STELLATE GANGLION;  Surgeon: Vincent Alejandre MD;  Location: [facility] OR;  Service: Pain Management;  Laterality: Left;    WRIST SURGERY Left     x3       History reviewed. No pertinent family history.    Social History     Socioeconomic History    Marital status: Single     Spouse name: Not on file    Number of children: Not on file    Years of education: Not on file    Highest education level: Not on file   Occupational History    Not on file   Social Needs    Financial resource strain: Not on file    Food insecurity:     Worry: Not on file     Inability: Not on file    Transportation needs:     Medical: Not on file     Non-medical: Not on file   Tobacco Use    Smoking status: Former Smoker    Smokeless tobacco: Never Used   Substance and Sexual Activity    Alcohol use: No    Drug use: No    Sexual activity: Not on file   Lifestyle    Physical activity:     Days per week: Not on file     Minutes per session: Not on file    Stress: Not on file   Relationships    Social connections:     Talks on phone: Not on file     Gets together: Not on file     Attends Protestant service: Not on file     Active member of club or organization: Not on file     Attends meetings of clubs or organizations: Not on file     Relationship status: Not on file   Other Topics Concern    Not on file   Social History Narrative    Not on file       Review of patient's allergies indicates:  No Known Allergies    Review of Systems:  General ROS: positive for  - fatigue  Psychological ROS: negative for - hostility  Hematological and Lymphatic ROS: negative for - bleeding problems  Endocrine ROS: negative for - unexpected weight changes  Respiratory ROS: positive for - cough  Cardiovascular ROS: no chest pain or dyspnea on exertion  Gastrointestinal ROS: no abdominal pain, change in bowel habits, or black or bloody stools  Musculoskeletal ROS: positive for - muscular  weakness  Neurological ROS: negative for - bowel and bladder control changes  Dermatological ROS: negative for rash    Physical Exam:  Vitals:    11/13/19 0822   BP: (!) 144/92   Pulse: 99   Resp: 18   Temp: 96.4 °F (35.8 °C)   TempSrc: Oral   SpO2: 97%   Weight: 100.7 kg (222 lb 0.1 oz)   PainSc:   4     Body mass index is 31.85 kg/m².     Gen: NAD  Psych: mood appropriate for given condition  CV: 2+ radial pulse  HEENT: anicteric   Respiratory: non labored  Abd: soft nt, nd  Skin: intact, + allodynia, + temperature asymmetry  Sensation: intact to lt touch bilaterally in c4-t1   ROM: decreased hand intrinsics on the left  Tone:  Normal at elbow, wrist and shoulder   Palpation: tender cervical paraspinals, levator scapula and trapezius non tender bicipital tendon    Imaging:  Xray left wrist 9/11/19  FINDINGS:  There has been ORIF of the left wrist, with volar plate screw device of the distal radius, and a dorsal plate screw device extending from the distal radial shaft to the mid shaft of the 3rd metacarpal.  Chronic distal radius and ulnar styloid fractures are present.  All hardware components are intact and engaged.  There is no change in alignment.  Moderate dorsal soft tissue swelling is present which has decreased.    Labs:  BMP  Lab Results   Component Value Date     09/27/2019    K 4.1 09/27/2019     09/27/2019    CO2 23 09/27/2019    BUN 10 09/27/2019    CREATININE 1.2 09/27/2019    CALCIUM 9.5 09/27/2019    ANIONGAP 8 09/27/2019    ESTGFRAFRICA >60.0 09/27/2019    EGFRNONAA >60.0 09/27/2019     Lab Results   Component Value Date    ALT 22 09/27/2019    AST 16 09/27/2019    ALKPHOS 78 09/27/2019    BILITOT 0.5 09/27/2019       Assessment:  Problem List Items Addressed This Visit        Neuro    Complex regional pain syndrome type 1 of left upper extremity - Primary    Chronic pain syndrome          Treatment Plan:  44 y.o. year old male presents to the office with left hand pain.  He injured  his hand on 12/27/17 when his hand suffered crush injury between a forklift and a truck.  He is s/p left total wrist arthrodesis with allograft from the left proximal tibia on 8/6/19.  He reports good relief of his pain that he was feeling prior to the surgery, but now continues to have his typical baseline pain which was present since the injury.  He is s/p left stellate ganglion block with Dr. Jarrell on 5/21/18, 6/18/18, and 7/23/18.  Per progress notes it appears that his symptoms of CRPS improved following the injections.  Today he subjectively c/o hyperesthesia, temperature asymmetry, decreased ROM, weakness, and clamminess/sweating changes in his left hand.  On exam I appreciate temperature asymmetry, contracture with decreased ROM of his digits, and allodynia to light touch.  He is currently doing PT with desensitization therapy.      In terms of medication management, he says he ran out of hydrocodone that was previously prescribed on 9/11/19 and says he has taken one of his mother's morphine tablets because he was in pain but ran out of his hydrocodone yesterday.  Also says he would likely test positive for marijuana and ectasy as he was around people who were smoking and someone may have slipped a pill into his drink.  He defers UDS today.  I don't recommend further opioid treatment chronically.  Discussed that opioids are not indicated for chronic non-malignant pain as the risks of dependence, addiction, potential hyperalgesia, sedation, medication interactions, respiratory depression and even death outweigh any benefit.  Given that he has been taking 15mg of hydrocodone and does show some signs of withdrawal today with tachycardia, runny nose, cough, and fatigue will do taper off of opioids: hydrocodone 5mg po bid prn x7 days followed by hydrocodone 5mg po qdaily prn x5 days then stop.   I offered referral to addiction services but he declines.  He will continue amitriptyline 10mg qhs and increase  gabapentin from 300mg TID to 600mg TID.  Recommend to repeat stellate ganglion block as it appears to work in the past.  Also discussed SCS trial with henny SOTO for his CRPS and gave him information regarding it today.     11/13/19 - Mr. Gutierrez returns to the office for follow up.  He is s/p left stellate ganglion block on 10/22/19 with about 75% of his pain for 2-3 days following injection but then return of his typical pain.  Today his pain is 6/10, constant, burning, tingling.  He continues gabapentin 600mg BID and ibuprofen prn.  He has restarted amitriptyline 10mg qhs.  We will repeat stellate ganglion block and try and get him more prolonged relief so that he can continue PT with less pain.  I've also discussed SCS with him and gave him information about HF10 therapy.  He will follow up 2 weeks post injection.      Procedures: recommend to repeat stellate ganglion injection  PT/OT/HEP: continue PT and desensitization therapy  Medications: refill ibuprofen 800mg po bid prn.  Continue amitriptyline 10mg qhs and increase gabapentin 600mg BID to 600/900  Labs: Reviewed and medications are appropriately dosed for current hepatorenal function.  Imaging: No additional recommended at this time.    : Reviewed    Vincent Alejandre M.D.  Interventional Pain Medicine / Anesthesiology

## 2019-11-15 NOTE — PROGRESS NOTES
Occupational Therapy  Daily Treatment and Discharge  Note     Name: Reymundo Gutierrez  [facility] Number: [id number]    Therapy Diagnosis:   Encounter Diagnoses   Name Primary?    Chronic pain syndrome     Stiffness of finger joint of left hand Yes    Edema of hand     Weakness of left hand     Post-traumatic arthritis of wrist, left     Crushing injury of left wrist and hand, sequela      Physician: Escobar Colvin MD    Visit Date/Discharge Date: 11/18/2019    Physician orders:         Note     Please begin range of motion of the fingers of his left hand.     Frequency:  2 times per week     Duration:  4 weeks     Diagnosis:  Status post left wrist arthrodesis          Medical Diagnosis: Z98.1 (ICD-10-CM) - Hx of arthrodesis  Evaluation Date: 8/28/2019  Insurance Authorization period Expiration: 8/20/20  Plan of Care Expiration Period: 11/20/19   Next Re-assessment: 30 days (11/24/12019) and/or 10th visit   Date of Return to MD:  12/9/19   Visit # / Visits Authortized: 19 / 24  Time In: 0810  Time Out: 0845  Total Billable Time: 35 min - 5 min hot pack = 30 min.     Surgical Procedure and Date: Surgery: 8/06/19  PROCEDURE:    1.  Left total wrist arthrodesis with allograft from the left proximal tibia  2.  Left wrist hardware removal deep  3.  Left wrist extensor tenolysis     IMPLANTS:  Synthes short angle locking wrist arthrodesis plate               Precautions: Standard and velcro splint now being used.  S/P sx: > 8 weeks     Subjective      Pt reports: Pain levels unchanged per pt report.  he was compliant with HEP.   Response to previous treatment: Good tolerance.     Functional change: None reported this date     Pain:   Functional Pain Scale Rating 0-10:   7/10 on average  5/10 at best  710 at worst  Location: L wrist dorsal forearm and hand.  Description: ache/throbbing/burning  Aggravating Factors: Movement  Easing Factors: Elevation/rest.        Objective     Moist heat x 5 min.     Manual  therapy: STM all digits (volat plates) and capsular massage all jts L hand for 8 min.       Therapeutic exercises x 10 minutes, including  - PROM composite flexion and ext 2x20 sec each digit  - Jt blocking for DIP flexjon each digit x10- PROM composite ext each digit x10 x10 sec.      AROM PIP ext L 4th and 5th digits: LM (4th = 35 (+1 degree) and 5th = 40 (-2 degrees).      and Pinch Strength (in pounds, psi's):    Left Right    II 36/35 (-4# since last visit) 90    III       Lateral 18 LM     Tripod       Tip          Quick DASH Survey     Therapist reviewed Quick DASH scores for Reymundo Gutierrez on 19.   Quick DASH documents entered into [employer] - see Media section for original.     The Quick DASH Questionnaire- The following scores are based on patient reported assessment at the time of Discharge.     Activity:  1. Open a tight jar: 5 Difficulty  2. Do heavy household chores: 4 Difficulty  3. Carry a shopping bag or briefcase: 2 Difficulty  4. Wash your back: 5 Difficulty  5.Use a knife to cut food: 5 Difficulty  6.. Recreational activities requiring force through arm: 2 Difficulty  7. Social Limitation: 4 Limited  8. Work/ADL Limitation: 4 Limited  Severity of Symptoms (over the past week)  9. Pain: 4  10. Tinglin  11. Sleeping Limitation: 1 Difficulty     Limitation Score: 65.90%     Scale  1= NO (Difficulty or Limitatons)  2= Mild (Difficulty/Limitations)  3= Moderate (Difficulty/Limitations)  4= Severe (Difficulty/Limitations)  5= Extreme/Unable and/or can't sleep (Difficulty/Limitations)     Home Exercises and Education Provided      Education provided:  Cont current HEP. Cont with jt royer wear as tolerated.     Written Home Exercises Provided:  Patient instructed to cont prior HEP.     Exercises were reviewed and Reymundo was able to demonstrate them prior to the end of the session.  Reymundo demonstrated good understanding of the education provided.   .   See EMR under Media for  exercises provided today and/or prior visit.        Assessment   Pt with plateau with AROM gains as well as  strength at this time.  Continued OT tx is not indicated at this time. See below for end goal status        Goals:  Short Term Goals: (30 days, 9/27/19, or 10th visit) unless otherwise noted below.  1. Pt will be independent with HEP in 2 visits. (Met 8/30/19).  2. Pt will report decreased pain to a 6/10 at worst in LUE with ADLs in order to increase function/use of UE (Not met; still up to 7/10)  3. Pt will increase PIP level AROM flexion and ext L hand digits 2-5 by 10 degrees each to increase function L hand. (Met)  4. Pt will increase L thumb IP flexion ext AROM by 5 degrees each to increase function L hand. (Met)     New STGs to be met in the next 30 days (or by 10/27/19 or 10 more visits, 19th visit)   5. Pt will increase DIP level AROM flexion L hand digits 2-5 by 10 degrees each (36, 40, 30, 50) to increase function L hand. (Not met); Reason not met: possible plateau with DIP level flexion.  6. Pt will increase L thumb IP flexion AROM by 10 more degrees (to 40 degrees) to increase function L hand. (Not met)  7. Pt will at least 40# max  strength L hand in order to increase function with grasp during ADL/IADL tasks (cooking utensils, tool use, carrying groceries, steering wheel, etc.) (Met 10/25/19 )    New STGs to be met in the next 30 days (11/24/19)  or 10 more visits, 24th visit)  8. Pt will demo at least 50# max  strength L hand  to increase function with grasp during ADL/IADL tasks (cooking utensils, tool use, carrying groceries, steering wheel, etc.) Not met  9. Pt will demo at least 20 degrees of improved AROM ext L hand PIPs digits 4 and 5 in order to increase function for ADL/IADL. Met      Long Term Goals: (by discharge)  1. Pt will report decreased pain to 3/10 with ADLs to allow for increased function/use of UE. Not met  2. Pt will exhibit grossly WNL AROM of L hand to allow  for Independent use of for all ADL/IADL tasks. Not met  3. Pt will exhibit WFL  strength to allow a firm grasp during ADL/IADL (cooking utensils, tool use, carrying groceries, steering wheel, etc.) (Ongoing)  4. Pt will exhibit WFL lateral pinch strength to allow writing,opening containers, and turning keys. Met  5. Pt will report no greater than Min difficulty with all Quick DASH assessment items. Not met  6. Pt will at least 50# max  strength L hand in order to increase function with grasp during ADL/IADL tasks (cooking utensils, tool use, carrying groceries, steering wheel, etc.) (new/established 10/9/19) Not met   Plan   Pt for D/C with continued HEP at this time. Pt to follow-up with MD for any further tx needs.

## 2019-11-18 ENCOUNTER — CLINICAL SUPPORT (OUTPATIENT)
Dept: REHABILITATION | Facility: HOSPITAL | Age: 44
End: 2019-11-18
Payer: COMMERCIAL

## 2019-11-18 DIAGNOSIS — R60.0 EDEMA OF HAND: ICD-10-CM

## 2019-11-18 DIAGNOSIS — M19.132 POST-TRAUMATIC ARTHRITIS OF WRIST, LEFT: ICD-10-CM

## 2019-11-18 DIAGNOSIS — S67.42XS CRUSHING INJURY OF LEFT WRIST AND HAND, SEQUELA: ICD-10-CM

## 2019-11-18 DIAGNOSIS — M25.642 STIFFNESS OF FINGER JOINT OF LEFT HAND: Primary | ICD-10-CM

## 2019-11-18 DIAGNOSIS — R29.898 WEAKNESS OF LEFT HAND: ICD-10-CM

## 2019-11-18 DIAGNOSIS — G89.4 CHRONIC PAIN SYNDROME: ICD-10-CM

## 2019-11-18 PROBLEM — S67.42XA: Status: RESOLVED | Noted: 2019-07-10 | Resolved: 2019-11-18

## 2019-11-18 PROCEDURE — 97140 MANUAL THERAPY 1/> REGIONS: CPT | Mod: PO

## 2019-11-18 PROCEDURE — 97110 THERAPEUTIC EXERCISES: CPT | Mod: PO

## 2019-11-20 ENCOUNTER — HOSPITAL ENCOUNTER (OUTPATIENT)
Dept: RADIOLOGY | Facility: HOSPITAL | Age: 44
Discharge: HOME | End: 2019-11-20
Attending: ANESTHESIOLOGY
Payer: MEDICAID

## 2019-11-20 ENCOUNTER — PATIENT MESSAGE (OUTPATIENT)
Dept: SURGERY | Facility: HOSPITAL | Age: 44
End: 2019-11-20

## 2019-11-20 DIAGNOSIS — M51.36 DDD (DEGENERATIVE DISC DISEASE), LUMBAR: ICD-10-CM

## 2019-11-21 ENCOUNTER — PATIENT MESSAGE (OUTPATIENT)
Dept: PAIN MEDICINE | Facility: CLINIC | Age: 44
End: 2019-11-21

## 2019-11-21 NOTE — TELEPHONE ENCOUNTER
We will need you to follow up with your PCP or urgent care related to you having the fevers, to be sure that you do not have any infection, before we can reschedule your procedure.

## 2019-11-21 NOTE — TELEPHONE ENCOUNTER
Yes, I understand, since you stated you had a fever yesterday, we need you to follow up with your primary care doctor or an urgent care physician first, to be sure you don't have an infection that needs to be treated before we can reschedule your procedure.

## 2019-11-22 ENCOUNTER — PATIENT MESSAGE (OUTPATIENT)
Dept: PAIN MEDICINE | Facility: CLINIC | Age: 44
End: 2019-11-22

## 2019-12-02 ENCOUNTER — TELEPHONE (OUTPATIENT)
Dept: ORTHOPEDICS | Facility: CLINIC | Age: 44
End: 2019-12-02

## 2019-12-02 RX ORDER — AMITRIPTYLINE HYDROCHLORIDE 10 MG/1
10 TABLET, FILM COATED ORAL NIGHTLY
Qty: 21 TABLET | Refills: 0 | Status: CANCELLED | OUTPATIENT
Start: 2019-12-02 | End: 2019-12-23

## 2019-12-02 NOTE — TELEPHONE ENCOUNTER
----- Message from Jen Padilla sent at 12/2/2019 11:16 AM CST -----  Good morning,  Please see the email below from the patients workers comp . The document has been scanned to the patients chart under the media tab.    Thanks,  Jen MONSALVE [id number]    From: Blake Valentine <[email or web address]   Sent: Monday, December 02, 2019 9:41 AM  To: Jen Padilla <[email or web address]>  Cc: [email or web address]  Subject: SMO Report RE Reymundo Gutierrez.pdf       Please see SMO report for Reymundo Gutierrez, please have addressed at the 12/9 appt with Dr. Colvin.            BLAKE VALENTINE  Technical  [employer]  [address]  (P) [phone] (F) [phone]   [email or web address]  Mailing Address: [address], [place], [address]

## 2019-12-03 ENCOUNTER — OFFICE VISIT (OUTPATIENT)
Dept: FAMILY MEDICINE | Facility: CLINIC | Age: 44
End: 2019-12-03
Payer: MEDICAID

## 2019-12-03 ENCOUNTER — LAB VISIT (OUTPATIENT)
Dept: LAB | Facility: HOSPITAL | Age: 44
End: 2019-12-03
Attending: FAMILY MEDICINE
Payer: MEDICAID

## 2019-12-03 VITALS
WEIGHT: 222.19 LBS | DIASTOLIC BLOOD PRESSURE: 86 MMHG | TEMPERATURE: 98 F | HEIGHT: 70 IN | BODY MASS INDEX: 31.81 KG/M2 | SYSTOLIC BLOOD PRESSURE: 150 MMHG | HEART RATE: 104 BPM

## 2019-12-03 DIAGNOSIS — J45.20 MILD INTERMITTENT ASTHMA WITHOUT COMPLICATION: Primary | ICD-10-CM

## 2019-12-03 DIAGNOSIS — I10 HYPERTENSION, ESSENTIAL: ICD-10-CM

## 2019-12-03 DIAGNOSIS — R68.82 DECREASED LIBIDO: ICD-10-CM

## 2019-12-03 DIAGNOSIS — Z79.899 ENCOUNTER FOR LONG-TERM (CURRENT) USE OF MEDICATIONS: ICD-10-CM

## 2019-12-03 DIAGNOSIS — R53.83 FATIGUE, UNSPECIFIED TYPE: ICD-10-CM

## 2019-12-03 PROBLEM — M24.542 CONTRACTURE OF JOINT OF FINGER, LEFT: Status: ACTIVE | Noted: 2018-10-30

## 2019-12-03 PROBLEM — S52.92XE: Status: ACTIVE | Noted: 2018-02-06

## 2019-12-03 PROBLEM — S52.502D CLOSED FRACTURE OF LOWER END OF LEFT RADIUS WITH ROUTINE HEALING: Status: ACTIVE | Noted: 2019-02-28

## 2019-12-03 PROBLEM — B36.0 TINEA VERSICOLOR: Status: ACTIVE | Noted: 2018-10-08

## 2019-12-03 PROBLEM — S67.32XA: Status: ACTIVE | Noted: 2018-02-06

## 2019-12-03 PROBLEM — J30.1 SEASONAL ALLERGIC RHINITIS DUE TO POLLEN: Status: ACTIVE | Noted: 2018-09-21

## 2019-12-03 PROBLEM — G90.512 COMPLEX REGIONAL PAIN SYNDROME TYPE 1 OF LEFT UPPER EXTREMITY: Status: ACTIVE | Noted: 2018-03-13

## 2019-12-03 PROCEDURE — 36415 COLL VENOUS BLD VENIPUNCTURE: CPT | Mod: PO

## 2019-12-03 PROCEDURE — 84403 ASSAY OF TOTAL TESTOSTERONE: CPT

## 2019-12-03 PROCEDURE — 99999 PR PBB SHADOW E&M-EST. PATIENT-LVL III: CPT | Mod: PBBFAC,,, | Performed by: FAMILY MEDICINE

## 2019-12-03 PROCEDURE — 99214 OFFICE O/P EST MOD 30 MIN: CPT | Mod: S$PBB,,, | Performed by: FAMILY MEDICINE

## 2019-12-03 PROCEDURE — 99213 OFFICE O/P EST LOW 20 MIN: CPT | Mod: PBBFAC,PO | Performed by: FAMILY MEDICINE

## 2019-12-03 PROCEDURE — 99999 PR PBB SHADOW E&M-EST. PATIENT-LVL III: ICD-10-PCS | Mod: PBBFAC,,, | Performed by: FAMILY MEDICINE

## 2019-12-03 PROCEDURE — 99214 PR OFFICE/OUTPT VISIT, EST, LEVL IV, 30-39 MIN: ICD-10-PCS | Mod: S$PBB,,, | Performed by: FAMILY MEDICINE

## 2019-12-03 RX ORDER — FAMOTIDINE 20 MG/1
TABLET, FILM COATED ORAL
COMMUNITY
Start: 2019-11-19 | End: 2020-01-03 | Stop reason: SDUPTHER

## 2019-12-03 RX ORDER — FLUTICASONE PROPIONATE AND SALMETEROL 250; 50 UG/1; UG/1
1 POWDER RESPIRATORY (INHALATION) 2 TIMES DAILY
Qty: 60 EACH | Refills: 11 | Status: SHIPPED | OUTPATIENT
Start: 2019-12-03 | End: 2020-11-04

## 2019-12-03 RX ORDER — AMLODIPINE BESYLATE 5 MG/1
5 TABLET ORAL DAILY
Qty: 30 TABLET | Refills: 1 | Status: SHIPPED | OUTPATIENT
Start: 2019-12-03 | End: 2020-01-03

## 2019-12-03 NOTE — ASSESSMENT & PLAN NOTE
Checking testosterone.  Patient advised that this may be caused by elevated blood pressure will treat that condition.  See problem list.

## 2019-12-03 NOTE — PATIENT INSTRUCTIONS
Follow up in about 4 weeks (around 12/31/2019), or if symptoms worsen or fail to improve.     If no improvement in symptoms or symptoms worsen, please be advised to call MD, follow-up at clinic and/or go to ER if becomes severe.    Johnathan Greenwood M.D.         We Offer TELEHEATLH & Same Day Appointments!   Book your Telehealth appointment with me through my nurse or   Clinic appointments on [employer]!    [id number] [place] [address]    Office: [phone]     FAX: [phone]    Check out my Facebook Page and Follow Me at: CLICK HERE    Check out my website at [employer] by clicking on: CLICK HERE    To Schedule appointments online, go to [employer]: CLICK HERE     Location: [email or web address]

## 2019-12-03 NOTE — ASSESSMENT & PLAN NOTE
Recent labs were within normal limits.  Checking testosterone.  Patient likely withdrawing off of excess use of caffeinated beverages.  Patient advised of risk and benefits.  Patient also with elevated blood pressure today consistent with hypertension.  Treating see problem.

## 2019-12-03 NOTE — PROGRESS NOTES
======================================================  GOAL of current visit: est care    Previous PCP: Dr. Cardona  Specialists:Dr. Colvin  Recent lab work:  Recent imaging:   Colonoscopy History: N/A    There are no preventive care reminders to display for this patient.  ======================================================    PLAN:      Problem List Items Addressed This Visit     Mild intermittent asthma without complication - Primary (Chronic)     Refill Advair.  Reviewed asthma action plan with patient.  ER precautions.         Relevant Medications    fluticasone-salmeterol diskus inhaler 250-50 mcg    Hypertension, essential (Chronic)     Starting low-dose amlodipine.  Patient advised in her blood pressures through my chart.  Follow-up in 2 to 4 weeks.         Relevant Medications    amLODIPine (NORVASC) 5 MG tablet    Other Relevant Orders    MyChart Patient Entered Blood Pressure    Encounter for long-term (current) use of medications (Chronic)     Complete history and physical was completed today.  Complete and thorough medication reconciliation was performed.  Discussed risks and benefits of medications.  Advised patient on orders and health maintenance.  We discussed old records and old labs if available.  Will request any records not available through epic.  Continue current medications listed on your summary sheet.           Fatigue     Recent labs were within normal limits.  Checking testosterone.  Patient likely withdrawing off of excess use of caffeinated beverages.  Patient advised of risk and benefits.  Patient also with elevated blood pressure today consistent with hypertension.  Treating see problem.         Relevant Orders    Testosterone    Decreased libido     Checking testosterone.  Patient advised that this may be caused by elevated blood pressure will treat that condition.  See problem list.         Relevant Orders    Testosterone        Future Appointments     Date Provider Specialty  Appt Notes    12/3/2019  Lab     12/4/2019 Tomy Medrano MD Otolaryngology Sinusitis    12/5/2019 Vincent Alejandre MD Pain Medicine 2 week f/u    12/9/2019 Escobar Colvin MD Orthopedics Left wrist arthrodesis    1/3/2020 Johnathan Greenwood MD Family Medicine 4 week f/u lab           Medication List with Changes/Refills   New Medications    AMLODIPINE (NORVASC) 5 MG TABLET    Take 1 tablet (5 mg total) by mouth once daily.   Current Medications    ALBUTEROL (PROVENTIL) 2.5 MG /3 ML (0.083 %) NEBULIZER SOLUTION    Inhale 2.5 mg into the lungs.    ALBUTEROL (VENTOLIN HFA) 90 MCG/ACTUATION INHALER    Inhale 2 puffs into the lungs every 6 (six) hours as needed for Wheezing. Rescue    AMITRIPTYLINE (ELAVIL) 10 MG TABLET    Take 1 tablet (10 mg total) by mouth every evening for 21 days    FAMOTIDINE (PEPCID) 20 MG TABLET        FLUTICASONE PROPIONATE (FLONASE ALLERGY RELIEF) 50 MCG/ACTUATION NASAL SPRAY    Use 2 sprays (100 mcg total) by Each Nostril route once daily.    IBUPROFEN (ADVIL,MOTRIN) 800 MG TABLET    Take 1 tablet (800 mg total) by mouth 2 (two) times daily as needed for Pain.   Changed and/or Refilled Medications    Modified Medication Previous Medication    FLUTICASONE-SALMETEROL DISKUS INHALER 250-50 MCG fluticasone-salmeterol 250-50 mcg/dose (ADVAIR) 250-50 mcg/dose diskus inhaler       Inhale 1 puff into the lungs 2 (two) times daily. Controller    Inhale 1 puff into the lungs 2 (two) times daily. Controller   Discontinued Medications    BUPROPION (WELLBUTRIN XL) 300 MG 24 HR TABLET    Take 1 tablet (300 mg total) by mouth every morning    DULOXETINE (CYMBALTA) 60 MG CAPSULE    Take 60 mg by mouth.    DULOXETINE (CYMBALTA) 60 MG CAPSULE    Take 1 capsule (60 mg total) by mouth every morning    KETOCONAZOLE (NIZORAL) 2 % SHAMPOO    Apply topically.    ONDANSETRON (ZOFRAN-ODT) 4 MG TBDL    Take 2 tablets (8 mg total) by mouth every 8 (eight) hours as needed.    PROMETHAZINE (PHENERGAN) 25 MG TABLET     Take 25 mg by mouth.       Johnathan Greenwood M.D.     ==========================================================================  Subjective:      Patient ID: Reymundo Gutierrez is a 44 y.o. male.  has a past medical history of Asthma and Crushing injury of left wrist and hand (7/10/2019).     Chief Complaint: Establish Care (pt not fasting); Fatigue; Hypertension; and Medication Refill      Problem List Items Addressed This Visit     Mild intermittent asthma without complication - Primary (Chronic)    Overview     Chronic.  Stable.  Patient requesting refill on Advair.  Patient reports intermittent disease and rarely needs albuterol inhaler.         Current Assessment & Plan     Refill Advair.  Reviewed asthma action plan with patient.  ER precautions.         Hypertension, essential (Chronic)    Overview     December 2019:  CHRONIC.  Currently uncontrolled.  Starting blood pressure medicine today.. BP Reviewed.  Compliant with BP medications. No SE reported.   (-) CP, SOB, palpitations, dizziness, lightheadedness, HA, arm numbness, tingling or weakness, syncope.  + fatigue, erectile dysfunction  Creatinine   Date Value Ref Range Status   09/27/2019 1.2 0.5 - 1.4 mg/dL Final     Discussed hypertension disease course, DASH-diet and exercise.  Discussed medication regimen importance of treating high blood pressure.  Patient advised of risk of untreated blood pressure.    ER precautions were given for symptoms of hypertensive urgency and emergency.           Current Assessment & Plan     Starting low-dose amlodipine.  Patient advised in her blood pressures through my chart.  Follow-up in 2 to 4 weeks.         Encounter for long-term (current) use of medications (Chronic)    Overview     CHRONIC. Stable. Compliant with medications for managed conditions. See medication list. No SE reported.   Routine lab analysis is being monitored. Refills were addressed.  Lab Results   Component Value Date    WBC 9.67 09/27/2019     HGB 14.8 09/27/2019    HCT 43.4 09/27/2019    MCV 91 09/27/2019     09/27/2019         Chemistry        Component Value Date/Time     09/27/2019 1137    K 4.1 09/27/2019 1137     09/27/2019 1137    CO2 23 09/27/2019 1137    BUN 10 09/27/2019 1137    CREATININE 1.2 09/27/2019 1137    GLU 96 09/27/2019 1137        Component Value Date/Time    CALCIUM 9.5 09/27/2019 1137    ALKPHOS 78 09/27/2019 1137    AST 16 09/27/2019 1137    ALT 22 09/27/2019 1137    BILITOT 0.5 09/27/2019 1137    ESTGFRAFRICA >60.0 09/27/2019 1137    EGFRNONAA >60.0 09/27/2019 1137          Lab Results   Component Value Date    TSH 1.170 09/27/2019              Current Assessment & Plan     Complete history and physical was completed today.  Complete and thorough medication reconciliation was performed.  Discussed risks and benefits of medications.  Advised patient on orders and health maintenance.  We discussed old records and old labs if available.  Will request any records not available through epic.  Continue current medications listed on your summary sheet.           Fatigue    Overview     Acute on chronic.  Worsening.  Patient reports drinking moderate amount of red Bulls daily to increase his energy level in stay awake during shift work.         Current Assessment & Plan     Recent labs were within normal limits.  Checking testosterone.  Patient likely withdrawing off of excess use of caffeinated beverages.  Patient advised of risk and benefits.  Patient also with elevated blood pressure today consistent with hypertension.  Treating see problem.         Decreased libido    Overview     Subacute.  Has been going on for weeks to months now.  Patient was having issue while on Wellbutrin and Cymbalta.  Still having the issue.  Patient does have untreated high blood pressure.  Patient recently stopped drinking Red Bulls.  Patient is engaged to his fiancee and is having issues with decreased libido and erectile  dysfunction.         Current Assessment & Plan     Checking testosterone.  Patient advised that this may be caused by elevated blood pressure will treat that condition.  See problem list.                Past Medical History:  Past Medical History:   Diagnosis Date    Asthma     Crushing injury of left wrist and hand 7/10/2019     Past Surgical History:   Procedure Laterality Date    BONE GRAFT Left 8/6/2019    Procedure: BONE GRAFT LEG;  Surgeon: Escobar Colvin MD;  Location: [facility] OR;  Service: Orthopedics;  Laterality: Left;    FRACTURE SURGERY      INJECTION OF ANESTHETIC AGENT AROUND NERVE Left 10/22/2019    Procedure: Block, Nerve STELLATE GANGLION;  Surgeon: Vincent Alejandre MD;  Location: [facility] OR;  Service: Pain Management;  Laterality: Left;    WRIST SURGERY Left     x3     Review of patient's allergies indicates:  No Known Allergies  Medication List with Changes/Refills   New Medications    AMLODIPINE (NORVASC) 5 MG TABLET    Take 1 tablet (5 mg total) by mouth once daily.   Current Medications    ALBUTEROL (PROVENTIL) 2.5 MG /3 ML (0.083 %) NEBULIZER SOLUTION    Inhale 2.5 mg into the lungs.    ALBUTEROL (VENTOLIN HFA) 90 MCG/ACTUATION INHALER    Inhale 2 puffs into the lungs every 6 (six) hours as needed for Wheezing. Rescue    AMITRIPTYLINE (ELAVIL) 10 MG TABLET    Take 1 tablet (10 mg total) by mouth every evening for 21 days    FAMOTIDINE (PEPCID) 20 MG TABLET        FLUTICASONE PROPIONATE (FLONASE ALLERGY RELIEF) 50 MCG/ACTUATION NASAL SPRAY    Use 2 sprays (100 mcg total) by Each Nostril route once daily.    IBUPROFEN (ADVIL,MOTRIN) 800 MG TABLET    Take 1 tablet (800 mg total) by mouth 2 (two) times daily as needed for Pain.   Changed and/or Refilled Medications    Modified Medication Previous Medication    FLUTICASONE-SALMETEROL DISKUS INHALER 250-50 MCG fluticasone-salmeterol 250-50 mcg/dose (ADVAIR) 250-50 mcg/dose diskus inhaler       Inhale 1 puff into the lungs 2 (two) times daily.  "Controller    Inhale 1 puff into the lungs 2 (two) times daily. Controller   Discontinued Medications    BUPROPION (WELLBUTRIN XL) 300 MG 24 HR TABLET    Take 1 tablet (300 mg total) by mouth every morning    DULOXETINE (CYMBALTA) 60 MG CAPSULE    Take 60 mg by mouth.    DULOXETINE (CYMBALTA) 60 MG CAPSULE    Take 1 capsule (60 mg total) by mouth every morning    KETOCONAZOLE (NIZORAL) 2 % SHAMPOO    Apply topically.    ONDANSETRON (ZOFRAN-ODT) 4 MG TBDL    Take 2 tablets (8 mg total) by mouth every 8 (eight) hours as needed.    PROMETHAZINE (PHENERGAN) 25 MG TABLET    Take 25 mg by mouth.      Social History     Tobacco Use    Smoking status: Former Smoker    Smokeless tobacco: Never Used   Substance Use Topics    Alcohol use: No      History reviewed. No pertinent family history.    I have reviewed the complete PMH, social history, surgical history, allergies and medications.  As well as family history.    Review of Systems   Constitutional: Positive for fatigue. Negative for activity change.   HENT: Positive for sinus pressure. Negative for congestion and sinus pain.    Eyes: Negative for visual disturbance.   Respiratory: Negative for chest tightness and shortness of breath.    Cardiovascular: Negative for palpitations and leg swelling.   Gastrointestinal: Negative for abdominal pain, diarrhea and nausea.   Endocrine: Negative for polyuria.   Genitourinary: Negative for difficulty urinating and frequency.   Musculoskeletal: Negative for arthralgias and joint swelling.   Skin: Negative for rash.   Neurological: Negative for dizziness and headaches.   Psychiatric/Behavioral: Negative for agitation. The patient is not nervous/anxious.      Objective:   BP (!) 150/86   Pulse 104   Temp 98.3 °F (36.8 °C) (Oral)   Ht 5' 10" (1.778 m)   Wt 100.8 kg (222 lb 3.2 oz)   BMI 31.88 kg/m²   Physical Exam   Constitutional: He is oriented to person, place, and time. He appears well-developed and well-nourished. No " distress.   HENT:   Head: Normocephalic and atraumatic.   Eyes: Pupils are equal, round, and reactive to light. EOM are normal.   Neck: Normal range of motion. Neck supple.   Cardiovascular: Normal rate, regular rhythm, normal heart sounds and intact distal pulses.   No murmur heard.  Pulmonary/Chest: Effort normal and breath sounds normal. No respiratory distress. He has no wheezes.   Musculoskeletal: Normal range of motion. He exhibits no edema.   Patient wearing arm splint on the left arm.   Neurological: He is alert and oriented to person, place, and time. No cranial nerve deficit.   Skin: Skin is warm and dry. Capillary refill takes less than 2 seconds.   Psychiatric: He has a normal mood and affect. His behavior is normal.   Nursing note and vitals reviewed.      Assessment:     1. Mild intermittent asthma without complication    2. Fatigue, unspecified type    3. Decreased libido    4. Hypertension, essential    5. Encounter for long-term (current) use of medications      MDM:     I have Reviewed and summarized old records.  I have performed thorough medication reconciliation today and discussed risk and benefits of each medication.  I have reviewed labs and discussed with patient.  All questions were answered.  I am requesting old records and will review them once they are available.    I have signed for the following orders AND/OR meds.  Orders Placed This Encounter   Procedures    Testosterone     Standing Status:   Future     Number of Occurrences:   1     Standing Expiration Date:   1/31/2021    Sb Patient Entered Blood Pressure     Order Specific Question:   After how many days would you like to receive a notification of this patient's flowsheet entries?     Answer:   30     Medications Ordered This Encounter   Medications    amLODIPine (NORVASC) 5 MG tablet     Sig: Take 1 tablet (5 mg total) by mouth once daily.     Dispense:  30 tablet     Refill:  1    fluticasone-salmeterol diskus inhaler  250-50 mcg     Sig: Inhale 1 puff into the lungs 2 (two) times daily. Controller     Dispense:  60 each     Refill:  11        Follow up in about 4 weeks (around 12/31/2019), or if symptoms worsen or fail to improve, for LAB RESULTS, HTN, Med refills.    If no improvement in symptoms or symptoms worsen, advised to call/follow-up at clinic or go to ER. Patient voiced understanding and all questions/concerns were addressed.     DISCLAIMER: This note was compiled by using a speech recognition dictation system and therefore please be aware that typographical / speech recognition errors can and do occur.  Please contact me if you see any errors specifically.    Johnathan Greenwood M.D.       Office: [phone] [address] [place] [address]  FAX: [phone]

## 2019-12-03 NOTE — ASSESSMENT & PLAN NOTE
Starting low-dose amlodipine.  Patient advised in her blood pressures through my chart.  Follow-up in 2 to 4 weeks.

## 2019-12-04 ENCOUNTER — OFFICE VISIT (OUTPATIENT)
Dept: OTOLARYNGOLOGY | Facility: CLINIC | Age: 44
End: 2019-12-04
Payer: MEDICAID

## 2019-12-04 VITALS — BODY MASS INDEX: 31.56 KG/M2 | HEIGHT: 70 IN | WEIGHT: 220.44 LBS

## 2019-12-04 DIAGNOSIS — M95.0 NASAL VALVE COLLAPSE: ICD-10-CM

## 2019-12-04 DIAGNOSIS — J34.2 NASAL SEPTAL DEVIATION: ICD-10-CM

## 2019-12-04 DIAGNOSIS — J32.0 CHRONIC MAXILLARY SINUSITIS: Primary | ICD-10-CM

## 2019-12-04 DIAGNOSIS — J34.3 HYPERTROPHY OF BOTH INFERIOR NASAL TURBINATES: ICD-10-CM

## 2019-12-04 DIAGNOSIS — J34.89 NASAL OBSTRUCTION: ICD-10-CM

## 2019-12-04 LAB — TESTOST SERPL-MCNC: 490 NG/DL (ref 304–1227)

## 2019-12-04 PROCEDURE — 99214 OFFICE O/P EST MOD 30 MIN: CPT | Mod: S$PBB,,, | Performed by: OTOLARYNGOLOGY

## 2019-12-04 PROCEDURE — 99214 PR OFFICE/OUTPT VISIT, EST, LEVL IV, 30-39 MIN: ICD-10-PCS | Mod: S$PBB,,, | Performed by: OTOLARYNGOLOGY

## 2019-12-04 PROCEDURE — 99999 PR PBB SHADOW E&M-EST. PATIENT-LVL IV: CPT | Mod: PBBFAC,,, | Performed by: OTOLARYNGOLOGY

## 2019-12-04 PROCEDURE — 99999 PR PBB SHADOW E&M-EST. PATIENT-LVL IV: ICD-10-PCS | Mod: PBBFAC,,, | Performed by: OTOLARYNGOLOGY

## 2019-12-04 PROCEDURE — 99214 OFFICE O/P EST MOD 30 MIN: CPT | Mod: PBBFAC,PO | Performed by: OTOLARYNGOLOGY

## 2019-12-04 RX ORDER — AZELASTINE 1 MG/ML
2 SPRAY, METERED NASAL 2 TIMES DAILY
Qty: 30 ML | Refills: 6 | Status: SHIPPED | OUTPATIENT
Start: 2019-12-04 | End: 2020-09-11 | Stop reason: SDUPTHER

## 2019-12-04 NOTE — LETTER
December 6, 2019      Cornelio Cardona MD  [address]  [place] [address]           [place] [facility]  [address] [place] [address]  Phone: [phone]  Fax: [phone]          Patient: Reymundo Gutierrez   MR Number: [id number]   YOB: 1975   Date of Visit: 12/4/2019       Dear Dr. Cornelio Cardona:    Thank you for referring Reymundo Gutierrez to me for evaluation. Attached you will find relevant portions of my assessment and plan of care.    If you have questions, please do not hesitate to call me. I look forward to following Reymundo Gutierrez along with you.    Sincerely,    Tomy Medrano MD    Enclosure  CC:  No Recipients    If you would like to receive this communication electronically, please contact [email or web address] or [phone] to request more information on [employer] Link access.    For providers and/or their staff who would like to refer a patient to [facility], please contact us through our one-stop-shop provider referral line, [facility], at [phone].    If you feel you have received this communication in error or would no longer like to receive these types of communications, please e-mail [email or web address]

## 2019-12-04 NOTE — PROGRESS NOTES
Subjective:       Patient ID: Reymundo Gutierrez is a 44 y.o. male.    Chief Complaint: deviated septum and Sinusitis    Reymundo is here for sinus/nasal complaints. Symptoms have been present for years.   Baseline is poor.  Post-nasal drainage: yes  Pressure: yes, ethmoid region - constant  Congestion: yes - main issue  Decreased sense of smell: no  Therapies tried: Flonase and Singulair. Has used oral AH in the past as well.  Has a lot of nasal itching and rhinorrhea. He has had multiple rounds of antibiotics without improvement.  Seasonal variation: yes  He does use tobacco but has decr sig and reports almost being off    He saw Dr. mahoney in 2018.     Allergy testing: yes, had negative testing.   History of asthma: yes    SNOT-22=61    Social History     Tobacco Use   Smoking Status Current Some Day Smoker   Smokeless Tobacco Never Used     Social History     Substance and Sexual Activity   Alcohol Use No        Review of Systems   Constitutional: Negative for activity change and appetite change.   Eyes: Negative for discharge.   Respiratory: Negative for difficulty breathing and wheezing   Cardiovascular: Negative for chest pain.   Gastrointestinal: Negative for abdominal distention and abdominal pain.   Endocrine: Negative for cold intolerance and heat intolerance.   Genitourinary: Negative for dysuria.   Musculoskeletal: Negative for gait problem and joint swelling.   Skin: Negative for color change and pallor.   Neurological: Negative for syncope and weakness.   Psychiatric/Behavioral: Negative for agitation and confusion.     Objective:        Constitutional:   He is oriented to person, place, and time. He appears well-developed and well-nourished. He appears alert. He is active. Normal speech.      Head:  Normocephalic and atraumatic. Head is without TMJ tenderness. No scars. Salivary glands normal.  Facial strength is normal.      Ears:    Right Ear: No drainage or swelling. No middle ear effusion.   Left  Ear: No drainage or swelling.  No middle ear effusion.     Nose:  Septal deviation present. No mucosal edema, rhinorrhea or sinus tenderness. Turbinate hypertrophy.      Mouth/Throat  Oropharynx clear and moist without lesions or asymmetry, normal uvula midline and mirror exam normal. Normal dentition. No uvula swelling, lacerations or trismus. No oropharyngeal exudate. Tonsillar erythema, tonsillar exudate.      Neck:  Full range of motion with neck supple and no adenopathy. Thyroid tenderness is present. No tracheal deviation, no edema, no erythema, normal range of motion, no stridor, no crepitus and no neck rigidity present. No thyroid mass present.     Cardiovascular:   Intact distal pulses and normal pulses.      Pulmonary/Chest:   Effort normal and breath sounds normal. No stridor.     Psychiatric:   His speech is normal and behavior is normal. His mood appears not anxious. His affect is not labile.     Neurological:   He is alert and oriented to person, place, and time. No sensory deficit.     Skin:   No abrasions, lacerations, lesions, or rashes. No abrasion and no bruising noted.         Tests / Results:  Reviewed CT sinus  Narrowed L [facility] with Carolin cell. Mild inferior maxillary mucosal thickening      R severe strut deflection    L severe deflection    Assessment:       1. Chronic maxillary sinusitis    2. Nasal obstruction    3. Hypertrophy of both inferior nasal turbinates    4. Nasal septal deviation    5. Nasal valve collapse          Plan:       Discussed options. Has severe deviation of septum and ITH - deviation involves strut and Would require an open septorhinoplasty to repair given possible ear cartilage harvest  Discussed open septorhino with Batten grafting, ITR, L maxillary antrostomy  Discussed smoking cessation    I discussed at length the risk of nasal /  sinus surgery with the patient including, but not limited to, recurrence/persistence of symptoms, bleeding, infection, tearing  abnormality, septal perforation, tooth or cheek numbness, vision changes, orbital injury, CSF leak and changes in smell.  The patient had opportunity to ask questions and I answered all of them to their satisfaction.  We will proceed as planned.

## 2019-12-04 NOTE — PROGRESS NOTES
Patient notify through portalYour testosterone is normal, but at the lower limits.  I would recommend increasing exercise and improving your diet.  We can recheck this level in a few months.

## 2019-12-08 ENCOUNTER — PATIENT MESSAGE (OUTPATIENT)
Dept: FAMILY MEDICINE | Facility: CLINIC | Age: 44
End: 2019-12-08

## 2019-12-09 ENCOUNTER — HOSPITAL ENCOUNTER (OUTPATIENT)
Dept: RADIOLOGY | Facility: HOSPITAL | Age: 44
Discharge: HOME | End: 2019-12-09
Attending: ORTHOPAEDIC SURGERY
Payer: COMMERCIAL

## 2019-12-09 ENCOUNTER — OFFICE VISIT (OUTPATIENT)
Dept: ORTHOPEDICS | Facility: CLINIC | Age: 44
End: 2019-12-09
Payer: COMMERCIAL

## 2019-12-09 VITALS
HEIGHT: 70 IN | DIASTOLIC BLOOD PRESSURE: 96 MMHG | WEIGHT: 222 LBS | HEART RATE: 88 BPM | SYSTOLIC BLOOD PRESSURE: 136 MMHG | BODY MASS INDEX: 31.78 KG/M2

## 2019-12-09 DIAGNOSIS — M19.132 POST-TRAUMATIC ARTHRITIS OF WRIST, LEFT: Primary | ICD-10-CM

## 2019-12-09 DIAGNOSIS — S67.42XS CRUSHING INJURY OF LEFT WRIST AND HAND, SEQUELA: ICD-10-CM

## 2019-12-09 DIAGNOSIS — S67.42XD CRUSHING INJURY OF LEFT WRIST AND HAND, SUBSEQUENT ENCOUNTER: ICD-10-CM

## 2019-12-09 DIAGNOSIS — M24.532 CONTRACTURE OF LEFT WRIST JOINT: ICD-10-CM

## 2019-12-09 DIAGNOSIS — Z98.1 HX OF ARTHRODESIS: ICD-10-CM

## 2019-12-09 DIAGNOSIS — M19.132 POST-TRAUMATIC ARTHRITIS OF WRIST, LEFT: ICD-10-CM

## 2019-12-09 PROCEDURE — 73110 X-RAY EXAM OF WRIST: CPT | Mod: TC,PO,LT

## 2019-12-09 PROCEDURE — 73110 XR WRIST COMPLETE 3 VIEWS LEFT: ICD-10-PCS | Mod: 26,LT,, | Performed by: RADIOLOGY

## 2019-12-09 PROCEDURE — 73110 X-RAY EXAM OF WRIST: CPT | Mod: 26,LT,, | Performed by: RADIOLOGY

## 2019-12-09 PROCEDURE — 99213 OFFICE O/P EST LOW 20 MIN: CPT | Mod: S$GLB,,, | Performed by: ORTHOPAEDIC SURGERY

## 2019-12-09 PROCEDURE — 99999 PR PBB SHADOW E&M-EST. PATIENT-LVL III: CPT | Mod: PBBFAC,,, | Performed by: ORTHOPAEDIC SURGERY

## 2019-12-09 PROCEDURE — 99213 PR OFFICE/OUTPT VISIT, EST, LEVL III, 20-29 MIN: ICD-10-PCS | Mod: S$GLB,,, | Performed by: ORTHOPAEDIC SURGERY

## 2019-12-09 PROCEDURE — 99999 PR PBB SHADOW E&M-EST. PATIENT-LVL III: ICD-10-PCS | Mod: PBBFAC,,, | Performed by: ORTHOPAEDIC SURGERY

## 2019-12-10 ENCOUNTER — ANESTHESIA EVENT (OUTPATIENT)
Dept: SURGERY | Facility: HOSPITAL | Age: 44
End: 2019-12-10
Payer: MEDICAID

## 2019-12-10 ENCOUNTER — HOSPITAL ENCOUNTER (OUTPATIENT)
Dept: RADIOLOGY | Facility: HOSPITAL | Age: 44
Discharge: HOME | End: 2019-12-10
Attending: ANESTHESIOLOGY | Admitting: ANESTHESIOLOGY
Payer: COMMERCIAL

## 2019-12-10 ENCOUNTER — PATIENT MESSAGE (OUTPATIENT)
Dept: ORTHOPEDICS | Facility: CLINIC | Age: 44
End: 2019-12-10

## 2019-12-10 ENCOUNTER — TELEPHONE (OUTPATIENT)
Dept: FAMILY MEDICINE | Facility: CLINIC | Age: 44
End: 2019-12-10

## 2019-12-10 ENCOUNTER — HOSPITAL ENCOUNTER (OUTPATIENT)
Facility: HOSPITAL | Age: 44
Discharge: HOME | End: 2019-12-10
Attending: ANESTHESIOLOGY | Admitting: ANESTHESIOLOGY
Payer: COMMERCIAL

## 2019-12-10 VITALS
SYSTOLIC BLOOD PRESSURE: 132 MMHG | RESPIRATION RATE: 16 BRPM | WEIGHT: 222 LBS | HEIGHT: 70 IN | DIASTOLIC BLOOD PRESSURE: 89 MMHG | HEART RATE: 89 BPM | OXYGEN SATURATION: 99 % | BODY MASS INDEX: 31.78 KG/M2 | TEMPERATURE: 97 F

## 2019-12-10 DIAGNOSIS — G90.512 COMPLEX REGIONAL PAIN SYNDROME TYPE 1 OF LEFT UPPER EXTREMITY: Primary | ICD-10-CM

## 2019-12-10 DIAGNOSIS — M51.36 DDD (DEGENERATIVE DISC DISEASE), LUMBAR: ICD-10-CM

## 2019-12-10 PROCEDURE — 64450 NJX AA&/STRD OTHER PN/BRANCH: CPT | Mod: PO | Performed by: ANESTHESIOLOGY

## 2019-12-10 PROCEDURE — 77003 FLUOROGUIDE FOR SPINE INJECT: CPT | Mod: 26,,, | Performed by: ANESTHESIOLOGY

## 2019-12-10 PROCEDURE — 64510 N BLOCK STELLATE GANGLION: CPT | Mod: LT,,, | Performed by: ANESTHESIOLOGY

## 2019-12-10 PROCEDURE — 64510 PR INJECT NERV BLCK,STELLATE GANGLION: ICD-10-PCS | Mod: LT,,, | Performed by: ANESTHESIOLOGY

## 2019-12-10 PROCEDURE — 76000 FLUOROSCOPY <1 HR PHYS/QHP: CPT | Mod: TC,PO

## 2019-12-10 PROCEDURE — 64510 N BLOCK STELLATE GANGLION: CPT | Mod: PO | Performed by: ANESTHESIOLOGY

## 2019-12-10 PROCEDURE — 77003 PR FLUOROSCOPIC GUIDANCE SPINAL INJECTION: ICD-10-PCS | Mod: 26,,, | Performed by: ANESTHESIOLOGY

## 2019-12-10 PROCEDURE — 63600175 PHARM REV CODE 636 W HCPCS: Mod: PO | Performed by: ANESTHESIOLOGY

## 2019-12-10 RX ORDER — SODIUM CHLORIDE, SODIUM LACTATE, POTASSIUM CHLORIDE, CALCIUM CHLORIDE 600; 310; 30; 20 MG/100ML; MG/100ML; MG/100ML; MG/100ML
INJECTION, SOLUTION INTRAVENOUS CONTINUOUS
Status: DISCONTINUED | OUTPATIENT
Start: 2019-12-10 | End: 2019-12-10 | Stop reason: HOSPADM

## 2019-12-10 RX ORDER — MIDAZOLAM HYDROCHLORIDE 2 MG/2ML
INJECTION, SOLUTION INTRAMUSCULAR; INTRAVENOUS
Status: DISCONTINUED | OUTPATIENT
Start: 2019-12-10 | End: 2019-12-10 | Stop reason: HOSPADM

## 2019-12-10 RX ADMIN — SODIUM CHLORIDE, SODIUM LACTATE, POTASSIUM CHLORIDE, AND CALCIUM CHLORIDE: .6; .31; .03; .02 INJECTION, SOLUTION INTRAVENOUS at 08:12

## 2019-12-10 NOTE — DISCHARGE SUMMARY
[facility]  Discharge Note  Short Stay    Admit Date: 12/10/2019    Discharge Date: 12/10/2019    Attending Physician: Vincent Alejandre     Discharge Provider: Vincent Alejandre    Diagnoses:  Active Hospital Problems    Diagnosis  POA    Complex regional pain syndrome type 1 of left upper extremity [G90.512]  Yes      Resolved Hospital Problems   No resolved problems to display.       Discharged Condition: Good    Final Diagnoses: Complex regional pain syndrome type 1 of left upper extremity [G90.512]    Disposition: Home or Self Care    Hospital Course: No complications, uneventful    Outcome of Hospitalization, Treatment, Procedure, or Surgery:  Patient was admitted for outpatient interventional pain management procedure. The patient tolerated the procedure well with no complications.    Follow up/Patient Instructions:  Follow up as scheduled in Pain Management office in 3-4 weeks.  Patient has received instructions and follow up date and time.    Medications:  Continue previous medications    Discharge Procedure Orders   Notify your health care provider if you experience any of the following:  temperature >100.4     Notify your health care provider if you experience any of the following:  persistent nausea and vomiting or diarrhea     Notify your health care provider if you experience any of the following:  severe uncontrolled pain     Notify your health care provider if you experience any of the following:  redness, tenderness, or signs of infection (pain, swelling, redness, odor or green/yellow discharge around incision site)     Notify your health care provider if you experience any of the following:  difficulty breathing or increased cough     Notify your health care provider if you experience any of the following:  severe persistent headache     Notify your health care provider if you experience any of the following:  worsening rash     Notify your health care provider if you experience any of the  following:  persistent dizziness, light-headedness, or visual disturbances     Notify your health care provider if you experience any of the following:  increased confusion or weakness     Activity as tolerated         Discharge Procedure Orders (must include Diet, Follow-up, Activity):   Discharge Procedure Orders (must include Diet, Follow-up, Activity)   Notify your health care provider if you experience any of the following:  temperature >100.4     Notify your health care provider if you experience any of the following:  persistent nausea and vomiting or diarrhea     Notify your health care provider if you experience any of the following:  severe uncontrolled pain     Notify your health care provider if you experience any of the following:  redness, tenderness, or signs of infection (pain, swelling, redness, odor or green/yellow discharge around incision site)     Notify your health care provider if you experience any of the following:  difficulty breathing or increased cough     Notify your health care provider if you experience any of the following:  severe persistent headache     Notify your health care provider if you experience any of the following:  worsening rash     Notify your health care provider if you experience any of the following:  persistent dizziness, light-headedness, or visual disturbances     Notify your health care provider if you experience any of the following:  increased confusion or weakness     Activity as tolerated

## 2019-12-10 NOTE — TELEPHONE ENCOUNTER
----- Message from Arturo Padilla sent at 12/10/2019  3:44 PM CST -----  Contact: Pt  Please give pt a cvall at .[phone] (home) [phone] (work) he is returning the nurse call regarding his results

## 2019-12-10 NOTE — OP NOTE
PROCEDURE DATE: 12/10/2019    PROCEDURE: left side stellate ganglion block utilizing fluoroscopy    DIAGNOSIS: CRPS  Post Op diagnosis: Same    PHYSICIAN: Vincent Alejandre MD    MEDICATIONS INJECTED:    1) Test dose:  Lidocaine 1% with 1:200,000 epinephrine, 4 ml total  2) Treatment dose: Bupivicane 0.25%, 6ml total    LOCAL ANESTHETIC INJECTED:  Lidocaine 1%. ??ml per site.    SEDATION MEDICATIONS: versed 2mg    ESTIMATED BLOOD LOSS:  none    COMPLICATIONS:  none    TECHNIQUE:   A time-out taken to identify patient and procedure side prior to starting the procedure. The patient was placed in supine position, prepped and draped in the usual sterile fashion using ChloraPrep and sterile towels.  The area to be injected was determined under fluoroscopic guidance in AP and oblique view. The fluoroscope was rotated to a left-side oblique view at which point the uncinate process of the C6 vertebra was identified. Carotid artery was palpated and insured not in path of needle placement. Local anesthetic was given by raising a wheel and going down to the hub of a 25-gauge 1.5 inch needle.  In oblique view, a 3.5 inch 25-gauge bent-tip spinal needle was introduced and followed until it made contact with the uncinate process just anterior to the C6 foramen. After negative aspiration for blood or air, contrast dye was injected to confirm appropriate placement and that there was no vascular uptake, and this was also performed under live digital subtraction.  The test dose as noted above was given over 4 minutes and there were no signs of HR or BP changes, no tinnitus or circumoral numbness. Again, aspiration was negative for blood or air and the treatment medication was then given slowly over 5 minutes. The patient tolerated the procedure well.   The patient was given post procedure and discharge instructions to follow at home. The patient was discharged in a stable condition.

## 2019-12-10 NOTE — INTERVAL H&P NOTE
The patient has been examined and the H&P has been reviewed:    I concur with the findings and no changes have occurred since H&P was written.  Procedure explained using an anatomical model.  Risks, benefits, alternatives explained to patient who verbalized understanding, including increased risk of infection, bleeding, need for additional procedures or surgery, and nerve damage.  Questions regarding the procedure, risks, expected outcome, and possible side effects were solicited and answered to the patient's satisfaction.  Reymundo wishes to proceed with the injection.  Verbal and written consent were obtained.   ASA 2, MP II    Anesthesia/Surgery risks, benefits and alternative options discussed and understood by patient/family.          Active Hospital Problems    Diagnosis  POA    Complex regional pain syndrome type 1 of left upper extremity [G90.512]  Yes      Resolved Hospital Problems   No resolved problems to display.

## 2019-12-10 NOTE — DISCHARGE INSTRUCTIONS
PAIN MANAGEMENT    Home care instructions   Apply ice pack to the injection site for 20 minute prior for the first 24 hours for soreness/discomfort at injection site   DO NOT USE HEAT FOR 24 HOURS   Keep site clean and dry for 24 hours, remove bandaid when desired   Do not drive until tomorrow        BLOCKS  Resume regular activities today  Pain office will call in next 2 days      Resume Aspirin, Plavix, or Coumadin the day after the procedure unless other wise instructed  Resume home medication as prescribed today      CALL PHYSICIAN FOR:   Severe increase in your usual pain or appearance of new pain   Prolonged or increasing weakness or numbness in the legs or arms   Fever greater then 100 degrees F..   Drainage from the incision site, redness, active bleeding or increased swelling at the injection site   Headache that increases when your head is upright and decreases when you lie flat    FOR EMERGENCIES:   Go directly to Emergency Department for Shortness of breath, chest pain, or problems breathing

## 2019-12-11 ENCOUNTER — PATIENT MESSAGE (OUTPATIENT)
Dept: SURGERY | Facility: HOSPITAL | Age: 44
End: 2019-12-11

## 2019-12-11 ENCOUNTER — ANESTHESIA (OUTPATIENT)
Dept: SURGERY | Facility: HOSPITAL | Age: 44
End: 2019-12-11
Payer: MEDICAID

## 2019-12-11 ENCOUNTER — HOSPITAL ENCOUNTER (OUTPATIENT)
Facility: HOSPITAL | Age: 44
Discharge: HOME | End: 2019-12-11
Attending: OTOLARYNGOLOGY | Admitting: OTOLARYNGOLOGY
Payer: MEDICAID

## 2019-12-11 DIAGNOSIS — M95.0 NASAL VALVE COLLAPSE: ICD-10-CM

## 2019-12-11 DIAGNOSIS — J34.3 HYPERTROPHY OF BOTH INFERIOR NASAL TURBINATES: ICD-10-CM

## 2019-12-11 DIAGNOSIS — J34.89 NASAL OBSTRUCTION: Primary | ICD-10-CM

## 2019-12-11 DIAGNOSIS — J32.0 CHRONIC MAXILLARY SINUSITIS: ICD-10-CM

## 2019-12-11 DIAGNOSIS — J34.2 NASAL SEPTAL DEVIATION: ICD-10-CM

## 2019-12-11 PROCEDURE — D9220A PRA ANESTHESIA: ICD-10-PCS | Mod: ANES,,, | Performed by: ANESTHESIOLOGY

## 2019-12-11 PROCEDURE — 25000242 PHARM REV CODE 250 ALT 637 W/ HCPCS: Mod: PO | Performed by: NURSE ANESTHETIST, CERTIFIED REGISTERED

## 2019-12-11 PROCEDURE — 37000008 HC ANESTHESIA 1ST 15 MINUTES: Mod: PO | Performed by: OTOLARYNGOLOGY

## 2019-12-11 PROCEDURE — 63600175 PHARM REV CODE 636 W HCPCS: Mod: PO | Performed by: ANESTHESIOLOGY

## 2019-12-11 PROCEDURE — 25000003 PHARM REV CODE 250: Mod: PO | Performed by: OTOLARYNGOLOGY

## 2019-12-11 PROCEDURE — 36000706: Mod: PO | Performed by: OTOLARYNGOLOGY

## 2019-12-11 PROCEDURE — 25000003 PHARM REV CODE 250: Mod: PO | Performed by: NURSE ANESTHETIST, CERTIFIED REGISTERED

## 2019-12-11 PROCEDURE — 63600175 PHARM REV CODE 636 W HCPCS: Mod: PO | Performed by: OTOLARYNGOLOGY

## 2019-12-11 PROCEDURE — 30465 REPAIR NASAL STENOSIS: CPT | Mod: ,,, | Performed by: OTOLARYNGOLOGY

## 2019-12-11 PROCEDURE — 27201423 OPTIME MED/SURG SUP & DEVICES STERILE SUPPLY: Mod: PO | Performed by: OTOLARYNGOLOGY

## 2019-12-11 PROCEDURE — 30465 PR REPAIR NASAL CAVITY STENOSIS: ICD-10-PCS | Mod: ,,, | Performed by: OTOLARYNGOLOGY

## 2019-12-11 PROCEDURE — 00160 ANES PX NOSE&SINUS NOS: CPT | Mod: PO | Performed by: OTOLARYNGOLOGY

## 2019-12-11 PROCEDURE — 63600175 PHARM REV CODE 636 W HCPCS: Mod: PO | Performed by: NURSE ANESTHETIST, CERTIFIED REGISTERED

## 2019-12-11 PROCEDURE — 20912 PR REMV CARTILAGE FOR GRAFT NASAL: ICD-10-PCS | Mod: 51,,, | Performed by: OTOLARYNGOLOGY

## 2019-12-11 PROCEDURE — 31256 PR NASAL/SINUS ENDOSCOPY,OPEN MAXILL SINUS: ICD-10-PCS | Mod: 51,LT,, | Performed by: OTOLARYNGOLOGY

## 2019-12-11 PROCEDURE — 20912 REMOVE CARTILAGE FOR GRAFT: CPT | Mod: 51,,, | Performed by: OTOLARYNGOLOGY

## 2019-12-11 PROCEDURE — D9220A PRA ANESTHESIA: Mod: ANES,,, | Performed by: ANESTHESIOLOGY

## 2019-12-11 PROCEDURE — 30140 RESECT INFERIOR TURBINATE: CPT | Mod: 50,51,, | Performed by: OTOLARYNGOLOGY

## 2019-12-11 PROCEDURE — D9220A PRA ANESTHESIA: ICD-10-PCS | Mod: CRNA,,, | Performed by: NURSE ANESTHETIST, CERTIFIED REGISTERED

## 2019-12-11 PROCEDURE — 30520 REPAIR OF NASAL SEPTUM: CPT | Mod: 51,,, | Performed by: OTOLARYNGOLOGY

## 2019-12-11 PROCEDURE — 36000707: Mod: PO | Performed by: OTOLARYNGOLOGY

## 2019-12-11 PROCEDURE — 37000009 HC ANESTHESIA EA ADD 15 MINS: Mod: PO | Performed by: OTOLARYNGOLOGY

## 2019-12-11 PROCEDURE — 31256 EXPLORATION MAXILLARY SINUS: CPT | Mod: 51,LT,, | Performed by: OTOLARYNGOLOGY

## 2019-12-11 PROCEDURE — 30520 PR REPAIR, NASAL SEPTUM: ICD-10-PCS | Mod: 51,,, | Performed by: OTOLARYNGOLOGY

## 2019-12-11 PROCEDURE — D9220A PRA ANESTHESIA: Mod: CRNA,,, | Performed by: NURSE ANESTHETIST, CERTIFIED REGISTERED

## 2019-12-11 PROCEDURE — 71000015 HC POSTOP RECOV 1ST HR: Mod: PO | Performed by: OTOLARYNGOLOGY

## 2019-12-11 PROCEDURE — 25000003 PHARM REV CODE 250: Mod: PO | Performed by: ANESTHESIOLOGY

## 2019-12-11 PROCEDURE — 71000033 HC RECOVERY, INTIAL HOUR: Mod: PO | Performed by: OTOLARYNGOLOGY

## 2019-12-11 PROCEDURE — 30140 PR EXCISION TURBINATE,SUBMUCOUS: ICD-10-PCS | Mod: 50,51,, | Performed by: OTOLARYNGOLOGY

## 2019-12-11 RX ORDER — DEXAMETHASONE SODIUM PHOSPHATE 4 MG/ML
8 INJECTION, SOLUTION INTRA-ARTICULAR; INTRALESIONAL; INTRAMUSCULAR; INTRAVENOUS; SOFT TISSUE
Status: COMPLETED | OUTPATIENT
Start: 2019-12-11 | End: 2019-12-11

## 2019-12-11 RX ORDER — CEFAZOLIN SODIUM 2 G/50ML
2 SOLUTION INTRAVENOUS
Status: DISCONTINUED | OUTPATIENT
Start: 2019-12-11 | End: 2019-12-11 | Stop reason: HOSPADM

## 2019-12-11 RX ORDER — FENTANYL CITRATE 50 UG/ML
25 INJECTION, SOLUTION INTRAMUSCULAR; INTRAVENOUS EVERY 5 MIN PRN
Status: DISCONTINUED | OUTPATIENT
Start: 2019-12-11 | End: 2019-12-11 | Stop reason: HOSPADM

## 2019-12-11 RX ORDER — PHENYLEPHRINE HYDROCHLORIDE 10 MG/ML
INJECTION INTRAVENOUS
Status: DISCONTINUED | OUTPATIENT
Start: 2019-12-11 | End: 2019-12-11

## 2019-12-11 RX ORDER — LIDOCAINE HYDROCHLORIDE AND EPINEPHRINE 10; 10 MG/ML; UG/ML
INJECTION, SOLUTION INFILTRATION; PERINEURAL
Status: DISCONTINUED | OUTPATIENT
Start: 2019-12-11 | End: 2019-12-11 | Stop reason: HOSPADM

## 2019-12-11 RX ORDER — HYDROMORPHONE HYDROCHLORIDE 2 MG/ML
0.2 INJECTION, SOLUTION INTRAMUSCULAR; INTRAVENOUS; SUBCUTANEOUS EVERY 5 MIN PRN
Status: DISCONTINUED | OUTPATIENT
Start: 2019-12-11 | End: 2019-12-11 | Stop reason: HOSPADM

## 2019-12-11 RX ORDER — SODIUM CHLORIDE, SODIUM LACTATE, POTASSIUM CHLORIDE, CALCIUM CHLORIDE 600; 310; 30; 20 MG/100ML; MG/100ML; MG/100ML; MG/100ML
INJECTION, SOLUTION INTRAVENOUS CONTINUOUS
Status: DISCONTINUED | OUTPATIENT
Start: 2019-12-11 | End: 2019-12-11 | Stop reason: HOSPADM

## 2019-12-11 RX ORDER — MEPERIDINE HYDROCHLORIDE 50 MG/ML
12.5 INJECTION INTRAMUSCULAR; INTRAVENOUS; SUBCUTANEOUS ONCE AS NEEDED
Status: DISCONTINUED | OUTPATIENT
Start: 2019-12-11 | End: 2019-12-11 | Stop reason: HOSPADM

## 2019-12-11 RX ORDER — OXYCODONE AND ACETAMINOPHEN 5; 325 MG/1; MG/1
1 TABLET ORAL EVERY 4 HOURS PRN
Qty: 15 TABLET | Refills: 0 | Status: SHIPPED | OUTPATIENT
Start: 2019-12-11 | End: 2019-12-13 | Stop reason: SDUPTHER

## 2019-12-11 RX ORDER — OXYMETAZOLINE HCL 0.05 %
2 SPRAY, NON-AEROSOL (ML) NASAL
Status: COMPLETED | OUTPATIENT
Start: 2019-12-11 | End: 2019-12-11

## 2019-12-11 RX ORDER — EPHEDRINE SULFATE 50 MG/ML
INJECTION, SOLUTION INTRAVENOUS
Status: DISCONTINUED | OUTPATIENT
Start: 2019-12-11 | End: 2019-12-11

## 2019-12-11 RX ORDER — GLYCOPYRROLATE 0.2 MG/ML
INJECTION INTRAMUSCULAR; INTRAVENOUS
Status: DISCONTINUED | OUTPATIENT
Start: 2019-12-11 | End: 2019-12-11

## 2019-12-11 RX ORDER — NEOSTIGMINE METHYLSULFATE 1 MG/ML
INJECTION, SOLUTION INTRAVENOUS
Status: DISCONTINUED | OUTPATIENT
Start: 2019-12-11 | End: 2019-12-11

## 2019-12-11 RX ORDER — OXYCODONE HYDROCHLORIDE 5 MG/1
5 TABLET ORAL
Status: DISCONTINUED | OUTPATIENT
Start: 2019-12-11 | End: 2019-12-11 | Stop reason: HOSPADM

## 2019-12-11 RX ORDER — ACETAMINOPHEN 10 MG/ML
INJECTION, SOLUTION INTRAVENOUS
Status: DISCONTINUED | OUTPATIENT
Start: 2019-12-11 | End: 2019-12-11

## 2019-12-11 RX ORDER — ALBUTEROL SULFATE 90 UG/1
AEROSOL, METERED RESPIRATORY (INHALATION)
Status: DISCONTINUED | OUTPATIENT
Start: 2019-12-11 | End: 2019-12-11

## 2019-12-11 RX ORDER — DIPHENHYDRAMINE HYDROCHLORIDE 50 MG/ML
25 INJECTION INTRAMUSCULAR; INTRAVENOUS EVERY 6 HOURS PRN
Status: DISCONTINUED | OUTPATIENT
Start: 2019-12-11 | End: 2019-12-11 | Stop reason: HOSPADM

## 2019-12-11 RX ORDER — PROPOFOL 10 MG/ML
VIAL (ML) INTRAVENOUS
Status: DISCONTINUED | OUTPATIENT
Start: 2019-12-11 | End: 2019-12-11

## 2019-12-11 RX ORDER — CEPHALEXIN 500 MG/1
500 CAPSULE ORAL EVERY 8 HOURS
Qty: 15 CAPSULE | Refills: 0 | Status: SHIPPED | OUTPATIENT
Start: 2019-12-11 | End: 2019-12-16

## 2019-12-11 RX ORDER — SODIUM CHLORIDE 0.9 % (FLUSH) 0.9 %
3 SYRINGE (ML) INJECTION
Status: DISCONTINUED | OUTPATIENT
Start: 2019-12-11 | End: 2019-12-11 | Stop reason: HOSPADM

## 2019-12-11 RX ORDER — FENTANYL CITRATE 50 UG/ML
INJECTION, SOLUTION INTRAMUSCULAR; INTRAVENOUS
Status: DISCONTINUED | OUTPATIENT
Start: 2019-12-11 | End: 2019-12-11

## 2019-12-11 RX ORDER — KETAMINE HYDROCHLORIDE 100 MG/ML
INJECTION, SOLUTION INTRAMUSCULAR; INTRAVENOUS
Status: DISCONTINUED | OUTPATIENT
Start: 2019-12-11 | End: 2019-12-11

## 2019-12-11 RX ORDER — LIDOCAINE HYDROCHLORIDE 10 MG/ML
1 INJECTION, SOLUTION EPIDURAL; INFILTRATION; INTRACAUDAL; PERINEURAL ONCE
Status: DISCONTINUED | OUTPATIENT
Start: 2019-12-11 | End: 2019-12-11 | Stop reason: HOSPADM

## 2019-12-11 RX ORDER — ROCURONIUM BROMIDE 10 MG/ML
INJECTION, SOLUTION INTRAVENOUS
Status: DISCONTINUED | OUTPATIENT
Start: 2019-12-11 | End: 2019-12-11

## 2019-12-11 RX ORDER — EPINEPHRINE 1 MG/ML
INJECTION INTRAMUSCULAR; INTRAVENOUS; SUBCUTANEOUS
Status: DISCONTINUED | OUTPATIENT
Start: 2019-12-11 | End: 2019-12-11 | Stop reason: HOSPADM

## 2019-12-11 RX ORDER — ONDANSETRON 2 MG/ML
INJECTION INTRAMUSCULAR; INTRAVENOUS
Status: DISCONTINUED | OUTPATIENT
Start: 2019-12-11 | End: 2019-12-11

## 2019-12-11 RX ORDER — MIDAZOLAM HYDROCHLORIDE 1 MG/ML
INJECTION, SOLUTION INTRAMUSCULAR; INTRAVENOUS
Status: DISCONTINUED | OUTPATIENT
Start: 2019-12-11 | End: 2019-12-11

## 2019-12-11 RX ORDER — LIDOCAINE HCL/PF 100 MG/5ML
SYRINGE (ML) INTRAVENOUS
Status: DISCONTINUED | OUTPATIENT
Start: 2019-12-11 | End: 2019-12-11

## 2019-12-11 RX ADMIN — EPHEDRINE SULFATE 10 MG: 50 INJECTION, SOLUTION INTRAMUSCULAR; INTRAVENOUS; SUBCUTANEOUS at 10:12

## 2019-12-11 RX ADMIN — SODIUM CHLORIDE, SODIUM LACTATE, POTASSIUM CHLORIDE, AND CALCIUM CHLORIDE: .6; .31; .03; .02 INJECTION, SOLUTION INTRAVENOUS at 09:12

## 2019-12-11 RX ADMIN — PHENYLEPHRINE HYDROCHLORIDE 200 MCG: 10 INJECTION, SOLUTION INTRAMUSCULAR; INTRAVENOUS; SUBCUTANEOUS at 10:12

## 2019-12-11 RX ADMIN — FENTANYL CITRATE 25 MCG: 50 INJECTION INTRAMUSCULAR; INTRAVENOUS at 12:12

## 2019-12-11 RX ADMIN — ONDANSETRON 4 MG: 2 INJECTION, SOLUTION INTRAMUSCULAR; INTRAVENOUS at 11:12

## 2019-12-11 RX ADMIN — LIDOCAINE HYDROCHLORIDE 100 MG: 20 INJECTION PARENTERAL at 09:12

## 2019-12-11 RX ADMIN — NEOSTIGMINE METHYLSULFATE 5 MG: 1 INJECTION INTRAVENOUS at 11:12

## 2019-12-11 RX ADMIN — PHENYLEPHRINE HYDROCHLORIDE 200 MCG: 10 INJECTION, SOLUTION INTRAMUSCULAR; INTRAVENOUS; SUBCUTANEOUS at 09:12

## 2019-12-11 RX ADMIN — EPHEDRINE SULFATE 5 MG: 50 INJECTION, SOLUTION INTRAMUSCULAR; INTRAVENOUS; SUBCUTANEOUS at 09:12

## 2019-12-11 RX ADMIN — PHENYLEPHRINE HYDROCHLORIDE 100 MCG: 10 INJECTION, SOLUTION INTRAMUSCULAR; INTRAVENOUS; SUBCUTANEOUS at 11:12

## 2019-12-11 RX ADMIN — ROCURONIUM BROMIDE 40 MG: 10 INJECTION, SOLUTION INTRAVENOUS at 09:12

## 2019-12-11 RX ADMIN — KETAMINE HYDROCHLORIDE 25 MG: 100 INJECTION, SOLUTION, CONCENTRATE INTRAMUSCULAR; INTRAVENOUS at 09:12

## 2019-12-11 RX ADMIN — SODIUM CHLORIDE, SODIUM LACTATE, POTASSIUM CHLORIDE, AND CALCIUM CHLORIDE: .6; .31; .03; .02 INJECTION, SOLUTION INTRAVENOUS at 08:12

## 2019-12-11 RX ADMIN — MIDAZOLAM HYDROCHLORIDE 2 MG: 1 INJECTION, SOLUTION INTRAMUSCULAR; INTRAVENOUS at 08:12

## 2019-12-11 RX ADMIN — FENTANYL CITRATE 100 MCG: 50 INJECTION, SOLUTION INTRAMUSCULAR; INTRAVENOUS at 09:12

## 2019-12-11 RX ADMIN — DEXAMETHASONE SODIUM PHOSPHATE 8 MG: 4 INJECTION, SOLUTION INTRAMUSCULAR; INTRAVENOUS at 08:12

## 2019-12-11 RX ADMIN — PHENYLEPHRINE HYDROCHLORIDE 100 MCG: 10 INJECTION, SOLUTION INTRAMUSCULAR; INTRAVENOUS; SUBCUTANEOUS at 09:12

## 2019-12-11 RX ADMIN — EPHEDRINE SULFATE 10 MG: 50 INJECTION, SOLUTION INTRAMUSCULAR; INTRAVENOUS; SUBCUTANEOUS at 09:12

## 2019-12-11 RX ADMIN — FENTANYL CITRATE 50 MCG: 50 INJECTION, SOLUTION INTRAMUSCULAR; INTRAVENOUS at 11:12

## 2019-12-11 RX ADMIN — CEFAZOLIN SODIUM 2 G: 2 SOLUTION INTRAVENOUS at 09:12

## 2019-12-11 RX ADMIN — Medication 2 SPRAY: at 08:12

## 2019-12-11 RX ADMIN — GLYCOPYRROLATE 0.6 MG: 0.2 INJECTION, SOLUTION INTRAMUSCULAR; INTRAVENOUS at 11:12

## 2019-12-11 RX ADMIN — KETAMINE HYDROCHLORIDE 10 MG: 100 INJECTION, SOLUTION, CONCENTRATE INTRAMUSCULAR; INTRAVENOUS at 10:12

## 2019-12-11 RX ADMIN — OXYCODONE HYDROCHLORIDE 5 MG: 5 TABLET ORAL at 12:12

## 2019-12-11 RX ADMIN — ALBUTEROL SULFATE 5 PUFF: 90 AEROSOL, METERED RESPIRATORY (INHALATION) at 09:12

## 2019-12-11 RX ADMIN — ROCURONIUM BROMIDE 20 MG: 10 INJECTION, SOLUTION INTRAVENOUS at 10:12

## 2019-12-11 RX ADMIN — SODIUM CHLORIDE, SODIUM LACTATE, POTASSIUM CHLORIDE, AND CALCIUM CHLORIDE: .6; .31; .03; .02 INJECTION, SOLUTION INTRAVENOUS at 11:12

## 2019-12-11 RX ADMIN — PROPOFOL 200 MG: 10 INJECTION, EMULSION INTRAVENOUS at 09:12

## 2019-12-11 RX ADMIN — ROCURONIUM BROMIDE 10 MG: 10 INJECTION, SOLUTION INTRAVENOUS at 09:12

## 2019-12-11 RX ADMIN — PHENYLEPHRINE HYDROCHLORIDE 200 MCG: 10 INJECTION, SOLUTION INTRAMUSCULAR; INTRAVENOUS; SUBCUTANEOUS at 08:12

## 2019-12-11 RX ADMIN — ACETAMINOPHEN 1000 MG: 10 INJECTION, SOLUTION INTRAVENOUS at 09:12

## 2019-12-11 NOTE — ANESTHESIA PREPROCEDURE EVALUATION
12/11/2019  Reymundo Gutierrez is a 44 y.o., male.    Anesthesia Evaluation    I have reviewed the Patient Summary Reports.    I have reviewed the Nursing Notes.   I have reviewed the Medications.     Review of Systems  Anesthesia Hx:  No problems with previous Anesthesia    Social:  Smoker    Cardiovascular:   Hypertension, well controlled    Pulmonary:   Asthma asymptomatic and mild    Renal/:  Renal/ Normal     Neurological:   Neuromuscular Disease, (RSD/CPRS -1)    Endocrine:  Endocrine Normal    Psych:   Psychiatric History          Physical Exam  General:  Well nourished, Obesity    Airway/Jaw/Neck:  Airway Findings: Mouth Opening: Normal Tongue: Normal  General Airway Assessment: Adult  Oropharynx Findings:  Mallampati: III  TM Distance: Normal, at least 6 cm  Jaw/Neck Findings:  Neck ROM: Normal ROM     Eyes/Ears/Nose:  Eyes/Ears/Nose Findings:    Dental:  Dental Findings: In tact   Chest/Lungs:  Chest/Lungs Findings: Normal Respiratory Rate     Heart/Vascular:  Heart Findings: Rate: Normal  Rhythm: Regular Rhythm        Mental Status:  Mental Status Findings:  Cooperative, Alert and Oriented         Anesthesia Plan  Type of Anesthesia, risks & benefits discussed:  Anesthesia Type:  general  Patient's Preference:   Intra-op Monitoring Plan: standard ASA monitors  Intra-op Monitoring Plan Comments:   Post Op Pain Control Plan: multimodal analgesia  Post Op Pain Control Plan Comments:   Induction:   IV  Beta Blocker:  Patient is not currently on a Beta-Blocker (No further documentation required).       Informed Consent: Patient understands risks and agrees with Anesthesia plan.  Questions answered. Anesthesia consent signed with patient.  ASA Score: 2     Day of Surgery Review of History & Physical:  There are no significant changes.   H&P completed by Anesthesiologist.       Ready For Surgery  From Anesthesia Perspective.

## 2019-12-11 NOTE — ANESTHESIA PROCEDURE NOTES
Intubation  Performed by: Ayla Laura CRNA  Authorized by: Gustavo Roque MD     Intubation:     Induction:  Intravenous    Intubated:  Postinduction    Mask Ventilation:  Moderately difficult with oral airway    Attempts:  2    Attempted By:  CRNA    Method of Intubation:  Direct    Blade:  Elena 2    Laryngeal View Grade: Grade IV - neither epiglottis nor glottis seen      Attempted By (2nd Attempt):  CRNA    Method of Intubation (2nd Attempt):  Other (see comments)    Blade (2nd Attempt):  Glidescope 4    Laryngeal View Grade (2nd Attempt): Grade IIa - cords partially seen      Difficult Airway Encountered?: No      Complications:  None    Airway Device:  Oral endotracheal tube    Airway Device Size:  8.0    Style/Cuff Inflation:  Cuffed (inflated to minimal occlusive pressure)    Inflation Amount (mL):  5    Tube secured:  21    Secured at:  The lips    Placement Verified By:  Capnometry    Complicating Factors:  Poor neck/head extension and obesity    Findings Post-Intubation:  BS equal bilateral

## 2019-12-11 NOTE — TRANSFER OF CARE
"Anesthesia Transfer of Care Note    Patient: Reymundo Gutierrez    Procedure(s) Performed: Procedure(s) (LRB):  SEPTOPLASTY, NASAL (Bilateral)  REDUCTION, NASAL TURBINATE (Bilateral)  MAXILLARY ANTROSTOMY (Left)    Patient location: PACU    Anesthesia Type: general    Transport from OR: Transported from OR on room air with adequate spontaneous ventilation    Post pain: adequate analgesia    Post assessment: no apparent anesthetic complications    Post vital signs: stable    Level of consciousness: responds to stimulation    Nausea/Vomiting: no nausea/vomiting    Complications: none    Transfer of care protocol was followed      Last vitals:   Visit Vitals  BP (!) 146/95 (BP Location: Right arm, Patient Position: Sitting)   Pulse 89   Temp 36.8 °C (98.2 °F) (Skin)   Resp 19   Ht 5' 10" (1.778 m)   Wt 102.1 kg (225 lb)   SpO2 98%   BMI 32.28 kg/m²     "

## 2019-12-11 NOTE — DISCHARGE INSTRUCTIONS
Post-op Sinus / Nasal Surgery  Tomy Medrano MD  Department of Otolaryngology, Head and Neck Surgery  [facility]      PHONE NUMBERS:    Office number: [phone]  EMERGENCY: call 911  URGENT ISSUES or after hours: [phone]  My cell phone: [phone]    WHAT TO DO    1. You should follow-up on:  Monday  Please call [phone] to set up a specific time or to change dates.  Call me ASAP if you are experiencing any unexpected problems.  2. Use the recommended medications / treatments as described  3. Familiarize yourself with the information in this pamphlet  4. Call me with questions. Also, please call me the day after surgery to let me know how you are doing.  5.  Cell phone: [phone], please call me with any concerns or questions. I am not always available to answer my cell phone, so for any urgent or important issues, use the other numbers provided above.    WHAT TO EXPECT    Nasal Discharge & Bleeding  · It is common to have mild bleeding and nasal drainage after nasal &amp; sinus surgery.  · After surgery, a drip pad may have been placed under your nose. You may remove this at any point and can replace it if necessary at your discretion.    Pain & Pressure  · It is common to experience mild-to- moderate pain and pressure in your face and around your eyes.  · The pain usually is worst the first day after surgery.  · Use your medications as described below.  · For any severe pain, please contact me or present to the emergency room.    Nasal Congestion & Crusting  · It is common to experience nasal congestion after surgery. This is due to swelling and scabs. DO NOT BLOW YOUR NOSE until I say it is OK.  · Crusts are essentially scabs and mucus that build up in the nasal passages after surgery.  · Crusts eventually resolve. Usually, I will remove some crusts during your postoperative visit.  · Nasal irrigation helps to soften and remove scabs. Start irrigation  NOW  · Nasal irrigation kits are available over the counter in the nasal section. Common brands include SinuCleanse or NeilMed.    Nasal Irrigations  This will help remove the allergens, debris, and mucous from your nose to help you breathe. It will also clear it in preparation for other nasal medications.    To perform, purchase an over the counter sinus irrigation kit such as the NeilMed Sinus Rinse Kit. Use as directed on the box. You should use distilled water or water that was previously boiled and left to cool. If you wish, you may make your own solution. However, salt packets are available in the nasal section in your  drug store.     A rough estimate for making salt solution is:  8oz water  2 teaspoons salt (pickling, izzy or Kosher salt)  1 teaspoon baking soda    After each use, rinse the bottle with small amount of rubbing alcohol and clean with soap.  Replace the irrigation bottle if it becomes visibly soiled or every few weeks.      Fatigue  · It is common to feel mild to moderate fatigue for 7-10 days after surgery.    ACTIVITY    · First 1-2 days after surgery  · Plan to rest. You may start light activities as tolerated.  · Days 2 though 10 following surgery  · Light activity only until 10 days after surgery, gradually increase activity.  · No extensive bending over for 7 days after surgery.  · No lifting over 20 pounds for one week after surgery  · You may shower starting 1 day after surgery    NUTRITION    · Day of surgery  · Drink plenty of water / fluids  · Eat light snacks as tolerated  · It is common for people to have less of an appetite the day of surgery    · Day after surgery  · Advance your diet as tolerated    MEDICATIONS    · Percocet (oxycodone): take one or two every 4-6 hours as needed for pain.  · [place] law now prohibits prescription of more than 1 week of narcotic pain medication. You should wean from narcotic pain medication by that point, but it not, you will need to come to  have a refill at that point approved by your doctor.  · Oral antibiotics: Take the entire course as prescribed  · Mupirocin ointment: place a pea-sized amount in your nose three times per day for 5 days.  · NOTE: please take an over the counter stool softener while you are on pain medications - they may cause constipation.    MEDICATION SAFETY TIPS    · Use medications only as directed in the amounts specified  · Avoid aspirin, ibuprofen, naproxen, and related pain medications for 10 days. Avoid fish oil (omega acids) and vitamin E for 1 week.  · As your pain lessens, you may Replace doses of prescription pain medications with acetaminophen (Tylenol). However, Do Not take doses of prescription pain medications at the same time as Tylenol because this could cause an overdose of Tylenol.  · Do Not drive or operate machinery if taking prescription pain medications  · Read each medication label carefully, ask your pharmacist if you have any questions regarding warnings on the label  · Do not measure liquid with a kitchen spoon. There are pediatric measuring devices available at the pharmacy. Ask for one when you get your prescription filled.  · Store all mediations out of reach of children.  · Call the [facility] if your child takes too much of any medicine or if your child  · consumes your medication [phone] or [phone].    Call my office if you experience any of the following:    · Fever higher than 101 F  · Clear, watery nasal discharge  · Any visual changes or marked swelling around the eyes  · Severe headache or neck stiffness  · Brisk bleeding

## 2019-12-11 NOTE — PROGRESS NOTES
"Mr. Gutierrez returns to clinic today.  Has a history of left wrist arthrodesis, date of surgery 08/06/19.  He is slowly improving with therapy.  He has been sent to Dr Alejandre in pain management for treatment of Complex regional pain syndrome type 1 of left upper extremity and is scheduled tomorrow for a stellate ganglion block. His pain rating today is 6/10 and "feels like burning."  He is taking Elavil.         Physical exam:  Examination of the left wrist and hand reveals scars at the incisions sites.  There is no significant edema.  There is no erythema.  Palpation produces mild to moderate tenderness throughout the wrist and hand.  He is able to flex to within 1 cm of his distal palmar crease and he is able to extend to within 30° of full extension. He does have capillary refill less than 2 sec in all the digits.     Radiology:  X-rays of the left wrist were taken in clinic today.  He is noted to have arthrodesis plate in place.  There is progression of the arthrodesis across the carpus.  The volar plate remains well fixed     Assessment:  Status post left wrist arthrodesis, 08/06/19     Plan:     1.  He at his maximum medical improvement today, we will send him to [facility] for a Functional Capacity Evaluation and Impairment rating.     2.  Will continue to use hand as normally as tolerated and continue treatment with Pain Management      3.  He will follow up with me as needed.   "

## 2019-12-11 NOTE — BRIEF OP NOTE
[facility]  Brief Operative Note     SUMMARY     Surgery Date: 12/11/2019     Surgeon(s) and Role:     * Tomy Medrano MD - Primary    Assisting Surgeon: None    Pre-op Diagnosis:  Chronic maxillary sinusitis [J32.0]  Nasal obstruction [J34.89]  Hypertrophy of both inferior nasal turbinates [J34.3]  Nasal septal deviation [J34.2]  Nasal valve collapse [J34.89]    Post-op Diagnosis:  Post-Op Diagnosis Codes:     * Chronic maxillary sinusitis [J32.0]     * Nasal obstruction [J34.89]     * Hypertrophy of both inferior nasal turbinates [J34.3]     * Nasal septal deviation [J34.2]     * Nasal valve collapse [J34.89]    Procedure(s) (LRB):  SEPTOPLASTY, NASAL (Bilateral)  REDUCTION, NASAL TURBINATE (Bilateral)  MAXILLARY ANTROSTOMY (Left)    Anesthesia: General    Description of the findings of the procedure: Septoplasty, Turbinates, L max, Nasal Valve repair    Findings/Key Components: Severely deviated nasal septum, turbinate hypertrophy, scarred and narrowed L [facility] with Carolin cell    Estimated Blood Loss: * No values recorded between 12/11/2019  9:21 AM and 12/11/2019 11:30 AM *         Specimens:   Specimen (12h ago, onward)    None          Discharge Note    SUMMARY     Admit Date: 12/11/2019    Discharge Date and Time:  12/11/2019 11:30 AM    Hospital Course (synopsis of major diagnoses, care, treatment, and services provided during the course of the hospital stay): Did well following surgery and was discharged uneventfully     Final Diagnosis: Post-Op Diagnosis Codes:     * Chronic maxillary sinusitis [J32.0]     * Nasal obstruction [J34.89]     * Hypertrophy of both inferior nasal turbinates [J34.3]     * Nasal septal deviation [J34.2]     * Nasal valve collapse [J34.89]    Disposition: Home or Self Care    Follow Up/Patient Instructions: Regular diet, Follow-up Monday. Activity light    Medications:  Reconciled Home Medications:   Current Discharge Medication List      START taking these  medications    Details   cephALEXin (KEFLEX) 500 MG capsule Take 1 capsule (500 mg total) by mouth every 8 (eight) hours for 5 days  Qty: 15 capsule, Refills: 0      oxyCODONE-acetaminophen (PERCOCET) 5-325 mg per tablet Take 1 tablet by mouth every 4 (four) hours as needed.  Qty: 15 tablet, Refills: 0    Comments: Quantity prescribed more than 7 day supply? No         CONTINUE these medications which have NOT CHANGED    Details   albuterol (PROVENTIL) 2.5 mg /3 mL (0.083 %) nebulizer solution Inhale 2.5 mg into the lungs.      albuterol (VENTOLIN HFA) 90 mcg/actuation inhaler Inhale 2 puffs into the lungs every 6 (six) hours as needed for Wheezing. Rescue  Qty: 18 g, Refills: 11      amLODIPine (NORVASC) 5 MG tablet Take 1 tablet (5 mg total) by mouth once daily.  Qty: 30 tablet, Refills: 1    Associated Diagnoses: Hypertension, essential      azelastine (ASTELIN) 137 mcg (0.1 %) nasal spray Use 2 sprays (274 mcg total) by Nasal route 2 (two) times daily.  Qty: 30 mL, Refills: 6      famotidine (PEPCID) 20 MG tablet       fluticasone propionate (FLONASE ALLERGY RELIEF) 50 mcg/actuation nasal spray Use 2 sprays (100 mcg total) by Each Nostril route once daily.  Qty: 16 g, Refills: 11      fluticasone-salmeterol diskus inhaler 250-50 mcg Inhale 1 puff into the lungs 2 (two) times daily. Controller  Qty: 60 each, Refills: 11    Associated Diagnoses: Mild intermittent asthma without complication      ibuprofen (ADVIL,MOTRIN) 800 MG tablet Take 1 tablet (800 mg total) by mouth 2 (two) times daily as needed for Pain.  Qty: 40 tablet, Refills: 2      amitriptyline (ELAVIL) 10 MG tablet Take 1 tablet (10 mg total) by mouth every evening for 21 days  Qty: 21 tablet, Refills: 0           No discharge procedures on file.  Follow-up Information     Tomy Medrano MD In 5 days.    Specialty:  Otolaryngology  Contact information:  [address] [place] [address] [phone]

## 2019-12-11 NOTE — INTERVAL H&P NOTE
The patient has been examined and the H&P has been reviewed:    I concur with the findings and no changes have occurred since H&P was written.    Anesthesia/Surgery risks, benefits and alternative options discussed and understood by patient/family.          Active Hospital Problems    Diagnosis  POA    Nasal obstruction [J34.89]  Yes      Resolved Hospital Problems   No resolved problems to display.

## 2019-12-11 NOTE — ANESTHESIA POSTPROCEDURE EVALUATION
Anesthesia Post Evaluation    Patient: Reymundo Gutierrez    Procedure(s) Performed: Procedure(s) (LRB):  SEPTOPLASTY, NASAL (Bilateral)  REDUCTION, NASAL TURBINATE (Bilateral)  MAXILLARY ANTROSTOMY (Left)    Final Anesthesia Type: general    Patient location during evaluation: PACU  Patient participation: Yes- Able to Participate  Level of consciousness: awake and alert and oriented  Post-procedure vital signs: reviewed and stable  Pain management: adequate  Airway patency: patent    PONV status at discharge: No PONV  Anesthetic complications: no      Cardiovascular status: blood pressure returned to baseline and stable  Respiratory status: unassisted and spontaneous ventilation  Hydration status: euvolemic  Follow-up not needed.          Vitals Value Taken Time   /87 12/11/2019 12:50 PM   Temp 36.3 °C (97.3 °F) 12/11/2019 11:50 AM   Pulse 99 12/11/2019 12:50 PM   Resp 16 12/11/2019 12:50 PM   SpO2 96 % 12/11/2019 12:50 PM         Event Time     Out of Recovery 12:20:00          Pain/Kalie Score: Pain Rating Prior to Med Admin: 7 (12/11/2019 12:50 PM)  Pain Rating Post Med Admin: 7 (12/11/2019 12:50 PM)  Kalie Score: 10 (12/11/2019 12:50 PM)

## 2019-12-12 ENCOUNTER — TELEPHONE (OUTPATIENT)
Dept: PHARMACY | Facility: CLINIC | Age: 44
End: 2019-12-12

## 2019-12-12 VITALS
SYSTOLIC BLOOD PRESSURE: 144 MMHG | DIASTOLIC BLOOD PRESSURE: 87 MMHG | RESPIRATION RATE: 16 BRPM | HEART RATE: 99 BPM | HEIGHT: 70 IN | WEIGHT: 225 LBS | TEMPERATURE: 97 F | OXYGEN SATURATION: 96 % | BODY MASS INDEX: 32.21 KG/M2

## 2019-12-12 DIAGNOSIS — J45.20 MILD INTERMITTENT ASTHMA WITHOUT COMPLICATION: Primary | Chronic | ICD-10-CM

## 2019-12-12 RX ORDER — BUDESONIDE AND FORMOTEROL FUMARATE DIHYDRATE 80; 4.5 UG/1; UG/1
2 AEROSOL RESPIRATORY (INHALATION) 2 TIMES DAILY
Qty: 10.2 G | Refills: 12 | Status: SHIPPED | OUTPATIENT
Start: 2019-12-12 | End: 2022-01-12 | Stop reason: SDUPTHER

## 2019-12-12 NOTE — TELEPHONE ENCOUNTER
Good fternoon,     The prior authorization for Reymundo Gutierrez's Advair Diskus prescription has been DENIED for the following reason(s): This is not a preferred drug for your health plan.  You must try a preferred drug first, which included Advair HFA 45/21 mcg, Advair /21 mcg, Advair /21 mcg, Symbicort or Dulera.    Advair diskus is no longer on formulary.    Patient has been notified of the decision on 12/12/19 and patient states he/she he will wait to hear what provider changes his inhaler to.    If there are any additional questions or concerns, please contact me.    If you would like to appeal this decision you may request a peer to peer by:  calling [phone], Press *, Say Authorization, Say Submit One, Say Pharmacy. A Prior Authorization  Representative will answer and schedule a Peer to Peer Review.       Thank You!   Pham Whitmore CPhT, B.A  Patient Care Advocate   [facility]  [email or web address]  Phone: [phone] Ext [phone]  Fax: [phone]

## 2019-12-12 NOTE — TELEPHONE ENCOUNTER
Left detailed message on patient vm stating that symbicort has been substituted for the Advair disk

## 2019-12-13 ENCOUNTER — PATIENT MESSAGE (OUTPATIENT)
Dept: OTOLARYNGOLOGY | Facility: CLINIC | Age: 44
End: 2019-12-13

## 2019-12-13 RX ORDER — OXYCODONE AND ACETAMINOPHEN 5; 325 MG/1; MG/1
1 TABLET ORAL EVERY 4 HOURS PRN
Qty: 10 TABLET | Refills: 0 | OUTPATIENT
Start: 2019-12-13

## 2019-12-13 RX ORDER — OXYCODONE AND ACETAMINOPHEN 5; 325 MG/1; MG/1
1 TABLET ORAL EVERY 4 HOURS PRN
Qty: 10 TABLET | Refills: 0 | Status: SHIPPED | OUTPATIENT
Start: 2019-12-13 | End: 2020-01-03

## 2019-12-13 NOTE — TELEPHONE ENCOUNTER
Received a denial from [facility] regarding patient's Fluticasone-salmeterol 250-50 mcg inhaler, along with the denial was a preferred drug suggestion for budesonide/formoterol MDI (Symbicort) as well as Mometasone/formoterol MDI (Dulera).  Per form  circled symbicort, symbicort has been pended to Dr. Greenwood for approval, this message was routed to .

## 2019-12-13 NOTE — OP NOTE
12/13/2019     Name: Reymundo Gutierrez   MRN: [id number]   YOB: 1975     Pre-procedure diagnoses:  1. Nasal obstruction    2. Nasal valve collapse    3. Nasal septal deviation    4. Hypertrophy of both inferior nasal turbinates    5. Chronic maxillary sinusitis       Post-procedure diagnoses:  1. Nasal obstruction    2. Nasal valve collapse    3. Nasal septal deviation    4. Hypertrophy of both inferior nasal turbinates    5. Chronic maxillary sinusitis        Procedures performed  1. Septoplasty  2. Submucous Resection of Inferior Turbinates with Outfracture  3. Repair of nasal vestibular stenosis  4. Left maxillary antrostomy without removal of tissue    Surgeon: Tomy Medrano    Assistants: None    Anesthesia: General, Endotracheal    Findings:  1. Severe right septal deviation involving strut - repaired via closed technique with graft stabilization of strut  2. Turbinate hypertrophy, reduced as below  3. Manuel grafting to lateral nasal wall  4. Narrowing / Scarring of left maxillary antrostomy    Specimens:  1. None    Complications: None apparent    Blood Loss: 30 cc    Disposition: PACU    Indications:  Reymundo Gutierrez is a 44 y.o. male who was seen in clinic for evaluation of nasal obstruction and sinus complaints. He was found to have severe septal deviation of the septal strut as well as turbinate hypertrophy. He also had dynamic collapse of the lateral nasal walls with quiet inspiration. Based on clinical assessment, the following procedures were discussed as an option for treatment. I did discuss with him the possibility of needing to address the septum and lateral nasal wall via open rhinoplasty approach. We also discussed left maxillary antrostomy to address narrowing of the sinus outflow tract. After risks, benefits, and alternatives were discussed the patient decided to proceed. Specific risks that were discussed with the patient included anesthesia risks, scarring, recurrence,  bleeding, persistence of disease, change in smell, change in / loss of vision, CSF leak / meningitis, septal perforation.    Procedure in detail:  The patient was seen in the pre-operative holding area, and consent was signed. They were wheeled into the operating room and placed supine on the table. Anesthesia was induced via endotracheal tube. Afrin soaked pledgets were placed in the nose. 1% Lidocaine with 1:100,000 epinephrine was used to inject the septum and turbinate head. The patient was prepped and draped in the usual fashion.      SEPTOPLASTY: A right sided destinee transfixion incision was made and I dissected into the avascular subperichondrial plane. I elevated on both sides of the septum to release the medial crural attachments from the caudal strut. The septum was severely deviated along the length and the septal cartilage tip was very weakened. The mucoperichondrial flap was elevated beyond the bony-cartilaginous junction. I then made a cartilaginous incision, preserving > 1 cm of L-strut for nasal support. I then removed deviated portions of the cartilaginous and bony septum. Care was taken to be mindful of the nasal floor and skull base. I used a small portion of harvested septal cartilage to stabilize the septal tip with 4-0 PDS. I scored the concave aspect of the strut deviation. I resecured the caudal septum to the maxillary crest with 4-0 PDS. Once all of the deviated portions were removed, the flaps were irrigated to remove loose bony chips.  There was no perforation or flap rent noted. A 4-0 plaint gut was used to close the destinee transfixion incision and then a whipstitch was thrown to re approximate the flaps.    LEFT MAXILLARY ANTROSTOMY WITHOUT REMOVAL OF TISSUE: We began on the left side. The middle turbinate was gently medialized, exposing the uncinate process. The uncinate was gently out fractured. I divided the horizontal and vertical portions of the uncinate and performed an uncinectomy,  removing vertical and horizontal bone anterior  until the maxillary ridge. Care was taken to avoid the maxilla and nasolacrimal duct. The natural os was scarred down. The natural maxillary os was identified and conservatively widened to expose the sinus. The mucosa of the sinus was normal appearing. There was no tissue needing removal. A medium antrostomy was made.    REPAIR OF NASAL VESTIBULAR STENOSIS: I then made a left sided marginal incision and dissected superficial to the lower lateral cartilage along the lateral nasal wall down to the maxilla. I then inserted a Batten graft that was created in the standard fashion using harvested septal cartilage. This provided good support to the lateral nasal wall. I closed the incision with 4-0 plain gut. The same procedure was performed on the right.    INFERIOR TURBINATE REDUCTION: I then proceeded with submucous resection of inferior turbinates. 1% Lidocaine with 1:100,000 epinephrine was injected into the head of the right inferior turbinate. A small incision was made at the head of the inferior turbinate. I dissected in a submucosal plane along the inferior ale. The 3.5 mm microdebrider was used to resect bone and soft tissue, preserving the overlying mucosa. The turbinate was then gently outfractured. Hemostasis was achieved. The same procedure was performed on the left.     This marked the end of the procedure. Rodriguez splints were placed bilaterally and secured with a Nylon stitch. The patient was turned back over to the Anesthesia team.  They were awoken and transported back to the PACU in stable condition. Counts were correct. I performed the entire procedure.

## 2019-12-16 ENCOUNTER — OFFICE VISIT (OUTPATIENT)
Dept: OTOLARYNGOLOGY | Facility: CLINIC | Age: 44
End: 2019-12-16
Payer: MEDICAID

## 2019-12-16 VITALS — BODY MASS INDEX: 32.1 KG/M2 | HEIGHT: 70 IN | WEIGHT: 224.19 LBS

## 2019-12-16 DIAGNOSIS — J34.2 NASAL SEPTAL DEVIATION: ICD-10-CM

## 2019-12-16 DIAGNOSIS — J32.0 CHRONIC MAXILLARY SINUSITIS: Primary | ICD-10-CM

## 2019-12-16 DIAGNOSIS — J34.89 NASAL OBSTRUCTION: ICD-10-CM

## 2019-12-16 DIAGNOSIS — M95.0 NASAL VALVE COLLAPSE: ICD-10-CM

## 2019-12-16 DIAGNOSIS — J34.3 HYPERTROPHY OF BOTH INFERIOR NASAL TURBINATES: ICD-10-CM

## 2019-12-16 PROCEDURE — 99024 POSTOP FOLLOW-UP VISIT: CPT | Mod: ,,, | Performed by: OTOLARYNGOLOGY

## 2019-12-16 PROCEDURE — 99024 PR POST-OP FOLLOW-UP VISIT: ICD-10-PCS | Mod: ,,, | Performed by: OTOLARYNGOLOGY

## 2019-12-16 PROCEDURE — 99212 OFFICE O/P EST SF 10 MIN: CPT | Mod: PBBFAC,PO | Performed by: OTOLARYNGOLOGY

## 2019-12-16 PROCEDURE — 99999 PR PBB SHADOW E&M-EST. PATIENT-LVL II: ICD-10-PCS | Mod: PBBFAC,,, | Performed by: OTOLARYNGOLOGY

## 2019-12-16 PROCEDURE — 99999 PR PBB SHADOW E&M-EST. PATIENT-LVL II: CPT | Mod: PBBFAC,,, | Performed by: OTOLARYNGOLOGY

## 2019-12-16 RX ORDER — BUDESONIDE AND FORMOTEROL FUMARATE DIHYDRATE 80; 4.5 UG/1; UG/1
2 AEROSOL RESPIRATORY (INHALATION) DAILY
Qty: 1 INHALER | Refills: 0 | Status: SHIPPED | OUTPATIENT
Start: 2019-12-16 | End: 2020-01-03 | Stop reason: SDUPTHER

## 2019-12-16 NOTE — Clinical Note
I had initially said 1 month, but I forgot I need to debride him for sinus surgery. Can you schedule a 2 week follow-up instead?

## 2019-12-16 NOTE — PROGRESS NOTES
Reymundo is here for a post-operative visit following Septoplasty, Inferior Turbinate Reduction, L max, repair of nasal leanna    No issues, doing well  Mild bleeding, as expected  Pain controlled    Exam:  Alert, active, appropriate  Nasal splints removed  Nasal cavity suctioned, patent  No septal hematoma or abnormal crusting      Healing well  Follow-up 2 weeks

## 2019-12-19 ENCOUNTER — PATIENT MESSAGE (OUTPATIENT)
Dept: OTOLARYNGOLOGY | Facility: CLINIC | Age: 44
End: 2019-12-19

## 2019-12-21 ENCOUNTER — PATIENT MESSAGE (OUTPATIENT)
Dept: OTOLARYNGOLOGY | Facility: CLINIC | Age: 44
End: 2019-12-21

## 2019-12-23 ENCOUNTER — OFFICE VISIT (OUTPATIENT)
Dept: OTOLARYNGOLOGY | Facility: CLINIC | Age: 44
End: 2019-12-23
Payer: MEDICAID

## 2019-12-23 ENCOUNTER — TELEPHONE (OUTPATIENT)
Dept: OTOLARYNGOLOGY | Facility: CLINIC | Age: 44
End: 2019-12-23

## 2019-12-23 ENCOUNTER — OFFICE VISIT (OUTPATIENT)
Dept: PAIN MEDICINE | Facility: CLINIC | Age: 44
End: 2019-12-23
Payer: COMMERCIAL

## 2019-12-23 VITALS
DIASTOLIC BLOOD PRESSURE: 72 MMHG | SYSTOLIC BLOOD PRESSURE: 146 MMHG | OXYGEN SATURATION: 96 % | TEMPERATURE: 98 F | RESPIRATION RATE: 20 BRPM | WEIGHT: 224.75 LBS | BODY MASS INDEX: 32.25 KG/M2 | HEART RATE: 106 BPM

## 2019-12-23 VITALS — HEIGHT: 70 IN | BODY MASS INDEX: 31.94 KG/M2 | WEIGHT: 223.13 LBS

## 2019-12-23 DIAGNOSIS — J32.0 CHRONIC MAXILLARY SINUSITIS: Primary | ICD-10-CM

## 2019-12-23 DIAGNOSIS — J34.89 NASAL CRUSTING: ICD-10-CM

## 2019-12-23 DIAGNOSIS — G90.512 COMPLEX REGIONAL PAIN SYNDROME TYPE 1 OF LEFT UPPER EXTREMITY: Primary | ICD-10-CM

## 2019-12-23 DIAGNOSIS — G89.4 CHRONIC PAIN SYNDROME: ICD-10-CM

## 2019-12-23 PROCEDURE — 99999 PR PBB SHADOW E&M-EST. PATIENT-LVL IV: ICD-10-PCS | Mod: PBBFAC,,, | Performed by: ANESTHESIOLOGY

## 2019-12-23 PROCEDURE — 99212 OFFICE O/P EST SF 10 MIN: CPT | Mod: PBBFAC,PO | Performed by: OTOLARYNGOLOGY

## 2019-12-23 PROCEDURE — 99214 PR OFFICE/OUTPT VISIT, EST, LEVL IV, 30-39 MIN: ICD-10-PCS | Mod: S$GLB,,, | Performed by: ANESTHESIOLOGY

## 2019-12-23 PROCEDURE — 31237 NSL/SINS NDSC SURG BX POLYPC: CPT | Mod: S$PBB,79,LT, | Performed by: OTOLARYNGOLOGY

## 2019-12-23 PROCEDURE — 99999 PR PBB SHADOW E&M-EST. PATIENT-LVL IV: CPT | Mod: PBBFAC,,, | Performed by: ANESTHESIOLOGY

## 2019-12-23 PROCEDURE — 99499 UNLISTED E&M SERVICE: CPT | Mod: S$PBB,,, | Performed by: OTOLARYNGOLOGY

## 2019-12-23 PROCEDURE — 99214 OFFICE O/P EST MOD 30 MIN: CPT | Mod: S$GLB,,, | Performed by: ANESTHESIOLOGY

## 2019-12-23 PROCEDURE — 99999 PR PBB SHADOW E&M-EST. PATIENT-LVL II: CPT | Mod: PBBFAC,,, | Performed by: OTOLARYNGOLOGY

## 2019-12-23 PROCEDURE — 31237 NSL/SINS NDSC SURG BX POLYPC: CPT | Mod: PBBFAC,PO | Performed by: OTOLARYNGOLOGY

## 2019-12-23 PROCEDURE — 99999 PR PBB SHADOW E&M-EST. PATIENT-LVL II: ICD-10-PCS | Mod: PBBFAC,,, | Performed by: OTOLARYNGOLOGY

## 2019-12-23 PROCEDURE — 99499 NO LOS: ICD-10-PCS | Mod: S$PBB,,, | Performed by: OTOLARYNGOLOGY

## 2019-12-23 PROCEDURE — 31237 PR NASAL/SINUS ENDOSCOPY,BX/RMV POLYP/DEBRID: ICD-10-PCS | Mod: S$PBB,79,LT, | Performed by: OTOLARYNGOLOGY

## 2019-12-23 RX ORDER — PREDNISONE 20 MG/1
TABLET ORAL
COMMUNITY
Start: 2019-12-22 | End: 2020-01-03

## 2019-12-23 RX ORDER — CEFDINIR 300 MG/1
CAPSULE ORAL
COMMUNITY
Start: 2019-12-22 | End: 2020-01-03

## 2019-12-23 NOTE — PROGRESS NOTES
[facility] Follow Up Evaluation    Referred by: Dr. Colvin  Reason for referral: wrist pain    CC:   Chief Complaint   Patient presents with    Follow-up    Hand Pain      Last 3 PDI Scores 12/23/2019 11/13/2019   Pain Disability Index (PDI) 19 16     Interval HPI 12/23/19: Mr. Gutierrez returns to the office for follow up.  He is s/p 2nd left stellate ganglion block on 12/10/19 with over 50% relief of his pain for about a week.  Soon after he began to experience pain in his left hand and wrist, constant, tingling, burning.  He continues amitriptyline 25mg qhs, ibuprofen prn, and gabapentin 600mg BID.  He continues desensitization and PT.      Interval HPI 11/13/19: Mr. Gutierrez returns to the office for follow up.  He is s/p left stellate ganglion block on 10/22/19 with about 75% of his pain for 2-3 days following injection but then return of his typical pain.  Today his pain is 6/10, constant, burning, tingling.  He continues gabapentin 600mg BID and ibuprofen prn.  He has restarted amitriptyline 10mg qhs.      HPI:   Reymundo Gutierrez is a 44 y.o. male who complains of wrist pain     Has a history of left wrist arthrodesis due to a crush injury to his left hand.  He states that he has been taking vitamin-C, amitriptyline, and his pain medications.  He states that he is still having significant pain about his wrist and hand.  He has been working with therapy and feels as if he is gaining significant amount of function to his hand.    Onset: 12/27/17  Inciting Event: crush injury between fork lift and door of a truck If yes, Date of injury: 12/27/17  Progression: since onset, pain is stable  Current Pain Score: 8/10  Typical Range: 6-8/10  Timing: constant  Quality: aching, burning, electric, hot, tight  Radiation: no  Associated numbness or weakness: yes numbness, tingling, weakness  Exacerbated by: touching, extension, flexion  Allievated by: laying down, medications, PT  Is Pain Level  Acceptable?: No    Previous Therapies:  PT/OT: yes, PT and desensitization  HEP:   Interventions:   - left stellate ganglion block with Dr. Jarrell on 5/21/18, 6/18/18, and 7/23/18  Surgery:  8/6/19 - Left total wrist arthrodesis with allograft from the left proximal tibia  Medications:   - NSAIDS:   - MSK Relaxants:   - TCAs: amitriptyline  - SNRIs:   - Topicals: cymbalta (stopped)  - Anticonvulsants: gabapentin 300mg TID  - Opioids: hydrocodone 7.5/325mg    History:    Current Outpatient Medications:     albuterol (PROVENTIL) 2.5 mg /3 mL (0.083 %) nebulizer solution, Inhale 2.5 mg into the lungs., Disp: , Rfl:     albuterol (VENTOLIN HFA) 90 mcg/actuation inhaler, Inhale 2 puffs into the lungs every 6 (six) hours as needed for Wheezing. Rescue, Disp: 18 g, Rfl: 11    amitriptyline (ELAVIL) 10 MG tablet, Take 1 tablet (10 mg total) by mouth every evening for 21 days, Disp: 21 tablet, Rfl: 0    amLODIPine (NORVASC) 5 MG tablet, Take 1 tablet (5 mg total) by mouth once daily., Disp: 30 tablet, Rfl: 1    azelastine (ASTELIN) 137 mcg (0.1 %) nasal spray, Use 2 sprays (274 mcg total) by Nasal route 2 (two) times daily., Disp: 30 mL, Rfl: 6    budesonide-formoterol 80-4.5 mcg (SYMBICORT) 80-4.5 mcg/actuation HFAA, Inhale 2 puffs into the lungs 2 (two) times daily., Disp: 10.2 g, Rfl: 12    budesonide-formoterol 80-4.5 mcg (SYMBICORT) 80-4.5 mcg/actuation HFAA, Inhale 2 puffs into the lungs once daily. Controller, Disp: 1 Inhaler, Rfl: 0    cefdinir (OMNICEF) 300 MG capsule, , Disp: , Rfl:     famotidine (PEPCID) 20 MG tablet, , Disp: , Rfl:     fluticasone propionate (FLONASE ALLERGY RELIEF) 50 mcg/actuation nasal spray, Use 2 sprays (100 mcg total) by Each Nostril route once daily., Disp: 16 g, Rfl: 11    fluticasone-salmeterol diskus inhaler 250-50 mcg, Inhale 1 puff into the lungs 2 (two) times daily. Controller, Disp: 60 each, Rfl: 11    ibuprofen (ADVIL,MOTRIN) 800 MG tablet, Take 1 tablet (800 mg  total) by mouth 2 (two) times daily as needed for Pain., Disp: 40 tablet, Rfl: 2    oxyCODONE-acetaminophen (PERCOCET) 5-325 mg per tablet, Take 1 tablet by mouth every 4 (four) hours as needed., Disp: 10 tablet, Rfl: 0    predniSONE (DELTASONE) 20 MG tablet, , Disp: , Rfl:     Past Medical History:   Diagnosis Date    Asthma     Crushing injury of left wrist and hand 7/10/2019    Hypertension        Past Surgical History:   Procedure Laterality Date    BONE GRAFT Left 8/6/2019    Procedure: BONE GRAFT LEG;  Surgeon: Escobar Colvin MD;  Location: [facility] OR;  Service: Orthopedics;  Laterality: Left;    FRACTURE SURGERY      INJECTION OF ANESTHETIC AGENT AROUND NERVE Left 10/22/2019    Procedure: Block, Nerve STELLATE GANGLION;  Surgeon: Vincent Alejandre MD;  Location: [facility] OR;  Service: Pain Management;  Laterality: Left;    INJECTION OF ANESTHETIC AGENT AROUND NERVE Left 12/10/2019    Procedure: Block, Nerve STELLATE GANGLION;  Surgeon: Vincent Alejandre MD;  Location: [facility] OR;  Service: Pain Management;  Laterality: Left;    MAXILLARY ANTROSTOMY Left 12/11/2019    Procedure: MAXILLARY ANTROSTOMY;  Surgeon: Tomy Medrano MD;  Location: [facility] OR;  Service: ENT;  Laterality: Left;    NASAL SEPTOPLASTY Bilateral 12/11/2019    Procedure: SEPTOPLASTY, NASAL;  Surgeon: Tomy Medrano MD;  Location: [facility] OR;  Service: ENT;  Laterality: Bilateral;    NASAL TURBINATE REDUCTION Bilateral 12/11/2019    Procedure: REDUCTION, NASAL TURBINATE;  Surgeon: Tomy Medrano MD;  Location: [facility] OR;  Service: ENT;  Laterality: Bilateral;    WRIST SURGERY Left     x3       History reviewed. No pertinent family history.    Social History     Socioeconomic History    Marital status: Single     Spouse name: Not on file    Number of children: Not on file    Years of education: Not on file    Highest education level: Not on file   Occupational History    Not on file   Social Needs    Financial resource strain: Not  very hard    Food insecurity:     Worry: Never true     Inability: Never true    Transportation needs:     Medical: No     Non-medical: No   Tobacco Use    Smoking status: Current Some Day Smoker    Smokeless tobacco: Never Used   Substance and Sexual Activity    Alcohol use: No    Drug use: No    Sexual activity: Not on file   Lifestyle    Physical activity:     Days per week: 0 days     Minutes per session: 0 min    Stress: Only a little   Relationships    Social connections:     Talks on phone: More than three times a week     Gets together: More than three times a week     Attends [employer] service: 1 to 4 times per year     Active member of club or organization: No     Attends meetings of clubs or organizations: Never     Relationship status: Living with partner   Other Topics Concern    Not on file   Social History Narrative    Not on file       Review of patient's allergies indicates:  No Known Allergies    Review of Systems:  General ROS: positive for  - fatigue  Psychological ROS: negative for - hostility  Hematological and Lymphatic ROS: negative for - bleeding problems  Endocrine ROS: negative for - unexpected weight changes  Respiratory ROS: positive for - cough  Cardiovascular ROS: no chest pain or dyspnea on exertion  Gastrointestinal ROS: no abdominal pain, change in bowel habits, or black or bloody stools  Musculoskeletal ROS: positive for - muscular weakness  Neurological ROS: negative for - bowel and bladder control changes  Dermatological ROS: negative for rash    Physical Exam:  Vitals:    12/23/19 1538   BP: (!) 146/72   Pulse: 106   Resp: 20   Temp: 98 °F (36.7 °C)   TempSrc: Oral   SpO2: 96%   Weight: 101.9 kg (224 lb 12.1 oz)   PainSc:   4   PainLoc: Hand     Body mass index is 32.25 kg/m².     Gen: NAD  Psych: mood appropriate for given condition  CV: 2+ radial pulse  HEENT: anicteric   Respiratory: non labored  Abd: soft nt, nd  Skin: intact, + allodynia, + temperature  asymmetry  Sensation: intact to lt touch bilaterally in c4-t1, except decreased in non-dermatomal distribution on the left hand, wrist, and forearm  ROM: decreased hand intrinsics on the left    Motor:    Right Left   C4 Shoulder Abduction  5  5   C5 Elbow Flexion    5  5   C6 Wrist Extension  5  5   C7 Elbow Extension   5  5   C8/T1 Hand Intrinsics   5  5 rom limited   C8 First Dorsal Interosseus  5  5 rom rimited   C8 Abductor Pollicus Brevis  5  5 rom limited     Imaging:  Xray left wrist 9/11/19  FINDINGS:  There has been ORIF of the left wrist, with volar plate screw device of the distal radius, and a dorsal plate screw device extending from the distal radial shaft to the mid shaft of the 3rd metacarpal.  Chronic distal radius and ulnar styloid fractures are present.  All hardware components are intact and engaged.  There is no change in alignment.  Moderate dorsal soft tissue swelling is present which has decreased.    Labs:  BMP  Lab Results   Component Value Date     09/27/2019    K 4.1 09/27/2019     09/27/2019    CO2 23 09/27/2019    BUN 10 09/27/2019    CREATININE 1.2 09/27/2019    CALCIUM 9.5 09/27/2019    ANIONGAP 8 09/27/2019    ESTGFRAFRICA >60.0 09/27/2019    EGFRNONAA >60.0 09/27/2019     Lab Results   Component Value Date    ALT 22 09/27/2019    AST 16 09/27/2019    ALKPHOS 78 09/27/2019    BILITOT 0.5 09/27/2019       Assessment:  Problem List Items Addressed This Visit        Neuro    Complex regional pain syndrome type 1 of left upper extremity - Primary    Relevant Orders    Ambulatory Referral to Psychology    Chronic pain syndrome    Relevant Orders    MRI Cervical Spine Without Contrast        Treatment Plan:  44 y.o. year old male presents to the office with left hand pain.  He injured his hand on 12/27/17 when his hand suffered crush injury between a forklift and a truck.  He is s/p left total wrist arthrodesis with allograft from the left proximal tibia on 8/6/19.  He reports  good relief of his pain that he was feeling prior to the surgery, but now continues to have his typical baseline pain which was present since the injury.  He is s/p left stellate ganglion block with Dr. Jarrell on 5/21/18, 6/18/18, and 7/23/18.   Subjectively he c/o hyperesthesia, temperature asymmetry, decreased ROM, weakness, and clamminess/sweating changes in his left hand.  On exam I appreciate temperature asymmetry, contracture with decreased ROM of his digits, and allodynia to light touch over the dorsum of his left hand and wrist.  He is doing PT with desensitization therapy.      12/23/19 - Mr. Gutierrez returns to the office for follow up.  He is s/p 2nd left stellate ganglion block on 12/10/19 with over 50% relief of his pain for about a week.  Soon after he began to experience pain in his left hand and wrist, constant, tingling, burning.  He continues amitriptyline 25mg qhs, ibuprofen prn, and gabapentin 600mg BID.  He continues desensitization and PT.  At this point he continues to have LUE pain secondary to CRPS despite conservative treatment with PT and medications. He has failed to get sustained relief with stellate ganglion block x2.  His pain is limiting his mobility and interfering with his ADL's.  We will get a cervical MRI to evaluate his cervical neuroanatomy.  I've placed a referral for psychological assessment for spinal cord stimulator trial.   Once he has done this we will get him scheduled for cevical SCS trial with [employer].    Procedures: cervical SCS trial. Procedure explained using an anatomical model.  Risks, benefits, alternatives explained to patient who verbalized understanding, including increased risk of infection, bleeding, need for additional procedures or surgery, and nerve damage.  Questions regarding the procedure, risks, expected outcome, and possible side effects were solicited and answered to the patient's satisfaction.  Reymundo wishes to proceed with the injection.  Verbal and  written consent were obtained in clinic today.  PT/OT/HEP: continue PT and desensitization therapy  Medications: Continue ibuprofen, amitriptyline 10mg qhs and gabapentin 600mg BID  Labs: Reviewed and medications are appropriately dosed for current hepatorenal function.  Imaging: MRI cervical spine    : Reviewed    Vincent Alejandre M.D.  Interventional Pain Medicine / Anesthesiology

## 2019-12-23 NOTE — TELEPHONE ENCOUNTER
----- Message from Laila Schuler sent at 12/23/2019 10:25 AM CST -----  Type:  Patient Returning Call    Who Called:  patient  Who Left Message for Patient:  Nurse Littlejohn  Does the patient know what this is regarding?:  appt  Best Call Back Number:  [phone]  Additional Information:  Please call

## 2019-12-24 NOTE — PROGRESS NOTES
Here for post-operative visit #2   S/p Septoplasty, Inferior Turbinate Reduction, L max, repair of nasal valves      Left NASAL ENDOSCOPY WITH POLYPECTOMY &/OR DEBRIDEMENT  (cpt 53341)    Procedure: Risks, benefits, and alternatives of the procedure were discussed with the patient, and the patient consented to the nasal endoscopy with debridement.  The nasal cavity was sprayed with a topical decongestant and anesthetic . The endoscope was passed into the nostril with direct visualization.  On each side the nasal cavity, sinuses (if open), turbinates, and septum were examined with the findings described below.  Crusting and polypoid material was removed with suction and straight non thru cut forceps.   At the end of the examination, the scope was removed. The patient tolerated the procedure well with no complications.     Endoscopic Sinonasal Exam Findings:  -     Sinuses examined: left maxillary            The right sinus(es) have n/a - exam not performed            The left sinus(es) have mild edema with patent outflow tract  -     Nasal secretions: thick glue-like mucous within the left maxillary - no pus  -     Nasal septum: no significant deviation and no perforation appreciated   -     Inferior turbinate: well reduced with dense crusting over bilateral turbinates unable to be fully removed today  -     Middle turbinate: - normal mucosa without significant hypertrophy - bilaterally  -     Other findings: - no mass or obstructive lesion -    Assessment & Plan:  - Healing as expected  - Continue irrigations  - OK to restart previous nasal medications   - Follow up 7 days for repeat examination

## 2019-12-31 ENCOUNTER — OFFICE VISIT (OUTPATIENT)
Dept: OTOLARYNGOLOGY | Facility: CLINIC | Age: 44
End: 2019-12-31
Payer: MEDICAID

## 2019-12-31 ENCOUNTER — PATIENT MESSAGE (OUTPATIENT)
Dept: PAIN MEDICINE | Facility: CLINIC | Age: 44
End: 2019-12-31

## 2019-12-31 VITALS — WEIGHT: 223.56 LBS | BODY MASS INDEX: 32.01 KG/M2 | HEIGHT: 70 IN

## 2019-12-31 DIAGNOSIS — J34.3 HYPERTROPHY OF BOTH INFERIOR NASAL TURBINATES: ICD-10-CM

## 2019-12-31 DIAGNOSIS — J34.2 NASAL SEPTAL DEVIATION: ICD-10-CM

## 2019-12-31 DIAGNOSIS — M95.0 NASAL VALVE COLLAPSE: ICD-10-CM

## 2019-12-31 DIAGNOSIS — J34.89 NASAL OBSTRUCTION: ICD-10-CM

## 2019-12-31 DIAGNOSIS — J32.0 CHRONIC MAXILLARY SINUSITIS: Primary | ICD-10-CM

## 2019-12-31 PROCEDURE — 99499 NO LOS: ICD-10-PCS | Mod: S$PBB,,, | Performed by: OTOLARYNGOLOGY

## 2019-12-31 PROCEDURE — 99212 OFFICE O/P EST SF 10 MIN: CPT | Mod: PBBFAC,PO,25 | Performed by: OTOLARYNGOLOGY

## 2019-12-31 PROCEDURE — 99499 UNLISTED E&M SERVICE: CPT | Mod: S$PBB,,, | Performed by: OTOLARYNGOLOGY

## 2019-12-31 PROCEDURE — 99999 PR PBB SHADOW E&M-EST. PATIENT-LVL II: ICD-10-PCS | Mod: PBBFAC,,, | Performed by: OTOLARYNGOLOGY

## 2019-12-31 PROCEDURE — 31231 PR NASAL ENDOSCOPY, DX: ICD-10-PCS | Mod: 58,S$PBB,, | Performed by: OTOLARYNGOLOGY

## 2019-12-31 PROCEDURE — 31231 NASAL ENDOSCOPY DX: CPT | Mod: 58,S$PBB,, | Performed by: OTOLARYNGOLOGY

## 2019-12-31 PROCEDURE — 99999 PR PBB SHADOW E&M-EST. PATIENT-LVL II: CPT | Mod: PBBFAC,,, | Performed by: OTOLARYNGOLOGY

## 2019-12-31 PROCEDURE — 31231 NASAL ENDOSCOPY DX: CPT | Mod: PBBFAC,PO | Performed by: OTOLARYNGOLOGY

## 2019-12-31 RX ORDER — IBUPROFEN 800 MG/1
800 TABLET ORAL 2 TIMES DAILY PRN
Qty: 40 TABLET | Refills: 2 | Status: SHIPPED | OUTPATIENT
Start: 2019-12-31 | End: 2020-02-17 | Stop reason: SDUPTHER

## 2019-12-31 NOTE — PROGRESS NOTES
Here for post-operative visit #3  S/p Septoplasty, Inferior Turbinate Reduction, L max, repair of nasal valves    NASAL ENDOSCOPY     Procedure: Risks, benefits, and alternatives of the procedure were discussed with the patient, and the patient consented to the nasal endoscopy with debridement.  The nasal cavity was sprayed with a topical decongestant and anesthetic . The endoscope was passed into the nostril with direct visualization.  On each side the nasal cavity, sinuses (if open), turbinates, and septum were examined with the findings described below.   At the end of the examination, the scope was removed. The patient tolerated the procedure well with no complications.     Endoscopic Sinonasal Exam Findings:  -     Sinuses examined: left maxillary            The right sinus(es) have normal            The left sinus(es) have normal mucosa with patent outflow tract  -     Nasal secretions: mucous within the left maxillary - no pus  -     Nasal septum: no significant deviation and no perforation appreciated   -     Inferior turbinate: well reduced with mild crusting over bilateral turbinates removed  -     Middle turbinate: - normal mucosa without significant hypertrophy - bilaterally  -     Other findings: - no mass or obstructive lesion -    Assessment & Plan:  - Healing as expected  - Continue irrigations  - OK to restart previous nasal medications   - Follow up 7 days for repeat examination

## 2020-01-02 RX ORDER — AMITRIPTYLINE HYDROCHLORIDE 10 MG/1
10 TABLET, FILM COATED ORAL NIGHTLY
Qty: 30 TABLET | Refills: 2 | Status: SHIPPED | OUTPATIENT
Start: 2020-01-02 | End: 2021-02-25

## 2020-01-03 ENCOUNTER — OFFICE VISIT (OUTPATIENT)
Dept: FAMILY MEDICINE | Facility: CLINIC | Age: 45
End: 2020-01-03
Payer: MEDICAID

## 2020-01-03 VITALS
WEIGHT: 220.81 LBS | HEIGHT: 70 IN | DIASTOLIC BLOOD PRESSURE: 98 MMHG | SYSTOLIC BLOOD PRESSURE: 141 MMHG | BODY MASS INDEX: 31.61 KG/M2 | TEMPERATURE: 98 F | HEART RATE: 94 BPM

## 2020-01-03 DIAGNOSIS — I10 HYPERTENSION, ESSENTIAL: Chronic | ICD-10-CM

## 2020-01-03 DIAGNOSIS — K21.00 GERD WITH ESOPHAGITIS: Primary | ICD-10-CM

## 2020-01-03 DIAGNOSIS — F17.210 CIGARETTE SMOKER: ICD-10-CM

## 2020-01-03 DIAGNOSIS — R68.82 DECREASED LIBIDO: ICD-10-CM

## 2020-01-03 PROCEDURE — 99999 PR PBB SHADOW E&M-EST. PATIENT-LVL III: ICD-10-PCS | Mod: PBBFAC,,, | Performed by: FAMILY MEDICINE

## 2020-01-03 PROCEDURE — 99214 OFFICE O/P EST MOD 30 MIN: CPT | Mod: S$PBB,,, | Performed by: FAMILY MEDICINE

## 2020-01-03 PROCEDURE — 99213 OFFICE O/P EST LOW 20 MIN: CPT | Mod: PBBFAC,PO | Performed by: FAMILY MEDICINE

## 2020-01-03 PROCEDURE — 99214 PR OFFICE/OUTPT VISIT, EST, LEVL IV, 30-39 MIN: ICD-10-PCS | Mod: S$PBB,,, | Performed by: FAMILY MEDICINE

## 2020-01-03 PROCEDURE — 99999 PR PBB SHADOW E&M-EST. PATIENT-LVL III: CPT | Mod: PBBFAC,,, | Performed by: FAMILY MEDICINE

## 2020-01-03 RX ORDER — IBUPROFEN 200 MG
1 TABLET ORAL DAILY
Qty: 28 PATCH | Refills: 1 | Status: SHIPPED | OUTPATIENT
Start: 2020-01-03 | End: 2020-06-15

## 2020-01-03 RX ORDER — FAMOTIDINE 20 MG/1
20 TABLET, FILM COATED ORAL NIGHTLY PRN
Qty: 90 TABLET | Refills: 3 | Status: SHIPPED | OUTPATIENT
Start: 2020-01-03 | End: 2021-01-06 | Stop reason: SDUPTHER

## 2020-01-03 RX ORDER — AMLODIPINE BESYLATE 10 MG/1
10 TABLET ORAL DAILY
Qty: 90 TABLET | Refills: 1 | Status: SHIPPED | OUTPATIENT
Start: 2020-01-03 | End: 2021-04-30 | Stop reason: SDUPTHER

## 2020-01-03 NOTE — PATIENT INSTRUCTIONS
Follow up in about 3 months (around 4/3/2020), or if symptoms worsen or fail to improve, for HTN, LAB RESULTS.     If no improvement in symptoms or symptoms worsen, please be advised to call MD, follow-up at clinic and/or go to ER if becomes severe.    Johnathan Greenwood M.D.         We Offer TELEHEALTH & Same Day Appointments!   Book your Telehealth appointment with me through my nurse or   Clinic appointments on [facility]!    [address] [place] [address]    Office: [phone]     FAX: [phone]    Check out my Facebook Page and Follow Me at: CLICK HERE    Check out my website at [employer] by clicking on: CLICK HERE    To Schedule appointments online, go to [employer]: CLICK HERE     Location: [email or web address]
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
## 2020-01-04 ENCOUNTER — HOSPITAL ENCOUNTER (OUTPATIENT)
Dept: RADIOLOGY | Facility: HOSPITAL | Age: 45
Discharge: HOME | End: 2020-01-04
Attending: ANESTHESIOLOGY
Payer: COMMERCIAL

## 2020-01-04 DIAGNOSIS — G89.4 CHRONIC PAIN SYNDROME: ICD-10-CM

## 2020-01-04 PROCEDURE — 72141 MRI NECK SPINE W/O DYE: CPT | Mod: TC,PO

## 2020-01-04 PROCEDURE — 72141 MRI NECK SPINE W/O DYE: CPT | Mod: 26,,, | Performed by: RADIOLOGY

## 2020-01-04 PROCEDURE — 72141 MRI CERVICAL SPINE WITHOUT CONTRAST: ICD-10-PCS | Mod: 26,,, | Performed by: RADIOLOGY

## 2020-01-05 PROBLEM — K21.00 GERD WITH ESOPHAGITIS: Chronic | Status: ACTIVE | Noted: 2020-01-03

## 2020-01-05 PROBLEM — F17.210 CIGARETTE SMOKER: Status: ACTIVE | Noted: 2020-01-05

## 2020-01-05 PROBLEM — F17.210 CIGARETTE SMOKER: Chronic | Status: ACTIVE | Noted: 2020-01-05

## 2020-01-05 NOTE — ASSESSMENT & PLAN NOTE
Testosterone level was normal.  Patient advised to establish care with Urology.  We are treating his tobacco dependence and hypertension as this is the most likely cause.

## 2020-01-05 NOTE — ASSESSMENT & PLAN NOTE
We had three minute discussion about tobacco cessation.  Patient will start patch to reduce cravings.  Patient will also seek help with cessation program.

## 2020-01-05 NOTE — ASSESSMENT & PLAN NOTE
Increasing amlodipine to 10 mg.  Blood pressure uncontrolled.  Follow-up in two weeks for blood pressure check.

## 2020-01-05 NOTE — ASSESSMENT & PLAN NOTE
Refill medication.GERD RECOMMENDATIONS  caffeine reduction dietary changes  - Educated patient on lifestyle modifications to help control/reduce reflux/abdominal pain including: avoid large meals, avoid eating within 2-3 hours of bedtime (avoid late night eating & lying down soon after eating), elevate head of bed if nocturnal symptoms are present, smoking cessation (if current smoker), & weight loss (if overweight).   - Educated to avoid known foods which trigger reflux symptoms & to minimize/avoid high-fat foods, chocolate, caffeine, citrus, alcohol, & tomato products.  - Advised to avoid/limit use of NSAID's, since they can cause GI upset, bleeding, and/or ulcers. If needed, take with food.

## 2020-01-09 ENCOUNTER — PATIENT MESSAGE (OUTPATIENT)
Dept: FAMILY MEDICINE | Facility: CLINIC | Age: 45
End: 2020-01-09

## 2020-01-09 NOTE — TELEPHONE ENCOUNTER
Insurance will not pay for blood pressure cuff unless eligible for digital medicine hypertension program.

## 2020-01-09 NOTE — TELEPHONE ENCOUNTER
Patient is requesting a prescription for a blood pressure cuff be sent to [facility] in [place] for him.  Please advise

## 2020-01-25 ENCOUNTER — PATIENT MESSAGE (OUTPATIENT)
Dept: FAMILY MEDICINE | Facility: CLINIC | Age: 45
End: 2020-01-25

## 2020-01-25 DIAGNOSIS — I10 HYPERTENSION, ESSENTIAL: Primary | Chronic | ICD-10-CM

## 2020-01-27 ENCOUNTER — PATIENT MESSAGE (OUTPATIENT)
Dept: FAMILY MEDICINE | Facility: CLINIC | Age: 45
End: 2020-01-27

## 2020-01-27 RX ORDER — CHLORTHALIDONE 25 MG/1
25 TABLET ORAL DAILY
Qty: 30 TABLET | Refills: 11 | Status: SHIPPED | OUTPATIENT
Start: 2020-01-27 | End: 2020-03-12 | Stop reason: SDUPTHER

## 2020-01-27 NOTE — TELEPHONE ENCOUNTER
Please enter blood pressures through track my health on the [employer] alireza.  It should give you the option to enter your blood pressure results.  I have prescribed you another blood pressure medication to start taking in addition to amlodipine.  Please make follow-up appointment in 2 to 4 weeks after starting this medication to make sure that your blood pressure is improved.    I received your message which was reviewed along with the the medication list and allergies that we have below.  Please review it for accuracy to make sure that we have the most recent records on your history.     Based on this, the following orders were placed AND/OR medicines were sent in.     Orders Placed This Encounter   Procedures    [employer] Patient Entered Pulse     Order Specific Question:   After how many days would you like to receive a notification of this patient's flowsheet entries?     Answer:   30       Medications written and sent at this time include:  Medications Ordered This Encounter   Medications    chlorthalidone (HYGROTEN) 25 MG Tab     Sig: Take 1 tablet (25 mg total) by mouth once daily.     Dispense:  30 tablet     Refill:  11       Your pharmacy(ies) of choice at this time on record include the list below and any medications would have been sent to the one at the top.    [facility] [address] [facility] [address] [place] [address]  Phone: [phone] Fax: [phone]      Thank you for choosing us as your healthcare provider!  Dr. Johnathan Greenwood    ALLERGY LIST  Review of patient's allergies indicates:  No Known Allergies    MEDICATION LIST  Current Outpatient Medications on File Prior to Visit   Medication Sig Dispense Refill    albuterol (PROVENTIL) 2.5 mg /3 mL (0.083 %) nebulizer solution Inhale 2.5 mg into the lungs.      albuterol (VENTOLIN HFA) 90 mcg/actuation inhaler Inhale 2 puffs into the lungs every 6 (six) hours as needed for Wheezing. Rescue 18 g 11    amitriptyline (ELAVIL) 10 MG tablet  Take 1 tablet (10 mg total) by mouth every evening. 30 tablet 2    amLODIPine (NORVASC) 10 MG tablet Take 1 tablet (10 mg total) by mouth once daily. 90 tablet 1    azelastine (ASTELIN) 137 mcg (0.1 %) nasal spray Use 2 sprays (274 mcg total) by Nasal route 2 (two) times daily. 30 mL 6    budesonide-formoterol 80-4.5 mcg (SYMBICORT) 80-4.5 mcg/actuation HFAA Inhale 2 puffs into the lungs 2 (two) times daily. 10.2 g 12    famotidine (PEPCID) 20 MG tablet Take 1 tablet (20 mg total) by mouth nightly as needed for Heartburn. 90 tablet 3    fluticasone propionate (FLONASE ALLERGY RELIEF) 50 mcg/actuation nasal spray Use 2 sprays (100 mcg total) by Each Nostril route once daily. 16 g 11    fluticasone-salmeterol diskus inhaler 250-50 mcg Inhale 1 puff into the lungs 2 (two) times daily. Controller 60 each 11    ibuprofen (ADVIL,MOTRIN) 800 MG tablet Take 1 tablet (800 mg total) by mouth 2 (two) times daily as needed for Pain. 40 tablet 2    nicotine (NICODERM CQ) 14 mg/24 hr Place 1 patch onto the skin once daily. 28 patch 1    promethazine (PHENERGAN) 25 MG tablet Take 1 tablet (25 mg total) by mouth every 6 (six) hours as needed for Nausea 30 tablet 0     No current facility-administered medications on file prior to visit.        HEALTH MAINTENANCE THAT IS OVERDUE OR NEEDS TO BE UPDATED ON OUR CHART IS LISTED BELOW.  IF YOU HAVE HAD IT DONE ELSEWHERE, PLEASE SEND US DATES AND RECORDS IF YOU HAVE THEM TO MAKE YOUR CHART ACCURATE.  IF YOU HAVE NOT HAD THESE DONE AND ARE READY FOR US TO SCHEDULE THEM, PLEASE SEND US A MESSAGE.  There are no preventive care reminders to display for this patient.    DISCLAIMER: This note was compiled by using a speech recognition dictation system and therefore please be aware that typographical / speech recognition errors can and do occur.  Please contact me if you see any errors specifically.    Johnathan Greenwood MD  We Offer Telehealth & Same Day Appointments!   Book your  Telehealth appointment with me through my nurse or   Clinic appointments on [employer]!  [phone]     Check out my Facebook Page and Follow Me at: CLICK HERE    Check out my website at [employer] by clicking on: CLICK HERE    To Schedule appointments online, go to [employer]: CLICK HERE     Location: [email or web address]    [address] [place] [address]    FAX: [phone]

## 2020-01-29 ENCOUNTER — OFFICE VISIT (OUTPATIENT)
Dept: OTOLARYNGOLOGY | Facility: CLINIC | Age: 45
End: 2020-01-29
Payer: MEDICAID

## 2020-01-29 ENCOUNTER — PATIENT MESSAGE (OUTPATIENT)
Dept: PAIN MEDICINE | Facility: CLINIC | Age: 45
End: 2020-01-29

## 2020-01-29 VITALS — WEIGHT: 224 LBS | HEIGHT: 70 IN | BODY MASS INDEX: 32.07 KG/M2 | RESPIRATION RATE: 18 BRPM

## 2020-01-29 DIAGNOSIS — J34.3 HYPERTROPHY OF BOTH INFERIOR NASAL TURBINATES: ICD-10-CM

## 2020-01-29 DIAGNOSIS — J34.89 NASAL OBSTRUCTION: ICD-10-CM

## 2020-01-29 DIAGNOSIS — J32.0 CHRONIC MAXILLARY SINUSITIS: Primary | ICD-10-CM

## 2020-01-29 PROCEDURE — 99024 POSTOP FOLLOW-UP VISIT: CPT | Mod: S$PBB,,, | Performed by: OTOLARYNGOLOGY

## 2020-01-29 PROCEDURE — 99999 PR PBB SHADOW E&M-EST. PATIENT-LVL III: CPT | Mod: PBBFAC,,, | Performed by: OTOLARYNGOLOGY

## 2020-01-29 PROCEDURE — 99024 PR POST-OP FOLLOW-UP VISIT: ICD-10-PCS | Mod: S$PBB,,, | Performed by: OTOLARYNGOLOGY

## 2020-01-29 PROCEDURE — 99999 PR PBB SHADOW E&M-EST. PATIENT-LVL III: ICD-10-PCS | Mod: PBBFAC,,, | Performed by: OTOLARYNGOLOGY

## 2020-01-29 PROCEDURE — 99213 OFFICE O/P EST LOW 20 MIN: CPT | Mod: PBBFAC,PO | Performed by: OTOLARYNGOLOGY

## 2020-01-29 NOTE — PROGRESS NOTES
Subjective:       Patient ID: Reymundo Gutierrez is a 44 y.o. male.    Chief Complaint: Sinusitis      Reymundo is here for follow-up of   Here for post-operative visit #3  S/p Septoplasty, Inferior Turbinate Reduction, L max, repair of nasal valves.     Doing well. Breathing well.  Still with int crusting. Using saline    Review of Systems   Constitutional: Negative for activity change and appetite change.   Respiratory: Negative for difficulty breathing and wheezing   Cardiovascular: Negative for chest pain.      Objective:        Constitutional:   Vital signs are normal. He appears well-developed and well-nourished.     Head:  Normocephalic and atraumatic.     Nose:  Nose normal including turbinates, nasal mucosa, sinuses and nasal septum.   Bilateral crusting over turbinates suctioned. Well healed mucosa and well healed L max with mild mucous    Mouth/Throat  Oropharynx clear and moist without lesions or asymmetry.     Neck:  Neck normal without thyromegaly masses, asymmetry, normal tracheal structure, crepitus, and tenderness.         Tests / Results:  none    Assessment:       1. Chronic maxillary sinusitis    2. Nasal obstruction    3. Hypertrophy of both inferior nasal turbinates          Plan:         Doing well  Continue saline prn  FU prn

## 2020-02-10 ENCOUNTER — PATIENT MESSAGE (OUTPATIENT)
Dept: PAIN MEDICINE | Facility: CLINIC | Age: 45
End: 2020-02-10

## 2020-02-18 ENCOUNTER — PATIENT MESSAGE (OUTPATIENT)
Dept: PAIN MEDICINE | Facility: CLINIC | Age: 45
End: 2020-02-18

## 2020-02-18 RX ORDER — IBUPROFEN 800 MG/1
800 TABLET ORAL 2 TIMES DAILY PRN
Qty: 40 TABLET | Refills: 2 | Status: SHIPPED | OUTPATIENT
Start: 2020-02-18 | End: 2020-02-19 | Stop reason: SDUPTHER

## 2020-02-19 RX ORDER — IBUPROFEN 800 MG/1
800 TABLET ORAL 2 TIMES DAILY PRN
Qty: 40 TABLET | Refills: 2 | Status: CANCELLED | OUTPATIENT
Start: 2020-02-19

## 2020-02-19 RX ORDER — IBUPROFEN 800 MG/1
800 TABLET ORAL 2 TIMES DAILY PRN
Qty: 40 TABLET | Refills: 2 | Status: SHIPPED | OUTPATIENT
Start: 2020-02-19 | End: 2020-04-27 | Stop reason: SDUPTHER

## 2020-02-19 NOTE — TELEPHONE ENCOUNTER
Rx Refill sent to incorrect pharmacy. Patient requesting it be sent to [facility] in [place].

## 2020-02-19 NOTE — TELEPHONE ENCOUNTER
Message sent to pt. requesting he contact psychiatrist office to have records sent over and notifying him that refill request for motrin sent to Dr. Alejandre for review.

## 2020-02-20 ENCOUNTER — PATIENT MESSAGE (OUTPATIENT)
Dept: FAMILY MEDICINE | Facility: CLINIC | Age: 45
End: 2020-02-20

## 2020-02-20 DIAGNOSIS — R68.82 DECREASED LIBIDO: Primary | ICD-10-CM

## 2020-02-24 ENCOUNTER — PATIENT MESSAGE (OUTPATIENT)
Dept: FAMILY MEDICINE | Facility: CLINIC | Age: 45
End: 2020-02-24

## 2020-02-25 NOTE — TELEPHONE ENCOUNTER
Sent message to staff to schedule patient sooner that 8/11/2020 availability, awaiting response, patient aware

## 2020-02-26 ENCOUNTER — TELEPHONE (OUTPATIENT)
Dept: FAMILY MEDICINE | Facility: CLINIC | Age: 45
End: 2020-02-26

## 2020-02-26 DIAGNOSIS — K40.90 INGUINAL HERNIA WITHOUT OBSTRUCTION OR GANGRENE, RECURRENCE NOT SPECIFIED, UNSPECIFIED LATERALITY: ICD-10-CM

## 2020-02-26 DIAGNOSIS — K46.9 ABDOMINAL HERNIA WITHOUT OBSTRUCTION AND WITHOUT GANGRENE, RECURRENCE NOT SPECIFIED, UNSPECIFIED HERNIA TYPE: Primary | ICD-10-CM

## 2020-02-26 NOTE — TELEPHONE ENCOUNTER
----- Message from Hali Lewis LPN sent at 2/26/2020  7:22 AM CST -----  That is the soonest that we have available due to medicaid. He could try to get in with Dr. Thompson, he is a urologist in private practice here in [place]. There phone number is [phone]. He will need a referral to see him.       Hali Lewis LPN  ----- Message -----  From: Jhoanne Faulkner LPN  Sent: 2/25/2020   8:45 AM CST  To: DENVER Jamison Staff [facility]    Patient is wanting to be seen sooner that your first available in 8/11/2020, do you have anything available? Thank you

## 2020-02-26 NOTE — TELEPHONE ENCOUNTER
Dr Greenwood this is the soonest available urology appt I can find for the patient anywhere except [place] (needless to say he doesn't want to go there) so patient is aware, and has been instructed that if he finds one himself we can send referral.  Also patient states he was in the ER and they have found a hernia that they have told him he needs to see a surgeon I have pended a referral to gen surg, or did you want to see him first, please advise

## 2020-02-26 NOTE — TELEPHONE ENCOUNTER
Appt booked for gen surgery, patient informed, note added to urology appt in ref to abn US of testicles-he is on wait list

## 2020-02-27 ENCOUNTER — PATIENT MESSAGE (OUTPATIENT)
Dept: FAMILY MEDICINE | Facility: CLINIC | Age: 45
End: 2020-02-27

## 2020-02-27 DIAGNOSIS — Z79.899 ENCOUNTER FOR LONG-TERM (CURRENT) USE OF MEDICATIONS: ICD-10-CM

## 2020-02-27 DIAGNOSIS — Z12.5 ENCOUNTER FOR PROSTATE CANCER SCREENING: ICD-10-CM

## 2020-02-27 DIAGNOSIS — Z11.4 ENCOUNTER FOR SCREENING FOR HIV: ICD-10-CM

## 2020-02-27 DIAGNOSIS — Z80.9 FAMILY HISTORY OF CANCER: Primary | ICD-10-CM

## 2020-02-27 RX ORDER — HYDROCODONE BITARTRATE AND ACETAMINOPHEN 7.5; 325 MG/1; MG/1
TABLET ORAL
COMMUNITY
Start: 2020-02-23 | End: 2020-06-15

## 2020-02-27 RX ORDER — HYDROCODONE BITARTRATE AND ACETAMINOPHEN 7.5; 325 MG/1; MG/1
1 TABLET ORAL
COMMUNITY
Start: 2020-02-22 | End: 2020-02-29

## 2020-02-27 RX ORDER — DOCUSATE SODIUM 100 MG/1
100 CAPSULE, LIQUID FILLED ORAL
COMMUNITY
Start: 2020-02-22 | End: 2020-03-03

## 2020-02-27 NOTE — TELEPHONE ENCOUNTER
I have signed for the following orders AND/OR meds.  Please call the patient and ask the patient to schedule the testing AND/OR inform about any medications that were sent.        Orders Placed This Encounter   Procedures    PSA, Screening     Standing Status:   Future     Standing Expiration Date:   4/27/2021    CBC auto differential     Standing Status:   Future     Standing Expiration Date:   4/27/2021    URINALYSIS          Urinalysis Microscopic     Order Specific Question:   Specimen Source     Answer:   Urine    HIV 1/2 Ag/Ab (4th Gen)     Standing Status:   Future     Standing Expiration Date:   4/27/2021    Ambulatory referral/consult to Hematology / Oncology     Standing Status:   Future     Standing Expiration Date:   3/27/2021     Referral Priority:   Routine     Referral Type:   Consultation     Referral Reason:   Specialty Services Required     Requested Specialty:   Hematology and Oncology     Number of Visits Requested:   1

## 2020-03-02 ENCOUNTER — PATIENT MESSAGE (OUTPATIENT)
Dept: FAMILY MEDICINE | Facility: CLINIC | Age: 45
End: 2020-03-02

## 2020-03-03 ENCOUNTER — LAB VISIT (OUTPATIENT)
Dept: LAB | Facility: HOSPITAL | Age: 45
End: 2020-03-03
Attending: FAMILY MEDICINE
Payer: MEDICAID

## 2020-03-03 ENCOUNTER — TELEPHONE (OUTPATIENT)
Dept: PAIN MEDICINE | Facility: CLINIC | Age: 45
End: 2020-03-03

## 2020-03-03 DIAGNOSIS — Z11.4 ENCOUNTER FOR SCREENING FOR HIV: ICD-10-CM

## 2020-03-03 DIAGNOSIS — Z80.9 FAMILY HISTORY OF CANCER: ICD-10-CM

## 2020-03-03 DIAGNOSIS — Z12.5 ENCOUNTER FOR PROSTATE CANCER SCREENING: ICD-10-CM

## 2020-03-03 DIAGNOSIS — Z79.899 ENCOUNTER FOR LONG-TERM (CURRENT) USE OF MEDICATIONS: ICD-10-CM

## 2020-03-03 PROCEDURE — 84153 ASSAY OF PSA TOTAL: CPT

## 2020-03-03 PROCEDURE — 85025 COMPLETE CBC W/AUTO DIFF WBC: CPT

## 2020-03-03 PROCEDURE — 36415 COLL VENOUS BLD VENIPUNCTURE: CPT | Mod: PO

## 2020-03-03 PROCEDURE — 86703 HIV-1/HIV-2 1 RESULT ANTBDY: CPT

## 2020-03-04 LAB
BASOPHILS # BLD AUTO: 0.07 K/UL (ref 0–0.2)
BASOPHILS NFR BLD: 0.6 % (ref 0–1.9)
COMPLEXED PSA SERPL-MCNC: 0.99 NG/ML (ref 0–4)
DIFFERENTIAL METHOD: ABNORMAL
EOSINOPHIL # BLD AUTO: 1 K/UL (ref 0–0.5)
EOSINOPHIL NFR BLD: 7.7 % (ref 0–8)
ERYTHROCYTE [DISTWIDTH] IN BLOOD BY AUTOMATED COUNT: 13.8 % (ref 11.5–14.5)
HCT VFR BLD AUTO: 44 % (ref 40–54)
HGB BLD-MCNC: 14.3 G/DL (ref 14–18)
HIV 1+2 AB+HIV1 P24 AG SERPL QL IA: NEGATIVE
IMM GRANULOCYTES # BLD AUTO: 0.09 K/UL (ref 0–0.04)
IMM GRANULOCYTES NFR BLD AUTO: 0.7 % (ref 0–0.5)
LYMPHOCYTES # BLD AUTO: 3.3 K/UL (ref 1–4.8)
LYMPHOCYTES NFR BLD: 26.2 % (ref 18–48)
MCH RBC QN AUTO: 30.6 PG (ref 27–31)
MCHC RBC AUTO-ENTMCNC: 32.5 G/DL (ref 32–36)
MCV RBC AUTO: 94 FL (ref 82–98)
MONOCYTES # BLD AUTO: 1 K/UL (ref 0.3–1)
MONOCYTES NFR BLD: 7.8 % (ref 4–15)
NEUTROPHILS # BLD AUTO: 7.2 K/UL (ref 1.8–7.7)
NEUTROPHILS NFR BLD: 57 % (ref 38–73)
NRBC BLD-RTO: 0 /100 WBC
PLATELET # BLD AUTO: 308 K/UL (ref 150–350)
PMV BLD AUTO: 11.6 FL (ref 9.2–12.9)
RBC # BLD AUTO: 4.67 M/UL (ref 4.6–6.2)
WBC # BLD AUTO: 12.7 K/UL (ref 3.9–12.7)

## 2020-03-05 ENCOUNTER — OFFICE VISIT (OUTPATIENT)
Dept: HEMATOLOGY/ONCOLOGY | Facility: CLINIC | Age: 45
End: 2020-03-05
Payer: MEDICAID

## 2020-03-05 VITALS
TEMPERATURE: 98 F | OXYGEN SATURATION: 97 % | RESPIRATION RATE: 18 BRPM | HEART RATE: 86 BPM | BODY MASS INDEX: 32.22 KG/M2 | SYSTOLIC BLOOD PRESSURE: 153 MMHG | WEIGHT: 225.06 LBS | HEIGHT: 70 IN | DIASTOLIC BLOOD PRESSURE: 94 MMHG

## 2020-03-05 DIAGNOSIS — Z80.9 FAMILY HISTORY OF CANCER: ICD-10-CM

## 2020-03-05 PROCEDURE — 99213 OFFICE O/P EST LOW 20 MIN: CPT | Mod: PBBFAC,PO | Performed by: INTERNAL MEDICINE

## 2020-03-05 PROCEDURE — 99204 OFFICE O/P NEW MOD 45 MIN: CPT | Mod: S$PBB,,, | Performed by: INTERNAL MEDICINE

## 2020-03-05 PROCEDURE — 99204 PR OFFICE/OUTPT VISIT, NEW, LEVL IV, 45-59 MIN: ICD-10-PCS | Mod: S$PBB,,, | Performed by: INTERNAL MEDICINE

## 2020-03-05 PROCEDURE — 99999 PR PBB SHADOW E&M-EST. PATIENT-LVL III: ICD-10-PCS | Mod: PBBFAC,,, | Performed by: INTERNAL MEDICINE

## 2020-03-05 PROCEDURE — 99999 PR PBB SHADOW E&M-EST. PATIENT-LVL III: CPT | Mod: PBBFAC,,, | Performed by: INTERNAL MEDICINE

## 2020-03-05 NOTE — PROGRESS NOTES
Please CALL patient with results and Document verification.   [phone]    There are no preventive care reminders to display for this patient.

## 2020-03-05 NOTE — PROGRESS NOTES
PATIENT: Reymundo Gutierrez  MRN: [id number]  DATE: 3/5/2020    Diagnosis:   1. Family history of cancer      Chief Complaint: abnormal labs    Subjective:     History of Present Illness:     44-year-old male presented to the ER February 23rd with testicular pain and had an ultrasound that was unremarkable except for some epidermal head cysts and mild microlithiasis.    He saw his primary care physician for follow-up.    During the ER visit, given his strong family history of malignancy he was asked to follow-up with the oncology clinic and hence presents here.    Family history is positive for history of pancreatic cancer in his on T, bladder cancer in a cousin, and anti with leukemia and the known cancer in 1 of his uncles.    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  There is no known family history of colon cancer.    He has longstanding history of cigarette smoking    Past Medical History:   Past Medical History:   Diagnosis Date    Asthma     Crushing injury of left wrist and hand 7/10/2019    Hypertension        Past Surgical History:   Past Surgical History:   Procedure Laterality Date    BONE GRAFT Left 8/6/2019    Procedure: BONE GRAFT LEG;  Surgeon: Escobar Colvin MD;  Location: [facility] OR;  Service: Orthopedics;  Laterality: Left;    FRACTURE SURGERY      INJECTION OF ANESTHETIC AGENT AROUND NERVE Left 10/22/2019    Procedure: Block, Nerve STELLATE GANGLION;  Surgeon: Vincent Alejandre MD;  Location: [facility] OR;  Service: Pain Management;  Laterality: Left;    INJECTION OF ANESTHETIC AGENT AROUND NERVE Left 12/10/2019    Procedure: Block, Nerve STELLATE GANGLION;  Surgeon: Vincent Alejandre MD;  Location: [facility] OR;  Service: Pain Management;  Laterality: Left;    MAXILLARY ANTROSTOMY Left 12/11/2019    Procedure: MAXILLARY ANTROSTOMY;  Surgeon: Tomy Medrano MD;  Location: [facility] OR;  Service: ENT;  Laterality: Left;    NASAL SEPTOPLASTY Bilateral 12/11/2019    Procedure:  SEPTOPLASTY, NASAL;  Surgeon: Tomy Medrano MD;  Location: [facility] OR;  Service: ENT;  Laterality: Bilateral;    NASAL TURBINATE REDUCTION Bilateral 12/11/2019    Procedure: REDUCTION, NASAL TURBINATE;  Surgeon: Tomy Medrano MD;  Location: [facility] OR;  Service: ENT;  Laterality: Bilateral;    WRIST SURGERY Left     x3       Family History: History reviewed. No pertinent family history.    Social History:  reports that he has quit smoking. He has never used smokeless tobacco. He reports that he does not drink alcohol or use drugs.    Allergies:  Review of patient's allergies indicates:  No Known Allergies    Medications:  Current Outpatient Medications   Medication Sig Dispense Refill    albuterol (PROVENTIL) 2.5 mg /3 mL (0.083 %) nebulizer solution Inhale 2.5 mg into the lungs.      albuterol (VENTOLIN HFA) 90 mcg/actuation inhaler Inhale 2 puffs into the lungs every 6 (six) hours as needed for Wheezing. Rescue 18 g 11    amitriptyline (ELAVIL) 10 MG tablet Take 1 tablet (10 mg total) by mouth every evening. 30 tablet 2    amLODIPine (NORVASC) 10 MG tablet Take 1 tablet (10 mg total) by mouth once daily. 90 tablet 1    azelastine (ASTELIN) 137 mcg (0.1 %) nasal spray Use 2 sprays (274 mcg total) by Nasal route 2 (two) times daily. 30 mL 6    budesonide-formoterol 80-4.5 mcg (SYMBICORT) 80-4.5 mcg/actuation HFAA Inhale 2 puffs into the lungs 2 (two) times daily. 10.2 g 12    chlorthalidone (HYGROTEN) 25 MG Tab Take 1 tablet (25 mg total) by mouth once daily. 30 tablet 11    famotidine (PEPCID) 20 MG tablet Take 1 tablet (20 mg total) by mouth nightly as needed for Heartburn. 90 tablet 3    fluticasone propionate (FLONASE ALLERGY RELIEF) 50 mcg/actuation nasal spray Use 2 sprays (100 mcg total) by Each Nostril route once daily. 16 g 11    fluticasone-salmeterol diskus inhaler 250-50 mcg Inhale 1 puff into the lungs 2 (two) times daily. Controller 60 each 11    HYDROcodone-acetaminophen (NORCO) 7.5-325  "mg per tablet       ibuprofen (ADVIL,MOTRIN) 800 MG tablet Take 1 tablet (800 mg total) by mouth 2 (two) times daily as needed for Pain. 40 tablet 2    nicotine (NICODERM CQ) 14 mg/24 hr Place 1 patch onto the skin once daily. 28 patch 1    promethazine (PHENERGAN) 25 MG tablet Take 1 tablet (25 mg total) by mouth every 6 (six) hours as needed for Nausea 30 tablet 0     No current facility-administered medications for this visit.        Review of Systems  CONSTITUTIONAL: Weight stable. Appetite good. No fevers.  EYES: No eye pain or redness.  ENT/MOUTH: No sore throat or bleeding gums.  RESPIRATORY: No cough or congestion.  CARDIOVASCULAR: No chest pain or breathing difficulty.  GASTROINTESTINAL: No abdominal pain or nausea. No change in bowel habits.  GENITOURINARY: No hematuria or dysuria.  HEME/LYMPH: No swollen glands or bruises.  NEUROLOGIC: No headaches or tremors.  MUSCULOSKELETAL: No muscle pains or joint pains. No back pain.    Objective:     Vitals:    03/05/20 1010   BP: (!) 153/94   Pulse: 86   Resp: 18   Temp: 98 °F (36.7 °C)   SpO2: 97%   Weight: 102.1 kg (225 lb 1.4 oz)   Height: 5' 10" (1.778 m)     BMI: Body mass index is 32.3 kg/m².    Physical Exam  Vitals:    03/05/20 1010   BP: (!) 153/94   Pulse: 86   Resp: 18   Temp: 98 °F (36.7 °C)   SpO2: 97%   Weight: 102.1 kg (225 lb 1.4 oz)   Height: 5' 10" (1.778 m)     General appearance: well-groomed. no acute distress. conversant  Eyes: no icterus. no lid-lag. pupils equal and reactive.  Head and Face: atraumatic. no sinus tenderness.  Ear, Nose, Mouth, Throat: auditory canals clear. mucous membranes moist without ulcerations. oropharynx clear. no gum bleeding. good dental hygiene.  Neck: no masses or enlarged lymph nodes. thyroid non-tender.  Lymph Nodes: no enlarged lymph nodes felt in the neck, supraclavicular areas or axillae.  Lungs: clear to auscultation. no wheeze or rales. breathing nonlabored  Heart: rhythm regular. no gallops. no " edema.  Abdomen: soft and nontender. no ascites. no masses. no hepatosplenomegaly.  Skin: no rashes or bruises. no nodules felt.  Extremities: no clubbing or cyanosis.  Neurologic: alert. oriented to time, place and person.  Psychiatric: good judgement. recent and remote memory intact. no anxiety or agitation.    Assessment:       1. Family history of cancer      Plan:   Went through his records, labs and family history in detail.    Recommended continued follow-up with primary care physician and get age appropriate cancer screening.    Counseled on smoking cessation extensively.    He does not need any further labs or imaging from an oncology perspective at this point.    Consult about cancer risk with family history of various types of malignancies.    All questions answered.    Return to clinic as needed.

## 2020-03-05 NOTE — LETTER
March 5, 2020      Johnathan Greenwood MD  [address]  [place] [address]           [place] [facility] H[facility]ology Oncology  [address] [place] [address] JOHN, [facility] [address]  [facility] [address]  Phone: [phone]          Patient: Reymundo Gutierrez   MR Number: [id number]   YOB: 1975   Date of Visit: 3/5/2020       Dear Dr. Johnathan Greenwood:    Thank you for referring Reymundo Gutierrez to me for evaluation. Attached you will find relevant portions of my assessment and plan of care.    If you have questions, please do not hesitate to call me. I look forward to following Reymundo Gutierrez along with you.    Sincerely,    Bakari Hansen MD    Enclosure  CC:  No Recipients    If you would like to receive this communication electronically, please contact [email or web address] or [phone] to request more information on [employer] Link access.    For providers and/or their staff who would like to refer a patient to [facility], please contact us through our one-stop-shop provider referral line, [facility], at [phone].    If you feel you have received this communication in error or would no longer like to receive these types of communications, please e-mail [email or web address]

## 2020-03-12 ENCOUNTER — OFFICE VISIT (OUTPATIENT)
Dept: FAMILY MEDICINE | Facility: CLINIC | Age: 45
End: 2020-03-12
Payer: MEDICAID

## 2020-03-12 VITALS
BODY MASS INDEX: 31.81 KG/M2 | DIASTOLIC BLOOD PRESSURE: 88 MMHG | SYSTOLIC BLOOD PRESSURE: 138 MMHG | HEART RATE: 92 BPM | HEIGHT: 70 IN | TEMPERATURE: 99 F | WEIGHT: 222.19 LBS

## 2020-03-12 DIAGNOSIS — N50.89 TESTICULAR MICROLITHIASIS: ICD-10-CM

## 2020-03-12 DIAGNOSIS — Z79.899 ENCOUNTER FOR LONG-TERM (CURRENT) USE OF MEDICATIONS: ICD-10-CM

## 2020-03-12 DIAGNOSIS — R10.30 INGUINAL PAIN, UNSPECIFIED LATERALITY: ICD-10-CM

## 2020-03-12 DIAGNOSIS — Z13.6 ENCOUNTER FOR LIPID SCREENING FOR CARDIOVASCULAR DISEASE: ICD-10-CM

## 2020-03-12 DIAGNOSIS — Z13.220 ENCOUNTER FOR LIPID SCREENING FOR CARDIOVASCULAR DISEASE: ICD-10-CM

## 2020-03-12 DIAGNOSIS — I10 HYPERTENSION, ESSENTIAL: Primary | Chronic | ICD-10-CM

## 2020-03-12 PROCEDURE — 99214 OFFICE O/P EST MOD 30 MIN: CPT | Mod: S$PBB,,, | Performed by: FAMILY MEDICINE

## 2020-03-12 PROCEDURE — 99999 PR PBB SHADOW E&M-EST. PATIENT-LVL IV: ICD-10-PCS | Mod: PBBFAC,,, | Performed by: FAMILY MEDICINE

## 2020-03-12 PROCEDURE — 99999 PR PBB SHADOW E&M-EST. PATIENT-LVL IV: CPT | Mod: PBBFAC,,, | Performed by: FAMILY MEDICINE

## 2020-03-12 PROCEDURE — 99214 OFFICE O/P EST MOD 30 MIN: CPT | Mod: PBBFAC,PO | Performed by: FAMILY MEDICINE

## 2020-03-12 PROCEDURE — 99214 PR OFFICE/OUTPT VISIT, EST, LEVL IV, 30-39 MIN: ICD-10-PCS | Mod: S$PBB,,, | Performed by: FAMILY MEDICINE

## 2020-03-12 RX ORDER — ALBUTEROL SULFATE 90 UG/1
2 AEROSOL, METERED RESPIRATORY (INHALATION) EVERY 6 HOURS PRN
Qty: 18 G | Refills: 11 | Status: SHIPPED | OUTPATIENT
Start: 2020-03-12 | End: 2020-05-14 | Stop reason: SDUPTHER

## 2020-03-12 RX ORDER — CHLORTHALIDONE 25 MG/1
25 TABLET ORAL DAILY
Qty: 90 TABLET | Refills: 3 | Status: SHIPPED | OUTPATIENT
Start: 2020-03-12 | End: 2021-01-09 | Stop reason: SDUPTHER

## 2020-03-12 NOTE — PATIENT INSTRUCTIONS
Follow up in about 6 months (around 9/12/2020), or if symptoms worsen or fail to improve.     If no improvement in symptoms or symptoms worsen, please be advised to call MD, follow-up at clinic and/or go to ER if becomes severe.    Johnathan Greenwood M.D.        We Offer TELEHEALTH & Same Day Appointments!   Book your Telehealth appointment with me through my nurse or   Clinic appointments on [facility]!    [address] [place] [address]    Office: [phone]   FAX: [phone]    Check out my Facebook Page and Follow Me at: [email or web address]    Check out my website at [facility] [email or web address] clicking on: [email or web address]    To Schedule appointments online, go to [facility]: [email or web address]

## 2020-03-12 NOTE — ASSESSMENT & PLAN NOTE
Refill chlorthalidone.  Continue amlodipine.  Discussed hypertension disease course, DASH-diet and exercise.  Discussed medication regimen importance of treating high blood pressure.  Patient advised of risk of untreated blood pressure.    ER precautions were given for symptoms of hypertensive urgency and emergency.

## 2020-03-12 NOTE — PROGRESS NOTES
PLAN:      Problem List Items Addressed This Visit     Hypertension, essential - Primary (Chronic)     Refill chlorthalidone.  Continue amlodipine.  Discussed hypertension disease course, DASH-diet and exercise.  Discussed medication regimen importance of treating high blood pressure.  Patient advised of risk of untreated blood pressure.    ER precautions were given for symptoms of hypertensive urgency and emergency.           Relevant Medications    chlorthalidone (HYGROTEN) 25 MG Tab    Encounter for long-term (current) use of medications (Chronic)     Patient requesting refills today.  Labs are being monitored.         Relevant Medications    albuterol (VENTOLIN HFA) 90 mcg/actuation inhaler    chlorthalidone (HYGROTEN) 25 MG Tab    Other Relevant Orders    CBC Without Differential    TSH    Comprehensive metabolic panel    Lipid panel    Testicular microlithiasis     No longer having radiation of pain or inguinal pain.  Patient has follow-up with Urology.         Relevant Orders    US Scrotum And Testicles    Inguinal pain     Patient reassured.  No hernia felt on exam.  Patient's pain is resolved.  Likely referred pain from testicular pathology.  Patient will follow up closely with Urology.  Repeat ultrasound in a few months prior to appointment.         Relevant Orders    Ambulatory referral/consult to General Surgery      Other Visit Diagnoses     Encounter for lipid screening for cardiovascular disease        Relevant Orders    Lipid panel        Future Appointments     Date Provider Specialty Appt Notes    3/27/2020  Radiology     8/11/2020 Mariano Smith MD Urology abnormal US testicles    9/18/2020 Johnathan Greenwood MD Family Medicine 3 mo f/u            Medication List with Changes/Refills   Current Medications    ALBUTEROL (PROVENTIL) 2.5 MG /3 ML (0.083 %) NEBULIZER SOLUTION    Inhale 2.5 mg into the lungs.    AMITRIPTYLINE (ELAVIL) 10 MG TABLET    Take 1 tablet (10 mg total) by mouth every  evening.    AMLODIPINE (NORVASC) 10 MG TABLET    Take 1 tablet (10 mg total) by mouth once daily.    AZELASTINE (ASTELIN) 137 MCG (0.1 %) NASAL SPRAY    Use 2 sprays (274 mcg total) by Nasal route 2 (two) times daily.    BUDESONIDE-FORMOTEROL 80-4.5 MCG (SYMBICORT) 80-4.5 MCG/ACTUATION HFAA    Inhale 2 puffs into the lungs 2 (two) times daily.    FAMOTIDINE (PEPCID) 20 MG TABLET    Take 1 tablet (20 mg total) by mouth nightly as needed for Heartburn.    FLUTICASONE PROPIONATE (FLONASE ALLERGY RELIEF) 50 MCG/ACTUATION NASAL SPRAY    Use 2 sprays (100 mcg total) by Each Nostril route once daily.    FLUTICASONE-SALMETEROL DISKUS INHALER 250-50 MCG    Inhale 1 puff into the lungs 2 (two) times daily. Controller    HYDROCODONE-ACETAMINOPHEN (NORCO) 7.5-325 MG PER TABLET        IBUPROFEN (ADVIL,MOTRIN) 800 MG TABLET    Take 1 tablet (800 mg total) by mouth 2 (two) times daily as needed for Pain.    NICOTINE (NICODERM CQ) 14 MG/24 HR    Place 1 patch onto the skin once daily.    PROMETHAZINE (PHENERGAN) 25 MG TABLET    Take 1 tablet (25 mg total) by mouth every 6 (six) hours as needed for Nausea   Changed and/or Refilled Medications    Modified Medication Previous Medication    ALBUTEROL (VENTOLIN HFA) 90 MCG/ACTUATION INHALER albuterol (VENTOLIN HFA) 90 mcg/actuation inhaler       Inhale 2 puffs into the lungs every 6 (six) hours as needed for Wheezing. Rescue    Inhale 2 puffs into the lungs every 6 (six) hours as needed for Wheezing. Rescue    CHLORTHALIDONE (HYGROTEN) 25 MG TAB chlorthalidone (HYGROTEN) 25 MG Tab       Take 1 tablet (25 mg total) by mouth once daily.    Take 1 tablet (25 mg total) by mouth once daily.       Johnathan Greenwood M.D.     ==========================================================================  Subjective:      Patient ID: Reymundo Gutierrez is a 44 y.o. male.  has a past medical history of Asthma, Crushing injury of left wrist and hand (7/10/2019), and Hypertension.     Chief  Complaint: ER F/U (Inguinal Hernia, needs General Surgery Referral)    Patient needs to sign pain contract to satisfy health maintenance.  Problem List Items Addressed This Visit     Hypertension, essential - Primary (Chronic)    Overview     March 2020:  Blood pressure initially elevated.  Patient reports out of chlorthalidone.  Needs refill.  =======================================================  JANUARY 2020:  Blood pressure still elevated today.  Increasing amlodipine to 10 mg.  Patient reports compliance with amlodipine 5 mg.  No side effects reported.  ======================================================  December 2019:  CHRONIC.  Currently uncontrolled.  Starting blood pressure medicine today.. BP Reviewed.  Compliant with BP medications. No SE reported.   (-) CP, SOB, palpitations, dizziness, lightheadedness, HA, arm numbness, tingling or weakness, syncope.  + fatigue, erectile dysfunction  Creatinine   Date Value Ref Range Status   09/27/2019 1.2 0.5 - 1.4 mg/dL Final     Discussed hypertension disease course, DASH-diet and exercise.  Discussed medication regimen importance of treating high blood pressure.  Patient advised of risk of untreated blood pressure.    ER precautions were given for symptoms of hypertensive urgency and emergency.           Current Assessment & Plan     Refill chlorthalidone.  Continue amlodipine.  Discussed hypertension disease course, DASH-diet and exercise.  Discussed medication regimen importance of treating high blood pressure.  Patient advised of risk of untreated blood pressure.    ER precautions were given for symptoms of hypertensive urgency and emergency.           Encounter for long-term (current) use of medications (Chronic)    Overview     CHRONIC. Stable. Compliant with medications for managed conditions. See medication list. No SE reported.   Routine lab analysis is being monitored. Refills were  addressed.  =======================================================  MARCH 2020:    CHRONIC. Stable. Compliant with medications for managed conditions. See medication list. No SE reported.   Routine lab analysis is being monitored. Refills were addressed.  Lab Results   Component Value Date    WBC 12.70 03/03/2020    HGB 14.3 03/03/2020    HCT 44.0 03/03/2020    MCV 94 03/03/2020     03/03/2020         Chemistry        Component Value Date/Time     09/27/2019 1137    K 4.1 09/27/2019 1137     09/27/2019 1137    CO2 23 09/27/2019 1137    BUN 10 09/27/2019 1137    CREATININE 1.2 09/27/2019 1137    GLU 96 09/27/2019 1137        Component Value Date/Time    CALCIUM 9.5 09/27/2019 1137    ALKPHOS 78 09/27/2019 1137    AST 16 09/27/2019 1137    ALT 22 09/27/2019 1137    BILITOT 0.5 09/27/2019 1137    ESTGFRAFRICA >60.0 09/27/2019 1137    EGFRNONAA >60.0 09/27/2019 1137          Lab Results   Component Value Date    TSH 1.170 09/27/2019       ======================================================         Current Assessment & Plan     Patient requesting refills today.  Labs are being monitored.         Testicular microlithiasis    Overview     Reviewed hospital discharge summary.  Patient had ultrasound of testicles that was abnormal.  Patient has follow-up appointment with Urology.  Patient reports that pain has subsided.         Current Assessment & Plan     No longer having radiation of pain or inguinal pain.  Patient has follow-up with Urology.         Inguinal pain    Overview     Patient went to ER recently with inguinal pain.  Patient had testicular ultrasound that was abnormal.  Patient has follow-up with Urology concerning these findings.  See ultrasound from [facility].  Patient was also told that he may have a hernia that would need to be seen by General surgery.  Patient reports that pain has improved.  However patient would like a referral to General surgery for further evaluation.          Current Assessment & Plan     Patient reassured.  No hernia felt on exam.  Patient's pain is resolved.  Likely referred pain from testicular pathology.  Patient will follow up closely with Urology.  Repeat ultrasound in a few months prior to appointment.           Other Visit Diagnoses     Encounter for lipid screening for cardiovascular disease               Past Medical History:  Past Medical History:   Diagnosis Date    Asthma     Crushing injury of left wrist and hand 7/10/2019    Hypertension      Past Surgical History:   Procedure Laterality Date    BONE GRAFT Left 8/6/2019    Procedure: BONE GRAFT LEG;  Surgeon: Escobar Colvin MD;  Location: [facility] OR;  Service: Orthopedics;  Laterality: Left;    FRACTURE SURGERY      INJECTION OF ANESTHETIC AGENT AROUND NERVE Left 10/22/2019    Procedure: Block, Nerve STELLATE GANGLION;  Surgeon: Vincent Alejandre MD;  Location: [facility] OR;  Service: Pain Management;  Laterality: Left;    INJECTION OF ANESTHETIC AGENT AROUND NERVE Left 12/10/2019    Procedure: Block, Nerve STELLATE GANGLION;  Surgeon: Vincent Alejandre MD;  Location: [facility] OR;  Service: Pain Management;  Laterality: Left;    MAXILLARY ANTROSTOMY Left 12/11/2019    Procedure: MAXILLARY ANTROSTOMY;  Surgeon: Tomy Medrano MD;  Location: [facility] OR;  Service: ENT;  Laterality: Left;    NASAL SEPTOPLASTY Bilateral 12/11/2019    Procedure: SEPTOPLASTY, NASAL;  Surgeon: Tomy Medrano MD;  Location: [facility] OR;  Service: ENT;  Laterality: Bilateral;    NASAL TURBINATE REDUCTION Bilateral 12/11/2019    Procedure: REDUCTION, NASAL TURBINATE;  Surgeon: Tomy Medrano MD;  Location: [facility] OR;  Service: ENT;  Laterality: Bilateral;    WRIST SURGERY Left     x3     Review of patient's allergies indicates:  No Known Allergies  Medication List with Changes/Refills   Current Medications    ALBUTEROL (PROVENTIL) 2.5 MG /3 ML (0.083 %) NEBULIZER SOLUTION    Inhale 2.5 mg into the lungs.    AMITRIPTYLINE (ELAVIL)  10 MG TABLET    Take 1 tablet (10 mg total) by mouth every evening.    AMLODIPINE (NORVASC) 10 MG TABLET    Take 1 tablet (10 mg total) by mouth once daily.    AZELASTINE (ASTELIN) 137 MCG (0.1 %) NASAL SPRAY    Use 2 sprays (274 mcg total) by Nasal route 2 (two) times daily.    BUDESONIDE-FORMOTEROL 80-4.5 MCG (SYMBICORT) 80-4.5 MCG/ACTUATION HFAA    Inhale 2 puffs into the lungs 2 (two) times daily.    FAMOTIDINE (PEPCID) 20 MG TABLET    Take 1 tablet (20 mg total) by mouth nightly as needed for Heartburn.    FLUTICASONE PROPIONATE (FLONASE ALLERGY RELIEF) 50 MCG/ACTUATION NASAL SPRAY    Use 2 sprays (100 mcg total) by Each Nostril route once daily.    FLUTICASONE-SALMETEROL DISKUS INHALER 250-50 MCG    Inhale 1 puff into the lungs 2 (two) times daily. Controller    HYDROCODONE-ACETAMINOPHEN (NORCO) 7.5-325 MG PER TABLET        IBUPROFEN (ADVIL,MOTRIN) 800 MG TABLET    Take 1 tablet (800 mg total) by mouth 2 (two) times daily as needed for Pain.    NICOTINE (NICODERM CQ) 14 MG/24 HR    Place 1 patch onto the skin once daily.    PROMETHAZINE (PHENERGAN) 25 MG TABLET    Take 1 tablet (25 mg total) by mouth every 6 (six) hours as needed for Nausea   Changed and/or Refilled Medications    Modified Medication Previous Medication    ALBUTEROL (VENTOLIN HFA) 90 MCG/ACTUATION INHALER albuterol (VENTOLIN HFA) 90 mcg/actuation inhaler       Inhale 2 puffs into the lungs every 6 (six) hours as needed for Wheezing. Rescue    Inhale 2 puffs into the lungs every 6 (six) hours as needed for Wheezing. Rescue    CHLORTHALIDONE (HYGROTEN) 25 MG TAB chlorthalidone (HYGROTEN) 25 MG Tab       Take 1 tablet (25 mg total) by mouth once daily.    Take 1 tablet (25 mg total) by mouth once daily.      Social History     Tobacco Use    Smoking status: Former Smoker    Smokeless tobacco: Never Used   Substance Use Topics    Alcohol use: No      History reviewed. No pertinent family history.    I have reviewed the complete PMH, social  "history, surgical history, allergies and medications.  As well as family history.    Review of Systems   Constitutional: Negative for activity change and fatigue.   HENT: Negative for congestion and sinus pain.    Eyes: Negative for visual disturbance.   Respiratory: Negative for chest tightness and shortness of breath.    Cardiovascular: Negative for palpitations and leg swelling.   Gastrointestinal: Negative for abdominal pain, diarrhea and nausea.        Inguinal Hernia   Endocrine: Negative for polyuria.   Genitourinary: Negative for difficulty urinating and frequency.   Musculoskeletal: Negative for arthralgias and joint swelling.   Skin: Negative for rash.   Neurological: Negative for dizziness and headaches.   Psychiatric/Behavioral: Negative for agitation. The patient is not nervous/anxious.      Objective:   /88   Pulse 92   Temp 98.5 °F (36.9 °C) (Oral)   Ht 5' 10" (1.778 m)   Wt 100.8 kg (222 lb 3.2 oz)   BMI 31.88 kg/m²   Physical Exam   Constitutional: He is oriented to person, place, and time. He appears well-developed and well-nourished. No distress.   HENT:   Head: Normocephalic and atraumatic.   Eyes: Pupils are equal, round, and reactive to light. EOM are normal.   Neck: Normal range of motion. Neck supple.   Cardiovascular: Normal rate, regular rhythm, normal heart sounds and intact distal pulses.   No murmur heard.  Pulmonary/Chest: Effort normal and breath sounds normal. No respiratory distress. He has no wheezes.   Abdominal: Soft. Bowel sounds are normal. He exhibits no distension. No hernia.   Musculoskeletal: Normal range of motion. He exhibits no edema.   Neurological: He is alert and oriented to person, place, and time. No cranial nerve deficit.   Skin: Skin is warm and dry. Capillary refill takes less than 2 seconds.   Psychiatric: He has a normal mood and affect. His behavior is normal.   Nursing note and vitals reviewed.      Assessment:     1. Hypertension, essential    2. " Encounter for long-term (current) use of medications    3. Testicular microlithiasis    4. Inguinal pain, unspecified laterality    5. Encounter for lipid screening for cardiovascular disease      MDM:   Moderate complexity.  Moderate risk  I have Reviewed and summarized old records.  I have performed thorough medication reconciliation today and discussed risk and benefits of each medication.  I have reviewed outside imaging, labs and discussed with patient.  All questions were answered.  I am requesting old records and will review them once they are available.  ER records    I have signed for the following orders AND/OR meds.  Orders Placed This Encounter   Procedures    US Scrotum And Testicles     Standing Status:   Future     Standing Expiration Date:   3/12/2021     Order Specific Question:   May the Radiologist modify the order per protocol to meet the clinical needs of the patient?     Answer:   Yes    CBC Without Differential     Standing Status:   Standing     Number of Occurrences:   99     Standing Expiration Date:   5/15/2021    TSH     Standing Status:   Standing     Number of Occurrences:   99     Standing Expiration Date:   5/15/2021    Comprehensive metabolic panel     Standing Status:   Standing     Number of Occurrences:   99     Standing Expiration Date:   3/11/2040    Lipid panel     Standing Status:   Standing     Number of Occurrences:   99     Standing Expiration Date:   3/11/2040    Ambulatory referral/consult to General Surgery     Standing Status:   Future     Standing Expiration Date:   4/16/2021     Referral Priority:   Routine     Referral Type:   Surgical     Referral Reason:   Specialty Services Required     Requested Specialty:   General Surgery     Number of Visits Requested:   1     Medications Ordered This Encounter   Medications    albuterol (VENTOLIN HFA) 90 mcg/actuation inhaler     Sig: Inhale 2 puffs into the lungs every 6 (six) hours as needed for Wheezing. Rescue      Dispense:  18 g     Refill:  11    chlorthalidone (HYGROTEN) 25 MG Tab     Sig: Take 1 tablet (25 mg total) by mouth once daily.     Dispense:  90 tablet     Refill:  3        Follow up in about 6 months (around 9/12/2020), or if symptoms worsen or fail to improve.    If no improvement in symptoms or symptoms worsen, advised to call/follow-up at clinic or go to ER. Patient voiced understanding and all questions/concerns were addressed.     DISCLAIMER: This note was compiled by using a speech recognition dictation system and therefore please be aware that typographical / speech recognition errors can and do occur.  Please contact me if you see any errors specifically.    Johnathan Greenwood M.D.       Office: [phone] [address] [place] [address]  FAX: [phone]

## 2020-03-16 PROBLEM — R10.30 INGUINAL PAIN: Status: ACTIVE | Noted: 2020-03-16

## 2020-03-16 NOTE — ASSESSMENT & PLAN NOTE
Patient reassured.  No hernia felt on exam.  Patient's pain is resolved.  Likely referred pain from testicular pathology.  Patient will follow up closely with Urology.  Repeat ultrasound in a few months prior to appointment.

## 2020-03-20 ENCOUNTER — TELEPHONE (OUTPATIENT)
Dept: FAMILY MEDICINE | Facility: CLINIC | Age: 45
End: 2020-03-20

## 2020-03-20 NOTE — TELEPHONE ENCOUNTER
----- Message from RT Boris sent at 3/20/2020 11:07 AM CDT -----  Contact: Radiology  Due to concerns associated with Covid19 the radiology team is seeking to reschedule outpatient diagnostic exams between 3/21 - 4/21 that have been ordered prior to 3/19.  Reymundo Gutierrez is scheduled for US Scrotum and Testicles on 3/27 at [place].  Please respond to this message if you believe it is safe to postpone this exam and the radiology team will reschedule and update you on the patient's response.  If you have concerns that postponement is not safe (urgent or time sensitive diagnostic study), then reply and it will be performed as ordered.   If further discussion needed, please provide a contact number and a Radiologist will reach out to you shortly.

## 2020-04-17 ENCOUNTER — PATIENT MESSAGE (OUTPATIENT)
Dept: FAMILY MEDICINE | Facility: CLINIC | Age: 45
End: 2020-04-17

## 2020-04-17 ENCOUNTER — OFFICE VISIT (OUTPATIENT)
Dept: FAMILY MEDICINE | Facility: CLINIC | Age: 45
End: 2020-04-17
Payer: MEDICAID

## 2020-04-17 DIAGNOSIS — J01.91 ACUTE RECURRENT SINUSITIS, UNSPECIFIED LOCATION: Primary | ICD-10-CM

## 2020-04-17 DIAGNOSIS — B97.89 VIRAL RESPIRATORY ILLNESS: ICD-10-CM

## 2020-04-17 DIAGNOSIS — J98.8 VIRAL RESPIRATORY ILLNESS: ICD-10-CM

## 2020-04-17 PROCEDURE — 99214 PR OFFICE/OUTPT VISIT, EST, LEVL IV, 30-39 MIN: ICD-10-PCS | Mod: 95,,, | Performed by: INTERNAL MEDICINE

## 2020-04-17 PROCEDURE — U0002 COVID-19 LAB TEST NON-CDC: HCPCS

## 2020-04-17 PROCEDURE — 99214 OFFICE O/P EST MOD 30 MIN: CPT | Mod: 95,,, | Performed by: INTERNAL MEDICINE

## 2020-04-17 RX ORDER — MONTELUKAST SODIUM 10 MG/1
10 TABLET ORAL NIGHTLY
Qty: 30 TABLET | Refills: 0 | Status: SHIPPED | OUTPATIENT
Start: 2020-04-17 | End: 2020-05-12

## 2020-04-17 RX ORDER — AMOXICILLIN 875 MG/1
875 TABLET, FILM COATED ORAL 2 TIMES DAILY
Qty: 20 TABLET | Refills: 0 | Status: SHIPPED | OUTPATIENT
Start: 2020-04-17 | End: 2020-04-22 | Stop reason: ALTCHOICE

## 2020-04-17 NOTE — TELEPHONE ENCOUNTER
Let patient know he will need to make an appointment symptoms are not improving and still having fever.  Direct him to [facility] anywhere care over the weekend or make an appointment with an available provider next week.

## 2020-04-17 NOTE — PROGRESS NOTES
Primary Care Telemedicine Note  The patient location is:  Patient Home   The chief complaint leading to consultation is: sinus congestion/fever  Total time spent with patient: 15 min    Visit type: Virtual visit with synchronous audio only and video  Each patient to whom he or she provides medical services by telemedicine is:  (1) informed of the relationship between the physician and patient and the respective role of any other health care provider with respect to management of the patient; and (2) notified that he or she may decline to receive medical services by telemedicine and may withdraw from such care at any time.      Assessment/Plan:    Acute recurrent sinusitis  -Suspect symptoms 2/2 acute sinusitis, however will rule out COVID-19 given his current cough and subjective fever and hx of asthma  -Plan to start on course of antibiotics  -Continue antihistamine and intranasal steroids for hx of seasonal allergies. Start singulair as well  -Avoid decongestants due to hx of HTN. Cant take OTC Coricidin for symptoms    _____________________________________________________________________________________________________________________________________________________    Orders this visit:    Acute recurrent sinusitis, unspecified location  -     amoxicillin (AMOXIL) 875 MG tablet; Take 1 tablet (875 mg total) by mouth 2 (two) times daily. for 10 days  Dispense: 20 tablet; Refill: 0  -     montelukast (SINGULAIR) 10 mg tablet; Take 1 tablet (10 mg total) by mouth every evening.  Dispense: 30 tablet; Refill: 0    Viral respiratory illness  -     COVID-19 Routine Screening      Follow up if symptoms worsen or fail to improve.    Virginia Hector MD  _____________________________________________________________________________________________________________________________________________________    CC: Sinus congestion/fever    HPI:  Patient reports subjective fevers, sinus congestion/pain, headache, post-nasal drip,  and mostly non-productive cough but sometimes with mucus production. Denies sore throat. Denies shortness of breath. Denies anosmia. Symptoms have been present for the past week. He has been taking OTC allergy meds, astelin, sinus rinses and ibuprofen. He has a hx of asthma and uses his inhaler twice daily which is usual for him. He also reports a chronic history of sinus and allergy issues. He is not currently working but does still continue to go out to the store. Denies any sick contacts.     No other complaints today.    Past Medical History:  Past Medical History:   Diagnosis Date    Asthma     Crushing injury of left wrist and hand 7/10/2019    Hypertension      Past Surgical History:   Procedure Laterality Date    BONE GRAFT Left 8/6/2019    Procedure: BONE GRAFT LEG;  Surgeon: Escobar Colvin MD;  Location: [facility] OR;  Service: Orthopedics;  Laterality: Left;    FRACTURE SURGERY      INJECTION OF ANESTHETIC AGENT AROUND NERVE Left 10/22/2019    Procedure: Block, Nerve STELLATE GANGLION;  Surgeon: Vincent Alejandre MD;  Location: [facility] OR;  Service: Pain Management;  Laterality: Left;    INJECTION OF ANESTHETIC AGENT AROUND NERVE Left 12/10/2019    Procedure: Block, Nerve STELLATE GANGLION;  Surgeon: Vincent Alejandre MD;  Location: [facility] OR;  Service: Pain Management;  Laterality: Left;    MAXILLARY ANTROSTOMY Left 12/11/2019    Procedure: MAXILLARY ANTROSTOMY;  Surgeon: Tomy Medrano MD;  Location: [facility] OR;  Service: ENT;  Laterality: Left;    NASAL SEPTOPLASTY Bilateral 12/11/2019    Procedure: SEPTOPLASTY, NASAL;  Surgeon: Tomy Medrano MD;  Location: [facility] OR;  Service: ENT;  Laterality: Bilateral;    NASAL TURBINATE REDUCTION Bilateral 12/11/2019    Procedure: REDUCTION, NASAL TURBINATE;  Surgeon: Tomy Medrano MD;  Location: [facility] OR;  Service: ENT;  Laterality: Bilateral;    WRIST SURGERY Left     x3     Review of patient's allergies indicates:  No Known Allergies  Social History      Tobacco Use    Smoking status: Former Smoker    Smokeless tobacco: Never Used   Substance Use Topics    Alcohol use: No    Drug use: No     No family history on file.  Current Outpatient Medications on File Prior to Visit   Medication Sig Dispense Refill    albuterol (PROVENTIL) 2.5 mg /3 mL (0.083 %) nebulizer solution Inhale 2.5 mg into the lungs.      albuterol (VENTOLIN HFA) 90 mcg/actuation inhaler Inhale 2 puffs into the lungs every 6 (six) hours as needed for Wheezing. Rescue 18 g 11    amitriptyline (ELAVIL) 10 MG tablet Take 1 tablet (10 mg total) by mouth every evening. 30 tablet 2    amLODIPine (NORVASC) 10 MG tablet Take 1 tablet (10 mg total) by mouth once daily. 90 tablet 1    azelastine (ASTELIN) 137 mcg (0.1 %) nasal spray Use 2 sprays (274 mcg total) by Nasal route 2 (two) times daily. 30 mL 6    budesonide-formoterol 80-4.5 mcg (SYMBICORT) 80-4.5 mcg/actuation HFAA Inhale 2 puffs into the lungs 2 (two) times daily. 10.2 g 12    chlorthalidone (HYGROTEN) 25 MG Tab Take 1 tablet (25 mg total) by mouth once daily. 90 tablet 3    famotidine (PEPCID) 20 MG tablet Take 1 tablet (20 mg total) by mouth nightly as needed for Heartburn. 90 tablet 3    fluticasone propionate (FLONASE ALLERGY RELIEF) 50 mcg/actuation nasal spray Use 2 sprays (100 mcg total) by Each Nostril route once daily. 16 g 11    fluticasone-salmeterol diskus inhaler 250-50 mcg Inhale 1 puff into the lungs 2 (two) times daily. Controller 60 each 11    HYDROcodone-acetaminophen (NORCO) 7.5-325 mg per tablet       ibuprofen (ADVIL,MOTRIN) 800 MG tablet Take 1 tablet (800 mg total) by mouth 2 (two) times daily as needed for Pain. 40 tablet 2    nicotine (NICODERM CQ) 14 mg/24 hr Place 1 patch onto the skin once daily. 28 patch 1    promethazine (PHENERGAN) 25 MG tablet Take 1 tablet (25 mg total) by mouth every 6 (six) hours as needed for Nausea 30 tablet 0     No current facility-administered medications on file prior  to visit.        Review of Systems   Constitutional: Positive for chills and malaise/fatigue. Negative for fever.   HENT: Positive for congestion and sinus pain. Negative for sore throat.    Respiratory: Positive for cough. Negative for shortness of breath.    Neurological: Positive for headaches.         Physical Exam     Units 2/12/2020 2/5/2020 2/5/2020 2/4/2020 1/30/2020 1/29/2020 1/28/2020   Time -  6:09 PM  1:31 PM  7:00 AM  9:50 AM  3:40 PM 11:35 PM  9:36 PM   Pulse bpm 99 107 100 102 108 103 94          2/12/2020 2/11/2020 2/5/2020 2/5/2020 2/4/2020 1/30/2020 1/29/2020   Time  6:10 PM  5:00 PM  1:31 PM  7:00 AM  9:50 AM  3:41 PM 11:35 PM   Systolic Blood Pressure 124 120 133 120 130 133 134   Diastolic Blood Pressure 82 80 86 80 80 83 81         No flowsheet data found.    Physical Exam   Constitutional: He is oriented to person, place, and time. He appears well-developed and well-nourished. No distress.   HENT:   Head: Normocephalic and atraumatic.   Mouth/Throat: Oropharynx is clear and moist.   parasinus tenderness upon self-palpation   Eyes: EOM are normal.   Neck: Normal range of motion.   Pulmonary/Chest: Effort normal. No respiratory distress.   Neurological: He is alert and oriented to person, place, and time.   Psychiatric: He has a normal mood and affect. His behavior is normal.

## 2020-04-18 LAB — SARS-COV-2 RNA RESP QL NAA+PROBE: NOT DETECTED

## 2020-04-19 PROBLEM — J01.91 ACUTE RECURRENT SINUSITIS: Status: ACTIVE | Noted: 2020-04-19

## 2020-04-20 NOTE — ASSESSMENT & PLAN NOTE
-Suspect symptoms 2/2 acute sinusitis, however will rule out COVID-19 given his current cough and subjective fever and hx of asthma  -Plan to start on course of antibiotics  -Continue antihistamine and intranasal steroids for hx of seasonal allergies. Start singulair as well  -Avoid decongestants due to hx of HTN. Cant take OTC Coricidin for symptoms

## 2020-04-22 ENCOUNTER — PATIENT MESSAGE (OUTPATIENT)
Dept: FAMILY MEDICINE | Facility: CLINIC | Age: 45
End: 2020-04-22

## 2020-04-22 DIAGNOSIS — J01.91 ACUTE RECURRENT SINUSITIS, UNSPECIFIED LOCATION: Primary | ICD-10-CM

## 2020-04-22 RX ORDER — METHYLPREDNISOLONE 4 MG/1
TABLET ORAL
Qty: 21 TABLET | Refills: 0 | Status: SHIPPED | OUTPATIENT
Start: 2020-04-22 | End: 2020-06-15

## 2020-04-22 RX ORDER — AMOXICILLIN AND CLAVULANATE POTASSIUM 875; 125 MG/1; MG/1
1 TABLET, FILM COATED ORAL 2 TIMES DAILY
Qty: 14 TABLET | Refills: 0 | Status: SHIPPED | OUTPATIENT
Start: 2020-04-22 | End: 2020-04-29

## 2020-04-27 RX ORDER — IBUPROFEN 800 MG/1
800 TABLET ORAL 2 TIMES DAILY PRN
Qty: 40 TABLET | Refills: 2 | Status: SHIPPED | OUTPATIENT
Start: 2020-04-27 | End: 2020-07-14 | Stop reason: SDUPTHER

## 2020-04-27 NOTE — TELEPHONE ENCOUNTER
Please see that he is on medicaid now?  We were planning on cervical SCS trial.  Please follow up to see if we have psych clearance and if we can proceed with medicaid.     Will refill ibuprofen as COVID-19 test was negative on 4/17/20.

## 2020-05-12 DIAGNOSIS — J01.91 ACUTE RECURRENT SINUSITIS, UNSPECIFIED LOCATION: ICD-10-CM

## 2020-05-12 RX ORDER — MONTELUKAST SODIUM 10 MG/1
TABLET ORAL
Qty: 30 TABLET | Refills: 11 | Status: SHIPPED | OUTPATIENT
Start: 2020-05-12 | End: 2020-05-14 | Stop reason: SDUPTHER

## 2020-05-14 DIAGNOSIS — J01.91 ACUTE RECURRENT SINUSITIS, UNSPECIFIED LOCATION: ICD-10-CM

## 2020-05-14 DIAGNOSIS — Z79.899 ENCOUNTER FOR LONG-TERM (CURRENT) USE OF MEDICATIONS: ICD-10-CM

## 2020-05-15 RX ORDER — ALBUTEROL SULFATE 90 UG/1
2 AEROSOL, METERED RESPIRATORY (INHALATION) EVERY 6 HOURS PRN
Qty: 18 G | Refills: 11 | Status: SHIPPED | OUTPATIENT
Start: 2020-05-15 | End: 2020-09-30 | Stop reason: SDUPTHER

## 2020-05-15 RX ORDER — MONTELUKAST SODIUM 10 MG/1
10 TABLET ORAL NIGHTLY
Qty: 30 TABLET | Refills: 11 | Status: SHIPPED | OUTPATIENT
Start: 2020-05-15 | End: 2021-01-06 | Stop reason: SDUPTHER

## 2020-05-22 ENCOUNTER — TELEPHONE (OUTPATIENT)
Dept: RADIOLOGY | Facility: HOSPITAL | Age: 45
End: 2020-05-22

## 2020-06-10 ENCOUNTER — PATIENT MESSAGE (OUTPATIENT)
Dept: FAMILY MEDICINE | Facility: CLINIC | Age: 45
End: 2020-06-10

## 2020-06-10 DIAGNOSIS — S92.912D CLOSED NONDISPLACED FRACTURE OF PHALANX OF TOE OF LEFT FOOT WITH ROUTINE HEALING, UNSPECIFIED TOE, SUBSEQUENT ENCOUNTER: Primary | ICD-10-CM

## 2020-06-10 RX ORDER — HYDROCODONE BITARTRATE AND ACETAMINOPHEN 7.5; 325 MG/1; MG/1
1 TABLET ORAL EVERY 6 HOURS PRN
Qty: 20 TABLET | Refills: 0 | Status: SHIPPED | OUTPATIENT
Start: 2020-06-10 | End: 2020-06-23

## 2020-06-11 ENCOUNTER — TELEPHONE (OUTPATIENT)
Dept: ORTHOPEDICS | Facility: CLINIC | Age: 45
End: 2020-06-11

## 2020-06-11 ENCOUNTER — PATIENT MESSAGE (OUTPATIENT)
Dept: FAMILY MEDICINE | Facility: CLINIC | Age: 45
End: 2020-06-11

## 2020-06-11 NOTE — TELEPHONE ENCOUNTER
I received your message which was reviewed along with the the medication list and allergies that we have below.  Please review it for accuracy to make sure that we have the most recent records on your history.     Based on this, the following orders were placed AND/OR medicines were sent in.     Orders Placed This Encounter   Procedures    Ambulatory referral/consult to Orthopedics     Standing Status:   Future     Standing Expiration Date:   7/10/2021     Referral Priority:   Urgent     Referral Type:   Consultation     Requested Specialty:   Orthopedic Surgery     Number of Visits Requested:   1       Medications written and sent at this time include:  Medications Ordered This Encounter   Medications    HYDROcodone-acetaminophen (NORCO) 7.5-325 mg per tablet     Sig: Take 1 tablet by mouth every 6 (six) hours as needed for Pain.     Dispense:  20 tablet     Refill:  0       Your pharmacy(ies) of choice at this time on record include the list below and any medications would have been sent to the one at the top.    [facility]  [address] [facility] [address] [place] [address]  Phone: [phone] Fax: [phone]    [facility] [address] [place] [facility]  [place] [address]  Phone: [phone] Fax: [phone]      Thank you for choosing us as your healthcare provider!  Dr. Johnathan Greenwood    ALLERGY LIST  Review of patient's allergies indicates:  No Known Allergies    MEDICATION LIST  Current Outpatient Medications on File Prior to Visit   Medication Sig Dispense Refill    albuterol (PROVENTIL) 2.5 mg /3 mL (0.083 %) nebulizer solution Inhale 2.5 mg into the lungs.      albuterol (VENTOLIN HFA) 90 mcg/actuation inhaler Inhale 2 puffs into the lungs every 6 (six) hours as needed for Wheezing. Rescue 18 g 11    amitriptyline (ELAVIL) 10 MG tablet Take 1 tablet (10 mg total) by mouth every evening. 30 tablet 2    amLODIPine (NORVASC) 10 MG tablet Take 1 tablet  (10 mg total) by mouth once daily. 90 tablet 1    azelastine (ASTELIN) 137 mcg (0.1 %) nasal spray Use 2 sprays (274 mcg total) by Nasal route 2 (two) times daily. 30 mL 6    budesonide-formoterol 80-4.5 mcg (SYMBICORT) 80-4.5 mcg/actuation HFAA Inhale 2 puffs into the lungs 2 (two) times daily. 10.2 g 12    chlorthalidone (HYGROTEN) 25 MG Tab Take 1 tablet (25 mg total) by mouth once daily. 90 tablet 3    famotidine (PEPCID) 20 MG tablet Take 1 tablet (20 mg total) by mouth nightly as needed for Heartburn. 90 tablet 3    fluticasone propionate (FLONASE ALLERGY RELIEF) 50 mcg/actuation nasal spray Use 2 sprays (100 mcg total) by Each Nostril route once daily. 16 g 11    fluticasone-salmeterol diskus inhaler 250-50 mcg Inhale 1 puff into the lungs 2 (two) times daily. Controller 60 each 11    HYDROcodone-acetaminophen (NORCO) 7.5-325 mg per tablet       ibuprofen (ADVIL,MOTRIN) 800 MG tablet Take 1 tablet (800 mg total) by mouth 2 (two) times daily as needed for Pain. 40 tablet 2    methylPREDNISolone (MEDROL DOSEPACK) 4 mg tablet follow package directions 21 tablet 0    montelukast (SINGULAIR) 10 mg tablet Take 1 tablet (10 mg total) by mouth every evening. 30 tablet 11    nicotine (NICODERM CQ) 14 mg/24 hr Place 1 patch onto the skin once daily. 28 patch 1    promethazine (PHENERGAN) 25 MG tablet Take 1 tablet (25 mg total) by mouth every 6 (six) hours as needed for Nausea 30 tablet 0     No current facility-administered medications on file prior to visit.        HEALTH MAINTENANCE THAT IS OVERDUE OR NEEDS TO BE UPDATED ON OUR CHART IS LISTED BELOW.  IF YOU HAVE HAD IT DONE ELSEWHERE, PLEASE SEND US DATES AND RECORDS IF YOU HAVE THEM TO MAKE YOUR CHART ACCURATE.  IF YOU HAVE NOT HAD THESE DONE AND ARE READY FOR US TO SCHEDULE THEM, PLEASE SEND US A MESSAGE.  There are no preventive care reminders to display for this patient.    DISCLAIMER: This note was compiled by using a speech recognition dictation  system and therefore please be aware that typographical / speech recognition errors can and do occur.  Please contact me if you see any errors specifically.    Johnathan Greenwood MD  We Offer Telehealth & Same Day Appointments!   Book your Telehealth appointment with me through my nurse or   Clinic appointments on [employer]!  [phone]     Check out my Facebook Page and Follow Me at: CLICK HERE    Check out my website at [employer] by clicking on: CLICK HERE    To Schedule appointments online, go to [employer]: CLICK HERE     Location: [email or web address]    [address] [place] [address]    FAX: [phone]       Result Narrative   REASON FOR EXAM: pain s/p stubbing toes on brick ledge     TECHNICAL FACTORS: Three or more views    COMPARISON: None    FINDINGS: There is suspicion of a nondisplaced fracture of the distal phalanx of the first digit. There is also a 1.5 mm ossicle adjacent to the base of the proximal phalanx of the second digit laterally on the oblique view. This could represent a   fracture fragment or other soft tissue calcification. There is no evidence of subluxation. There is minimal osteoarthritis of the forefoot. No significant soft tissue swelling is identified.    IMPRESSION:  Possible fractures of the distal phalanx of the first digit and the base of the proximal phalanx of the second digit.    Approved by KENNETH Nino on 6/9/2020 9:29 AM    Electronically signed by Russel Bennett MD on 6/9/2020 9:52 AM

## 2020-06-12 ENCOUNTER — HOSPITAL ENCOUNTER (OUTPATIENT)
Dept: RADIOLOGY | Facility: HOSPITAL | Age: 45
Discharge: HOME | End: 2020-06-12
Attending: PODIATRIST
Payer: MEDICAID

## 2020-06-12 ENCOUNTER — OFFICE VISIT (OUTPATIENT)
Dept: PODIATRY | Facility: CLINIC | Age: 45
End: 2020-06-12
Payer: MEDICAID

## 2020-06-12 VITALS
WEIGHT: 216.5 LBS | SYSTOLIC BLOOD PRESSURE: 138 MMHG | BODY MASS INDEX: 31.06 KG/M2 | HEART RATE: 85 BPM | DIASTOLIC BLOOD PRESSURE: 89 MMHG

## 2020-06-12 DIAGNOSIS — M79.672 FOOT PAIN, LEFT: Primary | ICD-10-CM

## 2020-06-12 DIAGNOSIS — Z87.891 HISTORY OF SMOKING: ICD-10-CM

## 2020-06-12 DIAGNOSIS — S92.425A CLOSED NONDISPLACED FRACTURE OF DISTAL PHALANX OF LEFT GREAT TOE, INITIAL ENCOUNTER: Primary | ICD-10-CM

## 2020-06-12 DIAGNOSIS — S92.425A CLOSED NONDISPLACED FRACTURE OF DISTAL PHALANX OF LEFT GREAT TOE, INITIAL ENCOUNTER: ICD-10-CM

## 2020-06-12 PROCEDURE — 99999 PR PBB SHADOW E&M-EST. PATIENT-LVL III: CPT | Mod: PBBFAC,,, | Performed by: PODIATRIST

## 2020-06-12 PROCEDURE — 73630 X-RAY EXAM OF FOOT: CPT | Mod: 26,LT,, | Performed by: RADIOLOGY

## 2020-06-12 PROCEDURE — 99203 PR OFFICE/OUTPT VISIT, NEW, LEVL III, 30-44 MIN: ICD-10-PCS | Mod: S$PBB,,, | Performed by: PODIATRIST

## 2020-06-12 PROCEDURE — 73630 X-RAY EXAM OF FOOT: CPT | Mod: TC,LT

## 2020-06-12 PROCEDURE — 99999 PR PBB SHADOW E&M-EST. PATIENT-LVL III: ICD-10-PCS | Mod: PBBFAC,,, | Performed by: PODIATRIST

## 2020-06-12 PROCEDURE — 99213 OFFICE O/P EST LOW 20 MIN: CPT | Mod: PBBFAC,25 | Performed by: PODIATRIST

## 2020-06-12 PROCEDURE — 73630 XR FOOT COMPLETE 3 VIEW LEFT: ICD-10-PCS | Mod: 26,LT,, | Performed by: RADIOLOGY

## 2020-06-12 PROCEDURE — 99203 OFFICE O/P NEW LOW 30 MIN: CPT | Mod: S$PBB,,, | Performed by: PODIATRIST

## 2020-06-12 NOTE — PROGRESS NOTES
Subjective:       Patient ID: Reymundo Gutierrez is a 44 y.o. male.    Chief Complaint: Foot Injury (Patient was running and hit left foot on the bricks of driveway. patient wearing postop shoe and dressing for visit. Drainage and open area noted to left hallux. 5/10 Pain at present, stated he took a pain pill at 5am this morning. Patient is non diabetic. )      HPI:  Reymundo Guteirrez presents to the office today after sustaining an injury to the left great toe.  Patient reports that he was running after his dog several days ago at 1:00 a.m. and subsequently fell while he hit his great toe against a new concrete brick driveway.  He states that he did present to the emergency room at [facility] where an x-ray was performed.  They showed possible fracture to the left great distal phalanx which was nondisplaced. Patient's PMD is Johnathan Greenwood MD.  Currently and a 5/10 pain at present.  Reports that he did take some pain medicine this morning as well.  Has been ambulating with a postoperative shoe with no complaints.    Review of patient's allergies indicates:  No Known Allergies    Past Medical History:   Diagnosis Date    Asthma     Crushing injury of left wrist and hand 7/10/2019    Hypertension        History reviewed. No pertinent family history.    Social History     Socioeconomic History    Marital status: Single     Spouse name: Not on file    Number of children: Not on file    Years of education: Not on file    Highest education level: Not on file   Occupational History    Not on file   Social Needs    Financial resource strain: Not very hard    Food insecurity:     Worry: Never true     Inability: Never true    Transportation needs:     Medical: No     Non-medical: No   Tobacco Use    Smoking status: Former Smoker    Smokeless tobacco: Never Used   Substance and Sexual Activity    Alcohol use: No    Drug use: No    Sexual activity: Not on file   Lifestyle    Physical activity:      Days per week: 0 days     Minutes per session: 0 min    Stress: Only a little   Relationships    Social connections:     Talks on phone: More than three times a week     Gets together: More than three times a week     Attends Zoroastrian service: 1 to 4 times per year     Active member of club or organization: No     Attends meetings of clubs or organizations: Never     Relationship status: Living with partner   Other Topics Concern    Not on file   Social History Narrative    Not on file       Past Surgical History:   Procedure Laterality Date    BONE GRAFT Left 8/6/2019    Procedure: BONE GRAFT LEG;  Surgeon: Escobar Colvin MD;  Location: [facility] OR;  Service: Orthopedics;  Laterality: Left;    FRACTURE SURGERY      INJECTION OF ANESTHETIC AGENT AROUND NERVE Left 10/22/2019    Procedure: Block, Nerve STELLATE GANGLION;  Surgeon: Vincent Alejandre MD;  Location: [facility] OR;  Service: Pain Management;  Laterality: Left;    INJECTION OF ANESTHETIC AGENT AROUND NERVE Left 12/10/2019    Procedure: Block, Nerve STELLATE GANGLION;  Surgeon: Vincent Alejandre MD;  Location: [facility] OR;  Service: Pain Management;  Laterality: Left;    MAXILLARY ANTROSTOMY Left 12/11/2019    Procedure: MAXILLARY ANTROSTOMY;  Surgeon: Tomy Medrano MD;  Location: [facility] OR;  Service: ENT;  Laterality: Left;    NASAL SEPTOPLASTY Bilateral 12/11/2019    Procedure: SEPTOPLASTY, NASAL;  Surgeon: Tomy Medrano MD;  Location: [facility] OR;  Service: ENT;  Laterality: Bilateral;    NASAL TURBINATE REDUCTION Bilateral 12/11/2019    Procedure: REDUCTION, NASAL TURBINATE;  Surgeon: Tomy Medrano MD;  Location: [facility];  Service: ENT;  Laterality: Bilateral;    WRIST SURGERY Left     x3       Review of Systems   Constitutional: Negative for activity change, appetite change, chills and fever.   HENT: Negative for sinus pain, sore throat and voice change.    Eyes: Negative for pain, redness and visual disturbance.   Respiratory: Negative for cough  and shortness of breath.    Cardiovascular: Negative for chest pain and palpitations.   Gastrointestinal: Negative for diarrhea, nausea and vomiting.   Musculoskeletal: Positive for gait problem. Negative for back pain and joint swelling.   Skin: Negative for color change and wound.   Neurological: Negative for dizziness, weakness and numbness.   Psychiatric/Behavioral: The patient is not nervous/anxious.           Objective:   /89   Pulse 85   Wt 98.2 kg (216 lb 7.9 oz)   BMI 31.06 kg/m²     X-Ray Foot Complete Left  Narrative: EXAMINATION:  XR FOOT COMPLETE 3 VIEW LEFT    CLINICAL HISTORY:  .  Nondisplaced fracture of distal phalanx of left great toe, initial encounter for closed fracture    TECHNIQUE:  AP, lateral and oblique views of the left foot were performed.    COMPARISON:  None    FINDINGS:  No osseous erosions.  There is a nondisplaced fracture distal phalanx 1st ray.  Slight overlying soft tissue swelling.  No other fracture seen.  Mild hallux valgus appearance.  Atherosclerosis as can be seen with peripheral vascular disease/diabetes.  Impression: As above    Electronically signed by: Diogenes Valencia MD  Date:    06/12/2020  Time:    11:22       Physical Exam  LOWER EXTREMITY PHYSICAL EXAMINATION    VASCULAR: The right DP pulse is 2/4 and the left DP is 2/4. The right PT pulse is 2/4 and the left PT pulse is 2/4. Proximal to distal, warm to warm. No dependent rubor or elevation palor is noted. Capillary refill time is less than 3 seconds. Hair growth is appreciated to the dorsal foot and digits.    NEUROLOGY:  Protective sensation is intact with [place] Becca monofilament. Proprioception is intact. Intact sensation to light touch. No neurological symptoms noted on palpation of interspace with radiating sensations proximally or distally. Tinel's and Valliex's sign is negative. No neurological symptoms with compression of metatarsal heads. No numbness or tingling reported on examination.      DERMATOLOGY: Skin is supple, dry and intact. No ecchymosis is noted. No hypertrophic skin formation. No erythema or cellulitis is noted.     ORTHOPEDIC:  Pain on palpation of the distal phalanx of the left hallux.  Slight hallux abductovalgus deformity is noted and was previously present.  Does not have pain with dorsiflexion or plantar flexion of the metatarsophalangeal joint.  Patient ambulating with a mildly antalgic gait utilizing a postoperative shoe.      Assessment:     1. Closed nondisplaced fracture of distal phalanx of left great toe, initial encounter    2. History of smoking        Plan:     Closed nondisplaced fracture of distal phalanx of left great toe, initial encounter  -     X-Ray Foot Complete Left; Future; Expected date: 06/12/2020    History of smoking      X-rays were taken reviewed by myself with the patient the room.  These were also correlated to the report taken from [place] as we are unable to visualize the images.  There appears to be a nondisplaced closed fracture of the left great toe at the distal phalanx.  This is a transverse to slightly oblique fracture fragment.  There is no deviation dorsally or plantarly.  No medial lateral deviation as well.  Did discussed with patient to utilize his current pain medication and a postoperative shoe to help prevent range of motion of the interphalangeal joint.  Also discussed possible turf toe implant if he would like to use other shoe gear as this would also prevent range of motion or movement of the fracture fragments.  We will plan to see patient back in approximately 6 weeks in [place] to re-evaluate the fracture line.  Recommend conservative treatment as there is no deviation or dislocation.    Patient does have a history of smoking.  Reports that he is currently 3 weeks since last cigarette.  Encourage him to continue to be smoke free.  Also discussed risks of nonhealing wounds or fractures secondary to nicotine use.        Future  Appointments   Date Time Provider Department Center   6/15/2020 10:40 AM Johnathan Greenwood MD [facility] [place]   7/16/2020  1:40 PM Johnathan Greenwood MD [facility] FAM MED [place]   7/28/2020 10:00 AM [facility] XR1 [facility] XRAY [place]   7/28/2020 10:20 AM Lexy Davis DPM [facility] POD [place]   8/11/2020 11:00 AM Mariano Smith MD [facility] UROLOGY [place]   9/18/2020  1:00 PM Johnathan Greenwood MD [facility] MED [place]

## 2020-06-12 NOTE — LETTER
June 12, 2020      Johnathan Greenwood MD  [address] [place] [address]           [facility]  [address] [place] [id number]  Phone: [phone]  Fax: [phone]          Patient: Reymundo Gutierrez   MR Number: [id number]   YOB: 1975   Date of Visit: 6/12/2020       Dear :    Thank you for referring Reymundo Gutierrez to me for evaluation. Attached you will find relevant portions of my assessment and plan of care.    If you have questions, please do not hesitate to call me. I look forward to following Reymundo Gutierrez along with you.    Sincerely,    Lexy Davis, MONIQUE    Enclosure  CC:  No Recipients    If you would like to receive this communication electronically, please contact [email or web address] or [phone] to request more information on [employer] Link access.    For providers and/or their staff who would like to refer a patient to [facility], please contact us through our one-stop-shop provider referral line, Siddharth Chou, at [phone].    If you feel you have received this communication in error or would no longer like to receive these types of communications, please e-mail [email or web address]

## 2020-06-15 ENCOUNTER — OFFICE VISIT (OUTPATIENT)
Dept: FAMILY MEDICINE | Facility: CLINIC | Age: 45
End: 2020-06-15
Payer: MEDICAID

## 2020-06-15 VITALS
BODY MASS INDEX: 30.92 KG/M2 | HEIGHT: 70 IN | DIASTOLIC BLOOD PRESSURE: 88 MMHG | HEART RATE: 90 BPM | TEMPERATURE: 98 F | SYSTOLIC BLOOD PRESSURE: 136 MMHG | WEIGHT: 216 LBS

## 2020-06-15 DIAGNOSIS — H66.002 NON-RECURRENT ACUTE SUPPURATIVE OTITIS MEDIA OF LEFT EAR WITHOUT SPONTANEOUS RUPTURE OF TYMPANIC MEMBRANE: Primary | ICD-10-CM

## 2020-06-15 PROCEDURE — 99999 PR PBB SHADOW E&M-EST. PATIENT-LVL III: CPT | Mod: PBBFAC,,, | Performed by: FAMILY MEDICINE

## 2020-06-15 PROCEDURE — 99213 PR OFFICE/OUTPT VISIT, EST, LEVL III, 20-29 MIN: ICD-10-PCS | Mod: S$PBB,,, | Performed by: FAMILY MEDICINE

## 2020-06-15 PROCEDURE — 99999 PR PBB SHADOW E&M-EST. PATIENT-LVL III: ICD-10-PCS | Mod: PBBFAC,,, | Performed by: FAMILY MEDICINE

## 2020-06-15 PROCEDURE — 99213 OFFICE O/P EST LOW 20 MIN: CPT | Mod: PBBFAC,PO | Performed by: FAMILY MEDICINE

## 2020-06-15 PROCEDURE — 99213 OFFICE O/P EST LOW 20 MIN: CPT | Mod: S$PBB,,, | Performed by: FAMILY MEDICINE

## 2020-06-15 RX ORDER — PREDNISONE 20 MG/1
20 TABLET ORAL 2 TIMES DAILY
Qty: 10 TABLET | Refills: 0 | Status: SHIPPED | OUTPATIENT
Start: 2020-06-15 | End: 2020-06-20

## 2020-06-15 RX ORDER — AMOXICILLIN AND CLAVULANATE POTASSIUM 875; 125 MG/1; MG/1
TABLET, FILM COATED ORAL
COMMUNITY
Start: 2020-06-13 | End: 2020-08-07

## 2020-06-15 RX ORDER — LORATADINE 10 MG/1
10 TABLET ORAL DAILY
Qty: 30 TABLET | Refills: 11 | Status: SHIPPED | OUTPATIENT
Start: 2020-06-15 | End: 2020-09-30 | Stop reason: SDUPTHER

## 2020-06-15 NOTE — PATIENT INSTRUCTIONS
Follow up if symptoms worsen or fail to improve, for As scheduled.     If no improvement in symptoms or symptoms worsen, please be advised to call MD, follow-up at clinic and/or go to ER if becomes severe.    Johnathan Greenwood M.D.        We Offer TELEHEALTH & Same Day Appointments!   Book your Telehealth appointment with me through my nurse or   Clinic appointments on [employer]!    [address] [place] [address]    Office: [phone]   FAX: [phone]    Check out my Facebook Page and Follow Me at: [email or web address]    Check out my website at [facility] by clicking on: [email or web address]    To Schedule appointments online, go to [employer]: [email or web address]

## 2020-06-15 NOTE — ASSESSMENT & PLAN NOTE
Start Claritin.  Increase Flonase to twice a day.  Start prednisone burst.  Follow-up with ENT if no improvement.  Discussed condition course and signs and symptoms to expect.  Patient advised take anti-inflammatories and or Tylenol for pain or fever.  ER precautions.  Call MD or follow-up to clinic if not improving or worsening symptoms.

## 2020-06-27 ENCOUNTER — PATIENT MESSAGE (OUTPATIENT)
Dept: FAMILY MEDICINE | Facility: CLINIC | Age: 45
End: 2020-06-27

## 2020-06-28 NOTE — TELEPHONE ENCOUNTER
Set up follow-up appointment with an available provider preferably Podiatry who he has seen for his foot.  He should not still be needing pain medication at this time and needs further evaluation.

## 2020-06-29 ENCOUNTER — OFFICE VISIT (OUTPATIENT)
Dept: OTOLARYNGOLOGY | Facility: CLINIC | Age: 45
End: 2020-06-29
Payer: MEDICAID

## 2020-06-29 VITALS — TEMPERATURE: 98 F | WEIGHT: 217.81 LBS | HEIGHT: 70 IN | BODY MASS INDEX: 31.18 KG/M2

## 2020-06-29 DIAGNOSIS — J34.89 NASAL CRUSTING: ICD-10-CM

## 2020-06-29 DIAGNOSIS — J32.0 CHRONIC MAXILLARY SINUSITIS: Primary | ICD-10-CM

## 2020-06-29 DIAGNOSIS — J31.0 RHINITIS, UNSPECIFIED TYPE: ICD-10-CM

## 2020-06-29 PROCEDURE — 99999 PR PBB SHADOW E&M-EST. PATIENT-LVL IV: ICD-10-PCS | Mod: PBBFAC,,, | Performed by: OTOLARYNGOLOGY

## 2020-06-29 PROCEDURE — 99214 OFFICE O/P EST MOD 30 MIN: CPT | Mod: S$PBB,,, | Performed by: OTOLARYNGOLOGY

## 2020-06-29 PROCEDURE — 99999 PR PBB SHADOW E&M-EST. PATIENT-LVL IV: CPT | Mod: PBBFAC,,, | Performed by: OTOLARYNGOLOGY

## 2020-06-29 PROCEDURE — 99214 OFFICE O/P EST MOD 30 MIN: CPT | Mod: PBBFAC,PO | Performed by: OTOLARYNGOLOGY

## 2020-06-29 PROCEDURE — 99214 PR OFFICE/OUTPT VISIT, EST, LEVL IV, 30-39 MIN: ICD-10-PCS | Mod: S$PBB,,, | Performed by: OTOLARYNGOLOGY

## 2020-06-29 NOTE — PROGRESS NOTES
Subjective:       Patient ID: Reymundo Gutierrez is a 45 y.o. male.    Chief Complaint: Otitis Media (left ear) and Otalgia (left ear)    Reymundo is here for follow-up.    He has been having increased Left sided ear pressure for the past 2 weeks, assoc with nasal rinse use. He has been dx with AOM and treated with multiple rounds of antibiotics and steroids without relief. He does feel mild relief as of today.    He still has nasal pressure, congestion, drainage, bilaterally following surgery. Feels related to crusting in the nose. This is daily.   His nasal allergies have been worsening and he has been on oral Ah and Singulair as well as Flonase. Prior to a few weeks ago, he was on Astelin and Saline irrigations. No allergy testing.   No tobacco.    History of S/p Septoplasty, Inferior Turbinate Reduction, L max, repair of nasal valves 12/2019    Review of Systems   Constitutional: Negative for activity change and appetite change.   Respiratory: Negative for difficulty breathing and wheezing   Cardiovascular: Negative for chest pain.      Objective:        Constitutional:   Vital signs are normal. He appears well-developed and well-nourished.     Head:  Normocephalic and atraumatic.     Ears:  Hearing normal to normal and whispered voice; external ear normal without scars, lesions, or masses; ear canal, tympanic membrane, and middle ear normal..     Nose:  Nose normal including turbinates, nasal mucosa, sinuses and nasal septum. No septal deviation. No turbinate hypertrophy.    Dry cavity bilaterally with crust over L IT and bilateral head of MT    Mouth/Throat  Oropharynx clear and moist without lesions or asymmetry.     Neck:  Neck normal without thyromegaly masses, asymmetry, normal tracheal structure, crepitus, and tenderness.         Tests / Results:  none    Assessment:       1. Chronic maxillary sinusitis    2. Nasal crusting    3. Rhinitis, unspecified type          Plan:         Suspect nasal dryness r/t  Astelin. Stop this, no infection seen today  L ear improving. Nearly resolved serous effusion. OK for decongestant prn for a few days  Continue Flonase 1 SEN, and continue oral AH + Singulair  FU 2 mos, consider allergy testing.

## 2020-06-29 NOTE — PATIENT INSTRUCTIONS
Stop nasal saline flushes for 1 month and use plain nasal saline spray instead to decrease pressure on ear.  Ear infection better, fluid improving and I expected it will continue to improve. OK to take a Sudafed for a few days to help open up the ear  Stop the Astelin, continue flonase 1 spray each nostril daily, and continue the Loratidine and Singulair

## 2020-06-29 NOTE — TELEPHONE ENCOUNTER
Called and spoke with the patient, patient states that his toes hurt bad at times.  Patient notified of Dr. Greenwood's response to patient's message, patient states that he will contact the podiatry clinic to see if they will get him in sooner due to his pain.

## 2020-07-14 RX ORDER — IBUPROFEN 800 MG/1
800 TABLET ORAL 2 TIMES DAILY PRN
Qty: 40 TABLET | Refills: 1 | Status: SHIPPED | OUTPATIENT
Start: 2020-07-14 | End: 2020-11-11

## 2020-07-20 PROBLEM — J01.91 ACUTE RECURRENT SINUSITIS: Status: RESOLVED | Noted: 2020-04-19 | Resolved: 2020-07-20

## 2020-07-28 ENCOUNTER — HOSPITAL ENCOUNTER (OUTPATIENT)
Dept: RADIOLOGY | Facility: HOSPITAL | Age: 45
Discharge: HOME | End: 2020-07-28
Attending: PODIATRIST
Payer: MEDICAID

## 2020-07-28 ENCOUNTER — OFFICE VISIT (OUTPATIENT)
Dept: PODIATRY | Facility: CLINIC | Age: 45
End: 2020-07-28
Payer: MEDICAID

## 2020-07-28 VITALS — HEART RATE: 108 BPM | SYSTOLIC BLOOD PRESSURE: 145 MMHG | DIASTOLIC BLOOD PRESSURE: 98 MMHG

## 2020-07-28 DIAGNOSIS — B35.1 TINEA UNGUIUM: ICD-10-CM

## 2020-07-28 DIAGNOSIS — S92.425D CLOSED NONDISPLACED FRACTURE OF DISTAL PHALANX OF LEFT GREAT TOE WITH ROUTINE HEALING, SUBSEQUENT ENCOUNTER: Primary | ICD-10-CM

## 2020-07-28 DIAGNOSIS — M79.674 PAIN IN TOE OF RIGHT FOOT: ICD-10-CM

## 2020-07-28 DIAGNOSIS — M79.672 FOOT PAIN, LEFT: ICD-10-CM

## 2020-07-28 PROCEDURE — 73630 XR FOOT COMPLETE 3 VIEW LEFT: ICD-10-PCS | Mod: 26,LT,, | Performed by: RADIOLOGY

## 2020-07-28 PROCEDURE — 99213 PR OFFICE/OUTPT VISIT, EST, LEVL III, 20-29 MIN: ICD-10-PCS | Mod: 25,S$PBB,, | Performed by: PODIATRIST

## 2020-07-28 PROCEDURE — 73630 X-RAY EXAM OF FOOT: CPT | Mod: TC,PO,LT

## 2020-07-28 PROCEDURE — 99213 OFFICE O/P EST LOW 20 MIN: CPT | Mod: PBBFAC,25,PO | Performed by: PODIATRIST

## 2020-07-28 PROCEDURE — 11720 PR DEBRIDEMENT OF NAIL(S), 1-5: ICD-10-PCS | Mod: S$PBB,,, | Performed by: PODIATRIST

## 2020-07-28 PROCEDURE — 11720 DEBRIDE NAIL 1-5: CPT | Mod: S$PBB,,, | Performed by: PODIATRIST

## 2020-07-28 PROCEDURE — 99999 PR PBB SHADOW E&M-EST. PATIENT-LVL III: ICD-10-PCS | Mod: PBBFAC,,, | Performed by: PODIATRIST

## 2020-07-28 PROCEDURE — 99999 PR PBB SHADOW E&M-EST. PATIENT-LVL III: CPT | Mod: PBBFAC,,, | Performed by: PODIATRIST

## 2020-07-28 PROCEDURE — 11720 DEBRIDE NAIL 1-5: CPT | Mod: PBBFAC,PO | Performed by: PODIATRIST

## 2020-07-28 PROCEDURE — 73630 X-RAY EXAM OF FOOT: CPT | Mod: 26,LT,, | Performed by: RADIOLOGY

## 2020-07-28 PROCEDURE — 99213 OFFICE O/P EST LOW 20 MIN: CPT | Mod: 25,S$PBB,, | Performed by: PODIATRIST

## 2020-07-28 NOTE — PROGRESS NOTES
Subjective:       Patient ID: Reymundo Gutierrez is a 45 y.o. male.    Chief Complaint: Foot Pain (c/o 5/10 pain at present to left hallux. Wearing sock and open toe slip on shoe to left foot. ), Nail Problem (left hallux nail is very painful. ), and Hypertension (145/98, states he drank a redbull this morning before his appointment. denies headaches, dizziness or blurred vision at this time. )      HPI:  Reymundo Gutierrez presents to the office today for follow-up of left great toe pain.  Patient had sustained a fracture to the distal aspect of the left great toe of the distal phalanx and was painful.  Currently states his pain is a 5/10 but is likely associated with the nail.  He does state that the nail is thickened and causing discomfort and unable to wear regular shoe gear.  Reports that he has been ambulating in a hard sole shoe and compliant with recommendations.  Today he is wearing a sandal.  He does have elevated blood pressure because he states that he drink a red bull this morning and did not take his blood pressure medication.  Patient's PMD is Johnathan Greenwood MD.      Review of patient's allergies indicates:  No Known Allergies    Past Medical History:   Diagnosis Date    Asthma     Crushing injury of left wrist and hand 7/10/2019    Hypertension        History reviewed. No pertinent family history.    Social History     Socioeconomic History    Marital status: Single     Spouse name: Not on file    Number of children: Not on file    Years of education: Not on file    Highest education level: Not on file   Occupational History    Not on file   Social Needs    Financial resource strain: Not very hard    Food insecurity     Worry: Never true     Inability: Never true    Transportation needs     Medical: No     Non-medical: No   Tobacco Use    Smoking status: Former Smoker    Smokeless tobacco: Never Used   Substance and Sexual Activity    Alcohol use: No    Drug use: No    Sexual  activity: Not on file   Lifestyle    Physical activity     Days per week: 0 days     Minutes per session: 0 min    Stress: Only a little   Relationships    Social connections     Talks on phone: More than three times a week     Gets together: More than three times a week     Attends [employer] service: 1 to 4 times per year     Active member of club or organization: No     Attends meetings of clubs or organizations: Never     Relationship status: Living with partner   Other Topics Concern    Not on file   Social History Narrative    Not on file       Past Surgical History:   Procedure Laterality Date    BONE GRAFT Left 8/6/2019    Procedure: BONE GRAFT LEG;  Surgeon: Escobar Colvin MD;  Location: [facility];  Service: Orthopedics;  Laterality: Left;    FRACTURE SURGERY      INJECTION OF ANESTHETIC AGENT AROUND NERVE Left 10/22/2019    Procedure: Block, Nerve STELLATE GANGLION;  Surgeon: Vincent Alejandre MD;  Location: [facility];  Service: Pain Management;  Laterality: Left;    INJECTION OF ANESTHETIC AGENT AROUND NERVE Left 12/10/2019    Procedure: Block, Nerve STELLATE GANGLION;  Surgeon: Vincent Alejandre MD;  Location: [facility] OR;  Service: Pain Management;  Laterality: Left;    MAXILLARY ANTROSTOMY Left 12/11/2019    Procedure: MAXILLARY ANTROSTOMY;  Surgeon: Tomy Medrano MD;  Location: [facility] OR;  Service: ENT;  Laterality: Left;    NASAL SEPTOPLASTY Bilateral 12/11/2019    Procedure: SEPTOPLASTY, NASAL;  Surgeon: Tomy Medrano MD;  Location: [facility] OR;  Service: ENT;  Laterality: Bilateral;    NASAL TURBINATE REDUCTION Bilateral 12/11/2019    Procedure: REDUCTION, NASAL TURBINATE;  Surgeon: Tomy Medrano MD;  Location: [facility];  Service: ENT;  Laterality: Bilateral;    WRIST SURGERY Left     x3       Review of Systems   Constitutional: Negative for activity change, appetite change, chills and fever.   HENT: Negative for sinus pain, sore throat and voice change.    Eyes: Negative for pain,  redness and visual disturbance.   Respiratory: Negative for cough and shortness of breath.    Cardiovascular: Negative for chest pain and palpitations.   Gastrointestinal: Negative for diarrhea, nausea and vomiting.   Musculoskeletal: Positive for gait problem. Negative for back pain and joint swelling.   Skin: Negative for color change and wound.   Neurological: Negative for dizziness, weakness and numbness.   Psychiatric/Behavioral: The patient is not nervous/anxious.           Objective:   BP (!) 145/98   Pulse 108     X-Ray Foot Complete Left  Narrative: EXAMINATION:  XR FOOT COMPLETE 3 VIEW LEFT    CLINICAL HISTORY:  .  Pain in left foot    TECHNIQUE:  AP, lateral and oblique views of the left foot were performed.    COMPARISON:  06/12/2020    FINDINGS:  Mild hallux valgus appearance.  Healing fracture of the 1st distal phalanx noted.  No soft tissue swelling.  Atherosclerosis as can be seen with peripheral vascular disease and or diabetes.  Impression: As above    Electronically signed by: Diogenes Valencia MD  Date:    07/28/2020  Time:    11:18       Physical Exam  LOWER EXTREMITY PHYSICAL EXAMINATION    VASCULAR: The right DP pulse is 2/4 and the left DP is 2/4. The right PT pulse is 2/4 and the left PT pulse is 2/4. Proximal to distal, warm to warm. No dependent rubor or elevation palor is noted. Capillary refill time is less than 3 seconds. Hair growth is appreciated to the dorsal foot and digits.    NEUROLOGY:  Protective sensation is intact with Muse Becca monofilament. Proprioception is intact. Intact sensation to light touch. No neurological symptoms noted on palpation of interspace with radiating sensations proximally or distally. Tinel's and Valliex's sign is negative. No neurological symptoms with compression of metatarsal heads. No numbness or tingling reported on examination.     DERMATOLOGY: Skin is supple, dry and intact. No ecchymosis is noted. No hypertrophic skin formation. No erythema or  cellulitis is noted.  The left hallux nail is thickened, elevated, discolored, dystrophic with malodorous subungual debris.  It was painful to touch.  The remaining nails are normal color, thickness, and texture.    ORTHOPEDIC:  Pain on palpation of the distal phalanx of the left hallux when direct palpation of the nail plate.  No pain with medial to lateral compression of the distal phalanx.  Moderate asymptomatic hallux abductovalgus deformity is noted and was previously present.  Does not have pain with dorsiflexion or plantar flexion of the metatarsophalangeal joint.  Patient ambulating with a mildly antalgic gait utilizing a postoperative shoe.      Assessment:     1. Closed nondisplaced fracture of distal phalanx of left great toe with routine healing, subsequent encounter    2. Tinea unguium    3. Pain in toe of right foot        Plan:     Closed nondisplaced fracture of distal phalanx of left great toe with routine healing, subsequent encounter  X-rays were taken today and reviewed by myself with the patient room.  These were compared to previous x-rays which showed a nondisplaced fracture distal phalanx of the left great toe.  When reviewing the x-rays today, there is noted to be a healed fracture to the distal aspect the right great toe.  As patient is approximately 6 weeks since his last appointment, patient is okay start ambulating in regular shoe gear.  I encouraged him to take it slow initially increase his activity thereafter.    Tinea unguium  Pain in toe of right foot  We discussed medical options in regarding to onychomycosis.  We did discuss utilizing oral medications, topical medications, fungal creams, and Vicks vapor rub.  We also discussed performing an avulsion/matricectomy to remove the offending nail.  Patient states that they would consider these options put would like to manage these conservatively with debridements.    The onychomycotic nail plates, as outlined above ( L# 1), are sharply  debrided with double action nail nipper, and/or with the assistance of a mechanical rotary cristy, with removal of all offending nail and nail border(s), for reduction of pains. Nails are reduced in terms of length, width and girth with removal of subungual debris to facilitate pain free weight bearing and ambulation. The elongated nails as outlined in the objective portion of this note, were trimmed to appropriate length, with a double action nail nipper, for alleviation/reduction of pains as well.    Patient follow-up p.r.n.    Future Appointments   Date Time Provider Department Center   8/31/2020  8:30 AM Tomy Medrano MD [facility] ENT [place]   9/18/2020  1:00 PM Johnathan Greenwood MD [facility] MED [place]   10/6/2020 11:00 AM Mariano Smith MD [facility] UROLOGY [place]

## 2020-08-07 ENCOUNTER — OFFICE VISIT (OUTPATIENT)
Dept: OTOLARYNGOLOGY | Facility: CLINIC | Age: 45
End: 2020-08-07
Payer: MEDICAID

## 2020-08-07 VITALS — WEIGHT: 210.13 LBS | BODY MASS INDEX: 30.08 KG/M2 | HEIGHT: 70 IN

## 2020-08-07 DIAGNOSIS — J02.9 PHARYNGITIS, UNSPECIFIED ETIOLOGY: Primary | ICD-10-CM

## 2020-08-07 DIAGNOSIS — J32.9 SINUSITIS, UNSPECIFIED CHRONICITY, UNSPECIFIED LOCATION: ICD-10-CM

## 2020-08-07 PROCEDURE — 87070 CULTURE OTHR SPECIMN AEROBIC: CPT

## 2020-08-07 PROCEDURE — 87077 CULTURE AEROBIC IDENTIFY: CPT

## 2020-08-07 PROCEDURE — 99214 OFFICE O/P EST MOD 30 MIN: CPT | Mod: S$PBB,,, | Performed by: OTOLARYNGOLOGY

## 2020-08-07 PROCEDURE — 87186 SC STD MICRODIL/AGAR DIL: CPT

## 2020-08-07 PROCEDURE — 99213 OFFICE O/P EST LOW 20 MIN: CPT | Mod: PBBFAC,PO | Performed by: OTOLARYNGOLOGY

## 2020-08-07 PROCEDURE — 99214 PR OFFICE/OUTPT VISIT, EST, LEVL IV, 30-39 MIN: ICD-10-PCS | Mod: S$PBB,,, | Performed by: OTOLARYNGOLOGY

## 2020-08-07 PROCEDURE — 87147 CULTURE TYPE IMMUNOLOGIC: CPT

## 2020-08-07 PROCEDURE — 99999 PR PBB SHADOW E&M-EST. PATIENT-LVL III: ICD-10-PCS | Mod: PBBFAC,,, | Performed by: OTOLARYNGOLOGY

## 2020-08-07 PROCEDURE — 99999 PR PBB SHADOW E&M-EST. PATIENT-LVL III: CPT | Mod: PBBFAC,,, | Performed by: OTOLARYNGOLOGY

## 2020-08-07 RX ORDER — GABAPENTIN 600 MG/1
600 TABLET ORAL 2 TIMES DAILY
COMMUNITY
End: 2020-12-03

## 2020-08-07 RX ORDER — CYCLOBENZAPRINE HCL 10 MG
TABLET ORAL
COMMUNITY
Start: 2020-07-14 | End: 2020-10-06

## 2020-08-07 RX ORDER — CEFDINIR 300 MG/1
300 CAPSULE ORAL 2 TIMES DAILY
Qty: 20 CAPSULE | Refills: 0 | Status: SHIPPED | OUTPATIENT
Start: 2020-08-07 | End: 2020-08-17

## 2020-08-07 RX ORDER — HYDROCODONE BITARTRATE AND ACETAMINOPHEN 7.5; 325 MG/1; MG/1
TABLET ORAL
COMMUNITY
Start: 2020-07-14 | End: 2020-10-06

## 2020-08-07 NOTE — PROGRESS NOTES
Subjective:       Patient ID: Reymundo Gutierrez is a 45 y.o. male.    Chief Complaint: Otalgia and Otitis Media    Reymundo is here for follow-up.    Left otalgia has persisted since last visit. Was treated for AOM a few months ago and had effusion. Had development of left sided throat pain over the past few days along with neck pain and took left over keflex.  Continues to have nasal congestion and crusting.   No allergy testing.   No tobacco.    History of S/p Septoplasty, Inferior Turbinate Reduction, L max, repair of nasal valves 12/2019    Review of Systems   Constitutional: Negative for activity change and appetite change.   Respiratory: Negative for difficulty breathing and wheezing   Cardiovascular: Negative for chest pain.      Objective:        Constitutional:   Vital signs are normal. He appears well-developed and well-nourished.     Head:  Normocephalic and atraumatic.     Ears:  Hearing normal to normal and whispered voice; external ear normal without scars, lesions, or masses; ear canal, tympanic membrane, and middle ear normal..     Nose:  Nose normal including turbinates, nasal mucosa, sinuses and nasal septum. No septal deviation. No turbinate hypertrophy.    Dry cavity bilaterally with crust over L IT and bilateral head of MT    Mouth/Throat  Oropharynx clear and moist without lesions or asymmetry.     Neck:  Neck normal without thyromegaly masses, asymmetry, normal tracheal structure, crepitus, and tenderness.         Tests / Results:  none    Assessment:       1. Pharyngitis, unspecified etiology    2. Sinusitis, unspecified chronicity, unspecified location          Plan:         Nasal saline irrigations with mupirocin.  Cefdinir times 10 days  Follow-up 1 month. Nasal endoscopy if persistent and   Consider immune an allergy testing if persistent

## 2020-08-11 LAB
BACTERIA SPEC AEROBE CULT: ABNORMAL
BACTERIA SPEC AEROBE CULT: ABNORMAL

## 2020-08-26 ENCOUNTER — PATIENT MESSAGE (OUTPATIENT)
Dept: FAMILY MEDICINE | Facility: CLINIC | Age: 45
End: 2020-08-26

## 2020-08-26 DIAGNOSIS — G89.29 CHRONIC RIGHT SHOULDER PAIN: ICD-10-CM

## 2020-08-26 DIAGNOSIS — M79.601 RIGHT ARM PAIN: ICD-10-CM

## 2020-08-26 DIAGNOSIS — M79.642 LEFT HAND PAIN: ICD-10-CM

## 2020-08-26 DIAGNOSIS — R52 PAIN: Primary | ICD-10-CM

## 2020-08-26 DIAGNOSIS — M25.511 CHRONIC RIGHT SHOULDER PAIN: ICD-10-CM

## 2020-08-27 ENCOUNTER — TELEPHONE (OUTPATIENT)
Dept: ORTHOPEDICS | Facility: CLINIC | Age: 45
End: 2020-08-27

## 2020-08-27 NOTE — TELEPHONE ENCOUNTER
Please assist in getting this patient set up for an appointment.  Due to type of insurance coverage, I am unable to schedule with in your clinics, please help.

## 2020-08-27 NOTE — TELEPHONE ENCOUNTER
is not in [place] , he does go to [place] ,does pt want to see  for pain there? Please advise

## 2020-08-27 NOTE — TELEPHONE ENCOUNTER
I received your message which was reviewed along with the the medication list and allergies that we have below.  Please review it for accuracy to make sure that we have the most recent records on your history.     Based on this, the following orders were placed AND/OR medicines were sent in.     Orders Placed This Encounter   Procedures    Ambulatory referral/consult to Orthopedics     Standing Status:   Future     Standing Expiration Date:   9/26/2021     Referral Priority:   Routine     Referral Type:   Consultation     Requested Specialty:   Orthopedic Surgery     Number of Visits Requested:   1    Ambulatory referral/consult to Pain Clinic     Standing Status:   Future     Standing Expiration Date:   9/26/2021     Referral Priority:   Routine     Referral Type:   Consultation     Referral Reason:   Specialty Services Required     Referred to Provider:   Joel Daniel MD     Requested Specialty:   Pain Medicine     Number of Visits Requested:   1       Medications written and sent at this time include:       Your pharmacy(ies) of choice at this time on record include the list below and any medications would have been sent to the one at the top.    [facility] [address] [place] [facility] [place] - [address]  [address]  [place] [address]  Phone: [phone] Fax: [phone]      Thank you for choosing us as your healthcare provider!  Dr. Johnathan Greenwood    ALLERGY LIST  Review of patient's allergies indicates:  No Known Allergies    MEDICATION LIST  Current Outpatient Medications on File Prior to Visit   Medication Sig Dispense Refill    albuterol (PROVENTIL) 2.5 mg /3 mL (0.083 %) nebulizer solution Inhale 2.5 mg into the lungs.      albuterol (VENTOLIN HFA) 90 mcg/actuation inhaler Inhale 2 puffs into the lungs every 6 (six) hours as needed for Wheezing. Rescue 18 g 11    amitriptyline (ELAVIL) 10 MG tablet Take 1 tablet (10 mg total) by mouth every evening. 30 tablet 2    amLODIPine  (NORVASC) 10 MG tablet Take 1 tablet (10 mg total) by mouth once daily. 90 tablet 1    azelastine (ASTELIN) 137 mcg (0.1 %) nasal spray Use 2 sprays (274 mcg total) by Nasal route 2 (two) times daily. 30 mL 6    budesonide-formoterol 80-4.5 mcg (SYMBICORT) 80-4.5 mcg/actuation HFAA Inhale 2 puffs into the lungs 2 (two) times daily. (Patient not taking: Reported on 8/7/2020) 10.2 g 12    chlorthalidone (HYGROTEN) 25 MG Tab Take 1 tablet (25 mg total) by mouth once daily. 90 tablet 3    cyclobenzaprine (FLEXERIL) 10 MG tablet       famotidine (PEPCID) 20 MG tablet Take 1 tablet (20 mg total) by mouth nightly as needed for Heartburn. (Patient not taking: Reported on 8/7/2020) 90 tablet 3    fluticasone propionate (FLONASE ALLERGY RELIEF) 50 mcg/actuation nasal spray Use 2 sprays (100 mcg total) by Each Nostril route once daily. (Patient not taking: Reported on 8/7/2020) 16 g 11    fluticasone-salmeterol diskus inhaler 250-50 mcg Inhale 1 puff into the lungs 2 (two) times daily. Controller (Patient not taking: Reported on 8/7/2020) 60 each 11    gabapentin (NEURONTIN) 600 MG tablet Take 600 mg by mouth 2 (two) times daily.      HYDROcodone-acetaminophen (NORCO) 7.5-325 mg per tablet       ibuprofen (ADVIL,MOTRIN) 800 MG tablet Take 1 tablet (800 mg total) by mouth 2 (two) times daily as needed for Pain. (Patient not taking: Reported on 8/7/2020) 40 tablet 1    loratadine (CLARITIN) 10 mg tablet Take 1 tablet (10 mg total) by mouth once daily. 30 tablet 11    montelukast (SINGULAIR) 10 mg tablet Take 1 tablet (10 mg total) by mouth every evening. 30 tablet 11     No current facility-administered medications on file prior to visit.        HEALTH MAINTENANCE THAT IS OVERDUE OR NEEDS TO BE UPDATED ON OUR CHART IS LISTED BELOW.  IF YOU HAVE HAD IT DONE ELSEWHERE, PLEASE SEND US DATES AND RECORDS IF YOU HAVE THEM TO MAKE YOUR CHART ACCURATE.  IF YOU HAVE NOT HAD THESE DONE AND ARE READY FOR US TO SCHEDULE THEM,  PLEASE SEND US A MESSAGE.  There are no preventive care reminders to display for this patient.    DISCLAIMER: This note was compiled by using a speech recognition dictation system and therefore please be aware that typographical / speech recognition errors can and do occur.  Please contact me if you see any errors specifically.    Johnathan Greenwood MD  We Offer Telehealth & Same Day Appointments!   Book your Telehealth appointment with me through my nurse or   Clinic appointments on [employer]!  [phone]     Check out my Facebook Page and Follow Me at: CLICK HERE    Check out my website at [employer] by clicking on: CLICK HERE    To Schedule appointments online, go to [employer]: CLICK HERE     Location: [email or web address]    [address] [place] [address]    FAX: [phone]

## 2020-08-28 ENCOUNTER — OFFICE VISIT (OUTPATIENT)
Dept: PODIATRY | Facility: CLINIC | Age: 45
End: 2020-08-28
Payer: MEDICAID

## 2020-08-28 ENCOUNTER — TELEPHONE (OUTPATIENT)
Dept: ORTHOPEDICS | Facility: CLINIC | Age: 45
End: 2020-08-28

## 2020-08-28 VITALS — BODY MASS INDEX: 30.87 KG/M2 | HEIGHT: 70 IN | WEIGHT: 215.63 LBS

## 2020-08-28 DIAGNOSIS — Z87.891 HISTORY OF SMOKING: ICD-10-CM

## 2020-08-28 DIAGNOSIS — M72.2 PLANTAR FASCIITIS: Primary | ICD-10-CM

## 2020-08-28 DIAGNOSIS — M21.41 PES PLANUS OF BOTH FEET: ICD-10-CM

## 2020-08-28 DIAGNOSIS — M77.42 METATARSALGIA OF LEFT FOOT: ICD-10-CM

## 2020-08-28 DIAGNOSIS — M21.42 PES PLANUS OF BOTH FEET: ICD-10-CM

## 2020-08-28 DIAGNOSIS — M21.612 ASYMPTOMATIC BUNION OF LEFT FOOT: ICD-10-CM

## 2020-08-28 PROCEDURE — 99999 PR PBB SHADOW E&M-EST. PATIENT-LVL III: CPT | Mod: PBBFAC,,, | Performed by: PODIATRIST

## 2020-08-28 PROCEDURE — 99213 PR OFFICE/OUTPT VISIT, EST, LEVL III, 20-29 MIN: ICD-10-PCS | Mod: S$PBB,,, | Performed by: PODIATRIST

## 2020-08-28 PROCEDURE — 99999 PR PBB SHADOW E&M-EST. PATIENT-LVL III: ICD-10-PCS | Mod: PBBFAC,,, | Performed by: PODIATRIST

## 2020-08-28 PROCEDURE — 99213 OFFICE O/P EST LOW 20 MIN: CPT | Mod: PBBFAC,PO | Performed by: PODIATRIST

## 2020-08-28 PROCEDURE — 99213 OFFICE O/P EST LOW 20 MIN: CPT | Mod: S$PBB,,, | Performed by: PODIATRIST

## 2020-08-28 NOTE — PROGRESS NOTES
Subjective:       Patient ID: Reymundo Gutierrez is a 45 y.o. male.    Chief Complaint: Foot Pain (Pt c/o left foot pain rating 7/10 at presents. He describes pain on top of toes and underneath in the lateral aspect. Wears tennis shoes w/ socks. Non diabetic pt. PCP Dr Greenwood.)      HPI: Reymundo Gutierrez complains of severe pains to the left foot. States pains are sharp and stabbing-like in nature. Pains are to the plantar foot, mostly with walking and standing. Rates the pains at approx. 7/10. States post-static dyskinesia. Denies any recent identifiable trauma. Does limp with gait.  States pain has been present for approximately 1-2 weeks.  Reports that is worsened in the morning but does decrease in pain throughout the day.  States walking and standing causes and/or exacerbates the symptoms.  Also complains of pain to the plantar aspect of the foot around the 2nd and 3rd toes.  Patient's Primary Care Provider is Johnathan Greenwood MD.     Review of patient's allergies indicates:  No Known Allergies    Past Medical History:   Diagnosis Date    Asthma     Crushing injury of left wrist and hand 7/10/2019    Hypertension        History reviewed. No pertinent family history.    Social History     Socioeconomic History    Marital status: Single     Spouse name: Not on file    Number of children: Not on file    Years of education: Not on file    Highest education level: Not on file   Occupational History    Not on file   Social Needs    Financial resource strain: Not very hard    Food insecurity     Worry: Never true     Inability: Never true    Transportation needs     Medical: No     Non-medical: No   Tobacco Use    Smoking status: Former Smoker    Smokeless tobacco: Never Used   Substance and Sexual Activity    Alcohol use: No    Drug use: No    Sexual activity: Not on file   Lifestyle    Physical activity     Days per week: 0 days     Minutes per session: 0 min    Stress: Only a little    Relationships    Social connections     Talks on phone: More than three times a week     Gets together: More than three times a week     Attends Restorationism service: 1 to 4 times per year     Active member of club or organization: No     Attends meetings of clubs or organizations: Never     Relationship status: Living with partner   Other Topics Concern    Not on file   Social History Narrative    Not on file       Past Surgical History:   Procedure Laterality Date    BONE GRAFT Left 8/6/2019    Procedure: BONE GRAFT LEG;  Surgeon: Escobar Colvin MD;  Location: [facility] OR;  Service: Orthopedics;  Laterality: Left;    FRACTURE SURGERY      INJECTION OF ANESTHETIC AGENT AROUND NERVE Left 10/22/2019    Procedure: Block, Nerve STELLATE GANGLION;  Surgeon: Vincent Alejandre MD;  Location: [facility] OR;  Service: Pain Management;  Laterality: Left;    INJECTION OF ANESTHETIC AGENT AROUND NERVE Left 12/10/2019    Procedure: Block, Nerve STELLATE GANGLION;  Surgeon: Vincent Alejandre MD;  Location: [facility] OR;  Service: Pain Management;  Laterality: Left;    MAXILLARY ANTROSTOMY Left 12/11/2019    Procedure: MAXILLARY ANTROSTOMY;  Surgeon: Tomy Medrano MD;  Location: [facility] OR;  Service: ENT;  Laterality: Left;    NASAL SEPTOPLASTY Bilateral 12/11/2019    Procedure: SEPTOPLASTY, NASAL;  Surgeon: Tomy Medrano MD;  Location: [facility] OR;  Service: ENT;  Laterality: Bilateral;    NASAL TURBINATE REDUCTION Bilateral 12/11/2019    Procedure: REDUCTION, NASAL TURBINATE;  Surgeon: Tomy Medrano MD;  Location: [facility] OR;  Service: ENT;  Laterality: Bilateral;    WRIST SURGERY Left     x3       Review of Systems   Constitutional: Negative for activity change, appetite change, chills and fever.   HENT: Negative for sinus pain, sore throat and voice change.    Eyes: Negative for pain, redness and visual disturbance.   Respiratory: Negative for cough and shortness of breath.    Cardiovascular: Negative for chest pain and  "palpitations.   Gastrointestinal: Negative for diarrhea, nausea and vomiting.   Musculoskeletal: Positive for gait problem. Negative for back pain and joint swelling.        Asymptomatic bunion deformity foot   Skin: Negative for color change and wound.   Neurological: Negative for dizziness, weakness and numbness.   Psychiatric/Behavioral: The patient is not nervous/anxious.          Objective:   Ht 5' 10" (1.778 m)   Wt 97.8 kg (215 lb 9.8 oz)   BMI 30.94 kg/m²     X-Ray Foot Complete Left  Narrative: EXAMINATION:  XR FOOT COMPLETE 3 VIEW LEFT    CLINICAL HISTORY:  .  Pain in left foot    TECHNIQUE:  AP, lateral and oblique views of the left foot were performed.    COMPARISON:  06/12/2020    FINDINGS:  Mild hallux valgus appearance.  Healing fracture of the 1st distal phalanx noted.  No soft tissue swelling.  Atherosclerosis as can be seen with peripheral vascular disease and or diabetes.  Impression: As above    Electronically signed by: Diogenes Valencia MD  Date:    07/28/2020  Time:    11:18      Physical Exam  LOWER EXTREMITY PHYSICAL EXAMINATION    VASCULAR: The left DP is 2/4 and the left PT pulse is 2/4. Proximal to distal, warm to warm. No dependent rubor or elevation palor is noted. Capillary refill time is less than 3 seconds. Hair growth is appreciated to the dorsal foot and digits.    NEUROLOGY: Proprioception is intact, bilateral. Sensation to light touch is intact. Negative Tinel's Sign and negative Valleix Sign. No neurological sensations with compression of the area of Lombardi's Nerve in the area of the Abductor Hallucis muscle belly.    ORTHOPEDIC:  Moderate to severe tenderness to palpation of the area around the plantar medial calcaneal tubercle on the left foot. Pains are characterized as sharp and stabbing-like with direct palpation of the area. There is also mild pain to palpation of the immediate plantar aspect of the heel, and no pains to the lateral band of the fascia. No edema is noted. No " fullness is noted. No pains or defects are noted within the plantar fascia at the arch. No plantar fibromas are noted. No defects are noted within the ligament.  Pain on palpation to the plantar aspect of the 2nd, 3rd metatarsal heads.  No associated hammertoe deformity noted.  Pes planus foot deformity present.  Antalgic gait pattern is noted.     DERMATOLOGY: No ecchymosis is noted.  Skin is supple, dry and intact. Skin is supple.  No hyperkeratosis noted. No calluses.  No open wounds or ulcerations are noted.  No palpable plantar fibromas noted.    Assessment:     1. Plantar fasciitis - Left Foot    2. Metatarsalgia of left foot    3. Pes planus of both feet    4. Asymptomatic bunion of left foot    5. History of smoking          Plan:     Plantar fasciitis - Left Foot  I explained to the patient that etiology and all treatment options for heel pain including rest,  ice messages, stretching exercises, strappings/tappings, NSAID's, injections, new shoegear with orthotic inserts, and/or surgical treatment. I also discussed a possible injection of steroid to help calm down the inflammation sooner. I expressed the importance of wearing the orthotics in culmination of other treatment modalities. Patient agreed to stretching exercises and inserts. I gave written and verbal instructions on heel cord stretching and this was demonstrated for the patient. Patient expressed understanding and had the patient teach back the instructions.  Patient instructed on adequate icing techniques which should be done for acute pain and inflammation.    Continue with current anti-inflammatory therapy.    Metatarsalgia of left foot  Discussed pathology in etiology associated with metatarsalgia.  Patient developing metatarsalgia secondary to pes planus foot deformity.  Recommend patient to apply a metatarsal pad within orthotic inserts to prevent excessive overloading at the 2nd and 3rd metatarsophalangeal joint.    Pes planus of both  feet  A prescription was provided to the patient for orthotics.  We discussed the importance of wearing the orthotics to help elevate the arch which will allow for tension to be lessened to the plantar fascia and posterior heel cord.  Discussed the importance of the break-in period with the inserts by wearing them 2-3 hours for the 1st day and increasing their use an hour each day until able to tolerate a full work period.    Asymptomatic bunion of left foot    History of smoking  Did discuss in detail the harmful effects of nicotine/tobacco/cigarette smoking, especially in relation to the lower extremity. I do rec. consultation with primary care provider for further discussed of smoking cessation methods. Smoking & Tobacco use cessation couseling was rendered at today's visit; intermediate, bewteen 3 and 10 minutes.            Future Appointments   Date Time Provider Department Center   9/8/2020  9:30 AM Tomy Medrano MD [facility] ENT [place]   9/10/2020  1:15 PM Duy Wells MD [facility] ORTHO [place]   9/18/2020  1:00 PM Johnathan Greenwood MD [facility] FAM MED [place]   10/6/2020 11:00 AM Mariano Smith MD [facility] UROLOGY [place]   10/13/2020  2:40 PM Lexy Davis DPM [facility] [place]

## 2020-09-08 ENCOUNTER — OFFICE VISIT (OUTPATIENT)
Dept: OTOLARYNGOLOGY | Facility: CLINIC | Age: 45
End: 2020-09-08
Payer: MEDICAID

## 2020-09-08 VITALS — BODY MASS INDEX: 30.42 KG/M2 | WEIGHT: 212.5 LBS | HEIGHT: 70 IN

## 2020-09-08 DIAGNOSIS — M25.511 RIGHT SHOULDER PAIN, UNSPECIFIED CHRONICITY: Primary | ICD-10-CM

## 2020-09-08 DIAGNOSIS — J34.89 NASAL CRUSTING: Primary | ICD-10-CM

## 2020-09-08 DIAGNOSIS — J30.2 SEASONAL ALLERGIC RHINITIS, UNSPECIFIED TRIGGER: ICD-10-CM

## 2020-09-08 DIAGNOSIS — J02.9 PHARYNGITIS, UNSPECIFIED ETIOLOGY: ICD-10-CM

## 2020-09-08 PROCEDURE — 99999 PR PBB SHADOW E&M-EST. PATIENT-LVL IV: CPT | Mod: PBBFAC,,, | Performed by: OTOLARYNGOLOGY

## 2020-09-08 PROCEDURE — 31231 PR NASAL ENDOSCOPY, DX: ICD-10-PCS | Mod: S$PBB,,, | Performed by: OTOLARYNGOLOGY

## 2020-09-08 PROCEDURE — 99214 PR OFFICE/OUTPT VISIT, EST, LEVL IV, 30-39 MIN: ICD-10-PCS | Mod: 25,S$PBB,, | Performed by: OTOLARYNGOLOGY

## 2020-09-08 PROCEDURE — 31231 NASAL ENDOSCOPY DX: CPT | Mod: PBBFAC,PO | Performed by: OTOLARYNGOLOGY

## 2020-09-08 PROCEDURE — 99999 PR PBB SHADOW E&M-EST. PATIENT-LVL IV: ICD-10-PCS | Mod: PBBFAC,,, | Performed by: OTOLARYNGOLOGY

## 2020-09-08 PROCEDURE — 99214 OFFICE O/P EST MOD 30 MIN: CPT | Mod: 25,S$PBB,, | Performed by: OTOLARYNGOLOGY

## 2020-09-08 PROCEDURE — 99214 OFFICE O/P EST MOD 30 MIN: CPT | Mod: PBBFAC,PO | Performed by: OTOLARYNGOLOGY

## 2020-09-08 PROCEDURE — 31231 NASAL ENDOSCOPY DX: CPT | Mod: S$PBB,,, | Performed by: OTOLARYNGOLOGY

## 2020-09-08 RX ORDER — KETOROLAC TROMETHAMINE 10 MG/1
10 TABLET, FILM COATED ORAL EVERY 6 HOURS PRN
COMMUNITY
Start: 2020-06-09 | End: 2020-10-06

## 2020-09-08 RX ORDER — NITROFURANTOIN MACROCRYSTALS 25 MG/1
CAPSULE ORAL
COMMUNITY
Start: 2020-08-10 | End: 2020-12-03

## 2020-09-08 NOTE — PROGRESS NOTES
Subjective:       Patient ID: Reymundo Gutierrez is a 45 y.o. male.    Chief Complaint: Follow-up (Sinus issues- nasal congestion)    Reymundo is here for follow-up of nasal crusting, L OM and hearing loss in setting of longstanding ear fullness.  Has been using nasal saline irrigations with Mupirocin with some improvement. Also on Omnicef x 10 days.     Surgical history of septoplasty, turbinate reduction, left maxillary antrostomy    Review of Systems   Constitutional: Negative for activity change and appetite change.   Respiratory: Negative for difficulty breathing and wheezing   Cardiovascular: Negative for chest pain.      Objective:        Constitutional:   Vital signs are normal. He appears well-developed and well-nourished.     Head:  Normocephalic and atraumatic.     Ears:  Hearing normal to normal and whispered voice; external ear normal without scars, lesions, or masses; ear canal, tympanic membrane, and middle ear normal..     Nose:  Mucosal edema (bilateral rhinitis changes) and rhinorrhea (mucoid) present. No septal deviation. No turbinate hypertrophy.    Significant improvement in nasal crusting anteriorly.  Given persistence of symptoms, nasal endoscopy was indicated.    Mouth/Throat  Oropharynx clear and moist without lesions or asymmetry.     Neck:  Neck normal without thyromegaly masses, asymmetry, normal tracheal structure, crepitus, and tenderness.         Tests / Results:  Name: Reymundo Gutierrez     Pre-procedure diagnosis: Nasal crusting [J34.89]  Post-procedure diagnosis: Same    Procedure: Bilateral nasal endoscopy  Anesthesia:  2% Lidocaine and Phenylephrine  Topical    Indication: This procedure is indicated as anterior rhinoscopy is not sufficient to account for all of the patients symptoms.     Procedure: Risks, benefits, and alternatives of the procedure were discussed with the patient, and consent was obtained to perform a nasal endoscopy.  The nasal cavity was sprayed with a  topical decongestant and topical anesthetic. After adequate anesthesia was obtained, the scope was passed into each nostril independently.  The nasal cavities, nasopharynx, choana, eustachian tube, fossa of Rosenmüller, and adenoids were examined with the findings described below. At the end of the examination, the scope was removed. The patient tolerated the procedure well with no complications.     Findings:  -     Nasal septum: no significant deviation and no perforation appreciated   -     Inferior turbinate: - normal mucosa without significant hypertrophy - bilaterally  -     Middle meatus / Middle Turbinate: LEFT: patent anbtrostomy, no purulence, no obstruction, no polyps  RIGHT: no purulence, no obstruction, no polyps  -     Sphenoethmoidal recess / Superior Turbinate LEFT: no purulence, no obstruction, no polyps RIGHT no purulence, no obstruction, no polyps  -     Adenoids have no hypertrophy, but there is edema/rhinitis of torus  -     There is no stenosis of the choana,  eustachian tube, or nasopharynx  -     Fossa of Rosenmüller is symmetric and contains no mass  -     significant improvement in crusting bilaterally with resolution of nasal infection      Assessment:       1. Nasal crusting    2. Pharyngitis, unspecified etiology    3. Seasonal allergic rhinitis, unspecified trigger          Plan:         Nasal infection is significantly improved.  He does have persistence of rhinitis changes and based on his history, I recommend RAST testing  Continue irrigations (can stop Mup). Will call with results

## 2020-09-09 ENCOUNTER — PATIENT OUTREACH (OUTPATIENT)
Dept: ADMINISTRATIVE | Facility: OTHER | Age: 45
End: 2020-09-09

## 2020-09-09 NOTE — PROGRESS NOTES
LINKS immunization registry updated  Care Everywhere updated  Health Maintenance updated  Chart reviewed for overdue Proactive [facility] Encounters (SUZY) health maintenance testing (CRS, Breast Ca, Diabetic Eye Exam)   Orders entered:N/A

## 2020-09-10 ENCOUNTER — OFFICE VISIT (OUTPATIENT)
Dept: ORTHOPEDICS | Facility: CLINIC | Age: 45
End: 2020-09-10
Payer: MEDICAID

## 2020-09-10 ENCOUNTER — HOSPITAL ENCOUNTER (OUTPATIENT)
Dept: RADIOLOGY | Facility: HOSPITAL | Age: 45
Discharge: HOME | End: 2020-09-10
Attending: ORTHOPAEDIC SURGERY
Payer: MEDICAID

## 2020-09-10 VITALS — BODY MASS INDEX: 30.42 KG/M2 | HEIGHT: 70 IN | WEIGHT: 212.5 LBS

## 2020-09-10 DIAGNOSIS — M79.601 RIGHT ARM PAIN: ICD-10-CM

## 2020-09-10 DIAGNOSIS — M54.12 CERVICAL RADICULAR PAIN: Primary | ICD-10-CM

## 2020-09-10 DIAGNOSIS — M25.511 RIGHT SHOULDER PAIN, UNSPECIFIED CHRONICITY: ICD-10-CM

## 2020-09-10 DIAGNOSIS — G89.29 CHRONIC RIGHT SHOULDER PAIN: ICD-10-CM

## 2020-09-10 DIAGNOSIS — R52 PAIN: ICD-10-CM

## 2020-09-10 DIAGNOSIS — M25.511 CHRONIC RIGHT SHOULDER PAIN: ICD-10-CM

## 2020-09-10 PROCEDURE — 99203 OFFICE O/P NEW LOW 30 MIN: CPT | Mod: S$PBB,,, | Performed by: ORTHOPAEDIC SURGERY

## 2020-09-10 PROCEDURE — 73030 XR SHOULDER TRAUMA 3 VIEW RIGHT: ICD-10-PCS | Mod: 26,RT,, | Performed by: RADIOLOGY

## 2020-09-10 PROCEDURE — 73030 X-RAY EXAM OF SHOULDER: CPT | Mod: TC,PO,RT

## 2020-09-10 PROCEDURE — 99214 OFFICE O/P EST MOD 30 MIN: CPT | Mod: PBBFAC,25,PN | Performed by: ORTHOPAEDIC SURGERY

## 2020-09-10 PROCEDURE — 99999 PR PBB SHADOW E&M-EST. PATIENT-LVL IV: CPT | Mod: PBBFAC,,, | Performed by: ORTHOPAEDIC SURGERY

## 2020-09-10 PROCEDURE — 99999 PR PBB SHADOW E&M-EST. PATIENT-LVL IV: ICD-10-PCS | Mod: PBBFAC,,, | Performed by: ORTHOPAEDIC SURGERY

## 2020-09-10 PROCEDURE — 99203 PR OFFICE/OUTPT VISIT, NEW, LEVL III, 30-44 MIN: ICD-10-PCS | Mod: S$PBB,,, | Performed by: ORTHOPAEDIC SURGERY

## 2020-09-10 PROCEDURE — 73030 X-RAY EXAM OF SHOULDER: CPT | Mod: 26,RT,, | Performed by: RADIOLOGY

## 2020-09-10 NOTE — PROGRESS NOTES
45 years old complaining pain in the right medial scapula Board for about 3 months time.  No trauma.  Throbbing pain.  Form good days, 8 on bad days.  Deep tissue massage seems to make it better wear a cervical motion in seems to make it worse    Exam today shows that shoulder motion is full and pains without signs infection or instability, cuff strength intact, cervical motion seems to reproduce his symptoms    X-rays of the shoulder negative    Assessment:  Cervical radicular symptoms to the right side    Plan:  Physical therapy, follow up as needed    Further History  Aching pain  Worse with activity  Relieved with rest  No other associated symptoms  No other radiation    Further Exam  Alert and oriented  Pleasant  Contralateral limb has appropriate range of motion for age and condition  Contralateral limb has appropriate strength for age and condition  Contralateral limb has appropriate stability  for age and condition  No adenopathy  Pulses are appropriate for current condition  Skin is intact        Chief Complaint    Chief Complaint   Patient presents with    Right Shoulder - Pain       HPI  Reymundo Gutierrez is a 45 y.o.  male who presents with       Past Medical History  Past Medical History:   Diagnosis Date    Asthma     Crushing injury of left wrist and hand 7/10/2019    Hypertension        Past Surgical History  Past Surgical History:   Procedure Laterality Date    BONE GRAFT Left 8/6/2019    Procedure: BONE GRAFT LEG;  Surgeon: Escobar Colvin MD;  Location: [facility] OR;  Service: Orthopedics;  Laterality: Left;    FRACTURE SURGERY      INJECTION OF ANESTHETIC AGENT AROUND NERVE Left 10/22/2019    Procedure: Block, Nerve STELLATE GANGLION;  Surgeon: Vincent Alejandre MD;  Location: [facility] OR;  Service: Pain Management;  Laterality: Left;    INJECTION OF ANESTHETIC AGENT AROUND NERVE Left 12/10/2019    Procedure: Block, Nerve STELLATE GANGLION;  Surgeon: Vincent Alejandre MD;  Location: [facility] OR;   Service: Pain Management;  Laterality: Left;    MAXILLARY ANTROSTOMY Left 12/11/2019    Procedure: MAXILLARY ANTROSTOMY;  Surgeon: Tomy Medrano MD;  Location: [facility] OR;  Service: ENT;  Laterality: Left;    NASAL SEPTOPLASTY Bilateral 12/11/2019    Procedure: SEPTOPLASTY, NASAL;  Surgeon: Tomy Medrano MD;  Location: [facility] OR;  Service: ENT;  Laterality: Bilateral;    NASAL TURBINATE REDUCTION Bilateral 12/11/2019    Procedure: REDUCTION, NASAL TURBINATE;  Surgeon: Tomy Medrano MD;  Location: [facility] OR;  Service: ENT;  Laterality: Bilateral;    WRIST SURGERY Left     x3       Medications  Current Outpatient Medications   Medication Sig    albuterol (PROVENTIL) 2.5 mg /3 mL (0.083 %) nebulizer solution Inhale 2.5 mg into the lungs.    albuterol (VENTOLIN HFA) 90 mcg/actuation inhaler Inhale 2 puffs into the lungs every 6 (six) hours as needed for Wheezing. Rescue    azelastine (ASTELIN) 137 mcg (0.1 %) nasal spray Use 2 sprays (274 mcg total) by Nasal route 2 (two) times daily.    budesonide-formoterol 80-4.5 mcg (SYMBICORT) 80-4.5 mcg/actuation HFAA Inhale 2 puffs into the lungs 2 (two) times daily.    chlorthalidone (HYGROTEN) 25 MG Tab Take 1 tablet (25 mg total) by mouth once daily.    cyclobenzaprine (FLEXERIL) 10 MG tablet     famotidine (PEPCID) 20 MG tablet Take 1 tablet (20 mg total) by mouth nightly as needed for Heartburn.    fluticasone propionate (FLONASE ALLERGY RELIEF) 50 mcg/actuation nasal spray Use 2 sprays (100 mcg total) by Each Nostril route once daily.    fluticasone-salmeterol diskus inhaler 250-50 mcg Inhale 1 puff into the lungs 2 (two) times daily. Controller    gabapentin (NEURONTIN) 600 MG tablet Take 600 mg by mouth 2 (two) times daily.    HYDROcodone-acetaminophen (NORCO) 7.5-325 mg per tablet     ibuprofen (ADVIL,MOTRIN) 800 MG tablet Take 1 tablet (800 mg total) by mouth 2 (two) times daily as needed for Pain.    ketorolac (TORADOL) 10 mg tablet Take 10 mg by  mouth every 6 (six) hours as needed.    loratadine (CLARITIN) 10 mg tablet Take 1 tablet (10 mg total) by mouth once daily.    montelukast (SINGULAIR) 10 mg tablet Take 1 tablet (10 mg total) by mouth every evening.    nitrofurantoin (MACRODANTIN) 25 MG Cap EMPTY CONTENTS OF 3 CAPSULES INTO NASAL IRRIGATION SYSTEM, ADD DISTILLED WATER, SALT PACK, MIX, & IRRIGATE. PERFORM 2 TIMES DAILY    amitriptyline (ELAVIL) 10 MG tablet Take 1 tablet (10 mg total) by mouth every evening.    amLODIPine (NORVASC) 10 MG tablet Take 1 tablet (10 mg total) by mouth once daily.     No current facility-administered medications for this visit.        Allergies  Review of patient's allergies indicates:  No Known Allergies    Family History  History reviewed. No pertinent family history.    Social History  Social History     Socioeconomic History    Marital status: Single     Spouse name: Not on file    Number of children: Not on file    Years of education: Not on file    Highest education level: Not on file   Occupational History    Not on file   Social Needs    Financial resource strain: Not very hard    Food insecurity     Worry: Never true     Inability: Never true    Transportation needs     Medical: No     Non-medical: No   Tobacco Use    Smoking status: Former Smoker    Smokeless tobacco: Never Used   Substance and Sexual Activity    Alcohol use: No    Drug use: No    Sexual activity: Not on file   Lifestyle    Physical activity     Days per week: 0 days     Minutes per session: 0 min    Stress: Only a little   Relationships    Social connections     Talks on phone: More than three times a week     Gets together: More than three times a week     Attends Restorationist service: 1 to 4 times per year     Active member of club or organization: No     Attends meetings of clubs or organizations: Never     Relationship status: Living with partner   Other Topics Concern    Not on file   Social History Narrative    Not on  file               Review of Systems     Constitutional: Negative    HENT: Negative  Eyes: Negative  Respiratory: Negative  Cardiovascular: Negative  Musculoskeletal: HPI  Skin: Negative  Neurological: Negative  Hematological: Negative  Endocrine: Negative                 Physical Exam    There were no vitals filed for this visit.  Body mass index is 30.49 kg/m².  Physical Examination:     General appearance -  well appearing, and in no distress  Mental status - awake  Neck - supple  Chest -  symmetric air entry  Heart - normal rate   Abdomen - soft      Assessment     1. Pain    2. Right arm pain    3. Chronic right shoulder pain          Plan

## 2020-09-10 NOTE — LETTER
September 10, 2020      Johnathan Greenwood MD  [address]  [place] [address]           [facility] [place]  [address] [place] [address]  Phone: [phone]          Patient: Reymundo Gutierrez   MR Number: [id number]   YOB: 1975   Date of Visit: 9/10/2020       Dear Dr. Johnathan Greenwood:    Thank you for referring Reymundo Gutierrez to me for evaluation. Attached you will find relevant portions of my assessment and plan of care.    If you have questions, please do not hesitate to call me. I look forward to following Reymundo Gutierrez along with you.    Sincerely,    Duy Wells MD    Enclosure  CC:  No Recipients    If you would like to receive this communication electronically, please contact [email or web address] or [phone] to request more information on [employer] Link access.    For providers and/or their staff who would like to refer a patient to [facility], please contact us through our one-stop-shop provider referral line, [facility] , at [phone].    If you feel you have received this communication in error or would no longer like to receive these types of communications, please e-mail [email or web address]

## 2020-09-11 RX ORDER — AZELASTINE 1 MG/ML
2 SPRAY, METERED NASAL 2 TIMES DAILY
Qty: 30 ML | Refills: 6 | Status: SHIPPED | OUTPATIENT
Start: 2020-09-11 | End: 2021-05-24

## 2020-09-21 ENCOUNTER — PATIENT MESSAGE (OUTPATIENT)
Dept: PHYSICAL MEDICINE AND REHAB | Facility: CLINIC | Age: 45
End: 2020-09-21

## 2020-09-30 DIAGNOSIS — Z79.899 ENCOUNTER FOR LONG-TERM (CURRENT) USE OF MEDICATIONS: ICD-10-CM

## 2020-09-30 DIAGNOSIS — H66.002 NON-RECURRENT ACUTE SUPPURATIVE OTITIS MEDIA OF LEFT EAR WITHOUT SPONTANEOUS RUPTURE OF TYMPANIC MEMBRANE: ICD-10-CM

## 2020-09-30 RX ORDER — LORATADINE 10 MG/1
10 TABLET ORAL DAILY
Qty: 30 TABLET | Refills: 11 | Status: SHIPPED | OUTPATIENT
Start: 2020-09-30 | End: 2021-01-25 | Stop reason: SDUPTHER

## 2020-09-30 RX ORDER — ALBUTEROL SULFATE 90 UG/1
2 AEROSOL, METERED RESPIRATORY (INHALATION) EVERY 6 HOURS PRN
Qty: 18 G | Refills: 11 | Status: SHIPPED | OUTPATIENT
Start: 2020-09-30 | End: 2020-11-28 | Stop reason: SDUPTHER

## 2020-10-05 ENCOUNTER — TELEPHONE (OUTPATIENT)
Dept: FAMILY MEDICINE | Facility: CLINIC | Age: 45
End: 2020-10-05

## 2020-10-06 ENCOUNTER — TELEPHONE (OUTPATIENT)
Dept: FAMILY MEDICINE | Facility: CLINIC | Age: 45
End: 2020-10-06

## 2020-10-06 NOTE — TELEPHONE ENCOUNTER
----- Message from Hanna Dorsey sent at 10/6/2020  8:48 AM CDT -----  Regarding: Missed call  Contact: Patient  .Type:  Patient Returning Call    Who Called: Patient  Who Left Message for Patient:  JOSÉ MIGUEL  Does the patient know what this is regarding?: Missed call  Would the patient rather a call back or a response via [facility]? Call  Best Call Back Number: .[phone] (home) [phone] (work)    Additional Information:

## 2020-10-06 NOTE — TELEPHONE ENCOUNTER
Attempted to call patient to set up an appointment, left message asking patient to return clinic call.

## 2020-10-15 NOTE — LETTER
September 18, 2019      Escobar Colvin MD  [address] [facility] [address] [place] [address]           [facility]  [address] [facility] [address] [place] [address]  Phone: [phone]  Fax: [phone]          Patient: Reymundo Gutierrez   MR Number: [id number]   YOB: 1975   Date of Visit: 9/18/2019       Dear Dr. Escobar Colvin:    Thank you for referring Reymundo Gutierrez to me for evaluation. Attached you will find relevant portions of my assessment and plan of care.    If you have questions, please do not hesitate to call me. I look forward to following Reymundo Gutierrez along with you.    Sincerely,    Vincent Alejandre MD    Enclosure  CC:  No Recipients    If you would like to receive this communication electronically, please contact [email or web address] or [phone] to request more information on [employer] Link access.    For providers and/or their staff who would like to refer a patient to [facility], please contact us through our one-stop-shop provider referral line, [facility], at [phone].    If you feel you have received this communication in error or would no longer like to receive these types of communications, please e-mail [email or web address]         
49yo F from home unemployed lives with her mom with no significant PMH presented with cough and SOB . Onset of symptoms was almost 14 days ago started with mild headache , chills associated with poor appetite.  patient reports symptoms were progressive with worsening cough , sob for which patient visited PCP who prescribed Z pack and patient completed the course . Patient started to spike fever to Max 100.8 over night , with chest discomfort. patient endorses loose BM , poor oral intake due to poor appetite, denies N/V. Patient is unemployed and barely leaves her house , denies sick contact however mother presented to ED with rico Matthews although much milder. Patient reports symports started with her and mother afterwards.        ED course : patient saturated 91% on RA , RR 25, placed on NC improved to 97% , patient desaturated to low 90s upon walking was put on NRB with improvement in O2 saturation

## 2020-10-20 NOTE — TELEPHONE ENCOUNTER
Reason for Disposition   MODERATE asthma attack (e.g., SOB at rest, speaks in phrases, audible wheezes) and not resolved after 2 nebulizer or inhaler treatments given 20 minutes apart    Protocols used: ST ASTHMA ATTACK-A-OH    Patient was seen in ED today at [place]. Patient's friend is calling to schedule a follow up with PCP per ED instructions. Patient has sob with activity only.    no edema, no murmurs, regular rate and rhythm

## 2020-10-27 ENCOUNTER — TELEPHONE (OUTPATIENT)
Dept: FAMILY MEDICINE | Facility: CLINIC | Age: 45
End: 2020-10-27

## 2020-10-27 RX ORDER — METHOCARBAMOL 500 MG/1
TABLET, FILM COATED ORAL
Qty: 21 TABLET | Refills: 0 | Status: SHIPPED | OUTPATIENT
Start: 2020-10-27 | End: 2020-11-28 | Stop reason: SDUPTHER

## 2020-10-27 RX ORDER — DICLOFENAC SODIUM 75 MG/1
TABLET, DELAYED RELEASE ORAL
Qty: 14 TABLET | Refills: 0 | Status: SHIPPED | OUTPATIENT
Start: 2020-10-27 | End: 2020-11-28 | Stop reason: SDUPTHER

## 2020-10-27 RX ORDER — IBUPROFEN 800 MG/1
800 TABLET ORAL 2 TIMES DAILY PRN
Qty: 40 TABLET | Refills: 1 | Status: CANCELLED | OUTPATIENT
Start: 2020-10-27

## 2020-10-27 NOTE — TELEPHONE ENCOUNTER
Called and spoke with the patient, an appointment was set up for the patient as a hospital follow up due to being admitted Friday night through Saturday, 10/23/2020-10/24/2020.  Patient verbalized understanding of this.

## 2020-11-04 ENCOUNTER — HOSPITAL ENCOUNTER (OUTPATIENT)
Dept: RADIOLOGY | Facility: HOSPITAL | Age: 45
Discharge: HOME | End: 2020-11-04
Attending: FAMILY MEDICINE
Payer: MEDICAID

## 2020-11-04 ENCOUNTER — OFFICE VISIT (OUTPATIENT)
Dept: FAMILY MEDICINE | Facility: CLINIC | Age: 45
End: 2020-11-04
Payer: MEDICAID

## 2020-11-04 VITALS
BODY MASS INDEX: 31.01 KG/M2 | HEIGHT: 70 IN | TEMPERATURE: 98 F | HEART RATE: 112 BPM | DIASTOLIC BLOOD PRESSURE: 83 MMHG | WEIGHT: 216.63 LBS | SYSTOLIC BLOOD PRESSURE: 149 MMHG

## 2020-11-04 DIAGNOSIS — R05.9 COUGH: ICD-10-CM

## 2020-11-04 DIAGNOSIS — I10 ESSENTIAL HYPERTENSION: ICD-10-CM

## 2020-11-04 DIAGNOSIS — Z09 HOSPITAL DISCHARGE FOLLOW-UP: Primary | ICD-10-CM

## 2020-11-04 DIAGNOSIS — B37.0 ORAL THRUSH: ICD-10-CM

## 2020-11-04 DIAGNOSIS — T78.40XD ALLERGY, SUBSEQUENT ENCOUNTER: ICD-10-CM

## 2020-11-04 DIAGNOSIS — J45.20 MILD INTERMITTENT ASTHMA WITHOUT COMPLICATION: Chronic | ICD-10-CM

## 2020-11-04 DIAGNOSIS — J45.20 MILD INTERMITTENT ASTHMA WITHOUT COMPLICATION: ICD-10-CM

## 2020-11-04 PROBLEM — T78.40XA ALLERGIES: Status: ACTIVE | Noted: 2020-11-04

## 2020-11-04 PROCEDURE — 99999 PR PBB SHADOW E&M-EST. PATIENT-LVL V: CPT | Mod: PBBFAC,,, | Performed by: FAMILY MEDICINE

## 2020-11-04 PROCEDURE — 71046 X-RAY EXAM CHEST 2 VIEWS: CPT | Mod: 26,,, | Performed by: RADIOLOGY

## 2020-11-04 PROCEDURE — 99214 OFFICE O/P EST MOD 30 MIN: CPT | Mod: S$PBB,,, | Performed by: FAMILY MEDICINE

## 2020-11-04 PROCEDURE — 99999 PR PBB SHADOW E&M-EST. PATIENT-LVL V: ICD-10-PCS | Mod: PBBFAC,,, | Performed by: FAMILY MEDICINE

## 2020-11-04 PROCEDURE — 99214 PR OFFICE/OUTPT VISIT, EST, LEVL IV, 30-39 MIN: ICD-10-PCS | Mod: S$PBB,,, | Performed by: FAMILY MEDICINE

## 2020-11-04 PROCEDURE — 71046 X-RAY EXAM CHEST 2 VIEWS: CPT | Mod: TC,PO

## 2020-11-04 PROCEDURE — 99215 OFFICE O/P EST HI 40 MIN: CPT | Mod: PBBFAC,25,PO | Performed by: FAMILY MEDICINE

## 2020-11-04 PROCEDURE — 71046 XR CHEST PA AND LATERAL: ICD-10-PCS | Mod: 26,,, | Performed by: RADIOLOGY

## 2020-11-04 RX ORDER — PROMETHAZINE HYDROCHLORIDE AND CODEINE PHOSPHATE 6.25; 1 MG/5ML; MG/5ML
5 SOLUTION ORAL EVERY 4 HOURS PRN
Qty: 240 ML | Refills: 0 | Status: SHIPPED | OUTPATIENT
Start: 2020-11-04 | End: 2020-11-14

## 2020-11-04 RX ORDER — NYSTATIN 100000 [USP'U]/ML
4 SUSPENSION ORAL
Qty: 60 ML | Refills: 0 | Status: SHIPPED | OUTPATIENT
Start: 2020-11-04 | End: 2020-11-11

## 2020-11-04 NOTE — PATIENT INSTRUCTIONS
Follow up in about 3 months (around 2/4/2021), or if symptoms worsen or fail to improve, for Med refills, LAB RESULTS.     If no improvement in symptoms or symptoms worsen, please be advised to call MD, follow-up at clinic and/or go to ER if becomes severe.    Johnathan Greenwood M.D.        We Offer TELEHEALTH & Same Day Appointments!   Book your Telehealth appointment with me through my nurse or   Clinic appointments on [facility]!    [address] [place] [address]    Office: [phone]   FAX: [phone]    Check out my Facebook Page [email or web address] Follow Me at: [email or web address]    Check [email or web address] my website at [employer] by clicking on: [email or web address]    [email or web address] Schedule appointments online, go to [email or web address]: [email or web address]

## 2020-11-04 NOTE — PROGRESS NOTES
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   Specialty Services Required     Requested Specialty:   Allergy     Number of Visits Requested:   1     Medications Ordered This Encounter   Medications    nystatin (MYCOSTATIN) 100,000 unit/mL suspension     Sig: Take 4 mLs (400,000 Units total) by mouth after meals as needed.     Dispense:  60 mL     Refill:  0    promethazine-codeine 6.25-10 mg/5 ml (PHENERGAN WITH CODEINE) 6.25-10 mg/5 mL syrup     Sig: Take 5 mLs by mouth every 4 (four) hours as needed for Cough.     Dispense:  240 mL     Refill:  0        Follow up in about 3 months (around 2/4/2021), or if symptoms worsen or fail to improve, for Med refills, LAB RESULTS, 2 weeks for BP check.    If no improvement in symptoms or symptoms worsen, advised to call/follow-up at clinic or go to ER. Patient voiced understanding and all questions/concerns were addressed.     DISCLAIMER: This note was compiled by using a speech recognition dictation system and therefore please be aware that typographical / speech recognition errors can and do occur.  Please contact me if you see any errors specifically.    Johnathan Greenwood M.D.       Office: [phone] [address] [place] [address]  FAX: [phone]

## 2020-11-06 PROBLEM — B34.8 RHINOVIRUS INFECTION: Status: ACTIVE | Noted: 2020-10-24

## 2020-11-06 PROBLEM — J45.901 EXACERBATION OF PERSISTENT ASTHMA: Status: ACTIVE | Noted: 2020-10-23

## 2020-11-06 PROBLEM — H10.13 ATOPIC CONJUNCTIVITIS OF BOTH EYES: Status: ACTIVE | Noted: 2020-10-23

## 2020-11-06 PROBLEM — R09.81 SINUS CONGESTION: Status: ACTIVE | Noted: 2020-10-23

## 2020-11-06 NOTE — ASSESSMENT & PLAN NOTE
Reviewed hospital discharge summary.  Reviewed medications and asthma action plan with the patient.  ER precautions.    Continue inhalers.

## 2020-11-06 NOTE — ASSESSMENT & PLAN NOTE
Patient recently admitted to the hospital for asthma exacerbation/viral bronchitis.  Patient was put on steroids and it has finished up that course.  Patient reports that symptoms are improving but still has slight cough.    Complete medication reconciliation was performed.

## 2020-11-06 NOTE — ASSESSMENT & PLAN NOTE
Two week blood pressure check.  Elevated BP likely due to current illness.  Improving.Counseled on importance of hypertension disease course, I recommend ongoing Education for DASH-diet and exercise.  Counseled on medication regimen importance of treating high blood pressure.  Please be advised of risk of untreated blood pressure as discussed.  Please advised of ER precautions were given for symptoms of hypertensive urgency and emergency.

## 2020-11-11 ENCOUNTER — PATIENT OUTREACH (OUTPATIENT)
Dept: ADMINISTRATIVE | Facility: OTHER | Age: 45
End: 2020-11-11

## 2020-11-11 ENCOUNTER — OFFICE VISIT (OUTPATIENT)
Dept: ALLERGY | Facility: CLINIC | Age: 45
End: 2020-11-11
Payer: MEDICAID

## 2020-11-11 VITALS
HEIGHT: 70 IN | BODY MASS INDEX: 31.91 KG/M2 | DIASTOLIC BLOOD PRESSURE: 82 MMHG | SYSTOLIC BLOOD PRESSURE: 129 MMHG | HEART RATE: 97 BPM | WEIGHT: 222.88 LBS

## 2020-11-11 DIAGNOSIS — J45.909 SEVERE ASTHMA, UNSPECIFIED WHETHER COMPLICATED, UNSPECIFIED WHETHER PERSISTENT: ICD-10-CM

## 2020-11-11 DIAGNOSIS — J30.1 SEASONAL ALLERGIC RHINITIS DUE TO POLLEN: ICD-10-CM

## 2020-11-11 DIAGNOSIS — T78.40XD ALLERGY, SUBSEQUENT ENCOUNTER: Primary | ICD-10-CM

## 2020-11-11 DIAGNOSIS — J30.89 ALLERGIC RHINITIS DUE TO AMERICAN HOUSE DUST MITE: ICD-10-CM

## 2020-11-11 PROCEDURE — 99204 PR OFFICE/OUTPT VISIT, NEW, LEVL IV, 45-59 MIN: ICD-10-PCS | Mod: S$PBB,,, | Performed by: ALLERGY & IMMUNOLOGY

## 2020-11-11 PROCEDURE — 99215 OFFICE O/P EST HI 40 MIN: CPT | Mod: PBBFAC | Performed by: ALLERGY & IMMUNOLOGY

## 2020-11-11 PROCEDURE — 99999 PR PBB SHADOW E&M-EST. PATIENT-LVL V: CPT | Mod: PBBFAC,,, | Performed by: ALLERGY & IMMUNOLOGY

## 2020-11-11 PROCEDURE — 99999 PR PBB SHADOW E&M-EST. PATIENT-LVL V: ICD-10-PCS | Mod: PBBFAC,,, | Performed by: ALLERGY & IMMUNOLOGY

## 2020-11-11 PROCEDURE — 99204 OFFICE O/P NEW MOD 45 MIN: CPT | Mod: S$PBB,,, | Performed by: ALLERGY & IMMUNOLOGY

## 2020-11-11 RX ORDER — EPINEPHRINE 0.3 MG/.3ML
1 INJECTION SUBCUTANEOUS ONCE
Qty: 2 DEVICE | Refills: 3 | Status: SHIPPED | OUTPATIENT
Start: 2020-11-11 | End: 2020-11-28 | Stop reason: SDUPTHER

## 2020-11-11 NOTE — LETTER
November 11, 2020      Johnathan Greenwood MD  [address] [facility] [place] [address]           [facility]  [id number] [place] [id number]  Phone: [phone]  Fax: [phone]          Patient: Reymundo Gutierrez   MR Number: [id number]   YOB: 1975   Date of Visit: 11/11/2020       Dear Dr. Johnathan Greenwood:    Thank you for referring Reymundo Gutierrez to me for evaluation. Attached you will find relevant portions of my assessment and plan of care.    If you have questions, please do not hesitate to call me. I look forward to following Reymundo Gutierrez along with you.    Sincerely,    Radha Hernández MD    Enclosure  CC:  No Recipients    If you would like to receive this communication electronically, please contact [email or web address] or [phone] to request more information on [employer] Link access.    For providers and/or their staff who would like to refer a patient to [facility], please contact us through our one-stop-shop provider referral line, [facility], at [phone].    If you feel you have received this communication in error or would no longer like to receive these types of communications, please e-mail [email or web address]

## 2020-11-11 NOTE — PROGRESS NOTES
Health Maintenance Due   Topic Date Due    Pneumococcal Vaccine (Medium Risk) (1 of 1 - PPSV23) 06/20/1993     Updates were requested from care everywhere.  Chart was reviewed for overdue Proactive [facility] Encounters (SUZY) topics (CRS, Breast Cancer Screening, Eye exam)  Health Maintenance has been updated.  LINKS immunization registry triggered.  LINKS not responding.

## 2020-11-11 NOTE — PATIENT INSTRUCTIONS
Omalizumab injection  What is this medicine?  OMALIZUMAB (oh shannon lye ZOO mab) is used to help treat allergic asthma and other allergies. It may be given with other treatments for these conditions.  How should I use this medicine?  This medicine is for injection under the skin. It is given by a health care professional in a hospital or clinic setting.  A special MedGuide will be given to you with each injection. Be sure to read this information carefully each time.  Talk to your pediatrician regarding the use of this medicine in children. While this drug may be prescribed for children as young as 6 years for selected conditions, precautions do apply.  What side effects may I notice from receiving this medicine?  Side effects that you should report to your doctor or health care professional as soon as possible:  · allergic reactions like skin rash, itching or hives, swelling of the face, lips, or tongue  · breathing problems  · pain, tingling, numbness in the hands or feet  · unusual bleeding or bruising  Side effects that usually do not require medical attention (report to your doctor or health care professional if they continue or are bothersome):  · headache  · mild redness, itching, swelling, or bruising at the injection site  · nausea  What may interact with this medicine?  Interactions are not expected.  What if I miss a dose?  It is important not to miss your dose. This medicine is usually given every 2 to 4 weeks. Call your doctor or health care professional if you are unable to keep an appointment.  Where should I keep my medicine?  This drug is given in a hospital or clinic and will not be stored at home.  What should I tell my health care provider before I take this medicine?  They need to know if you have any of these conditions:  · lymphoma or other cancer  · an unusual or allergic reaction to omalizumab, hamster proteins, other medicines, foods, dyes, or preservatives  · pregnant or trying to get  pregnant  · breast-feeding  What should I watch for while using this medicine?  After receiving each injection, you will be monitored for a short time in your doctor's office or clinic.  Improvement of your asthma or allergies will not be immediate. Improvements will occur gradually.  You will have regular blood tests to determine how often you will need this medicine.  Do not stop taking any of your other asthma or allergy treatments unless otherwise directed by your doctor.  NOTE:This sheet is a summary. It may not cover all possible information. If you have questions about this medicine, talk to your doctor, pharmacist, or health care provider. Copyright© 2017 [employer]      Epinephrine injection (Auto-injector)  What is this medicine?  EPINEPHRINE (ep i ROSETTA rin) is used for the emergency treatment of severe allergic reactions. You should keep this medicine with you at all times.  How should I use this medicine?  This medicine is for injection into the outer thigh. Your doctor or health care professional will instruct you on the proper use of the device during an emergency. Read all directions carefully and make sure you understand them. Do not use more often than directed.  Talk to your pediatrician regarding the use of this medicine in children. Special care may be needed. This drug is commonly used in children. A special device is available for use in children. If you are giving this medicine to a young child, hold their leg firmly in place before and during the injection to prevent injury.  What side effects may I notice from receiving this medicine?  Side effects that you should report to your doctor or health care professional as soon as possible:  · allergic reactions like skin rash, itching or hives, swelling of the face, lips, or tongue  · breathing problems  · chest pain  · fast, irregular heartbeat  · pain, tingling, numbness in the hands or feet  · pain, redness, or irritation at site where  injected  · vomiting  Side effects that usually do not require medical attention (report to your doctor or health care professional if they continue or are bothersome):  · anxious  · dizziness  · dry mouth  · headache  · increased sweating  · nausea  · unusually weak or tired  What may interact with this medicine?  This medicine is only used during an emergency. Significant drug interactions are not likely during emergency use.  What if I miss a dose?  This does not apply. You should only use this medicine for an allergic reaction.  Where should I keep my medicine?  Keep out of the reach of children.  Store at room temperature between 15 and 30 degrees C (59 and 86 degrees F). Protect from light and heat. The solution should be clear in color. If the solution is discolored or contains particles it must be replaced. Throw away any unused medicine after the expiration date. Ask your doctor or pharmacist about proper disposal of the injector if it is  or has been used. Always replace your auto-injector before it expires.  What should I tell my health care provider before I take this medicine?  They need to know if you have any of the following conditions:  · diabetes  · heart disease  · high blood pressure  · lung or breathing disease, like asthma  · Parkinson's disease  · thyroid disease  · an unusual or allergic reaction to epinephrine, sulfites, other medicines, foods, dyes, or preservatives  · pregnant or trying to get pregnant  · breast-feeding  What should I watch for while using this medicine?  Keep this medicine ready for use in the case of a severe allergic reaction. Make sure that you have the phone number of your doctor or health care professional and local hospital ready. Remember to check the expiration date of your medicine regularly. You may need to have additional units of this medicine with you at work, school, or other places. Talk to your doctor or health care professional about your need for  extra units. Some emergencies may require an additional dose. Check with your doctor or a health care professional before using an extra dose.  After use, go to the nearest hospital or call 911. Avoid physical activity. Make sure the treating health care professional knows you have received an injection of this medicine. You will receive additional instructions on what to do during and after use of this medicine before a medical emergency occurs.  NOTE:This sheet is a summary. It may not cover all possible information. If you have questions about this medicine, talk to your doctor, pharmacist, or health care provider. Copyright© 2017 [employer] Standard

## 2020-11-23 ENCOUNTER — TELEPHONE (OUTPATIENT)
Dept: FAMILY MEDICINE | Facility: CLINIC | Age: 45
End: 2020-11-23

## 2020-11-23 ENCOUNTER — PATIENT OUTREACH (OUTPATIENT)
Dept: ADMINISTRATIVE | Facility: HOSPITAL | Age: 45
End: 2020-11-23

## 2020-11-23 NOTE — PROGRESS NOTES
HTN Report. Patient notified to obtain a home BP if available. Patient states his home BP this morning was 129/72. Remote BP will be entered.

## 2020-11-28 DIAGNOSIS — Z79.899 ENCOUNTER FOR LONG-TERM (CURRENT) USE OF MEDICATIONS: ICD-10-CM

## 2020-11-28 DIAGNOSIS — J30.1 SEASONAL ALLERGIC RHINITIS DUE TO POLLEN: ICD-10-CM

## 2020-11-29 ENCOUNTER — SPECIALTY PHARMACY (OUTPATIENT)
Dept: PHARMACY | Facility: CLINIC | Age: 45
End: 2020-11-29

## 2020-11-29 DIAGNOSIS — J45.20 MILD INTERMITTENT ASTHMA WITHOUT COMPLICATION: Primary | ICD-10-CM

## 2020-11-30 RX ORDER — EPINEPHRINE 0.3 MG/.3ML
1 INJECTION SUBCUTANEOUS ONCE
Qty: 2 DEVICE | Refills: 3 | Status: SHIPPED | OUTPATIENT
Start: 2020-11-30 | End: 2021-08-19

## 2020-11-30 RX ORDER — FLUTICASONE PROPIONATE 50 MCG
2 SPRAY, SUSPENSION (ML) NASAL DAILY
Qty: 16 G | Refills: 11 | Status: SHIPPED | OUTPATIENT
Start: 2020-11-30 | End: 2021-01-04 | Stop reason: SDUPTHER

## 2020-11-30 RX ORDER — METHOCARBAMOL 500 MG/1
TABLET, FILM COATED ORAL
Qty: 21 TABLET | Refills: 0 | Status: SHIPPED | OUTPATIENT
Start: 2020-11-30 | End: 2020-12-21 | Stop reason: SDUPTHER

## 2020-11-30 RX ORDER — DICLOFENAC SODIUM 75 MG/1
TABLET, DELAYED RELEASE ORAL
Qty: 14 TABLET | Refills: 0 | Status: SHIPPED | OUTPATIENT
Start: 2020-11-30 | End: 2020-12-21 | Stop reason: SDUPTHER

## 2020-11-30 RX ORDER — ALBUTEROL SULFATE 90 UG/1
2 AEROSOL, METERED RESPIRATORY (INHALATION) EVERY 6 HOURS PRN
Qty: 18 G | Refills: 11 | Status: SHIPPED | OUTPATIENT
Start: 2020-11-30 | End: 2021-01-25 | Stop reason: SDUPTHER

## 2020-12-01 NOTE — TELEPHONE ENCOUNTER
LVM to Inform patient that [facility] received prescription for Xolair and prior authorization / benefits investigation is required. [facility] will be back in touch one insurance determination is received.

## 2020-12-03 ENCOUNTER — SPECIALTY PHARMACY (OUTPATIENT)
Dept: PHARMACY | Facility: CLINIC | Age: 45
End: 2020-12-03

## 2020-12-03 NOTE — TELEPHONE ENCOUNTER
Specialty Pharmacy - Initial Clinical Assessment    Specialty Medication Orders Linked to Encounter      Most Recent Value   Medication #1  omalizumab (XOLAIR) 150 mg injection (Order#[id number], Rx#[id number])        Salima Gutierrez is a 45 y.o. male, who is followed by the specialty pharmacy service for management and education.    Recent Encounters     Date Type Provider Description    12/03/2020 Specialty Pharmacy Joseph Pedro PharmD Initial Clinical Assessment    11/29/2020 Specialty Pharmacy Valentín Ray PharmD Referral Authorization        Clinical call attempts since last clinical assessment   No call attempts found.     Today he received education before his first fill with [facility].    Current Outpatient Medications   Medication Sig    albuterol (PROVENTIL) 2.5 mg /3 mL (0.083 %) nebulizer solution Inhale 2.5 mg into the lungs.    albuterol (VENTOLIN HFA) 90 mcg/actuation inhaler Inhale 2 puffs into the lungs every 6 (six) hours as needed for Wheezing. Rescue    amitriptyline (ELAVIL) 10 MG tablet Take 1 tablet (10 mg total) by mouth every evening.    amLODIPine (NORVASC) 10 MG tablet Take 1 tablet (10 mg total) by mouth once daily.    azelastine (ASTELIN) 137 mcg (0.1 %) nasal spray Use 2 sprays (274 mcg total) by Nasal route 2 (two) times daily.    budesonide-formoterol 80-4.5 mcg (SYMBICORT) 80-4.5 mcg/actuation HFAA Inhale 2 puffs into the lungs 2 (two) times daily.    chlorthalidone (HYGROTEN) 25 MG Tab Take 1 tablet (25 mg total) by mouth once daily.    diclofenac (VOLTAREN) 75 MG EC tablet Take 1 tablet (75 mg total) by mouth 2 (two) times daily for 7 days    EPINEPHrine (EPIPEN) 0.3 mg/0.3 mL AtIn Inject 0.3 mLs (0.3 mg total) into the muscle once. for 1 dose    famotidine (PEPCID) 20 MG tablet Take 1 tablet (20 mg total) by mouth nightly as needed for Heartburn.    fluticasone propionate (FLONASE ALLERGY RELIEF) 50 mcg/actuation nasal spray Use  2 sprays (100 mcg total) by Each Nostril route once daily.    loratadine (CLARITIN) 10 mg tablet Take 1 tablet (10 mg total) by mouth once daily.    methocarbamoL (ROBAXIN) 500 MG Tab Take 1 tablet (500 mg total) by mouth 3 (three) times daily for 7 days    montelukast (SINGULAIR) 10 mg tablet Take 1 tablet (10 mg total) by mouth every evening.    omalizumab (XOLAIR) 150 mg injection Inject 300 mg into the skin every 14 (fourteen) days.   Last reviewed on 12/3/2020  4:10 PM by Joseph Pedro, PharmD    Review of patient's allergies indicates:  No Known AllergiesLast reviewed on  12/3/2020 4:09 PM by Joseph Pedro    Drug Interactions    Drug interactions evaluated: yes  Clinically relevant drug interactions identified: no  Provided the patient with educational material regarding drug interactions: not applicable         Adverse Effects    *All other systems reviewed and are negative       Assessment Questions - Documented Responses      Most Recent Value   Assessment   Medication Reconciliation completed for patient  Yes   During the past 4 weeks, has patient missed any activities due to condition or medication?  No   During the past 4 weeks, did patient have any of the following urgent care visits?  None   Goals of Therapy Status  Discussed (new start)   Welcome packet contents reviewed and discussed with patient?  Yes   Assesment completed?  Yes   Plan  Therapy being initiated   Do you need to open a clinical intervention (i-vent)?  No   Do you want to schedule first shipment?  Yes   Medication #1 Assessment Info   Patient status  New medication, New to [facility]   Is this medication appropriate for the patient?  Yes   Is this medication effective?  Not yet started        Refill Questions - Documented Responses      Most Recent Value   Relationship to patient of person spoken to?  Self   HIPAA/medical authority confirmed?  Yes   Can the patient store medication/sharps container properly (at the correct temperature, away  "from children/pets, etc.)?  Yes   Can the patient call emergency services (911) in the event of an emergency?  Yes   Does the patient have any concerns or questions about taking or administering this medication as prescribed?  No   How many doses does the patient have on hand?  0   How many days does the patient report on hand quantity will last?  0   During the past 4 weeks, has patient missed any activities due to condition or medication?  No   During the past 4 weeks, did patient have any of the following urgent care visits?  None   How will the patient receive the medication?     When does the patient need to receive the medication?  12/07/20   Shipping Address  Prescription   Address in [facility] confirmed and updated if neccessary?  Yes   Expected Copay ($)  0   Is the patient able to afford the medication copay?  Yes   Payment Method  zero copay   Days supply of Refill  28   Refill activity completed?  Yes   Refill activity plan  Refill scheduled   Shipment/Pickup Date:  12/04/20          Objective    He has a past medical history of Asthma, Crushing injury of left wrist and hand (7/10/2019), and Hypertension.    Tried/failed medications: Symbicort, Singulair, Advair, Albuterol     BP Readings from Last 4 Encounters:   11/11/20 129/82   11/04/20 (!) 149/83   09/20/20 (!) 143/97   07/28/20 (!) 145/98     Ht Readings from Last 4 Encounters:   11/11/20 5' 10" (1.778 m)   11/04/20 5' 10" (1.778 m)   09/20/20 5' 10" (1.778 m)   09/10/20 5' 10" (1.778 m)     Wt Readings from Last 4 Encounters:   11/11/20 101.1 kg (222 lb 14.2 oz)   11/04/20 98.2 kg (216 lb 9.6 oz)   09/20/20 93.4 kg (206 lb)   09/10/20 96.4 kg (212 lb 8.4 oz)       The goals of prescribed drug therapy management include:  · Supporting patient to meet the prescriber's medical treatment objectives  · Improving or maintaining quality of life  · Maintaining optimal therapy adherence  · Minimizing and managing side effects      Goals of " Therapy Status: Discussed (new start)    Assessment/Plan  Patient plans to start therapy on 20      Indication, dosage, appropriateness, effectiveness, safety and convenience of his specialty medication(s) were reviewed today.     Patient Counseling    Counseled the patient on the following: doses and administration discussed, possible adverse effects and management discussed, possible drug and prescription drug interactions discussed, possible drug and OTC drug and food interactions discussed, lab monitoring and follow-up discussed, therapeutic rationale discussed, cost of medications and cost implications discussed, adherence and missed doses discussed, pharmacy contact information discussed       Initial consult for Xolair completed on 12/3. Medication will be shipped via  on  for appt on . COPAY $0 @004. No ER/ UC visits in the past month. Reviewed welcome packet, on-call services, and refill procedures. Reviewed medications, medical conditions, and allergies with no updates. NO DI or CI identified with Xolair. Confirmed- Name and .     Xolair 300 mg every 14 days appropriate for diagnosis of Severe Asthma [J45.909]. Patient weight is 101.1 kg and IgE 257 (2020), 300 mg every 14 days appropriate per s recommendations. Patient states his asthma is uncontrolled. Current asthma regimen includes: Singulair, Symbicort, Albuterol neb, and Albuterol inhaler. The patient states she uses his albuterol inhaler 3 to 4 times daily and albuterol neb at once to twice weekly. He states he had two nighttime awakenings due to asthma in the past month. He states he had one asthma exacerbation in the past month requiring sterioids. He states common asthma triggers are heat and allergies. His asthma symptoms included coughing, wheezing, and shortness of breath. He states he received yearly flu vaccine. He does not work and has not missed planned activities due to asthma in the past month  due to asthma. He states his asthma effects his every day life. He rates his quality of life a 5/10 and states he is no pain.    Consultation included:  Indication: Asthma     Counseled patient on administration directions:  Inject Xolair 300 into the skin every 2 weeks to be administered in healthcare setting.  ?  Patient was counseled on possible side effects:  - Injection site reaction: redness, soreness, itching, bruising, burning, inflammation which should resolve within 3-5 days.   -Joint pain, fatigue, dizziness, cold symptoms.  -Consulted on major signs and symptoms of anaphylaxis- rash, hives, wheezing, breathing problems, low blood pressure, rapid or weak heartbeat, anxiety, swelling of throat or tongue. Informed to seek immediate medical care for life threatening symptoms. Pt verbalized understanding.    Xolair is not for asthma attacks and confirms understanding to use albuterol inhaler as needed. Medication may take a few months to see full effects.  · Consulted not to discontinue steroids abruptly upon Xolair initiation.    Patient verbalized understanding. Reviewed the importance of compliance and keeping all follow up appointments. Patient denied any further questions. [facility] will reach out to set up next refill in 3 weeks. Patient advised to call [facility] should any questions arise / when beginning new medications. Therapy appropriate to initiate.     Confirmed  on 12/4 with Dina Ortiz MA   ATTN: Dina Ortiz  3rd Floor Allergy  [address] [facility]    Tasks added this encounter   12/26/2020 - Refill Call (Auto Added)   Tasks due within next 3 months   No tasks due.     Joseph Pedro, PharmD  [facility]  [address] [place] [address]  Phone: [phone]  Fax: [phone]

## 2020-12-07 ENCOUNTER — CLINICAL SUPPORT (OUTPATIENT)
Dept: ALLERGY | Facility: CLINIC | Age: 45
End: 2020-12-07
Payer: MEDICAID

## 2020-12-07 ENCOUNTER — TELEPHONE (OUTPATIENT)
Dept: ALLERGY | Facility: CLINIC | Age: 45
End: 2020-12-07

## 2020-12-07 DIAGNOSIS — J45.909 SEVERE ASTHMA, UNSPECIFIED WHETHER COMPLICATED, UNSPECIFIED WHETHER PERSISTENT: ICD-10-CM

## 2020-12-07 DIAGNOSIS — J45.20 MILD INTERMITTENT ASTHMA WITHOUT COMPLICATION: Primary | Chronic | ICD-10-CM

## 2020-12-07 PROCEDURE — 99499 NO LOS: ICD-10-PCS | Mod: S$PBB,,, | Performed by: ALLERGY & IMMUNOLOGY

## 2020-12-07 PROCEDURE — 99499 UNLISTED E&M SERVICE: CPT | Mod: S$PBB,,, | Performed by: ALLERGY & IMMUNOLOGY

## 2020-12-07 PROCEDURE — 96372 THER/PROPH/DIAG INJ SC/IM: CPT | Mod: PBBFAC

## 2020-12-07 RX ADMIN — OMALIZUMAB 150 MG: 202.5 INJECTION, SOLUTION SUBCUTANEOUS at 02:12

## 2020-12-07 RX ADMIN — OMALIZUMAB 150 MG: 202.5 INJECTION, SOLUTION SUBCUTANEOUS at 06:12

## 2020-12-07 NOTE — TELEPHONE ENCOUNTER
----- Message from Daniella Walker sent at 12/7/2020 12:55 PM CST -----  Regarding: appt  Contact: girlfriend, Lilliana  Ms Lilliana states that patient will be late for injection, please call her back at [phone]

## 2020-12-08 NOTE — PROGRESS NOTES
300 mg Xolair received Patient waited in clinic 2 hours for observation. Pt had epi pen on hand.No reaction noted.

## 2020-12-11 ENCOUNTER — PATIENT MESSAGE (OUTPATIENT)
Dept: OTHER | Facility: OTHER | Age: 45
End: 2020-12-11

## 2020-12-21 ENCOUNTER — CLINICAL SUPPORT (OUTPATIENT)
Dept: ALLERGY | Facility: CLINIC | Age: 45
End: 2020-12-21
Payer: MEDICAID

## 2020-12-21 DIAGNOSIS — J45.20 MILD INTERMITTENT ASTHMA WITHOUT COMPLICATION: Primary | ICD-10-CM

## 2020-12-21 PROCEDURE — 99499 NO LOS: ICD-10-PCS | Mod: S$PBB,,, | Performed by: ALLERGY & IMMUNOLOGY

## 2020-12-21 PROCEDURE — 99499 UNLISTED E&M SERVICE: CPT | Mod: S$PBB,,, | Performed by: ALLERGY & IMMUNOLOGY

## 2020-12-21 PROCEDURE — 99999 PR PBB SHADOW E&M-EST. PATIENT-LVL II: ICD-10-PCS | Mod: PBBFAC,,,

## 2020-12-21 PROCEDURE — 99999 PR PBB SHADOW E&M-EST. PATIENT-LVL II: CPT | Mod: PBBFAC,,,

## 2020-12-21 PROCEDURE — 96372 THER/PROPH/DIAG INJ SC/IM: CPT | Mod: PBBFAC

## 2020-12-21 RX ADMIN — OMALIZUMAB 150 MG: 202.5 INJECTION, SOLUTION SUBCUTANEOUS at 01:12

## 2020-12-21 NOTE — PROGRESS NOTES
Xolair 300 mgm received. Patient waited in clinic 30 min for observation. Pt had epi pen on hand.No reaction noted.

## 2020-12-22 ENCOUNTER — PATIENT MESSAGE (OUTPATIENT)
Dept: ALLERGY | Facility: CLINIC | Age: 45
End: 2020-12-22

## 2021-01-04 ENCOUNTER — TELEPHONE (OUTPATIENT)
Dept: PHARMACY | Facility: CLINIC | Age: 46
End: 2021-01-04

## 2021-01-04 ENCOUNTER — PATIENT MESSAGE (OUTPATIENT)
Dept: ALLERGY | Facility: CLINIC | Age: 46
End: 2021-01-04

## 2021-01-05 ENCOUNTER — TELEPHONE (OUTPATIENT)
Dept: PHARMACY | Facility: CLINIC | Age: 46
End: 2021-01-05

## 2021-01-05 ENCOUNTER — PATIENT MESSAGE (OUTPATIENT)
Dept: ALLERGY | Facility: CLINIC | Age: 46
End: 2021-01-05

## 2021-01-05 ENCOUNTER — SPECIALTY PHARMACY (OUTPATIENT)
Dept: PHARMACY | Facility: CLINIC | Age: 46
End: 2021-01-05

## 2021-01-06 ENCOUNTER — CLINICAL SUPPORT (OUTPATIENT)
Dept: ALLERGY | Facility: CLINIC | Age: 46
End: 2021-01-06
Payer: MEDICAID

## 2021-01-06 ENCOUNTER — SPECIALTY PHARMACY (OUTPATIENT)
Dept: PHARMACY | Facility: CLINIC | Age: 46
End: 2021-01-06

## 2021-01-06 DIAGNOSIS — J45.909 SEVERE ASTHMA, UNSPECIFIED WHETHER COMPLICATED, UNSPECIFIED WHETHER PERSISTENT: Primary | ICD-10-CM

## 2021-01-06 DIAGNOSIS — J30.1 SEASONAL ALLERGIC RHINITIS DUE TO POLLEN: ICD-10-CM

## 2021-01-06 PROCEDURE — 99499 UNLISTED E&M SERVICE: CPT | Mod: S$PBB,,, | Performed by: SPECIALIST

## 2021-01-06 PROCEDURE — 99499 NO LOS: ICD-10-PCS | Mod: S$PBB,,, | Performed by: SPECIALIST

## 2021-01-06 PROCEDURE — 96372 THER/PROPH/DIAG INJ SC/IM: CPT | Mod: PBBFAC

## 2021-01-06 RX ORDER — METHOCARBAMOL 500 MG/1
TABLET, FILM COATED ORAL
Qty: 21 TABLET | Refills: 0 | Status: SHIPPED | OUTPATIENT
Start: 2021-01-06 | End: 2021-01-25 | Stop reason: SDUPTHER

## 2021-01-06 RX ORDER — DICLOFENAC SODIUM 75 MG/1
TABLET, DELAYED RELEASE ORAL
Qty: 14 TABLET | Refills: 0 | Status: SHIPPED | OUTPATIENT
Start: 2021-01-06 | End: 2021-04-30 | Stop reason: SDUPTHER

## 2021-01-06 RX ADMIN — OMALIZUMAB 150 MG: 202.5 INJECTION, SOLUTION SUBCUTANEOUS at 02:01

## 2021-01-06 RX ADMIN — OMALIZUMAB 150 MG: 202.5 INJECTION, SOLUTION SUBCUTANEOUS at 03:01

## 2021-01-20 ENCOUNTER — PATIENT MESSAGE (OUTPATIENT)
Dept: ALLERGY | Facility: CLINIC | Age: 46
End: 2021-01-20

## 2021-01-20 ENCOUNTER — PATIENT MESSAGE (OUTPATIENT)
Dept: FAMILY MEDICINE | Facility: CLINIC | Age: 46
End: 2021-01-20

## 2021-01-20 DIAGNOSIS — Z76.89 REFERRAL OF PATIENT: Primary | ICD-10-CM

## 2021-01-25 RX ORDER — METHOCARBAMOL 500 MG/1
TABLET, FILM COATED ORAL
Qty: 21 TABLET | Refills: 3 | Status: SHIPPED | OUTPATIENT
Start: 2021-01-25 | End: 2021-04-30 | Stop reason: SDUPTHER

## 2021-01-26 ENCOUNTER — CLINICAL SUPPORT (OUTPATIENT)
Dept: ALLERGY | Facility: CLINIC | Age: 46
End: 2021-01-26
Payer: MEDICAID

## 2021-01-26 DIAGNOSIS — J45.20 MILD INTERMITTENT ASTHMA WITHOUT COMPLICATION: Primary | ICD-10-CM

## 2021-01-26 PROCEDURE — 99999 PR PBB SHADOW E&M-EST. PATIENT-LVL II: CPT | Mod: PBBFAC,,,

## 2021-01-26 PROCEDURE — 99499 UNLISTED E&M SERVICE: CPT | Mod: S$PBB,,, | Performed by: ALLERGY & IMMUNOLOGY

## 2021-01-26 PROCEDURE — 99999 PR PBB SHADOW E&M-EST. PATIENT-LVL II: ICD-10-PCS | Mod: PBBFAC,,,

## 2021-01-26 PROCEDURE — 99499 NO LOS: ICD-10-PCS | Mod: S$PBB,,, | Performed by: ALLERGY & IMMUNOLOGY

## 2021-01-26 PROCEDURE — 96372 THER/PROPH/DIAG INJ SC/IM: CPT | Mod: PBBFAC

## 2021-01-26 RX ADMIN — OMALIZUMAB 150 MG: 202.5 INJECTION, SOLUTION SUBCUTANEOUS at 01:01

## 2021-01-30 ENCOUNTER — PATIENT MESSAGE (OUTPATIENT)
Dept: FAMILY MEDICINE | Facility: CLINIC | Age: 46
End: 2021-01-30

## 2021-02-01 ENCOUNTER — TELEPHONE (OUTPATIENT)
Dept: ALLERGY | Facility: CLINIC | Age: 46
End: 2021-02-01

## 2021-02-01 ENCOUNTER — TELEPHONE (OUTPATIENT)
Dept: PHARMACY | Facility: CLINIC | Age: 46
End: 2021-02-01

## 2021-02-01 ENCOUNTER — SPECIALTY PHARMACY (OUTPATIENT)
Dept: PHARMACY | Facility: CLINIC | Age: 46
End: 2021-02-01

## 2021-02-07 ENCOUNTER — PATIENT MESSAGE (OUTPATIENT)
Dept: FAMILY MEDICINE | Facility: CLINIC | Age: 46
End: 2021-02-07

## 2021-02-08 PROBLEM — Z09 HOSPITAL DISCHARGE FOLLOW-UP: Status: RESOLVED | Noted: 2020-11-04 | Resolved: 2021-02-08

## 2021-02-09 ENCOUNTER — OFFICE VISIT (OUTPATIENT)
Dept: FAMILY MEDICINE | Facility: CLINIC | Age: 46
End: 2021-02-09
Payer: MEDICAID

## 2021-02-09 DIAGNOSIS — J45.20 MILD INTERMITTENT ASTHMA WITHOUT COMPLICATION: Chronic | ICD-10-CM

## 2021-02-09 DIAGNOSIS — J06.9 UPPER RESPIRATORY TRACT INFECTION, UNSPECIFIED TYPE: Primary | ICD-10-CM

## 2021-02-09 PROCEDURE — 99213 PR OFFICE/OUTPT VISIT, EST, LEVL III, 20-29 MIN: ICD-10-PCS | Mod: 95,,, | Performed by: FAMILY MEDICINE

## 2021-02-09 PROCEDURE — 99213 OFFICE O/P EST LOW 20 MIN: CPT | Mod: 95,,, | Performed by: FAMILY MEDICINE

## 2021-02-09 RX ORDER — METHYLPREDNISOLONE 4 MG/1
TABLET ORAL
Qty: 1 PACKAGE | Refills: 0 | Status: SHIPPED | OUTPATIENT
Start: 2021-02-09 | End: 2021-02-25

## 2021-02-09 RX ORDER — CODEINE PHOSPHATE AND GUAIFENESIN 10; 100 MG/5ML; MG/5ML
5 SOLUTION ORAL EVERY 6 HOURS PRN
Qty: 118 ML | Refills: 0 | Status: SHIPPED | OUTPATIENT
Start: 2021-02-09 | End: 2021-02-19

## 2021-02-11 ENCOUNTER — TELEPHONE (OUTPATIENT)
Dept: ALLERGY | Facility: CLINIC | Age: 46
End: 2021-02-11

## 2021-02-13 ENCOUNTER — PATIENT MESSAGE (OUTPATIENT)
Dept: ALLERGY | Facility: CLINIC | Age: 46
End: 2021-02-13

## 2021-02-17 RX ORDER — CODEINE PHOSPHATE AND GUAIFENESIN 10; 100 MG/5ML; MG/5ML
5 SOLUTION ORAL EVERY 6 HOURS PRN
Qty: 118 ML | Refills: 0 | OUTPATIENT
Start: 2021-02-17 | End: 2021-02-27

## 2021-02-18 ENCOUNTER — PATIENT MESSAGE (OUTPATIENT)
Dept: ALLERGY | Facility: CLINIC | Age: 46
End: 2021-02-18

## 2021-02-18 ENCOUNTER — PATIENT MESSAGE (OUTPATIENT)
Dept: FAMILY MEDICINE | Facility: CLINIC | Age: 46
End: 2021-02-18

## 2021-02-18 DIAGNOSIS — Z79.899 ENCOUNTER FOR LONG-TERM (CURRENT) USE OF MEDICATIONS: ICD-10-CM

## 2021-02-18 RX ORDER — ALBUTEROL SULFATE 90 UG/1
2 AEROSOL, METERED RESPIRATORY (INHALATION) EVERY 6 HOURS PRN
Qty: 18 G | Refills: 11 | OUTPATIENT
Start: 2021-02-18 | End: 2022-02-18

## 2021-02-24 DIAGNOSIS — J01.91 ACUTE RECURRENT SINUSITIS, UNSPECIFIED LOCATION: Primary | ICD-10-CM

## 2021-02-24 RX ORDER — MONTELUKAST SODIUM 10 MG/1
10 TABLET ORAL NIGHTLY
Qty: 30 TABLET | Refills: 11 | Status: CANCELLED | OUTPATIENT
Start: 2021-02-24

## 2021-02-24 RX ORDER — METHOCARBAMOL 500 MG/1
TABLET, FILM COATED ORAL
Qty: 21 TABLET | Refills: 3 | Status: CANCELLED | OUTPATIENT
Start: 2021-02-24

## 2021-02-25 ENCOUNTER — OFFICE VISIT (OUTPATIENT)
Dept: FAMILY MEDICINE | Facility: CLINIC | Age: 46
End: 2021-02-25
Payer: MEDICAID

## 2021-02-25 VITALS
WEIGHT: 220.63 LBS | DIASTOLIC BLOOD PRESSURE: 85 MMHG | HEART RATE: 96 BPM | TEMPERATURE: 98 F | SYSTOLIC BLOOD PRESSURE: 137 MMHG | HEIGHT: 70 IN | BODY MASS INDEX: 31.59 KG/M2

## 2021-02-25 DIAGNOSIS — J45.20 MILD INTERMITTENT ASTHMA WITHOUT COMPLICATION: ICD-10-CM

## 2021-02-25 DIAGNOSIS — Z12.11 COLON CANCER SCREENING: ICD-10-CM

## 2021-02-25 DIAGNOSIS — Z11.59 ENCOUNTER FOR SCREENING FOR OTHER VIRAL DISEASES: ICD-10-CM

## 2021-02-25 DIAGNOSIS — A49.9 BACTERIAL INFECTION: Primary | ICD-10-CM

## 2021-02-25 PROCEDURE — U0005 INFEC AGEN DETEC AMPLI PROBE: HCPCS

## 2021-02-25 PROCEDURE — U0003 INFECTIOUS AGENT DETECTION BY NUCLEIC ACID (DNA OR RNA); SEVERE ACUTE RESPIRATORY SYNDROME CORONAVIRUS 2 (SARS-COV-2) (CORONAVIRUS DISEASE [COVID-19]), AMPLIFIED PROBE TECHNIQUE, MAKING USE OF HIGH THROUGHPUT TECHNOLOGIES AS DESCRIBED BY CMS-2020-01-R: HCPCS

## 2021-02-25 PROCEDURE — 99214 OFFICE O/P EST MOD 30 MIN: CPT | Mod: PBBFAC,PO | Performed by: FAMILY MEDICINE

## 2021-02-25 PROCEDURE — 99999 PR PBB SHADOW E&M-EST. PATIENT-LVL IV: CPT | Mod: PBBFAC,,, | Performed by: FAMILY MEDICINE

## 2021-02-25 PROCEDURE — 99999 PR PBB SHADOW E&M-EST. PATIENT-LVL IV: ICD-10-PCS | Mod: PBBFAC,,, | Performed by: FAMILY MEDICINE

## 2021-02-25 PROCEDURE — 99214 PR OFFICE/OUTPT VISIT, EST, LEVL IV, 30-39 MIN: ICD-10-PCS | Mod: S$PBB,,, | Performed by: FAMILY MEDICINE

## 2021-02-25 PROCEDURE — 99214 OFFICE O/P EST MOD 30 MIN: CPT | Mod: S$PBB,,, | Performed by: FAMILY MEDICINE

## 2021-02-25 RX ORDER — AMOXICILLIN AND CLAVULANATE POTASSIUM 875; 125 MG/1; MG/1
1 TABLET, FILM COATED ORAL 2 TIMES DAILY
Qty: 20 TABLET | Refills: 0 | Status: SHIPPED | OUTPATIENT
Start: 2021-02-25 | End: 2021-03-07

## 2021-02-25 RX ORDER — ALBUTEROL SULFATE 0.83 MG/ML
2.5 SOLUTION RESPIRATORY (INHALATION) EVERY 4 HOURS PRN
Qty: 1 BOX | Refills: 12 | Status: SHIPPED | OUTPATIENT
Start: 2021-02-25 | End: 2021-08-04 | Stop reason: SDUPTHER

## 2021-02-26 LAB — SARS-COV-2 RNA RESP QL NAA+PROBE: NOT DETECTED

## 2021-03-02 ENCOUNTER — CLINICAL SUPPORT (OUTPATIENT)
Dept: ALLERGY | Facility: CLINIC | Age: 46
End: 2021-03-02
Payer: MEDICAID

## 2021-03-02 DIAGNOSIS — J45.20 MILD INTERMITTENT ASTHMA WITHOUT COMPLICATION: Primary | ICD-10-CM

## 2021-03-02 PROCEDURE — 99499 UNLISTED E&M SERVICE: CPT | Mod: S$PBB,,, | Performed by: ALLERGY & IMMUNOLOGY

## 2021-03-02 PROCEDURE — 99999 PR PBB SHADOW E&M-EST. PATIENT-LVL II: ICD-10-PCS | Mod: PBBFAC,,,

## 2021-03-02 PROCEDURE — 99999 PR PBB SHADOW E&M-EST. PATIENT-LVL II: CPT | Mod: PBBFAC,,,

## 2021-03-02 PROCEDURE — 99499 NO LOS: ICD-10-PCS | Mod: S$PBB,,, | Performed by: ALLERGY & IMMUNOLOGY

## 2021-03-12 ENCOUNTER — SPECIALTY PHARMACY (OUTPATIENT)
Dept: PHARMACY | Facility: CLINIC | Age: 46
End: 2021-03-12

## 2021-03-12 ENCOUNTER — TELEPHONE (OUTPATIENT)
Dept: ALLERGY | Facility: CLINIC | Age: 46
End: 2021-03-12

## 2021-04-05 ENCOUNTER — PATIENT MESSAGE (OUTPATIENT)
Dept: PHARMACY | Facility: CLINIC | Age: 46
End: 2021-04-05

## 2021-04-13 ENCOUNTER — TELEPHONE (OUTPATIENT)
Dept: ALLERGY | Facility: CLINIC | Age: 46
End: 2021-04-13

## 2021-04-16 ENCOUNTER — IMMUNIZATION (OUTPATIENT)
Dept: FAMILY MEDICINE | Facility: CLINIC | Age: 46
End: 2021-04-16
Payer: MEDICAID

## 2021-04-16 DIAGNOSIS — Z23 NEED FOR VACCINATION: Primary | ICD-10-CM

## 2021-04-16 PROCEDURE — 91300 COVID-19, MRNA, LNP-S, PF, 30 MCG/0.3 ML DOSE VACCINE: CPT | Mod: PBBFAC,PO

## 2021-04-30 RX ORDER — DICLOFENAC SODIUM 75 MG/1
TABLET, DELAYED RELEASE ORAL
Qty: 14 TABLET | Refills: 0 | Status: SHIPPED | OUTPATIENT
Start: 2021-04-30 | End: 2021-08-04 | Stop reason: SDUPTHER

## 2021-05-07 ENCOUNTER — IMMUNIZATION (OUTPATIENT)
Dept: FAMILY MEDICINE | Facility: CLINIC | Age: 46
End: 2021-05-07
Payer: MEDICAID

## 2021-05-07 DIAGNOSIS — Z23 NEED FOR VACCINATION: Primary | ICD-10-CM

## 2021-05-07 PROCEDURE — 91300 COVID-19, MRNA, LNP-S, PF, 30 MCG/0.3 ML DOSE VACCINE: CPT | Mod: PBBFAC,PO

## 2021-05-07 PROCEDURE — 0002A COVID-19, MRNA, LNP-S, PF, 30 MCG/0.3 ML DOSE VACCINE: CPT | Mod: PBBFAC,PO

## 2021-05-16 ENCOUNTER — PATIENT MESSAGE (OUTPATIENT)
Dept: ALLERGY | Facility: CLINIC | Age: 46
End: 2021-05-16

## 2021-05-17 ENCOUNTER — PATIENT OUTREACH (OUTPATIENT)
Dept: ADMINISTRATIVE | Facility: OTHER | Age: 46
End: 2021-05-17

## 2021-05-18 ENCOUNTER — OFFICE VISIT (OUTPATIENT)
Dept: OTOLARYNGOLOGY | Facility: CLINIC | Age: 46
End: 2021-05-18
Payer: MEDICAID

## 2021-05-18 ENCOUNTER — PATIENT MESSAGE (OUTPATIENT)
Dept: ALLERGY | Facility: CLINIC | Age: 46
End: 2021-05-18

## 2021-05-18 VITALS — BODY MASS INDEX: 30.71 KG/M2 | HEIGHT: 70 IN | WEIGHT: 214.5 LBS

## 2021-05-18 DIAGNOSIS — J30.2 SEASONAL ALLERGIC RHINITIS, UNSPECIFIED TRIGGER: ICD-10-CM

## 2021-05-18 DIAGNOSIS — L50.9 URTICARIA: Primary | ICD-10-CM

## 2021-05-18 DIAGNOSIS — J32.9 SINUSITIS, UNSPECIFIED CHRONICITY, UNSPECIFIED LOCATION: ICD-10-CM

## 2021-05-18 DIAGNOSIS — J34.89 NASAL CRUSTING: Primary | ICD-10-CM

## 2021-05-18 PROCEDURE — 99214 PR OFFICE/OUTPT VISIT, EST, LEVL IV, 30-39 MIN: ICD-10-PCS | Mod: S$PBB,,, | Performed by: OTOLARYNGOLOGY

## 2021-05-18 PROCEDURE — 99999 PR PBB SHADOW E&M-EST. PATIENT-LVL IV: CPT | Mod: PBBFAC,,, | Performed by: OTOLARYNGOLOGY

## 2021-05-18 PROCEDURE — 99999 PR PBB SHADOW E&M-EST. PATIENT-LVL IV: ICD-10-PCS | Mod: PBBFAC,,, | Performed by: OTOLARYNGOLOGY

## 2021-05-18 PROCEDURE — 99214 OFFICE O/P EST MOD 30 MIN: CPT | Mod: PBBFAC,PO | Performed by: OTOLARYNGOLOGY

## 2021-05-18 PROCEDURE — 99214 OFFICE O/P EST MOD 30 MIN: CPT | Mod: S$PBB,,, | Performed by: OTOLARYNGOLOGY

## 2021-05-18 RX ORDER — PREDNISONE 20 MG/1
40 TABLET ORAL DAILY
Qty: 14 TABLET | Refills: 0 | Status: SHIPPED | OUTPATIENT
Start: 2021-05-18 | End: 2022-05-26 | Stop reason: SDUPTHER

## 2021-05-18 RX ORDER — SULFAMETHOXAZOLE AND TRIMETHOPRIM 800; 160 MG/1; MG/1
1 TABLET ORAL 2 TIMES DAILY
Qty: 20 TABLET | Refills: 0 | Status: SHIPPED | OUTPATIENT
Start: 2021-05-18 | End: 2021-05-28

## 2021-06-20 LAB — NONINV COLON CA DNA+OCC BLD SCRN STL QL: NEGATIVE

## 2021-07-06 ENCOUNTER — TELEPHONE (OUTPATIENT)
Dept: FAMILY MEDICINE | Facility: CLINIC | Age: 46
End: 2021-07-06

## 2021-07-19 ENCOUNTER — PATIENT MESSAGE (OUTPATIENT)
Dept: FAMILY MEDICINE | Facility: CLINIC | Age: 46
End: 2021-07-19

## 2021-07-27 ENCOUNTER — PATIENT MESSAGE (OUTPATIENT)
Dept: ADMINISTRATIVE | Facility: OTHER | Age: 46
End: 2021-07-27

## 2021-08-04 ENCOUNTER — PATIENT MESSAGE (OUTPATIENT)
Dept: FAMILY MEDICINE | Facility: CLINIC | Age: 46
End: 2021-08-04

## 2021-08-04 DIAGNOSIS — Z79.899 ENCOUNTER FOR LONG-TERM (CURRENT) USE OF MEDICATIONS: ICD-10-CM

## 2021-08-04 DIAGNOSIS — J45.20 MILD INTERMITTENT ASTHMA WITHOUT COMPLICATION: ICD-10-CM

## 2021-08-04 DIAGNOSIS — K21.00 GERD WITH ESOPHAGITIS: ICD-10-CM

## 2021-08-05 RX ORDER — METHOCARBAMOL 500 MG/1
TABLET, FILM COATED ORAL
Qty: 21 TABLET | Refills: 3 | Status: SHIPPED | OUTPATIENT
Start: 2021-08-05 | End: 2022-01-12 | Stop reason: SDUPTHER

## 2021-08-05 RX ORDER — DICLOFENAC SODIUM 75 MG/1
TABLET, DELAYED RELEASE ORAL
Qty: 14 TABLET | Refills: 0 | Status: SHIPPED | OUTPATIENT
Start: 2021-08-05 | End: 2022-01-12 | Stop reason: SDUPTHER

## 2021-08-05 RX ORDER — FAMOTIDINE 20 MG/1
20 TABLET, FILM COATED ORAL NIGHTLY PRN
Qty: 90 TABLET | Refills: 4 | Status: SHIPPED | OUTPATIENT
Start: 2021-08-05 | End: 2022-01-12 | Stop reason: SDUPTHER

## 2021-08-05 RX ORDER — ALBUTEROL SULFATE 0.83 MG/ML
2.5 SOLUTION RESPIRATORY (INHALATION) EVERY 4 HOURS PRN
Qty: 1 BOX | Refills: 12 | Status: SHIPPED | OUTPATIENT
Start: 2021-08-05 | End: 2021-08-19

## 2021-08-05 RX ORDER — ALBUTEROL SULFATE 90 UG/1
2 AEROSOL, METERED RESPIRATORY (INHALATION) EVERY 6 HOURS PRN
Qty: 18 G | Refills: 11 | Status: SHIPPED | OUTPATIENT
Start: 2021-08-05 | End: 2022-01-12 | Stop reason: SDUPTHER

## 2021-08-19 ENCOUNTER — PATIENT MESSAGE (OUTPATIENT)
Dept: FAMILY MEDICINE | Facility: CLINIC | Age: 46
End: 2021-08-19

## 2021-08-19 ENCOUNTER — OFFICE VISIT (OUTPATIENT)
Dept: FAMILY MEDICINE | Facility: CLINIC | Age: 46
End: 2021-08-19
Payer: MEDICAID

## 2021-08-19 DIAGNOSIS — J45.20 MILD INTERMITTENT ASTHMA WITHOUT COMPLICATION: Chronic | ICD-10-CM

## 2021-08-19 DIAGNOSIS — J32.9 SINUSITIS, UNSPECIFIED CHRONICITY, UNSPECIFIED LOCATION: Primary | ICD-10-CM

## 2021-08-19 PROCEDURE — 99213 PR OFFICE/OUTPT VISIT, EST, LEVL III, 20-29 MIN: ICD-10-PCS | Mod: 95,,, | Performed by: FAMILY MEDICINE

## 2021-08-19 PROCEDURE — 99213 OFFICE O/P EST LOW 20 MIN: CPT | Mod: 95,,, | Performed by: FAMILY MEDICINE

## 2021-08-19 RX ORDER — CEPHALEXIN 500 MG/1
500 CAPSULE ORAL EVERY 12 HOURS
Qty: 20 CAPSULE | Refills: 0 | Status: SHIPPED | OUTPATIENT
Start: 2021-08-19 | End: 2022-03-09 | Stop reason: ALTCHOICE

## 2021-08-19 RX ORDER — PROMETHAZINE HYDROCHLORIDE AND DEXTROMETHORPHAN HYDROBROMIDE 6.25; 15 MG/5ML; MG/5ML
5 SYRUP ORAL 4 TIMES DAILY
Qty: 240 ML | Refills: 0 | Status: SHIPPED | OUTPATIENT
Start: 2021-08-19 | End: 2021-08-29

## 2021-08-19 RX ORDER — METHYLPREDNISOLONE 4 MG/1
TABLET ORAL
Qty: 1 PACKAGE | Refills: 0 | Status: SHIPPED | OUTPATIENT
Start: 2021-08-19 | End: 2021-09-09

## 2021-09-20 RX ORDER — FLUTICASONE PROPIONATE 50 MCG
SPRAY, SUSPENSION (ML) NASAL
Qty: 16 ML | Refills: 12 | Status: SHIPPED | OUTPATIENT
Start: 2021-09-20 | End: 2022-01-12 | Stop reason: SDUPTHER

## 2021-09-24 ENCOUNTER — TELEPHONE (OUTPATIENT)
Dept: FAMILY MEDICINE | Facility: CLINIC | Age: 46
End: 2021-09-24

## 2021-10-19 NOTE — ASSESSMENT & PLAN NOTE
Referral to Allergy.  Continue Singulair.  Patient is also on Claritin and Flonase.  Reports compliance.  No side effects reported.   cachectic

## 2022-01-12 ENCOUNTER — PATIENT MESSAGE (OUTPATIENT)
Dept: FAMILY MEDICINE | Facility: CLINIC | Age: 47
End: 2022-01-12
Payer: MEDICAID

## 2022-01-12 DIAGNOSIS — H66.002 NON-RECURRENT ACUTE SUPPURATIVE OTITIS MEDIA OF LEFT EAR WITHOUT SPONTANEOUS RUPTURE OF TYMPANIC MEMBRANE: ICD-10-CM

## 2022-01-12 DIAGNOSIS — J45.20 MILD INTERMITTENT ASTHMA WITHOUT COMPLICATION: Chronic | ICD-10-CM

## 2022-01-12 DIAGNOSIS — J30.2 SEASONAL ALLERGIC RHINITIS, UNSPECIFIED TRIGGER: Primary | ICD-10-CM

## 2022-01-12 DIAGNOSIS — I10 HYPERTENSION, ESSENTIAL: Chronic | ICD-10-CM

## 2022-01-12 DIAGNOSIS — Z79.899 ENCOUNTER FOR LONG-TERM (CURRENT) USE OF MEDICATIONS: ICD-10-CM

## 2022-01-12 DIAGNOSIS — K21.00 GERD WITH ESOPHAGITIS: ICD-10-CM

## 2022-01-12 RX ORDER — AZELASTINE 1 MG/ML
2 SPRAY, METERED NASAL 2 TIMES DAILY
Qty: 30 ML | Refills: 11 | Status: SHIPPED | OUTPATIENT
Start: 2022-01-12 | End: 2023-05-10

## 2022-01-13 RX ORDER — FAMOTIDINE 20 MG/1
20 TABLET, FILM COATED ORAL NIGHTLY PRN
Qty: 90 TABLET | Refills: 4 | Status: SHIPPED | OUTPATIENT
Start: 2022-01-13 | End: 2022-05-25 | Stop reason: SDUPTHER

## 2022-01-13 RX ORDER — BUDESONIDE AND FORMOTEROL FUMARATE DIHYDRATE 80; 4.5 UG/1; UG/1
2 AEROSOL RESPIRATORY (INHALATION) 2 TIMES DAILY
Qty: 10.2 G | Refills: 12 | Status: SHIPPED | OUTPATIENT
Start: 2022-01-13 | End: 2022-06-30 | Stop reason: SDUPTHER

## 2022-01-13 RX ORDER — DICLOFENAC SODIUM 75 MG/1
TABLET, DELAYED RELEASE ORAL
Qty: 14 TABLET | Refills: 0 | Status: SHIPPED | OUTPATIENT
Start: 2022-01-13 | End: 2022-11-10

## 2022-01-13 RX ORDER — CEPHALEXIN 500 MG/1
500 CAPSULE ORAL EVERY 12 HOURS
Qty: 20 CAPSULE | Refills: 0 | OUTPATIENT
Start: 2022-01-13

## 2022-01-13 RX ORDER — CHLORTHALIDONE 25 MG/1
25 TABLET ORAL DAILY
Qty: 90 TABLET | Refills: 3 | Status: SHIPPED | OUTPATIENT
Start: 2022-01-13 | End: 2022-03-28 | Stop reason: SDUPTHER

## 2022-01-13 RX ORDER — IBUPROFEN 800 MG/1
800 TABLET ORAL 3 TIMES DAILY
Qty: 90 TABLET | Refills: 4 | Status: SHIPPED | OUTPATIENT
Start: 2022-01-13 | End: 2022-03-28 | Stop reason: SDUPTHER

## 2022-01-13 RX ORDER — METHOCARBAMOL 500 MG/1
TABLET, FILM COATED ORAL
Qty: 21 TABLET | Refills: 3 | Status: SHIPPED | OUTPATIENT
Start: 2022-01-13 | End: 2022-03-28 | Stop reason: SDUPTHER

## 2022-01-13 RX ORDER — LORATADINE 10 MG/1
10 TABLET ORAL DAILY
Qty: 30 TABLET | Refills: 11 | Status: SHIPPED | OUTPATIENT
Start: 2022-01-13 | End: 2022-03-28 | Stop reason: SDUPTHER

## 2022-01-13 RX ORDER — ALBUTEROL SULFATE 90 UG/1
2 AEROSOL, METERED RESPIRATORY (INHALATION) EVERY 6 HOURS PRN
Qty: 18 G | Refills: 11 | Status: SHIPPED | OUTPATIENT
Start: 2022-01-13 | End: 2022-03-28 | Stop reason: SDUPTHER

## 2022-03-07 ENCOUNTER — PATIENT MESSAGE (OUTPATIENT)
Dept: FAMILY MEDICINE | Facility: CLINIC | Age: 47
End: 2022-03-07
Payer: MEDICAID

## 2022-03-07 NOTE — TELEPHONE ENCOUNTER
I attempted to reach patient by phone, but both numbers are not working at this time.  Please see message from patient and advise.

## 2022-03-07 NOTE — TELEPHONE ENCOUNTER
Set up an appointment with an available provider.  Virtual visit is fine if desired.  ER precautions for severe symptoms.

## 2022-03-08 ENCOUNTER — PATIENT MESSAGE (OUTPATIENT)
Dept: FAMILY MEDICINE | Facility: CLINIC | Age: 47
End: 2022-03-08

## 2022-03-09 ENCOUNTER — OFFICE VISIT (OUTPATIENT)
Dept: FAMILY MEDICINE | Facility: CLINIC | Age: 47
End: 2022-03-09
Payer: MEDICAID

## 2022-03-09 DIAGNOSIS — F43.21 GRIEF AT LOSS OF CHILD: ICD-10-CM

## 2022-03-09 DIAGNOSIS — F43.21 SITUATIONAL DEPRESSION: Primary | ICD-10-CM

## 2022-03-09 DIAGNOSIS — Z63.4 GRIEF AT LOSS OF CHILD: ICD-10-CM

## 2022-03-09 PROCEDURE — 1160F RVW MEDS BY RX/DR IN RCRD: CPT | Mod: CPTII,95,, | Performed by: NURSE PRACTITIONER

## 2022-03-09 PROCEDURE — 1159F PR MEDICATION LIST DOCUMENTED IN MEDICAL RECORD: ICD-10-PCS | Mod: CPTII,95,, | Performed by: NURSE PRACTITIONER

## 2022-03-09 PROCEDURE — 99214 OFFICE O/P EST MOD 30 MIN: CPT | Mod: 95,,, | Performed by: NURSE PRACTITIONER

## 2022-03-09 PROCEDURE — 1160F PR REVIEW ALL MEDS BY PRESCRIBER/CLIN PHARMACIST DOCUMENTED: ICD-10-PCS | Mod: CPTII,95,, | Performed by: NURSE PRACTITIONER

## 2022-03-09 PROCEDURE — 1159F MED LIST DOCD IN RCRD: CPT | Mod: CPTII,95,, | Performed by: NURSE PRACTITIONER

## 2022-03-09 PROCEDURE — 99214 PR OFFICE/OUTPT VISIT, EST, LEVL IV, 30-39 MIN: ICD-10-PCS | Mod: 95,,, | Performed by: NURSE PRACTITIONER

## 2022-03-09 RX ORDER — SERTRALINE HYDROCHLORIDE 25 MG/1
25 TABLET, FILM COATED ORAL DAILY
Qty: 30 TABLET | Refills: 0 | Status: SHIPPED | OUTPATIENT
Start: 2022-03-09 | End: 2022-03-29

## 2022-03-09 SDOH — SOCIAL DETERMINANTS OF HEALTH (SDOH): DISSAPEARANCE AND DEATH OF FAMILY MEMBER: Z63.4

## 2022-03-09 NOTE — PROGRESS NOTES
Primary Care Telemedicine Note  The patient location is:  Patient's Home - [place]  The chief complaint leading to consultation is:   Chief Complaint   Patient presents with    Depression      Total time: 30 minutes see MDM below. The total time spent on the encounter, which includes face to face time and non-face to face time preparing to see the patient (eg, review of previous medical records, tests), Obtaining and/or reviewing separately obtained history, documenting clinical information in the electronic or other health record, independently interpreting results (not separately reported)/communicating results to the patient/family/caregiver, and/or care coordination (not separately reported).    Visit type: Virtual visit with synchronous audio only and video  Each patient to whom he or she provides medical services by telemedicine is:  (1) informed of the relationship between the physician and patient and the respective role of any other health care provider with respect to management of the patient; and (2) notified that he or she may decline to receive medical services by telemedicine and may withdraw from such care at any time.  =================================================================    Assessment/Plan:  Problem List Items Addressed This Visit        Psychiatric    Situational depression - Primary    Overview     - patient reports that his 17-year-old son was found dead in his sleep on 2/15/2022. Since this time he has been struggling with grief and depression. He reports decreased appetite, depressed mood, difficulty concentrating, fatigue, feelings of worthlessness/guilt, hopelessness, insomnia.  - he is interested in starting medication for depression.  He reports that he has tried taking Cymbalta in the past but the medication made him feel sick.  - trial of Zoloft as prescribed   - he reports good support system with family, patient has a daughter who is a twin to the son that recently  "passed away.    - he currently denies suicidal/homicidal ideations  - patient is interested in counseling/therapy; psychiatry referral placed  -discussed condition course  -discussed SSRI/SNRI as first-line treatment for this condition  -discussed risk of discontinuing this medication without tapering  -patient was educated, advised of side effects, and all questions were answered.  Patient voiced understanding  -patient will follow up routinely and notify us if having any side effects or worsening or persistent symptoms.  ER precautions were given.           Relevant Medications    sertraline (ZOLOFT) 25 MG tablet    Other Relevant Orders    Ambulatory referral/consult to Psychiatry      Other Visit Diagnoses     Grief at loss of child            Follow up in about 1 month (around 4/9/2022), or if symptoms worsen or fail to improve, for Follow up with Dr. Greenwood.    Krysten Cosme NP  _____________________________________________________________________________________________________________________________________________________    CC: depression     HPI: Patient is in clinic today as an established patient here for depression. He complains of decreased appetite, depressed mood, difficulty concentrating, fatigue, feelings of worthlessness/guilt, hopelessness and insomnia. Onset was approximately a few weeks ago, unchanged since that time.  He denies current suicidal and homicidal plan or intent.  Risk factors: negative life event son recently passed away (see problem list) Previous treatment includes Cymbalta which made him "sick" and none.    Past Medical History:  Past Medical History:   Diagnosis Date    Asthma     Crushing injury of left wrist and hand 7/10/2019    Hypertension      Past Surgical History:   Procedure Laterality Date    BONE GRAFT Left 8/6/2019    Procedure: BONE GRAFT LEG;  Surgeon: Escobar Colvin MD;  Location: [facility];  Service: Orthopedics;  Laterality: Left;    FRACTURE " SURGERY      INJECTION OF ANESTHETIC AGENT AROUND NERVE Left 10/22/2019    Procedure: Block, Nerve STELLATE GANGLION;  Surgeon: Vincent Alejandre MD;  Location: [facility] OR;  Service: Pain Management;  Laterality: Left;    INJECTION OF ANESTHETIC AGENT AROUND NERVE Left 12/10/2019    Procedure: Block, Nerve STELLATE GANGLION;  Surgeon: Vincent Alejandre MD;  Location: [facility] OR;  Service: Pain Management;  Laterality: Left;    MAXILLARY ANTROSTOMY Left 12/11/2019    Procedure: MAXILLARY ANTROSTOMY;  Surgeon: Tomy Medrano MD;  Location: [facility] OR;  Service: ENT;  Laterality: Left;    NASAL SEPTOPLASTY Bilateral 12/11/2019    Procedure: SEPTOPLASTY, NASAL;  Surgeon: Tomy Medrano MD;  Location: [facility] OR;  Service: ENT;  Laterality: Bilateral;    NASAL TURBINATE REDUCTION Bilateral 12/11/2019    Procedure: REDUCTION, NASAL TURBINATE;  Surgeon: Tomy Medrano MD;  Location: [facility] OR;  Service: ENT;  Laterality: Bilateral;    WRIST SURGERY Left     x3     Review of patient's allergies indicates:  No Known Allergies  Social History     Tobacco Use    Smoking status: Former Smoker    Smokeless tobacco: Never Used   Substance Use Topics    Alcohol use: No    Drug use: No     History reviewed. No pertinent family history.  Current Outpatient Medications on File Prior to Visit   Medication Sig Dispense Refill    albuterol (VENTOLIN HFA) 90 mcg/actuation inhaler Inhale 2 puffs into the lungs every 6 (six) hours as needed for Wheezing. Rescue 18 g 11    amLODIPine (NORVASC) 10 MG tablet TAKE 1 TABLET BY MOUTH EVERY DAY 90 tablet 1    azelastine (ASTELIN) 137 mcg (0.1 %) nasal spray 2 sprays (274 mcg total) by Nasal route 2 (two) times daily. 30 mL 11    budesonide-formoterol 80-4.5 mcg (SYMBICORT) 80-4.5 mcg/actuation HFAA Inhale 2 puffs into the lungs 2 (two) times daily. 10.2 g 12    chlorthalidone (HYGROTEN) 25 MG Tab Take 1 tablet (25 mg total) by mouth once daily. 90 tablet 3    diclofenac (VOLTAREN) 75 MG EC  tablet Take 1 tablet (75 mg total) by mouth 2 (two) times daily for 7 days 14 tablet 0    famotidine (PEPCID) 20 MG tablet Take 1 tablet (20 mg total) by mouth nightly as needed for Heartburn. 90 tablet 4    fluticasone propionate (FLONASE) 50 mcg/actuation nasal spray 2 sprays (100 mcg total) by Each Nostril route once daily. 16 mL 12    ibuprofen (ADVIL,MOTRIN) 800 MG tablet Take 1 tablet (800 mg total) by mouth 3 (three) times daily. 90 tablet 4    loratadine (CLARITIN) 10 mg tablet Take 1 tablet (10 mg total) by mouth once daily. 30 tablet 11    methocarbamoL (ROBAXIN) 500 MG Tab Take 1 tablet (500 mg total) by mouth 3 (three) times daily for 7 days 21 tablet 3    montelukast (SINGULAIR) 10 mg tablet Take 1 tablet (10 mg total) by mouth every evening. 30 tablet 11    omalizumab (XOLAIR) 150 mg injection Inject 300 mg into the skin every 14 (fourteen) days. 4 vial 11    [DISCONTINUED] cephALEXin (KEFLEX) 500 MG capsule Take 1 capsule (500 mg total) by mouth every 12 (twelve) hours. 20 capsule 0     Current Facility-Administered Medications on File Prior to Visit   Medication Dose Route Frequency Provider Last Rate Last Admin    omalizumab injection 150 mg  150 mg Subcutaneous Q14 Days Radha Hernández MD   150 mg at 01/26/21 1351    omalizumab injection 150 mg  150 mg Subcutaneous Q14 Days Radha Hernández MD   150 mg at 01/26/21 1349    omalizumab injection 150 mg  150 mg Subcutaneous 1 time in Clinic/HOD Radha Hernández MD        omalizumab injection 150 mg  150 mg Subcutaneous 1 time in Clinic/HOD Radha Hernández MD         Review of Systems   Constitutional: Positive for appetite change. Negative for activity change, chills, fatigue, fever and unexpected weight change.   HENT: Negative for congestion, hearing loss, rhinorrhea, sore throat and trouble swallowing.    Eyes: Negative for discharge and visual disturbance.   Respiratory: Negative for cough, chest tightness, shortness of breath and wheezing.     Cardiovascular: Negative for chest pain, palpitations and leg swelling.   Gastrointestinal: Negative for abdominal pain, blood in stool, constipation, diarrhea and vomiting.   Endocrine: Negative for polydipsia and polyuria.   Genitourinary: Negative for difficulty urinating, dysuria, hematuria and urgency.   Musculoskeletal: Negative for arthralgias, joint swelling, myalgias and neck pain.   Skin: Negative for rash and wound.   Neurological: Negative for dizziness, weakness and headaches.   Psychiatric/Behavioral: Positive for dysphoric mood and sleep disturbance. Negative for behavioral problems, confusion and suicidal ideas. The patient is not nervous/anxious.      There were no vitals filed for this visit.    Wt Readings from Last 3 Encounters:   05/18/21 97.3 kg (214 lb 8.1 oz)   02/25/21 100.1 kg (220 lb 9.6 oz)   11/11/20 101.1 kg (222 lb 14.2 oz)     Physical Exam  Vitals reviewed.   Constitutional:       General: He is not in acute distress.     Appearance: Normal appearance. He is not ill-appearing.   HENT:      Head: Normocephalic and atraumatic.      Right Ear: External ear normal.      Left Ear: External ear normal.   Eyes:      Extraocular Movements: Extraocular movements intact.      Conjunctiva/sclera: Conjunctivae normal.   Cardiovascular:      Rate and Rhythm: Normal rate.   Pulmonary:      Effort: Pulmonary effort is normal. No respiratory distress.   Abdominal:      General: Abdomen is flat. There is no distension.   Skin:     General: Skin is warm and dry.   Neurological:      Mental Status: He is alert and oriented to person, place, and time. Mental status is at baseline.   Psychiatric:         Attention and Perception: Attention normal.         Mood and Affect: Mood is depressed. Affect is tearful.         Speech: Speech normal. Speech is not rapid and pressured, delayed or slurred.         Behavior: Behavior is not agitated, slowed, aggressive, withdrawn or hyperactive. Behavior is  cooperative.         Thought Content: Thought content is not paranoid. Thought content does not include homicidal or suicidal ideation. Thought content does not include homicidal or suicidal plan.         Cognition and Memory: Cognition normal.      Comments: Patient very upset and tearful during encounter. Condolences offered.        Health Maintenance   Topic Date Due    Lipid Panel  09/27/2024    TETANUS VACCINE  12/27/2027    Hepatitis C Screening  Completed

## 2022-03-18 ENCOUNTER — TELEPHONE (OUTPATIENT)
Dept: PSYCHIATRY | Facility: CLINIC | Age: 47
End: 2022-03-18
Payer: MEDICAID

## 2022-03-28 ENCOUNTER — PATIENT MESSAGE (OUTPATIENT)
Dept: FAMILY MEDICINE | Facility: CLINIC | Age: 47
End: 2022-03-28
Payer: MEDICAID

## 2022-03-28 DIAGNOSIS — I10 HYPERTENSION, ESSENTIAL: Chronic | ICD-10-CM

## 2022-03-28 DIAGNOSIS — Z79.899 ENCOUNTER FOR LONG-TERM (CURRENT) USE OF MEDICATIONS: ICD-10-CM

## 2022-03-28 DIAGNOSIS — F43.21 SITUATIONAL DEPRESSION: Primary | ICD-10-CM

## 2022-03-28 DIAGNOSIS — H66.002 NON-RECURRENT ACUTE SUPPURATIVE OTITIS MEDIA OF LEFT EAR WITHOUT SPONTANEOUS RUPTURE OF TYMPANIC MEMBRANE: ICD-10-CM

## 2022-03-28 RX ORDER — METHOCARBAMOL 500 MG/1
TABLET, FILM COATED ORAL
Qty: 21 TABLET | Refills: 3 | Status: SHIPPED | OUTPATIENT
Start: 2022-03-28 | End: 2022-04-12

## 2022-03-28 RX ORDER — CHLORTHALIDONE 25 MG/1
25 TABLET ORAL DAILY
Qty: 90 TABLET | Refills: 3 | Status: SHIPPED | OUTPATIENT
Start: 2022-03-28 | End: 2022-09-13 | Stop reason: SDUPTHER

## 2022-03-28 RX ORDER — ALBUTEROL SULFATE 90 UG/1
2 AEROSOL, METERED RESPIRATORY (INHALATION) EVERY 6 HOURS PRN
Qty: 18 G | Refills: 11 | Status: SHIPPED | OUTPATIENT
Start: 2022-03-28 | End: 2022-05-25 | Stop reason: SDUPTHER

## 2022-03-28 RX ORDER — IBUPROFEN 800 MG/1
800 TABLET ORAL 3 TIMES DAILY
Qty: 90 TABLET | Refills: 4 | Status: SHIPPED | OUTPATIENT
Start: 2022-03-28 | End: 2022-05-25 | Stop reason: SDUPTHER

## 2022-03-28 RX ORDER — LORATADINE 10 MG/1
10 TABLET ORAL DAILY
Qty: 30 TABLET | Refills: 11 | Status: SHIPPED | OUTPATIENT
Start: 2022-03-28 | End: 2022-05-25 | Stop reason: SDUPTHER

## 2022-03-29 RX ORDER — SERTRALINE HYDROCHLORIDE 50 MG/1
50 TABLET, FILM COATED ORAL DAILY
Qty: 90 TABLET | Refills: 1 | Status: SHIPPED | OUTPATIENT
Start: 2022-03-29 | End: 2022-04-12

## 2022-04-12 ENCOUNTER — OFFICE VISIT (OUTPATIENT)
Dept: FAMILY MEDICINE | Facility: CLINIC | Age: 47
End: 2022-04-12
Payer: MEDICAID

## 2022-04-12 VITALS
DIASTOLIC BLOOD PRESSURE: 86 MMHG | HEART RATE: 105 BPM | WEIGHT: 194.13 LBS | SYSTOLIC BLOOD PRESSURE: 130 MMHG | BODY MASS INDEX: 27.79 KG/M2 | HEIGHT: 70 IN | OXYGEN SATURATION: 98 % | TEMPERATURE: 98 F

## 2022-04-12 DIAGNOSIS — F43.21 SITUATIONAL DEPRESSION: Primary | ICD-10-CM

## 2022-04-12 DIAGNOSIS — Z79.899 ENCOUNTER FOR LONG-TERM (CURRENT) USE OF MEDICATIONS: ICD-10-CM

## 2022-04-12 DIAGNOSIS — M79.10 TRIGGER POINT: ICD-10-CM

## 2022-04-12 PROCEDURE — 3008F BODY MASS INDEX DOCD: CPT | Mod: CPTII,,, | Performed by: FAMILY MEDICINE

## 2022-04-12 PROCEDURE — 3079F DIAST BP 80-89 MM HG: CPT | Mod: CPTII,,, | Performed by: FAMILY MEDICINE

## 2022-04-12 PROCEDURE — 3079F PR MOST RECENT DIASTOLIC BLOOD PRESSURE 80-89 MM HG: ICD-10-PCS | Mod: CPTII,,, | Performed by: FAMILY MEDICINE

## 2022-04-12 PROCEDURE — 99215 OFFICE O/P EST HI 40 MIN: CPT | Mod: PBBFAC,PO | Performed by: FAMILY MEDICINE

## 2022-04-12 PROCEDURE — 99999 PR PBB SHADOW E&M-EST. PATIENT-LVL V: ICD-10-PCS | Mod: PBBFAC,,, | Performed by: FAMILY MEDICINE

## 2022-04-12 PROCEDURE — 99214 PR OFFICE/OUTPT VISIT, EST, LEVL IV, 30-39 MIN: ICD-10-PCS | Mod: S$PBB,,, | Performed by: FAMILY MEDICINE

## 2022-04-12 PROCEDURE — 99214 OFFICE O/P EST MOD 30 MIN: CPT | Mod: S$PBB,,, | Performed by: FAMILY MEDICINE

## 2022-04-12 PROCEDURE — 3075F SYST BP GE 130 - 139MM HG: CPT | Mod: CPTII,,, | Performed by: FAMILY MEDICINE

## 2022-04-12 PROCEDURE — 3008F PR BODY MASS INDEX (BMI) DOCUMENTED: ICD-10-PCS | Mod: CPTII,,, | Performed by: FAMILY MEDICINE

## 2022-04-12 PROCEDURE — 1159F MED LIST DOCD IN RCRD: CPT | Mod: CPTII,,, | Performed by: FAMILY MEDICINE

## 2022-04-12 PROCEDURE — 99999 PR PBB SHADOW E&M-EST. PATIENT-LVL V: CPT | Mod: PBBFAC,,, | Performed by: FAMILY MEDICINE

## 2022-04-12 PROCEDURE — 1159F PR MEDICATION LIST DOCUMENTED IN MEDICAL RECORD: ICD-10-PCS | Mod: CPTII,,, | Performed by: FAMILY MEDICINE

## 2022-04-12 PROCEDURE — 3075F PR MOST RECENT SYSTOLIC BLOOD PRESS GE 130-139MM HG: ICD-10-PCS | Mod: CPTII,,, | Performed by: FAMILY MEDICINE

## 2022-04-12 PROCEDURE — 1160F RVW MEDS BY RX/DR IN RCRD: CPT | Mod: CPTII,,, | Performed by: FAMILY MEDICINE

## 2022-04-12 PROCEDURE — 1160F PR REVIEW ALL MEDS BY PRESCRIBER/CLIN PHARMACIST DOCUMENTED: ICD-10-PCS | Mod: CPTII,,, | Performed by: FAMILY MEDICINE

## 2022-04-12 RX ORDER — CYCLOBENZAPRINE HCL 10 MG
10 TABLET ORAL 3 TIMES DAILY PRN
Qty: 30 TABLET | Refills: 0 | Status: SHIPPED | OUTPATIENT
Start: 2022-04-12 | End: 2022-05-26 | Stop reason: SDUPTHER

## 2022-04-12 RX ORDER — METHYLPREDNISOLONE 4 MG/1
TABLET ORAL
Qty: 21 TABLET | Refills: 0 | Status: SHIPPED | OUTPATIENT
Start: 2022-04-12 | End: 2022-05-26

## 2022-04-12 RX ORDER — SERTRALINE HYDROCHLORIDE 100 MG/1
150 TABLET, FILM COATED ORAL DAILY
Qty: 135 TABLET | Refills: 4 | Status: SHIPPED | OUTPATIENT
Start: 2022-04-12 | End: 2022-06-30 | Stop reason: SDUPTHER

## 2022-04-12 NOTE — PROGRESS NOTES
PLAN:      Problem List Items Addressed This Visit     Encounter for long-term (current) use of medications (Chronic)     Complete history and physical was completed today.  Complete and thorough medication reconciliation was performed.  Discussed risks and benefits of medications.  Advised patient on orders and health maintenance.  We discussed old records and old labs if available.  Will request any records not available through epic.  Continue current medications listed on your summary sheet.             Situational depression - Primary (Chronic)     Increase Zoloft 150 milligrams daily.  Keep appointment with psychiatry for counseling.Please be advised of condition course.  SNRI/SSRI is first-line treatment for this condition.  Please be advised of the risk of discontinuing this medication without tapering/contacting MD.  Patient has been advised of side effects, and all questions were answered.  Patient voiced understanding.  Patient will follow up routinely and notify us if having any side effects or worsening or persistent symptoms.  ER precautions were given. Antidepressant/Antianxiety Medication Initiation:  Patient informed of risks, benefits, and potential side effects of medication and accepts informed consent.  Common side effects include nausea, fatigue, headache, insomnia, etc see medication insert for complete side effect profile.  Most importantly be advised of the possibility of new or worsening suicidal thoughts/depression/anxiety etcetera.  Please be advised to stop the medication immediately and seek urgent treatment if this occurs.  Therefore please do not to abruptly discontinue medication without physician guidance except in cases of sudden onset or worsening of SI.   Close follow-up with us in four weeks.           Relevant Medications    sertraline (ZOLOFT) 100 MG tablet    Other Relevant Orders    Sedimentation rate    C-Reactive Protein    TSH    Trigger point     Physical exam consistent  with trigger point tenderness.  Start Medrol Dosepak and Flexeril.  Patient will take anti-inflammatories over-the-counter.  Increase hydration with water.  If no improvement recommend physical therapy for dry needling and massage etc..           Relevant Medications    cyclobenzaprine (FLEXERIL) 10 MG tablet    methylPREDNISolone (MEDROL DOSEPACK) 4 mg tablet    Other Relevant Orders    Sedimentation rate    C-Reactive Protein    TSH        Future Appointments     Date Provider Specialty Appt Notes    4/19/2022 [facility] Psychiatry consult    4/20/2022  Lab     5/19/2022 Johnathan Greenwood MD Family Medicine 1month     6/10/2022 Krysten Cosme NP Family Medicine 2 month f/u Grieving and Depression         Medication Management for assessment above:   Medication List with Changes/Refills   New Medications    CYCLOBENZAPRINE (FLEXERIL) 10 MG TABLET    Take 1 tablet (10 mg total) by mouth 3 (three) times daily as needed for Muscle spasms.    METHYLPREDNISOLONE (MEDROL DOSEPACK) 4 MG TABLET    follow package directions    SERTRALINE (ZOLOFT) 100 MG TABLET    Take 1.5 tablets (150 mg total) by mouth once daily.   Current Medications    ALBUTEROL (VENTOLIN HFA) 90 MCG/ACTUATION INHALER    Inhale 2 puffs into the lungs every 6 (six) hours as needed for Wheezing. Rescue    AMLODIPINE (NORVASC) 10 MG TABLET    TAKE 1 TABLET BY MOUTH EVERY DAY    AZELASTINE (ASTELIN) 137 MCG (0.1 %) NASAL SPRAY    2 sprays (274 mcg total) by Nasal route 2 (two) times daily.    BUDESONIDE-FORMOTEROL 80-4.5 MCG (SYMBICORT) 80-4.5 MCG/ACTUATION HFAA    Inhale 2 puffs into the lungs 2 (two) times daily.    CHLORTHALIDONE (HYGROTEN) 25 MG TAB    Take 1 tablet (25 mg total) by mouth once daily.    DICLOFENAC (VOLTAREN) 75 MG EC TABLET    Take 1 tablet (75 mg total) by mouth 2 (two) times daily for 7 days    FAMOTIDINE (PEPCID) 20 MG TABLET    Take 1 tablet (20 mg total) by mouth nightly as needed for Heartburn.    FLUTICASONE  PROPIONATE (FLONASE) 50 MCG/ACTUATION NASAL SPRAY    2 sprays (100 mcg total) by Each Nostril route once daily.    IBUPROFEN (ADVIL,MOTRIN) 800 MG TABLET    Take 1 tablet (800 mg total) by mouth 3 (three) times daily.    LORATADINE (CLARITIN) 10 MG TABLET    Take 1 tablet (10 mg total) by mouth once daily.    MONTELUKAST (SINGULAIR) 10 MG TABLET    Take 1 tablet (10 mg total) by mouth every evening.    OMALIZUMAB (XOLAIR) 150 MG INJECTION    Inject 300 mg into the skin every 14 (fourteen) days.   Discontinued Medications    METHOCARBAMOL (ROBAXIN) 500 MG TAB    Take 1 tablet (500 mg total) by mouth 3 (three) times daily for 7 days    SERTRALINE (ZOLOFT) 50 MG TABLET    Take 1 tablet (50 mg total) by mouth once daily.       Johnathan Greenwood M.D.  ==========================================================================  Subjective:   Patient ID: Reymundo Gutierrez is a 46 y.o. male.  has a past medical history of Asthma, Crushing injury of left wrist and hand (7/10/2019), and Hypertension.   Chief Complaint: Depression (Follow up )      Problem List Items Addressed This Visit     Encounter for long-term (current) use of medications (Chronic)    Overview     CHRONIC. Stable. Compliant with medications for managed conditions. See medication list. No SE reported. Routine lab analysis is being monitored. Refills were addressed.MARCH 2020:  CHRONIC. Stable. Compliant with medications for managed conditions. See medication list. No SE reported. Routine lab analysis is being monitored. Refills were addressed.  April 2022:  Reviewed labs.  CHRONIC. Stable. Compliant with medications for managed conditions. See medication list. No SE reported.   Routine lab analysis is being monitored. Refills were addressed.  Lab Results   Component Value Date    WBC 15.09 (H) 09/20/2020    HGB 16.2 09/20/2020    HCT 46.7 09/20/2020    MCV 89 09/20/2020     09/20/2020         Chemistry        Component Value Date/Time      (L) 09/20/2020 2149    K 4.2 09/20/2020 2149     09/20/2020 2149    CO2 24 09/20/2020 2149    BUN 9 09/20/2020 2149    CREATININE 1.04 09/20/2020 2149     (H) 09/20/2020 2149        Component Value Date/Time    CALCIUM 9.8 09/20/2020 2149    ALKPHOS 77 09/20/2020 2149    AST 24 09/20/2020 2149    ALT 29 09/20/2020 2149    BILITOT 0.6 09/20/2020 2149    ESTGFRAFRICA >60 09/20/2020 2149    EGFRNONAA >60 09/20/2020 2149          Lab Results   Component Value Date    TSH 1.170 09/27/2019                Current Assessment & Plan     Complete history and physical was completed today.  Complete and thorough medication reconciliation was performed.  Discussed risks and benefits of medications.  Advised patient on orders and health maintenance.  We discussed old records and old labs if available.  Will request any records not available through epic.  Continue current medications listed on your summary sheet.             Situational depression - Primary (Chronic)    Overview     Interval history:  April 2022:  Patient was started on Zoloft.  He has titrated up to 100 milligrams daily.  He reports symptoms have improved however he is still grieving heavily.  He has appointment with psychiatry next week.  Denies any SI HI hallucinations.    Previous HPI by Krysten Cosme NP- patient reports that his 17-year-old son was found dead in his sleep on 2/15/2022. Since this time he has been struggling with grief and depression. He reports decreased appetite, depressed mood, difficulty concentrating, fatigue, feelings of worthlessness/guilt, hopelessness, insomnia.  - he is interested in starting medication for depression.  He reports that he has tried taking Cymbalta in the past but the medication made him feel sick.  - trial of Zoloft as prescribed   - he reports good support system with family, patient has a daughter who is a twin to the son that recently passed away.    - he currently denies suicidal/homicidal  ideations  - patient is interested in counseling/therapy; psychiatry referral placed  -discussed condition course  -discussed SSRI/SNRI as first-line treatment for this condition  -discussed risk of discontinuing this medication without tapering  -patient was educated, advised of side effects, and all questions were answered.  Patient voiced understanding  -patient will follow up routinely and notify us if having any side effects or worsening or persistent symptoms.  ER precautions were given.           Current Assessment & Plan     Increase Zoloft 150 milligrams daily.  Keep appointment with psychiatry for counseling.Please be advised of condition course.  SNRI/SSRI is first-line treatment for this condition.  Please be advised of the risk of discontinuing this medication without tapering/contacting MD.  Patient has been advised of side effects, and all questions were answered.  Patient voiced understanding.  Patient will follow up routinely and notify us if having any side effects or worsening or persistent symptoms.  ER precautions were given. Antidepressant/Antianxiety Medication Initiation:  Patient informed of risks, benefits, and potential side effects of medication and accepts informed consent.  Common side effects include nausea, fatigue, headache, insomnia, etc see medication insert for complete side effect profile.  Most importantly be advised of the possibility of new or worsening suicidal thoughts/depression/anxiety etcetera.  Please be advised to stop the medication immediately and seek urgent treatment if this occurs.  Therefore please do not to abruptly discontinue medication without physician guidance except in cases of sudden onset or worsening of SI.   Close follow-up with us in four weeks.           Trigger point    Overview     New problem.  Patient having significant tenderness and tension in his left neck and back area around the scapula.  He has tried Robaxin without relief.           Current  Assessment & Plan     Physical exam consistent with trigger point tenderness.  Start Medrol Dosepak and Flexeril.  Patient will take anti-inflammatories over-the-counter.  Increase hydration with water.  If no improvement recommend physical therapy for dry needling and massage etc..                  Review of patient's allergies indicates:  No Known Allergies  Current Outpatient Medications   Medication Instructions    albuterol (VENTOLIN HFA) 90 mcg/actuation inhaler 2 puffs, Inhalation, Every 6 hours PRN, Rescue    amLODIPine (NORVASC) 10 MG tablet TAKE 1 TABLET BY MOUTH EVERY DAY    azelastine (ASTELIN) 274 mcg, Nasal, 2 times daily    budesonide-formoterol 80-4.5 mcg (SYMBICORT) 80-4.5 mcg/actuation HFAA 2 puffs, Inhalation, 2 times daily    chlorthalidone (HYGROTEN) 25 mg, Oral, Daily    cyclobenzaprine (FLEXERIL) 10 mg, Oral, 3 times daily PRN    diclofenac (VOLTAREN) 75 MG EC tablet Take 1 tablet (75 mg total) by mouth 2 (two) times daily for 7 days    famotidine (PEPCID) 20 mg, Oral, Nightly PRN    fluticasone propionate (FLONASE) 100 mcg, Each Nostril, Daily    ibuprofen (ADVIL,MOTRIN) 800 mg, Oral, 3 times daily    loratadine (CLARITIN) 10 mg, Oral, Daily    methylPREDNISolone (MEDROL DOSEPACK) 4 mg tablet follow package directions    montelukast (SINGULAIR) 10 mg, Oral, Nightly    sertraline (ZOLOFT) 150 mg, Oral, Daily    XOLAIR 300 mg, Subcutaneous, Every 14 days      I have reviewed the PMH, social history, FamilyHx, surgical history, allergies and medications documented / confirmed by the patient at the time of this visit.  Review of Systems   Constitutional: Negative for chills, fatigue, fever and unexpected weight change.   HENT: Negative for ear pain and sore throat.    Eyes: Negative for redness and visual disturbance.   Respiratory: Negative for cough and shortness of breath.    Cardiovascular: Negative for chest pain and palpitations.   Gastrointestinal: Negative for nausea and  "vomiting.   Endocrine: Negative for cold intolerance and heat intolerance.   Genitourinary: Negative for difficulty urinating and hematuria.   Musculoskeletal: Positive for arthralgias and myalgias.   Skin: Negative for rash and wound.   Allergic/Immunologic: Negative for environmental allergies and food allergies.   Neurological: Negative for weakness and headaches.   Hematological: Negative for adenopathy. Does not bruise/bleed easily.   Psychiatric/Behavioral: Positive for dysphoric mood. Negative for sleep disturbance. The patient is not nervous/anxious.      Objective:   /86   Pulse 105   Temp 97.7 °F (36.5 °C) (Other (see comments))   Ht 5' 10" (1.778 m)   Wt 88 kg (194 lb 1.6 oz)   SpO2 98%   BMI 27.85 kg/m²   Physical Exam  Vitals and nursing note reviewed.   Constitutional:       General: He is not in acute distress.     Appearance: He is well-developed. He is not diaphoretic.   HENT:      Head: Normocephalic and atraumatic.      Right Ear: External ear normal.      Left Ear: External ear normal.      Nose: Nose normal. No rhinorrhea.   Eyes:      Extraocular Movements: Extraocular movements intact.      Pupils: Pupils are equal, round, and reactive to light.   Cardiovascular:      Rate and Rhythm: Normal rate.      Pulses: Normal pulses.   Pulmonary:      Effort: Pulmonary effort is normal. No respiratory distress.      Breath sounds: Normal breath sounds.   Abdominal:      General: Bowel sounds are normal.      Palpations: Abdomen is soft.   Musculoskeletal:         General: Normal range of motion.      Cervical back: Normal range of motion and neck supple. Spasms present.        Back:    Skin:     General: Skin is warm and dry.      Capillary Refill: Capillary refill takes less than 2 seconds.      Findings: No rash.   Neurological:      General: No focal deficit present.      Mental Status: He is alert and oriented to person, place, and time.   Psychiatric:         Attention and Perception: " He is attentive.         Mood and Affect: Mood normal. Mood is not anxious or depressed. Affect is not labile, blunt, angry or inappropriate.         Speech: He is communicative. Speech is not rapid and pressured, delayed, slurred or tangential.         Behavior: Behavior normal. Behavior is not agitated, slowed, aggressive, withdrawn, hyperactive or combative.         Thought Content: Thought content normal. Thought content is not paranoid or delusional. Thought content does not include homicidal or suicidal ideation. Thought content does not include homicidal or suicidal plan.         Cognition and Memory: Memory is not impaired.         Judgment: Judgment normal. Judgment is not impulsive or inappropriate.        Patient is wearing a mask which limits physical exam due to COVID restrictions.   Assessment:     1. Situational depression    2. Trigger point    3. Encounter for long-term (current) use of medications      MDM:   Moderate complexity.  Moderate risk.  Total time: 31 minutes.  This includes total time spent on the encounter, which includes face to face time and non-face to face time preparing to see the patient (eg, review of previous medical records, tests), Obtaining and/or reviewing separately obtained history, documenting clinical information in the electronic or other health record, independently interpreting results (not separately reported)/communicating results to the patient/family/caregiver, and/or care coordination (not separately reported).    I have Reviewed and summarized old records.  I have performed thorough medication reconciliation today and discussed risk and benefits of medications.  I have reviewed labs and discussed with patient.  All questions were answered.  I am requesting old records and will review them once they are available.    I have signed for the following orders AND/OR meds.  Orders Placed This Encounter   Procedures    Sedimentation rate     Standing Status:   Future      Standing Expiration Date:   6/11/2023    C-Reactive Protein     Standing Status:   Future     Standing Expiration Date:   6/11/2023    TSH     Standing Status:   Standing     Number of Occurrences:   99     Standing Expiration Date:   4/7/2042     Medications Ordered This Encounter   Medications    cyclobenzaprine (FLEXERIL) 10 MG tablet     Sig: Take 1 tablet (10 mg total) by mouth 3 (three) times daily as needed for Muscle spasms.     Dispense:  30 tablet     Refill:  0    methylPREDNISolone (MEDROL DOSEPACK) 4 mg tablet     Sig: follow package directions     Dispense:  21 tablet     Refill:  0    sertraline (ZOLOFT) 100 MG tablet     Sig: Take 1.5 tablets (150 mg total) by mouth once daily.     Dispense:  135 tablet     Refill:  4        Follow up in about 6 weeks (around 5/24/2022), or if symptoms worsen or fail to improve, for with Krysten Cosme NP, Med refills, LAB RESULTS.    If no improvement in symptoms or symptoms worsen, advised to call/follow-up at clinic or go to ER. Patient voiced understanding and all questions/concerns were addressed.   DISCLAIMER: This note was compiled by using a speech recognition dictation system and therefore please be aware that typographical / speech recognition errors can and do occur.  Please contact me if you see any errors specifically.    Johnathan Greenwood M.D.       Office: [phone] [address] [place] [address]  FAX: [phone]

## 2022-04-12 NOTE — PATIENT INSTRUCTIONS
Follow up in about 6 weeks (around 5/24/2022), or if symptoms worsen or fail to improve, for with Krysten Cosme NP, Med refills, LAB RESULTS.     Dear patient,   As a result of recent federal legislation (The Federal Cures Act), you may receive lab or pathology results from your visit in your [facility] account before your physician is able to contact you. Your physician or their representative will relay the results to you with their recommendations at their soonest availability.     If no improvement in symptoms or symptoms worsen, please be advised to call MD, follow-up at clinic and/or go to ER if becomes severe.    Johnathan Greenwood M.D.        We Offer TELEHEALTH & Same Day Appointments!   Book your Telehealth appointment with me through my nurse or   Clinic appointments on [facility]!    [address] [place] [address]    Office: [phone]   FAX: [phone]    Check out my Facebook Page and Follow Me at: [email or web address]    Check out my website at [email or web address] by clicking on: [email or web address]    To Schedule appointments online, go to [facility]: [email or web address]

## 2022-04-12 NOTE — ASSESSMENT & PLAN NOTE
Physical exam consistent with trigger point tenderness.  Start Medrol Dosepak and Flexeril.  Patient will take anti-inflammatories over-the-counter.  Increase hydration with water.  If no improvement recommend physical therapy for dry needling and massage etc..

## 2022-04-12 NOTE — ASSESSMENT & PLAN NOTE
Increase Zoloft 150 milligrams daily.  Keep appointment with psychiatry for counseling.Please be advised of condition course.  SNRI/SSRI is first-line treatment for this condition.  Please be advised of the risk of discontinuing this medication without tapering/contacting MD.  Patient has been advised of side effects, and all questions were answered.  Patient voiced understanding.  Patient will follow up routinely and notify us if having any side effects or worsening or persistent symptoms.  ER precautions were given. Antidepressant/Antianxiety Medication Initiation:  Patient informed of risks, benefits, and potential side effects of medication and accepts informed consent.  Common side effects include nausea, fatigue, headache, insomnia, etc see medication insert for complete side effect profile.  Most importantly be advised of the possibility of new or worsening suicidal thoughts/depression/anxiety etcetera.  Please be advised to stop the medication immediately and seek urgent treatment if this occurs.  Therefore please do not to abruptly discontinue medication without physician guidance except in cases of sudden onset or worsening of SI.   Close follow-up with us in four weeks.

## 2022-04-19 ENCOUNTER — PATIENT MESSAGE (OUTPATIENT)
Dept: FAMILY MEDICINE | Facility: CLINIC | Age: 47
End: 2022-04-19
Payer: MEDICAID

## 2022-04-20 ENCOUNTER — PATIENT MESSAGE (OUTPATIENT)
Dept: PSYCHIATRY | Facility: CLINIC | Age: 47
End: 2022-04-20
Payer: MEDICAID

## 2022-04-28 ENCOUNTER — PATIENT MESSAGE (OUTPATIENT)
Dept: FAMILY MEDICINE | Facility: CLINIC | Age: 47
End: 2022-04-28
Payer: MEDICAID

## 2022-05-25 ENCOUNTER — PATIENT MESSAGE (OUTPATIENT)
Dept: FAMILY MEDICINE | Facility: CLINIC | Age: 47
End: 2022-05-25
Payer: MEDICAID

## 2022-05-25 DIAGNOSIS — M79.10 TRIGGER POINT: ICD-10-CM

## 2022-05-25 DIAGNOSIS — M54.9 BACK PAIN, UNSPECIFIED BACK LOCATION, UNSPECIFIED BACK PAIN LATERALITY, UNSPECIFIED CHRONICITY: Primary | ICD-10-CM

## 2022-05-26 RX ORDER — PREDNISONE 20 MG/1
40 TABLET ORAL DAILY
Qty: 14 TABLET | Refills: 0 | Status: SHIPPED | OUTPATIENT
Start: 2022-05-26 | End: 2022-08-08 | Stop reason: SDUPTHER

## 2022-05-26 RX ORDER — CYCLOBENZAPRINE HCL 10 MG
10 TABLET ORAL 3 TIMES DAILY PRN
Qty: 30 TABLET | Refills: 0 | Status: SHIPPED | OUTPATIENT
Start: 2022-05-26 | End: 2022-06-05

## 2022-05-26 NOTE — TELEPHONE ENCOUNTER
I received your message which was reviewed along with the the medication list and allergies that we have below.  Please review it for accuracy to make sure that we have the most recent records on your history.     Based on this, the following orders were placed AND/OR medicines were sent in.     No orders of the defined types were placed in this encounter.      Medications written and sent at this time include:  Medications Ordered This Encounter   Medications    cyclobenzaprine (FLEXERIL) 10 MG tablet     Sig: Take 1 tablet (10 mg total) by mouth 3 (three) times daily as needed for Muscle spasms.     Dispense:  30 tablet     Refill:  0    predniSONE (DELTASONE) 20 MG tablet     Sig: Take 2 tablets (40 mg total) by mouth once daily. Take in am, take with food for 7 days     Dispense:  14 tablet     Refill:  0       Your pharmacy(ies) of choice at this time on record include the list below and any medications would have been sent to the one at the top.    [facility] [address] [place] [facility] [place]  [address] [address]  [address]  [place] [address]  Phone: [phone] Fax: [phone]      Thank you for choosing us as your healthcare provider!  Dr. Johnathan Greenwood    ALLERGY LIST  Review of patient's allergies indicates:  No Known Allergies    MEDICATION LIST  Current Outpatient Medications on File Prior to Visit   Medication Sig Dispense Refill    albuterol (VENTOLIN HFA) 90 mcg/actuation inhaler Inhale 2 puffs into the lungs every 6 (six) hours as needed for Wheezing. Rescue 18 g 11    amLODIPine (NORVASC) 10 MG tablet TAKE 1 TABLET BY MOUTH EVERY DAY 90 tablet 1    azelastine (ASTELIN) 137 mcg (0.1 %) nasal spray 2 sprays (274 mcg total) by Nasal route 2 (two) times daily. 30 mL 11    budesonide-formoterol 80-4.5 mcg (SYMBICORT) 80-4.5 mcg/actuation HFAA Inhale 2 puffs into the lungs 2 (two) times daily. 10.2 g 12    chlorthalidone (HYGROTEN) 25 MG Tab Take 1 tablet (25 mg total) by mouth once  daily. 90 tablet 3    diclofenac (VOLTAREN) 75 MG EC tablet Take 1 tablet (75 mg total) by mouth 2 (two) times daily for 7 days 14 tablet 0    famotidine (PEPCID) 20 MG tablet Take 1 tablet (20 mg total) by mouth nightly as needed for Heartburn. 90 tablet 4    fluticasone propionate (FLONASE) 50 mcg/actuation nasal spray 2 sprays (100 mcg total) by Each Nostril route once daily. 16 mL 12    ibuprofen (ADVIL,MOTRIN) 800 MG tablet Take 1 tablet (800 mg total) by mouth 3 (three) times daily. 90 tablet 4    loratadine (CLARITIN) 10 mg tablet Take 1 tablet (10 mg total) by mouth once daily. 30 tablet 11    montelukast (SINGULAIR) 10 mg tablet Take 1 tablet (10 mg total) by mouth every evening. 30 tablet 11    omalizumab (XOLAIR) 150 mg injection Inject 300 mg into the skin every 14 (fourteen) days. 4 vial 11    sertraline (ZOLOFT) 100 MG tablet Take 1.5 tablets (150 mg total) by mouth once daily. 135 tablet 4    [DISCONTINUED] albuterol (VENTOLIN HFA) 90 mcg/actuation inhaler Inhale 2 puffs into the lungs every 6 (six) hours as needed for Wheezing. Rescue 18 g 11    [DISCONTINUED] cyclobenzaprine (FLEXERIL) 10 MG tablet Take 1 tablet (10 mg total) by mouth 3 (three) times daily as needed for Muscle spasms. 30 tablet 0    [DISCONTINUED] famotidine (PEPCID) 20 MG tablet Take 1 tablet (20 mg total) by mouth nightly as needed for Heartburn. 90 tablet 4    [DISCONTINUED] ibuprofen (ADVIL,MOTRIN) 800 MG tablet Take 1 tablet (800 mg total) by mouth 3 (three) times daily. 90 tablet 4    [DISCONTINUED] loratadine (CLARITIN) 10 mg tablet Take 1 tablet (10 mg total) by mouth once daily. 30 tablet 11    [DISCONTINUED] methylPREDNISolone (MEDROL DOSEPACK) 4 mg tablet follow package directions 21 tablet 0    [DISCONTINUED] predniSONE (DELTASONE) 20 MG tablet Take 2 tablets (40 mg total) by mouth once daily. Take in am, take with food for 7 days 14 tablet 0     Current Facility-Administered Medications on File Prior to  Visit   Medication Dose Route Frequency Provider Last Rate Last Admin    omalizumab injection 150 mg  150 mg Subcutaneous Q14 Days Radha Hernández MD   150 mg at 01/26/21 1351    omalizumab injection 150 mg  150 mg Subcutaneous Q14 Days Radha Hernández MD   150 mg at 01/26/21 1349    omalizumab injection 150 mg  150 mg Subcutaneous 1 time in Clinic/HOD Radha Hernández MD        omalizumab injection 150 mg  150 mg Subcutaneous 1 time in Clinic/HOD Radha Hernández MD           HEALTH MAINTENANCE THAT IS OVERDUE OR NEEDS TO BE UPDATED ON OUR CHART IS LISTED BELOW.  IF YOU HAVE HAD IT DONE ELSEWHERE, PLEASE SEND US DATES AND RECORDS IF YOU HAVE THEM TO MAKE YOUR CHART ACCURATE.  IF YOU HAVE NOT HAD THESE DONE AND ARE READY FOR US TO SCHEDULE THEM, PLEASE SEND US A MESSAGE.  Health Maintenance Due   Topic Date Due    COVID-19 Vaccine (3 - Booster for Pfizer series) 10/07/2021       DISCLAIMER: This note was compiled by using a speech recognition dictation system and therefore please be aware that typographical / speech recognition errors can and do occur.  Please contact me if you see any errors specifically.    Johnathan Greenwood MD  We Offer Telehealth & Same Day Appointments!   Book your Telehealth appointment with me through my nurse or   Clinic appointments on [facility]!  [phone]     Check out my Facebook Page and Follow Me at: CLICK HERE    Check out my website at [employer] by clicking on: CLICK HERE    To Schedule appointments online, go to [employer]: CLICK HERE     Location: [email or web address]    [address] [place] [address]    FAX: [phone]

## 2022-05-27 ENCOUNTER — TELEPHONE (OUTPATIENT)
Dept: PSYCHIATRY | Facility: CLINIC | Age: 47
End: 2022-05-27
Payer: MEDICAID

## 2022-05-31 ENCOUNTER — PATIENT MESSAGE (OUTPATIENT)
Dept: FAMILY MEDICINE | Facility: CLINIC | Age: 47
End: 2022-05-31
Payer: MEDICAID

## 2022-06-10 ENCOUNTER — OFFICE VISIT (OUTPATIENT)
Dept: FAMILY MEDICINE | Facility: CLINIC | Age: 47
End: 2022-06-10
Payer: MEDICAID

## 2022-06-10 VITALS
DIASTOLIC BLOOD PRESSURE: 81 MMHG | BODY MASS INDEX: 26.24 KG/M2 | TEMPERATURE: 99 F | RESPIRATION RATE: 18 BRPM | HEART RATE: 103 BPM | HEIGHT: 70 IN | WEIGHT: 183.31 LBS | SYSTOLIC BLOOD PRESSURE: 139 MMHG

## 2022-06-10 DIAGNOSIS — G47.09 OTHER INSOMNIA: ICD-10-CM

## 2022-06-10 DIAGNOSIS — F51.02 ADJUSTMENT INSOMNIA: ICD-10-CM

## 2022-06-10 DIAGNOSIS — F43.21 SITUATIONAL DEPRESSION: Primary | ICD-10-CM

## 2022-06-10 PROCEDURE — 99214 OFFICE O/P EST MOD 30 MIN: CPT | Mod: S$PBB,,, | Performed by: NURSE PRACTITIONER

## 2022-06-10 PROCEDURE — 99999 PR PBB SHADOW E&M-EST. PATIENT-LVL V: CPT | Mod: PBBFAC,,, | Performed by: NURSE PRACTITIONER

## 2022-06-10 PROCEDURE — 99999 PR PBB SHADOW E&M-EST. PATIENT-LVL V: ICD-10-PCS | Mod: PBBFAC,,, | Performed by: NURSE PRACTITIONER

## 2022-06-10 PROCEDURE — 3075F PR MOST RECENT SYSTOLIC BLOOD PRESS GE 130-139MM HG: ICD-10-PCS | Mod: CPTII,,, | Performed by: NURSE PRACTITIONER

## 2022-06-10 PROCEDURE — 1160F RVW MEDS BY RX/DR IN RCRD: CPT | Mod: CPTII,,, | Performed by: NURSE PRACTITIONER

## 2022-06-10 PROCEDURE — 3008F PR BODY MASS INDEX (BMI) DOCUMENTED: ICD-10-PCS | Mod: CPTII,,, | Performed by: NURSE PRACTITIONER

## 2022-06-10 PROCEDURE — 3079F DIAST BP 80-89 MM HG: CPT | Mod: CPTII,,, | Performed by: NURSE PRACTITIONER

## 2022-06-10 PROCEDURE — 1159F MED LIST DOCD IN RCRD: CPT | Mod: CPTII,,, | Performed by: NURSE PRACTITIONER

## 2022-06-10 PROCEDURE — 3075F SYST BP GE 130 - 139MM HG: CPT | Mod: CPTII,,, | Performed by: NURSE PRACTITIONER

## 2022-06-10 PROCEDURE — 1159F PR MEDICATION LIST DOCUMENTED IN MEDICAL RECORD: ICD-10-PCS | Mod: CPTII,,, | Performed by: NURSE PRACTITIONER

## 2022-06-10 PROCEDURE — 3079F PR MOST RECENT DIASTOLIC BLOOD PRESSURE 80-89 MM HG: ICD-10-PCS | Mod: CPTII,,, | Performed by: NURSE PRACTITIONER

## 2022-06-10 PROCEDURE — 99215 OFFICE O/P EST HI 40 MIN: CPT | Mod: PBBFAC,PO | Performed by: NURSE PRACTITIONER

## 2022-06-10 PROCEDURE — 3008F BODY MASS INDEX DOCD: CPT | Mod: CPTII,,, | Performed by: NURSE PRACTITIONER

## 2022-06-10 PROCEDURE — 99214 PR OFFICE/OUTPT VISIT, EST, LEVL IV, 30-39 MIN: ICD-10-PCS | Mod: S$PBB,,, | Performed by: NURSE PRACTITIONER

## 2022-06-10 PROCEDURE — 1160F PR REVIEW ALL MEDS BY PRESCRIBER/CLIN PHARMACIST DOCUMENTED: ICD-10-PCS | Mod: CPTII,,, | Performed by: NURSE PRACTITIONER

## 2022-06-10 RX ORDER — BUSPIRONE HYDROCHLORIDE 7.5 MG/1
7.5 TABLET ORAL 3 TIMES DAILY
Qty: 90 TABLET | Refills: 0 | Status: SHIPPED | OUTPATIENT
Start: 2022-06-10 | End: 2022-06-10 | Stop reason: ALTCHOICE

## 2022-06-10 RX ORDER — TRAZODONE HYDROCHLORIDE 50 MG/1
50 TABLET ORAL NIGHTLY
Qty: 30 TABLET | Refills: 0 | Status: SHIPPED | OUTPATIENT
Start: 2022-06-10 | End: 2022-06-30 | Stop reason: SDUPTHER

## 2022-06-10 NOTE — PROGRESS NOTES
Assessment/Plan:  Problem List Items Addressed This Visit        Psychiatric    Situational depression - Primary (Chronic)    Overview     Interval history:  April 2022:  Patient was started on Zoloft.  He has titrated up to 100 milligrams daily.  He reports symptoms have improved however he is still grieving heavily.  He has appointment with psychiatry next week.  Denies any SI HI hallucinations.    Previous HPI by Krysten Cosme NP  - patient reports that his 17-year-old son was found dead in his sleep on 2/15/2022. Since this time he has been struggling with grief and depression. He reports decreased appetite, depressed mood, difficulty concentrating, fatigue, feelings of worthlessness/guilt, hopelessness, insomnia.  - he is interested in starting medication for depression.  He reports that he has tried taking Cymbalta in the past but the medication made him feel sick.  - trial of Zoloft as prescribed   - he reports good support system with family, patient has a daughter who is a twin to the son that recently passed away.    - he currently denies suicidal/homicidal ideations  - patient is interested in counseling/therapy; psychiatry referral placed  -discussed condition course  -discussed SSRI/SNRI as first-line treatment for this condition  -discussed risk of discontinuing this medication without tapering  -patient was educated, advised of side effects, and all questions were answered.  Patient voiced understanding  -patient will follow up routinely and notify us if having any side effects or worsening or persistent symptoms.  ER precautions were given.           Current Assessment & Plan     Patient very down/sad/tearful today. Viewing and sharing pictures/stories of his son. He reports that he received phone call that he has to meet with  this afternoon and will be given the cause of death for his son. Emotional support provided.      Zoloft has been increased to 100 mg daily. He has had some  improvement with medication. He has not seen psychiatry yet. He has had problems getting scheduled due to provider availability and his work schedule. He complains of trouble sleeping. See insomnia problem list.    Advised of condition course. SNRI/SSRI is first-line treatment for this condition.  Please be advised of the risk of discontinuing this medication without tapering/contacting MD.  Patient has been advised of side effects, and all questions were answered.  Patient voiced understanding. Patient will follow up routinely and notify us if having any side effects or worsening or persistent symptoms. ER precautions were given. Recommend close follow-up with in four weeks.           Relevant Medications    traZODone (DESYREL) 50 MG tablet       Other    Adjustment insomnia    Overview     See situational depression    The patient has had a problem with insomnia.  The problem started a couple months ago. Symptoms include difficulty falling asleep and non-restful sleep and is also associated with daytime somnolence and fatigue. He has tried OTC medications with no improvement. Discussed sleep hygiene measures including regular sleep schedule, optimal sleep environment, and relaxing presleep rituals. Avoid daytime naps. Avoid caffeine after noon. Avoid excess alcohol. Avoid tobacco. Recommended daily exercise.    Trial of trazodone as prescribed.            Relevant Medications    traZODone (DESYREL) 50 MG tablet        Follow up in about 1 month (around 7/10/2022), or if symptoms worsen or fail to improve, for Follow up with PCP.    Krysten Cosme NP  _____________________________________________________________________________________________________________________________________________________    CC: follow up     HPI: Patient is in clinic today as an established patient here for follow up. Chronic condition reviewed. Further detail as stated above.     Past Medical History:  Past Medical History:   Diagnosis  Date    Asthma     Crushing injury of left wrist and hand 7/10/2019    Hypertension      Past Surgical History:   Procedure Laterality Date    BONE GRAFT Left 08/06/2019    Procedure: BONE GRAFT LEG;  Surgeon: Escobar Colvin MD;  Location: [facility] OR;  Service: Orthopedics;  Laterality: Left;    FRACTURE SURGERY      INJECTION OF ANESTHETIC AGENT AROUND NERVE Left 10/22/2019    Procedure: Block, Nerve STELLATE GANGLION;  Surgeon: Vincent Alejandre MD;  Location: [facility] OR;  Service: Pain Management;  Laterality: Left;    INJECTION OF ANESTHETIC AGENT AROUND NERVE Left 12/10/2019    Procedure: Block, Nerve STELLATE GANGLION;  Surgeon: Vincent Alejandre MD;  Location: [facility] OR;  Service: Pain Management;  Laterality: Left;    MAXILLARY ANTROSTOMY Left 12/11/2019    Procedure: MAXILLARY ANTROSTOMY;  Surgeon: Tomy Medrano MD;  Location: [facility] OR;  Service: ENT;  Laterality: Left;    NASAL SEPTOPLASTY Bilateral 12/11/2019    Procedure: SEPTOPLASTY, NASAL;  Surgeon: Tomy Medrano MD;  Location: [facility] OR;  Service: ENT;  Laterality: Bilateral;    NASAL TURBINATE REDUCTION Bilateral 12/11/2019    Procedure: REDUCTION, NASAL TURBINATE;  Surgeon: Tomy Medrano MD;  Location: [facility] OR;  Service: ENT;  Laterality: Bilateral;    WRIST SURGERY Left     x3     Review of patient's allergies indicates:  No Known Allergies  Social History     Tobacco Use    Smoking status: Former Smoker     Packs/day: 1.00     Years: 5.00     Pack years: 5.00     Types: Cigarettes    Smokeless tobacco: Never Used   Substance Use Topics    Alcohol use: No    Drug use: No     Family History   Problem Relation Age of Onset    Asthma Daughter      Current Outpatient Medications on File Prior to Visit   Medication Sig Dispense Refill    albuterol (VENTOLIN HFA) 90 mcg/actuation inhaler Inhale 2 puffs into the lungs every 6 (six) hours as needed for Wheezing. Rescue 18 g 11    amLODIPine (NORVASC) 10 MG tablet TAKE 1 TABLET BY MOUTH  EVERY DAY 90 tablet 1    azelastine (ASTELIN) 137 mcg (0.1 %) nasal spray 2 sprays (274 mcg total) by Nasal route 2 (two) times daily. 30 mL 11    budesonide-formoterol 80-4.5 mcg (SYMBICORT) 80-4.5 mcg/actuation HFAA Inhale 2 puffs into the lungs 2 (two) times daily. 10.2 g 12    chlorthalidone (HYGROTEN) 25 MG Tab Take 1 tablet (25 mg total) by mouth once daily. 90 tablet 3    diclofenac (VOLTAREN) 75 MG EC tablet Take 1 tablet (75 mg total) by mouth 2 (two) times daily for 7 days 14 tablet 0    famotidine (PEPCID) 20 MG tablet Take 1 tablet (20 mg total) by mouth nightly as needed for Heartburn. 90 tablet 4    fluticasone propionate (FLONASE) 50 mcg/actuation nasal spray 2 sprays (100 mcg total) by Each Nostril route once daily. 16 mL 12    ibuprofen (ADVIL,MOTRIN) 800 MG tablet Take 1 tablet (800 mg total) by mouth 3 (three) times daily. 90 tablet 4    loratadine (CLARITIN) 10 mg tablet Take 1 tablet (10 mg total) by mouth once daily. 30 tablet 11    montelukast (SINGULAIR) 10 mg tablet Take 1 tablet (10 mg total) by mouth every evening. 30 tablet 11    omalizumab (XOLAIR) 150 mg injection Inject 300 mg into the skin every 14 (fourteen) days. 4 vial 11    sertraline (ZOLOFT) 100 MG tablet Take 1.5 tablets (150 mg total) by mouth once daily. 135 tablet 4     Current Facility-Administered Medications on File Prior to Visit   Medication Dose Route Frequency Provider Last Rate Last Admin    omalizumab injection 150 mg  150 mg Subcutaneous Q14 Days Radha Hernández MD   150 mg at 01/26/21 1351    omalizumab injection 150 mg  150 mg Subcutaneous Q14 Days Radha Hernández MD   150 mg at 01/26/21 1349    omalizumab injection 150 mg  150 mg Subcutaneous 1 time in Clinic/HOD Radha Hernández MD        omalizumab injection 150 mg  150 mg Subcutaneous 1 time in Clinic/HOD Radha Hernández MD         Review of Systems   Constitutional: Negative for appetite change, chills, fatigue and fever.   HENT: Negative for congestion,  "rhinorrhea and sore throat.    Eyes: Negative for visual disturbance.   Respiratory: Negative for cough and shortness of breath.    Cardiovascular: Negative for chest pain, palpitations and leg swelling.   Gastrointestinal: Negative for abdominal pain, diarrhea and vomiting.   Genitourinary: Negative for difficulty urinating, dysuria and hematuria.   Musculoskeletal: Negative for arthralgias and myalgias.   Skin: Negative for rash and wound.   Neurological: Negative for dizziness and headaches.   Psychiatric/Behavioral: Positive for dysphoric mood and sleep disturbance. Negative for behavioral problems. The patient is not nervous/anxious.      Vitals:    06/10/22 1306   BP: 139/81   BP Location: Left arm   Patient Position: Sitting   Pulse: 103   Resp: 18   Temp: 98.5 °F (36.9 °C)   Weight: 83.2 kg (183 lb 5 oz)   Height: 5' 10" (1.778 m)     Wt Readings from Last 3 Encounters:   06/10/22 83.2 kg (183 lb 5 oz)   04/12/22 88 kg (194 lb 1.6 oz)   05/18/21 97.3 kg (214 lb 8.1 oz)     Physical Exam  Vitals reviewed.   Constitutional:       General: He is not in acute distress.     Appearance: Normal appearance. He is not ill-appearing.   HENT:      Head: Normocephalic and atraumatic.      Right Ear: External ear normal.      Left Ear: External ear normal.   Eyes:      Extraocular Movements: Extraocular movements intact.      Conjunctiva/sclera: Conjunctivae normal.   Cardiovascular:      Rate and Rhythm: Normal rate.      Heart sounds: Normal heart sounds.   Pulmonary:      Effort: Pulmonary effort is normal. No respiratory distress.      Breath sounds: Normal breath sounds.   Abdominal:      General: Abdomen is flat. There is no distension.   Musculoskeletal:         General: Normal range of motion.      Cervical back: Normal range of motion.   Skin:     General: Skin is warm and dry.      Coloration: Skin is not pale.      Findings: No rash.   Neurological:      Mental Status: He is alert and oriented to person, " place, and time. Mental status is at baseline.   Psychiatric:         Mood and Affect: Mood is depressed. Affect is tearful.         Speech: Speech normal.         Behavior: Behavior normal. Behavior is cooperative.         Thought Content: Thought content does not include homicidal or suicidal ideation.       Health Maintenance   Topic Date Due    Lipid Panel  09/27/2024    TETANUS VACCINE  12/27/2027    Hepatitis C Screening  Completed

## 2022-06-10 NOTE — ASSESSMENT & PLAN NOTE
Patient very down/sad/tearful today. Viewing and sharing pictures/stories of his son. He reports that he received phone call that he has to meet with  this afternoon and will be given the cause of death for his son. Emotional support provided.      Zoloft has been increased to 100 mg daily. He has had some improvement with medication. He has not seen psychiatry yet. He has had problems getting scheduled due to provider availability and his work schedule. He complains of trouble sleeping. See insomnia problem list.    Advised of condition course. SNRI/SSRI is first-line treatment for this condition.  Please be advised of the risk of discontinuing this medication without tapering/contacting MD.  Patient has been advised of side effects, and all questions were answered.  Patient voiced understanding. Patient will follow up routinely and notify us if having any side effects or worsening or persistent symptoms. ER precautions were given. Recommend close follow-up with in four weeks.

## 2022-06-30 DIAGNOSIS — F43.21 SITUATIONAL DEPRESSION: ICD-10-CM

## 2022-06-30 DIAGNOSIS — G47.09 OTHER INSOMNIA: ICD-10-CM

## 2022-07-01 RX ORDER — TRAZODONE HYDROCHLORIDE 50 MG/1
50 TABLET ORAL NIGHTLY
Qty: 30 TABLET | Refills: 12 | Status: SHIPPED | OUTPATIENT
Start: 2022-07-01 | End: 2022-07-20 | Stop reason: DRUGHIGH

## 2022-07-05 DIAGNOSIS — F43.21 ADJUSTMENT DISORDER WITH DEPRESSED MOOD: ICD-10-CM

## 2022-07-05 RX ORDER — BUSPIRONE HYDROCHLORIDE 7.5 MG/1
TABLET ORAL
Qty: 90 TABLET | Refills: 2 | Status: SHIPPED | OUTPATIENT
Start: 2022-07-05 | End: 2022-07-06

## 2022-07-19 ENCOUNTER — PATIENT MESSAGE (OUTPATIENT)
Dept: FAMILY MEDICINE | Facility: CLINIC | Age: 47
End: 2022-07-19
Payer: MEDICAID

## 2022-07-20 ENCOUNTER — PATIENT MESSAGE (OUTPATIENT)
Dept: FAMILY MEDICINE | Facility: CLINIC | Age: 47
End: 2022-07-20
Payer: MEDICAID

## 2022-07-20 DIAGNOSIS — F43.21 SITUATIONAL DEPRESSION: Primary | ICD-10-CM

## 2022-07-20 DIAGNOSIS — F51.02 ADJUSTMENT INSOMNIA: ICD-10-CM

## 2022-07-20 RX ORDER — TRAZODONE HYDROCHLORIDE 100 MG/1
100 TABLET ORAL NIGHTLY
Qty: 90 TABLET | Refills: 0 | Status: SHIPPED | OUTPATIENT
Start: 2022-07-20 | End: 2022-09-13 | Stop reason: SDUPTHER

## 2022-07-20 RX ORDER — BUSPIRONE HYDROCHLORIDE 15 MG/1
15 TABLET ORAL 3 TIMES DAILY
Qty: 90 TABLET | Refills: 2 | Status: SHIPPED | OUTPATIENT
Start: 2022-07-20 | End: 2022-09-13 | Stop reason: SDUPTHER

## 2022-07-20 NOTE — TELEPHONE ENCOUNTER
Please let patient know the dose of both medications can be increased. I have sent new prescriptions to the pharmacy. He should continue the zoloft as well. I believe he has been referred to psychiatry previously, please check on the status of this. New referral placed if needed.     I have signed for the following orders AND/OR meds.  Please call the patient and ask the patient to schedule the testing AND/OR inform about any medications that were sent. Medications have been sent to pharmacy listed below      Orders Placed This Encounter   Procedures    Ambulatory referral/consult to Psychiatry     Standing Status:   Future     Standing Expiration Date:   8/20/2023     Referral Priority:   Routine     Referral Type:   Psychiatric     Referral Reason:   Specialty Services Required     Requested Specialty:   Psychiatry     Number of Visits Requested:   1     Medications Ordered This Encounter   Medications    busPIRone (BUSPAR) 15 MG tablet     Sig: Take 1 tablet (15 mg total) by mouth 3 (three) times daily.     Dispense:  90 tablet     Refill:  2    traZODone (DESYREL) 100 MG tablet     Sig: Take 1 tablet (100 mg total) by mouth every evening.     Dispense:  90 tablet     Refill:  0       [id number] [place] [facility] [address] [address]  [address] [address]  [place] [address]  Phone: [phone] Fax: [phone]

## 2022-07-20 NOTE — TELEPHONE ENCOUNTER
Please set this patient up with a virtual visit with an available provider  for further evaluation an treatment.

## 2022-07-21 ENCOUNTER — OFFICE VISIT (OUTPATIENT)
Dept: FAMILY MEDICINE | Facility: CLINIC | Age: 47
End: 2022-07-21
Payer: MEDICAID

## 2022-07-21 DIAGNOSIS — J06.9 ACUTE UPPER RESPIRATORY INFECTION: Primary | ICD-10-CM

## 2022-07-21 DIAGNOSIS — J02.9 SORE THROAT: ICD-10-CM

## 2022-07-21 DIAGNOSIS — A49.9 BACTERIAL INFECTION: ICD-10-CM

## 2022-07-21 PROCEDURE — 1159F MED LIST DOCD IN RCRD: CPT | Mod: CPTII,95,, | Performed by: NURSE PRACTITIONER

## 2022-07-21 PROCEDURE — 1160F RVW MEDS BY RX/DR IN RCRD: CPT | Mod: CPTII,95,, | Performed by: NURSE PRACTITIONER

## 2022-07-21 PROCEDURE — 99213 PR OFFICE/OUTPT VISIT, EST, LEVL III, 20-29 MIN: ICD-10-PCS | Mod: 95,,, | Performed by: NURSE PRACTITIONER

## 2022-07-21 PROCEDURE — 1160F PR REVIEW ALL MEDS BY PRESCRIBER/CLIN PHARMACIST DOCUMENTED: ICD-10-PCS | Mod: CPTII,95,, | Performed by: NURSE PRACTITIONER

## 2022-07-21 PROCEDURE — 99213 OFFICE O/P EST LOW 20 MIN: CPT | Mod: 95,,, | Performed by: NURSE PRACTITIONER

## 2022-07-21 PROCEDURE — 1159F PR MEDICATION LIST DOCUMENTED IN MEDICAL RECORD: ICD-10-PCS | Mod: CPTII,95,, | Performed by: NURSE PRACTITIONER

## 2022-07-21 RX ORDER — AZITHROMYCIN 250 MG/1
TABLET, FILM COATED ORAL
Qty: 6 TABLET | Refills: 0 | Status: SHIPPED | OUTPATIENT
Start: 2022-07-21 | End: 2022-07-26

## 2022-08-31 ENCOUNTER — PATIENT MESSAGE (OUTPATIENT)
Dept: FAMILY MEDICINE | Facility: CLINIC | Age: 47
End: 2022-08-31
Payer: MEDICAID

## 2022-09-20 ENCOUNTER — PATIENT MESSAGE (OUTPATIENT)
Dept: FAMILY MEDICINE | Facility: CLINIC | Age: 47
End: 2022-09-20
Payer: MEDICAID

## 2022-10-31 ENCOUNTER — OFFICE VISIT (OUTPATIENT)
Dept: PSYCHIATRY | Facility: CLINIC | Age: 47
End: 2022-10-31
Payer: MEDICAID

## 2022-10-31 ENCOUNTER — TELEPHONE (OUTPATIENT)
Dept: PSYCHIATRY | Facility: CLINIC | Age: 47
End: 2022-10-31
Payer: MEDICAID

## 2022-10-31 ENCOUNTER — PATIENT MESSAGE (OUTPATIENT)
Dept: FAMILY MEDICINE | Facility: CLINIC | Age: 47
End: 2022-10-31
Payer: MEDICAID

## 2022-10-31 DIAGNOSIS — F43.21 SITUATIONAL DEPRESSION: ICD-10-CM

## 2022-10-31 DIAGNOSIS — F51.02 ADJUSTMENT INSOMNIA: ICD-10-CM

## 2022-10-31 DIAGNOSIS — F33.2 SEVERE EPISODE OF RECURRENT MAJOR DEPRESSIVE DISORDER, WITHOUT PSYCHOTIC FEATURES: Primary | ICD-10-CM

## 2022-10-31 PROCEDURE — 99999 PR PBB SHADOW E&M-EST. PATIENT-LVL I: CPT | Mod: PBBFAC,HB,,

## 2022-10-31 PROCEDURE — 99999 PR PBB SHADOW E&M-EST. PATIENT-LVL I: ICD-10-PCS | Mod: PBBFAC,HB,,

## 2022-10-31 PROCEDURE — 90791 PR PSYCHIATRIC DIAGNOSTIC EVALUATION: ICD-10-PCS | Mod: AH,HB,95,

## 2022-10-31 PROCEDURE — 90791 PSYCH DIAGNOSTIC EVALUATION: CPT | Mod: AH,HB,95,

## 2022-10-31 PROCEDURE — 99211 OFF/OP EST MAY X REQ PHY/QHP: CPT | Mod: PBBFAC,PO

## 2022-10-31 RX ORDER — ARIPIPRAZOLE 2 MG/1
2 TABLET ORAL DAILY
Qty: 30 TABLET | Refills: 11 | Status: SHIPPED | OUTPATIENT
Start: 2022-10-31 | End: 2022-12-04 | Stop reason: SDUPTHER

## 2022-10-31 RX ORDER — FLUOXETINE HYDROCHLORIDE 20 MG/1
15 CAPSULE ORAL DAILY
Qty: 30 CAPSULE | Refills: 11 | Status: CANCELLED | OUTPATIENT
Start: 2022-10-31 | End: 2023-10-31

## 2022-10-31 RX ORDER — FLUOXETINE 10 MG/1
15 CAPSULE ORAL DAILY
COMMUNITY
Start: 2022-10-13 | End: 2022-11-10

## 2022-10-31 RX ORDER — FLUOXETINE 10 MG/1
10 CAPSULE ORAL DAILY
Qty: 30 CAPSULE | Refills: 11 | Status: SHIPPED | OUTPATIENT
Start: 2022-10-31 | End: 2022-12-04 | Stop reason: SDUPTHER

## 2022-10-31 NOTE — Clinical Note
Gabriele Coleman, I have sent over a referral for therapy for this patient. I was advised by Dr. Brizuela to also send you over the chart for high importance due to two recent suicide attempts less than a month a part. Please let me know if I can provide you with any additional information.

## 2022-10-31 NOTE — TELEPHONE ENCOUNTER
Send refill request and request records from [facility] and set up appointment in a few weeks with me.

## 2022-10-31 NOTE — PROGRESS NOTES
The patient location is:  [place], [place], coffee shop parking lot  The patient location [place] is: [place]  The patient phone number is: [phone]  Visit type: Virtual visit with synchronous audio and video  Each patient to whom he or she provides medical services by telemedicine is:  (1) informed of the relationship between the physician and patient and the respective role of any other health care provider with respect to management of the patient; and (2) notified that he or she may decline to receive medical services by telemedicine and may withdraw from such care at any time.     Primary Care Behavioral Health: Initial  Patient Name: Reymundo Gutierrez  Date:  10/31/2022  Site:  [facility]  Referral source: Krysten Cosme NP    Chief complaint/reason for encounter: Grief and anxiety     History of present illness:  Mr. Reymundo Gutierrez is a 47 y.o. Black or  male referred by Krysten Cosme NP.  Patient was seen, examined and chart was reviewed. Patient reviewed and agreed to informed consent and limits of confidentiality. Patient presents due to complicated grief and bereavement after the passing of his 17 year old son in February 2022. Patient has been struggling with feelings of worthlessness and hopelessness since this time. In addition to his grief, Mr. Gutierrez has been having difficulty concentrating, agitation, and racing thoughts.    Past Medical History:   Diagnosis Date    Asthma     Crushing injury of left wrist and hand 7/10/2019    Hypertension          Current Outpatient Medications:     albuterol (VENTOLIN HFA) 90 mcg/actuation inhaler, Inhale 2 puffs into the lungs every 6 (six) hours as needed for Wheezing. Rescue, Disp: 18 g, Rfl: 11    amLODIPine (NORVASC) 10 MG tablet, Take 1 tablet (10 mg total) by mouth once daily., Disp: 90 tablet, Rfl: 4    azelastine (ASTELIN) 137 mcg (0.1 %) nasal spray, 2 sprays (274 mcg total) by Nasal route 2 (two)  times daily., Disp: 30 mL, Rfl: 11    budesonide-formoterol 80-4.5 mcg (SYMBICORT) 80-4.5 mcg/actuation HFAA, Inhale 2 puffs into the lungs 2 (two) times daily., Disp: 10.2 g, Rfl: 12    busPIRone (BUSPAR) 15 MG tablet, Take 1 tablet (15 mg total) by mouth 3 (three) times daily., Disp: 90 tablet, Rfl: 4    chlorthalidone (HYGROTEN) 25 MG Tab, Take 1 tablet (25 mg total) by mouth once daily., Disp: 90 tablet, Rfl: 4    diclofenac (VOLTAREN) 75 MG EC tablet, Take 1 tablet (75 mg total) by mouth 2 (two) times daily for 7 days, Disp: 14 tablet, Rfl: 0    diphenhydrAMINE-aluminum-magnesium hydroxide-simethicone-LIDOcaine HCl 2%, Swish and spit 15 mLs every 6 (six) hours as needed (sore throat)., Disp: 150 mL, Rfl: 0    famotidine (PEPCID) 20 MG tablet, Take 1 tablet (20 mg total) by mouth nightly as needed for Heartburn., Disp: 90 tablet, Rfl: 4    fluticasone propionate (FLONASE) 50 mcg/actuation nasal spray, 2 sprays (100 mcg total) by Each Nostril route once daily., Disp: 16 mL, Rfl: 4    ibuprofen (ADVIL,MOTRIN) 800 MG tablet, Take 1 tablet (800 mg total) by mouth 3 (three) times daily., Disp: 90 tablet, Rfl: 4    loratadine (CLARITIN) 10 mg tablet, Take 1 tablet (10 mg total) by mouth once daily., Disp: 30 tablet, Rfl: 4    montelukast (SINGULAIR) 10 mg tablet, Take 1 tablet (10 mg total) by mouth every evening., Disp: 30 tablet, Rfl: 11    omalizumab (XOLAIR) 150 mg injection, Inject 300 mg into the skin every 14 (fourteen) days., Disp: 4 vial, Rfl: 11    predniSONE (DELTASONE) 20 MG tablet, TAKE 2 TABLETS BY MOUTH ONCE DAILY. TAKE IN AM, TAKE WITH FOOD FOR 7 DAYS, Disp: 14 tablet, Rfl: 0    sertraline (ZOLOFT) 100 MG tablet, Take 1.5 tablets (150 mg total) by mouth once daily., Disp: 135 tablet, Rfl: 4    traZODone (DESYREL) 100 MG tablet, Take 1 tablet (100 mg total) by mouth every evening., Disp: 90 tablet, Rfl: 4    Current Facility-Administered Medications:     omalizumab injection 150 mg, 150 mg,  Subcutaneous, Q14 Days, Radha Hernández MD, 150 mg at 01/26/21 1351    omalizumab injection 150 mg, 150 mg, Subcutaneous, Q14 Days, Radha Hernández MD, 150 mg at 01/26/21 1349    omalizumab injection 150 mg, 150 mg, Subcutaneous, 1 time in Clinic/HOD, Radha Hernández MD    omalizumab injection 150 mg, 150 mg, Subcutaneous, 1 time in Clinic/HOD, Radha Hernández MD    Psychiatric history:  Previous diagnosis: patient denies  Psychiatric medication: Trazodone, Zoloft and Buspar until about 2 weeks ago. Currently taking Prozac, Rexulti, and Trazodone  Previous hospitalizations: 1.5 weeks in inpatient behavioral health in [place], [place] after an attempted overdose  History of outpatient treatment: Patient denies  Previous suicide attempt:  Patient reports two recent suicide attempts via overdose in September 2022 and October 2022  Family history of psychiatric illness: patient denies    Current and past substance use:  Alcohol:  drinks 1-2 beers while watching sports or after a long day of work.   Drugs:  patient denies  Tobacco:  smoking off and on 23 years, currently 1/2 ppd  Caffeine:  Soda, 1x a day 3-5 days/wk.      Psychiatric symptoms:  Depression:  Patient reports difficulty concentrating, feelings of worthlessness or guilt, hopelessness, appetite changes, or psychomotor agitation  Jaci/Hypomania:  Denied.  He denied periods of elevated mood or abnormally increased energy or goal-directed activity.  Anxiety:  Restlessness and excessive worry, agitation/irritability  Thoughts:  Denied delusions, hallucinations.  Suicidal thoughts/behaviors:  Patient denied current suicidal and homicidal ideation, plan and intent.  Patient noted agreement to call 911/and or present to the ED if he experienced suicidal or homicidal ideation, plan or intent.    Self-injury:  Denied.  Sleep: sleeping good with use of sleep aid (trazodone)   Trauma: unexpected death of 18 y/o son in Feb 2022. Death of grandmother in 2002.      Mental Status  Exam:  General appearance:  appears stated age, neatly dressed, well groomed  Speech:  normal rate, normal tone, normal pitch, normal volume  Level of cooperation:  cooperative  Thought processes:  logical, goal-directed  Mood:  euthymic  Thought content:  no illusions, no visual hallucinations, no auditory hallucinations, no delusions, no active or passive homicidal thoughts, no active or passive suicidal ideation, no obsessions, no compulsions, no violence  Affect:  appropriate  Orientation:  oriented to person, place, situation and date  Memory/Attention and Concentration:  No gross deficits made evident during conversation.  Judgment and insight: fair  Language:  intact          Impression: Patient presents today with complicated grief and anxiety. He notes that his mood changes only began after the death of his son. He reports that in addition to his grief, his relationship of many years just ended. Two weeks ago patient attempted to end his life via overdose and was admitted to a behavioral health facility for 1.5 weeks. He reports a change in medication at that time has improved his mood and ability to concentrate. He describes prior to the medication change he was experiencing symptoms such as racing thoughts, difficulty concentrating, easily agitated, hypervigilance, grinding of the teeth and restlessness. Patient identified protective factors are his two children, family, and work. He is motivated for therapy and future oriented. He would like to continue medication but would also like to work on skills to help manage his grief, stress, and interpersonal conflicts.     Diagnosis:  Bereavement/Grief  Major Depression Disorder, severe      Plan:    Patient noted agreement to text [phone], call 911/and or present to the ED if he experienced suicidal or homicidal ideation, plan or intent.   Patient provided psychoeducation on depression, anxiety and grief.  Methods of management of episodes of anxiety; deep  breathing/relaxation, distraction.  Stress management and grief will be discussed at upcoming visits.  Patient to follow-up in 2-3 weeks.     Length of Appointment: 26mins face-to-face, 20mins chart review          Bethanie Elena Psy.D   Clinical Health Psychology Fellow

## 2022-11-01 ENCOUNTER — PATIENT MESSAGE (OUTPATIENT)
Dept: PSYCHIATRY | Facility: CLINIC | Age: 47
End: 2022-11-01
Payer: MEDICAID

## 2022-11-01 ENCOUNTER — TELEPHONE (OUTPATIENT)
Dept: PSYCHIATRY | Facility: CLINIC | Age: 47
End: 2022-11-01
Payer: MEDICAID

## 2022-11-01 ENCOUNTER — PATIENT MESSAGE (OUTPATIENT)
Dept: FAMILY MEDICINE | Facility: CLINIC | Age: 47
End: 2022-11-01
Payer: MEDICAID

## 2022-11-01 RX ORDER — BREXPIPRAZOLE 2 MG/1
2 TABLET ORAL
Status: CANCELLED | OUTPATIENT
Start: 2022-11-01

## 2022-11-10 ENCOUNTER — OFFICE VISIT (OUTPATIENT)
Dept: FAMILY MEDICINE | Facility: CLINIC | Age: 47
End: 2022-11-10
Payer: MEDICAID

## 2022-11-10 VITALS
HEIGHT: 70 IN | HEART RATE: 74 BPM | BODY MASS INDEX: 28.95 KG/M2 | WEIGHT: 202.25 LBS | SYSTOLIC BLOOD PRESSURE: 139 MMHG | DIASTOLIC BLOOD PRESSURE: 82 MMHG

## 2022-11-10 DIAGNOSIS — Z79.899 ENCOUNTER FOR LONG-TERM (CURRENT) USE OF MEDICATIONS: ICD-10-CM

## 2022-11-10 DIAGNOSIS — A49.9 BACTERIAL INFECTION: Primary | ICD-10-CM

## 2022-11-10 DIAGNOSIS — F51.02 ADJUSTMENT INSOMNIA: ICD-10-CM

## 2022-11-10 PROBLEM — F33.2 MDD (MAJOR DEPRESSIVE DISORDER), RECURRENT SEVERE, WITHOUT PSYCHOSIS: Status: ACTIVE | Noted: 2022-11-10

## 2022-11-10 PROBLEM — T14.91XA SUICIDE ATTEMPT: Status: ACTIVE | Noted: 2022-11-10

## 2022-11-10 PROCEDURE — 99214 PR OFFICE/OUTPT VISIT, EST, LEVL IV, 30-39 MIN: ICD-10-PCS | Mod: S$PBB,,, | Performed by: FAMILY MEDICINE

## 2022-11-10 PROCEDURE — 99215 OFFICE O/P EST HI 40 MIN: CPT | Mod: PBBFAC,PO | Performed by: FAMILY MEDICINE

## 2022-11-10 PROCEDURE — 99214 OFFICE O/P EST MOD 30 MIN: CPT | Mod: S$PBB,,, | Performed by: FAMILY MEDICINE

## 2022-11-10 PROCEDURE — 1159F PR MEDICATION LIST DOCUMENTED IN MEDICAL RECORD: ICD-10-PCS | Mod: CPTII,,, | Performed by: FAMILY MEDICINE

## 2022-11-10 PROCEDURE — 3079F DIAST BP 80-89 MM HG: CPT | Mod: CPTII,,, | Performed by: FAMILY MEDICINE

## 2022-11-10 PROCEDURE — 99999 PR PBB SHADOW E&M-EST. PATIENT-LVL V: ICD-10-PCS | Mod: PBBFAC,,, | Performed by: FAMILY MEDICINE

## 2022-11-10 PROCEDURE — 1160F RVW MEDS BY RX/DR IN RCRD: CPT | Mod: CPTII,,, | Performed by: FAMILY MEDICINE

## 2022-11-10 PROCEDURE — 3075F SYST BP GE 130 - 139MM HG: CPT | Mod: CPTII,,, | Performed by: FAMILY MEDICINE

## 2022-11-10 PROCEDURE — 3008F BODY MASS INDEX DOCD: CPT | Mod: CPTII,,, | Performed by: FAMILY MEDICINE

## 2022-11-10 PROCEDURE — 3079F PR MOST RECENT DIASTOLIC BLOOD PRESSURE 80-89 MM HG: ICD-10-PCS | Mod: CPTII,,, | Performed by: FAMILY MEDICINE

## 2022-11-10 PROCEDURE — 3075F PR MOST RECENT SYSTOLIC BLOOD PRESS GE 130-139MM HG: ICD-10-PCS | Mod: CPTII,,, | Performed by: FAMILY MEDICINE

## 2022-11-10 PROCEDURE — 3008F PR BODY MASS INDEX (BMI) DOCUMENTED: ICD-10-PCS | Mod: CPTII,,, | Performed by: FAMILY MEDICINE

## 2022-11-10 PROCEDURE — 1160F PR REVIEW ALL MEDS BY PRESCRIBER/CLIN PHARMACIST DOCUMENTED: ICD-10-PCS | Mod: CPTII,,, | Performed by: FAMILY MEDICINE

## 2022-11-10 PROCEDURE — 99999 PR PBB SHADOW E&M-EST. PATIENT-LVL V: CPT | Mod: PBBFAC,,, | Performed by: FAMILY MEDICINE

## 2022-11-10 PROCEDURE — 1159F MED LIST DOCD IN RCRD: CPT | Mod: CPTII,,, | Performed by: FAMILY MEDICINE

## 2022-11-10 RX ORDER — METHYLPREDNISOLONE 4 MG/1
TABLET ORAL
Qty: 21 TABLET | Refills: 0 | Status: SHIPPED | OUTPATIENT
Start: 2022-11-10 | End: 2023-05-10

## 2022-11-10 RX ORDER — TRAZODONE HYDROCHLORIDE 150 MG/1
150 TABLET ORAL NIGHTLY
Qty: 90 TABLET | Refills: 4 | Status: SHIPPED | OUTPATIENT
Start: 2022-11-10 | End: 2022-12-04 | Stop reason: SDUPTHER

## 2022-11-10 RX ORDER — AMOXICILLIN AND CLAVULANATE POTASSIUM 875; 125 MG/1; MG/1
1 TABLET, FILM COATED ORAL EVERY 12 HOURS
Qty: 20 TABLET | Refills: 0 | Status: SHIPPED | OUTPATIENT
Start: 2022-11-10 | End: 2023-05-10

## 2022-11-10 NOTE — PATIENT INSTRUCTIONS
Follow up in about 6 months (around 5/10/2023), or if symptoms worsen or fail to improve, for Med refills.     Dear patient,   As a result of recent federal legislation (The Federal Cures Act), you may receive lab or pathology results from your visit in your [facility] account before your physician is able to contact you. Your physician or their representative will relay the results to you with their recommendations at their soonest availability.     If no improvement in symptoms or symptoms worsen, please be advised to call MD, follow-up at clinic and/or go to ER if becomes severe.    Johnathan Greenwood M.D.        We Offer TELEHEALTH & Same Day Appointments!   Book your Telehealth appointment with me through my nurse or   Clinic appointments on [employer]!    [address] [place] [address]    Office: [phone]   FAX: [phone]    Check out my Facebook Page and Follow Me at: [email or web address]    Check out my website at [employer] by clicking on: [email or web address]    To Schedule appointments online, go to [employer]: [email or web address]

## 2022-11-10 NOTE — ASSESSMENT & PLAN NOTE
Bacterial sinus infection: Start antibiotics and steroids.   - risk of corticosteroids reviewed (elevated BP/glucose, insomnia, psychosis, bone loss, etc) and patient expressed understanding  Discussed condition course and signs and symptoms to expect.  Patient advised take anti-inflammatories and or Tylenol for pain or fever.  ER precautions.  Call MD or follow-up to clinic if not improving or worsening symptoms.
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
## 2022-11-10 NOTE — ASSESSMENT & PLAN NOTE
Patient advised update labs prior to next appointment.  Complete history and physical was completed today.  Complete and thorough medication reconciliation was performed.  Discussed risks and benefits of medications.  Advised patient on orders and health maintenance.  We discussed old records and old labs if available.  Will request any records not available through epic.  Continue current medications listed on your summary sheet.

## 2022-11-10 NOTE — ASSESSMENT & PLAN NOTE
Increase trazodone 150 milligrams.  Patient reports this medication was helping initially however he is not sleeping again.  Patient is compliant with his other medications and attending Psychology sessions.  He reports that he is currently stable.  Denies any SI HI or hallucinations.Discussed insomnia condition course.  Advised of first-line medications for this condition.  Also discussed sleep hygiene.  Information was given below.  Good sleep habits (sometimes referred to as sleep hygiene) can help you get a good nights sleep.    Some habits that can improve your sleep health:  -Be consistent. Go to bed at the same time each night and get up at the same time each morning, including on the weekends  -Make sure your bedroom is quiet, dark, relaxing, and at a comfortable temperature  -Remove electronic devices, such as TVs, computers, and smart phones, from the bedroom  -Avoid large meals, caffeine, and alcohol before bedtime  -Get some exercise. Being physically active during the day can help you fall asleep more easily at night.

## 2022-11-22 ENCOUNTER — TELEPHONE (OUTPATIENT)
Dept: PSYCHIATRY | Facility: CLINIC | Age: 47
End: 2022-11-22
Payer: MEDICAID

## 2022-11-22 NOTE — TELEPHONE ENCOUNTER
Called pt in regards to the missed appointment from today. Requested a call back to reschedule this virtual appointment . No show letter sent

## 2022-12-19 ENCOUNTER — PATIENT MESSAGE (OUTPATIENT)
Dept: FAMILY MEDICINE | Facility: CLINIC | Age: 47
End: 2022-12-19
Payer: MEDICAID

## 2022-12-20 ENCOUNTER — TELEPHONE (OUTPATIENT)
Dept: PSYCHIATRY | Facility: CLINIC | Age: 47
End: 2022-12-20
Payer: MEDICAID

## 2022-12-20 NOTE — TELEPHONE ENCOUNTER
Attempted to contact patient. Left VM to return call to reschedule appointment.     Bethanie Elena PsyD

## 2023-01-23 ENCOUNTER — PATIENT MESSAGE (OUTPATIENT)
Dept: FAMILY MEDICINE | Facility: CLINIC | Age: 48
End: 2023-01-23
Payer: MEDICAID

## 2023-01-23 DIAGNOSIS — F43.21 SITUATIONAL DEPRESSION: Primary | Chronic | ICD-10-CM

## 2023-01-23 RX ORDER — FLUOXETINE 10 MG/1
20 CAPSULE ORAL DAILY
Qty: 180 CAPSULE | Refills: 3 | Status: SHIPPED | OUTPATIENT
Start: 2023-01-23 | End: 2023-06-15 | Stop reason: SDUPTHER

## 2023-01-23 RX ORDER — METHOCARBAMOL 500 MG/1
500 TABLET, FILM COATED ORAL 3 TIMES DAILY
COMMUNITY
Start: 2022-12-17 | End: 2023-04-17

## 2023-01-23 NOTE — TELEPHONE ENCOUNTER
Care Due:                  Date            Visit Type   Department     Provider  --------------------------------------------------------------------------------                                EP -                              PRIMARY      [facility] FAM[facility]  Last Visit: 11-      CARE ([facility])   MEDICINE       Johnathan Greenwood                               -                              PRIMARY      [facility]  Next Visit: 05-      CARE ([facility])   [facility]       Johnathan Greenwood                                                            Last  Test          Frequency    Reason                     Performed    Due Date  --------------------------------------------------------------------------------    CMP.........  12 months..  chlorthalidone,            Not Found    Overdue                             famotidine...............    [employer] Embedded Care Gaps. Reference number: [id number]. 1/23/2023   2:28:51 PM CST

## 2023-04-17 RX ORDER — METHOCARBAMOL 500 MG/1
TABLET, FILM COATED ORAL
Qty: 21 TABLET | Refills: 3 | Status: SHIPPED | OUTPATIENT
Start: 2023-04-17

## 2023-04-17 NOTE — TELEPHONE ENCOUNTER
Refill Routing Note   Medication(s) are not appropriate for processing by [facility] for the following reason(s):      Medication outside of protocol    [facility] action(s):  Route              Appointments  past 12m or future 3m with PCP    Date Provider   Last Visit   11/10/2022 Johnathan Greenwood MD   Next Visit   5/10/2023 Johnathan Greenwood MD   ED visits in past 90 days: 0        Note composed:10:29 AM 04/17/2023

## 2023-05-10 ENCOUNTER — OFFICE VISIT (OUTPATIENT)
Dept: FAMILY MEDICINE | Facility: CLINIC | Age: 48
End: 2023-05-10
Payer: MEDICAID

## 2023-05-10 VITALS
SYSTOLIC BLOOD PRESSURE: 138 MMHG | TEMPERATURE: 98 F | WEIGHT: 202.38 LBS | OXYGEN SATURATION: 97 % | BODY MASS INDEX: 28.97 KG/M2 | HEART RATE: 82 BPM | HEIGHT: 70 IN | DIASTOLIC BLOOD PRESSURE: 88 MMHG | RESPIRATION RATE: 18 BRPM

## 2023-05-10 DIAGNOSIS — Z13.220 ENCOUNTER FOR LIPID SCREENING FOR CARDIOVASCULAR DISEASE: ICD-10-CM

## 2023-05-10 DIAGNOSIS — Z13.6 ENCOUNTER FOR LIPID SCREENING FOR CARDIOVASCULAR DISEASE: ICD-10-CM

## 2023-05-10 DIAGNOSIS — I10 HYPERTENSION, ESSENTIAL: Primary | Chronic | ICD-10-CM

## 2023-05-10 DIAGNOSIS — Z79.899 ENCOUNTER FOR LONG-TERM (CURRENT) USE OF MEDICATIONS: ICD-10-CM

## 2023-05-10 DIAGNOSIS — J34.9 SINUS PROBLEM: ICD-10-CM

## 2023-05-10 PROCEDURE — 3075F SYST BP GE 130 - 139MM HG: CPT | Mod: CPTII,,, | Performed by: FAMILY MEDICINE

## 2023-05-10 PROCEDURE — 99999 PR PBB SHADOW E&M-EST. PATIENT-LVL V: CPT | Mod: PBBFAC,,, | Performed by: FAMILY MEDICINE

## 2023-05-10 PROCEDURE — 3008F BODY MASS INDEX DOCD: CPT | Mod: CPTII,,, | Performed by: FAMILY MEDICINE

## 2023-05-10 PROCEDURE — 3079F DIAST BP 80-89 MM HG: CPT | Mod: CPTII,,, | Performed by: FAMILY MEDICINE

## 2023-05-10 PROCEDURE — 1160F PR REVIEW ALL MEDS BY PRESCRIBER/CLIN PHARMACIST DOCUMENTED: ICD-10-PCS | Mod: CPTII,,, | Performed by: FAMILY MEDICINE

## 2023-05-10 PROCEDURE — 99214 PR OFFICE/OUTPT VISIT, EST, LEVL IV, 30-39 MIN: ICD-10-PCS | Mod: S$PBB,,, | Performed by: FAMILY MEDICINE

## 2023-05-10 PROCEDURE — 1159F MED LIST DOCD IN RCRD: CPT | Mod: CPTII,,, | Performed by: FAMILY MEDICINE

## 2023-05-10 PROCEDURE — 99215 OFFICE O/P EST HI 40 MIN: CPT | Mod: PBBFAC,PO | Performed by: FAMILY MEDICINE

## 2023-05-10 PROCEDURE — 99214 OFFICE O/P EST MOD 30 MIN: CPT | Mod: S$PBB,,, | Performed by: FAMILY MEDICINE

## 2023-05-10 PROCEDURE — 1160F RVW MEDS BY RX/DR IN RCRD: CPT | Mod: CPTII,,, | Performed by: FAMILY MEDICINE

## 2023-05-10 PROCEDURE — 1159F PR MEDICATION LIST DOCUMENTED IN MEDICAL RECORD: ICD-10-PCS | Mod: CPTII,,, | Performed by: FAMILY MEDICINE

## 2023-05-10 PROCEDURE — 3079F PR MOST RECENT DIASTOLIC BLOOD PRESSURE 80-89 MM HG: ICD-10-PCS | Mod: CPTII,,, | Performed by: FAMILY MEDICINE

## 2023-05-10 PROCEDURE — 3075F PR MOST RECENT SYSTOLIC BLOOD PRESS GE 130-139MM HG: ICD-10-PCS | Mod: CPTII,,, | Performed by: FAMILY MEDICINE

## 2023-05-10 PROCEDURE — 3008F PR BODY MASS INDEX (BMI) DOCUMENTED: ICD-10-PCS | Mod: CPTII,,, | Performed by: FAMILY MEDICINE

## 2023-05-10 PROCEDURE — 99999 PR PBB SHADOW E&M-EST. PATIENT-LVL V: ICD-10-PCS | Mod: PBBFAC,,, | Performed by: FAMILY MEDICINE

## 2023-05-10 RX ORDER — AMLODIPINE BESYLATE 10 MG/1
10 TABLET ORAL DAILY
Qty: 90 TABLET | Refills: 4 | Status: SHIPPED | OUTPATIENT
Start: 2023-05-10

## 2023-05-10 NOTE — ASSESSMENT & PLAN NOTE
Follow-up with ENT. Discussed condition course and signs and symptoms to expect.  Patient advised take anti-inflammatories and or Tylenol for pain or fever.  ER precautions.  Call MD or follow-up to clinic if not improving or worsening symptoms.

## 2023-05-10 NOTE — ASSESSMENT & PLAN NOTE
Patient advised to restart his blood pressure medication.Counseled on importance of hypertension disease course, I recommend ongoing Education for DASH-diet and exercise.  Counseled on medication regimen importance of treating high blood pressure.  Please be advised of risk of untreated blood pressure as discussed.  Please advised of ER precautions were given for symptoms of hypertensive urgency and emergency.  Start digital medicine hypertension program

## 2023-05-10 NOTE — PATIENT INSTRUCTIONS
Follow up in about 6 months (around 11/10/2023), or if symptoms worsen or fail to improve, for Med refills.     Dear patient,   As a result of recent federal legislation (The Federal Cures Act), you may receive lab or pathology results from your visit in your [facility] account before your physician is able to contact you. Your physician or their representative will relay the results to you with their recommendations at their soonest availability.     If no improvement in symptoms or symptoms worsen, please be advised to call MD, follow-up at clinic and/or go to ER if becomes severe.    Johnathan Greenwood M.D.        We Offer TELEHEALTH & Same Day Appointments!   Book your Telehealth appointment with me through my nurse or   Clinic appointments on [employer]!    [address] [place] [address]    Office: [phone]   FAX: [phone]    Check out my Facebook Page and Follow Me at: [email or web address]    Check out my website at [employer] by clicking on: [email or web address]    To Schedule appointments [email or web address] go to [employer]: [email or web address]

## 2023-05-10 NOTE — PROGRESS NOTES
PLAN:    Patient advised to update blood work.  He has not had labs since 2020.  Patient advised of the risk of foregoing blood work and not being compliant with medications /treatment plan.  Problem List Items Addressed This Visit       Hypertension, essential - Primary (Chronic)     Patient advised to restart his blood pressure medication.Counseled on importance of hypertension disease course, I recommend ongoing Education for DASH-diet and exercise.  Counseled on medication regimen importance of treating high blood pressure.  Please be advised of risk of untreated blood pressure as discussed.  Please advised of ER precautions were given for symptoms of hypertensive urgency and emergency.  Start digital medicine hypertension program         Relevant Medications    amLODIPine (NORVASC) 10 MG tablet    Other Relevant Orders    CBC Without Differential    Comprehensive Metabolic Panel    TSH    Hemoglobin A1C    Lipid Panel    Hypertension Digital Medicine (HDMP) Enrollment Order (Completed)    Hypertension Digital Medicine ([facility]): Assign Onboarding Questionnaires (Completed)    Encounter for long-term (current) use of medications (Chronic)     Complete history and physical was completed today.  Complete and thorough medication reconciliation was performed.  Discussed risks and benefits of medications.  Advised patient on orders and health maintenance.  We discussed old records and old labs if available.  Will request any records not available through epic.  Continue current medications listed on your summary sheet.             Relevant Orders    CBC Without Differential    Comprehensive Metabolic Panel    TSH    Hemoglobin A1C    Lipid Panel    Sinus problem     Follow-up with ENT. Discussed condition course and signs and symptoms to expect.  Patient advised take anti-inflammatories and or Tylenol for pain or fever.  ER precautions.  Call MD or follow-up to clinic if not improving or worsening symptoms.              Relevant Orders    Ambulatory referral/consult to ENT    Encounter for lipid screening for cardiovascular disease     Routine screening for cholesterol.           Relevant Orders    Lipid Panel   Mood:  Patient reports everything is stable on current medication regimen.  Follow-up with Psychiatry.     Medication Management for assessment above:   Medication List with Changes/Refills   Current Medications    ALBUTEROL (VENTOLIN HFA) 90 MCG/ACTUATION INHALER    Inhale 2 puffs into the lungs every 6 (six) hours as needed for Wheezing. Rescue    ARIPIPRAZOLE (ABILIFY) 2 MG TAB    Take 1 tablet (2 mg total) by mouth once daily.    AZELASTINE (ASTELIN) 137 MCG (0.1 %) NASAL SPRAY    2 sprays (274 mcg total) by Nasal route 2 (two) times daily.    BUDESONIDE-FORMOTEROL 80-4.5 MCG (SYMBICORT) 80-4.5 MCG/ACTUATION HFAA    Inhale 2 puffs into the lungs 2 (two) times daily.    CHLORTHALIDONE (HYGROTEN) 25 MG TAB    Take 1 tablet (25 mg total) by mouth once daily.    FAMOTIDINE (PEPCID) 20 MG TABLET    Take 1 tablet (20 mg total) by mouth nightly as needed for Heartburn.    FLUOXETINE 10 MG CAPSULE    Take 2 capsules (20 mg total) by mouth once daily.    FLUTICASONE PROPIONATE (FLONASE) 50 MCG/ACTUATION NASAL SPRAY    2 sprays (100 mcg total) by Each Nostril route once daily.    IBUPROFEN (ADVIL,MOTRIN) 800 MG TABLET    Take 1 tablet (800 mg total) by mouth 3 (three) times daily.    LORATADINE (CLARITIN) 10 MG TABLET    Take 1 tablet (10 mg total) by mouth once daily.    METHOCARBAMOL (ROBAXIN) 500 MG TAB    TAKE 1 TABLET BY MOUTH 3 TIMES DAILY FOR 7 DAYS.    SERTRALINE (ZOLOFT) 100 MG TABLET    Take 1.5 tablets (150 mg total) by mouth once daily.    TRAZODONE (DESYREL) 150 MG TABLET    Take 1 tablet (150 mg total) by mouth every evening.   Changed and/or Refilled Medications    Modified Medication Previous Medication    AMLODIPINE (NORVASC) 10 MG TABLET amLODIPine (NORVASC) 10 MG tablet       Take 1 tablet (10 mg total) by  mouth once daily.    Take 1 tablet (10 mg total) by mouth once daily.   Discontinued Medications    AMOXICILLIN-CLAVULANATE 875-125MG (AUGMENTIN) 875-125 MG PER TABLET    Take 1 tablet by mouth every 12 (twelve) hours.    BUSPIRONE (BUSPAR) 15 MG TABLET    Take 1 tablet (15 mg total) by mouth 3 (three) times daily.    METHYLPREDNISOLONE (MEDROL DOSEPACK) 4 MG TABLET    follow package directions       Johnathan Greenwood M.D.  ==========================================================================  Subjective:   Patient ID: Reymundo Gutierrez is a 47 y.o. male.  has a past medical history of Asthma, Crushing injury of left wrist and hand (7/10/2019), and Hypertension.   Chief Complaint: Follow-up (Sinus) and Hypertension      Problem List Items Addressed This Visit       Hypertension, essential - Primary (Chronic)    Overview     May 2023: Patient here with his family member.  They report that he has not been compliant with his blood pressure medication.  Blood pressure elevated today.  He is interested in the [employer] hypertension program.  NOVEMBER 2020:  Patient with mildly elevated blood pressure today due to coughing and recent steroids.  Denies any other symptoms.  March 2020:  Blood pressure initially elevated.  Patient reports out of chlorthalidone.  Needs refill.  JANUARY 2020:  Blood pressure still elevated today.  Increasing amlodipine to 10 mg.  Patient reports compliance with amlodipine 5 mg.  No side effects reported.  December 2019:  CHRONIC.  Currently uncontrolled.  Starting blood pressure medicine today.. BP Reviewed.  Compliant with BP medications. No SE reported.   (-) CP, SOB, palpitations, dizziness, lightheadedness, HA, arm numbness, tingling or weakness, syncope.  + fatigue, erectile dysfunction  Creatinine   Date Value Ref Range Status   09/20/2020 1.04 0.50 - 1.40 mg/dL Final     Results for orders placed or performed during the hospital encounter of 09/20/20   EKG 12-lead     Collection Time: 09/20/20 10:07 PM    Narrative    Test Reason : I10,    Vent. Rate : 101 BPM     Atrial Rate : 101 BPM     P-R Int : 122 ms          QRS Dur : 084 ms      QT Int : 336 ms       P-R-T Axes : 054 002 014 degrees     QTc Int : 435 ms    Sinus tachycardia  Otherwise normal ECG  When compared with ECG of 11-SEP-2017 10:33,  No significant change was found  Confirmed by Rubio Anderson MD ([id number]) on 9/21/2020 10:36:27 AM    Referred By: TERESA   SELF           Confirmed By:Rubio Anderson MD            Current Assessment & Plan     Patient advised to restart his blood pressure medication.Counseled on importance of hypertension disease course, I recommend ongoing Education for DASH-diet and exercise.  Counseled on medication regimen importance of treating high blood pressure.  Please be advised of risk of untreated blood pressure as discussed.  Please advised of ER precautions were given for symptoms of hypertensive urgency and emergency.  Start digital medicine hypertension program         Encounter for long-term (current) use of medications (Chronic)    Overview     May 2023: Reviewed labs.  November 2022: Reviewed labs.  CHRONIC. Stable. Compliant with medications for managed conditions. See medication list. No SE reported. Routine lab analysis is being monitored. Refills were addressed.MARCH 2020:  CHRONIC. Stable. Compliant with medications for managed conditions. See medication list. No SE reported. Routine lab analysis is being monitored. Refills were addressed.  April 2022:  Reviewed labs.  CHRONIC. Stable. Compliant with medications for managed conditions. See medication list. No SE reported.   Routine lab analysis is being monitored. Refills were addressed.  Lab Results   Component Value Date    WBC 15.09 (H) 09/20/2020    HGB 16.2 09/20/2020    HCT 46.7 09/20/2020    MCV 89 09/20/2020     09/20/2020       Chemistry        Component Value Date/Time     (L) 09/20/2020 2149    K 4.2 09/20/2020  2149     09/20/2020 2149    CO2 24 09/20/2020 2149    BUN 9 09/20/2020 2149    CREATININE 1.04 09/20/2020 2149     (H) 09/20/2020 2149        Component Value Date/Time    CALCIUM 9.8 09/20/2020 2149    ALKPHOS 77 09/20/2020 2149    AST 24 09/20/2020 2149    ALT 29 09/20/2020 2149    BILITOT 0.6 09/20/2020 2149    ESTGFRAFRICA >60 09/20/2020 2149    EGFRNONAA >60 09/20/2020 2149          Lab Results   Component Value Date    TSH 1.170 09/27/2019            Current Assessment & Plan     Complete history and physical was completed today.  Complete and thorough medication reconciliation was performed.  Discussed risks and benefits of medications.  Advised patient on orders and health maintenance.  We discussed old records and old labs if available.  Will request any records not available through epic.  Continue current medications listed on your summary sheet.             Sinus problem    Overview     Chronic.  Recurrent.  Patient has not followed up with ENT.  Continues to try over-the-counter medication.           Current Assessment & Plan     Follow-up with ENT. Discussed condition course and signs and symptoms to expect.  Patient advised take anti-inflammatories and or Tylenol for pain or fever.  ER precautions.  Call MD or follow-up to clinic if not improving or worsening symptoms.             Encounter for lipid screening for cardiovascular disease    Overview     CHRONIC. STABLE. Lab analysis reviewed.   (-) CP, SOB, abdominal pain, N/V/D, constipation, jaundice, skin changes.  (-) Myalgias  Lab Results   Component Value Date    CHOL 153 09/27/2019    CHOL 182 12/15/2016    CHOL 154 03/09/2015     Lab Results   Component Value Date    HDL 43 09/27/2019    HDL 68 12/15/2016    HDL 43 03/09/2015     Lab Results   Component Value Date    LDLCALC 91.2 09/27/2019    LDLCALC 106.0 12/15/2016    LDLCALC 87.0 03/09/2015     Lab Results   Component Value Date    TRIG 94 09/27/2019    TRIG 40 12/15/2016     TRIG 120 03/09/2015     Lab Results   Component Value Date    CHOLHDL 28.1 09/27/2019    CHOLHDL 37.4 12/15/2016    CHOLHDL 27.9 03/09/2015     Lab Results   Component Value Date    TOTALCHOLEST 3.6 09/27/2019    TOTALCHOLEST 2.7 12/15/2016    TOTALCHOLEST 3.6 03/09/2015     Lab Results   Component Value Date    ALT 29 09/20/2020    AST 24 09/20/2020    ALKPHOS 77 09/20/2020    BILITOT 0.6 09/20/2020   ======================================================           Current Assessment & Plan     Routine screening for cholesterol.               Review of patient's allergies indicates:  No Known Allergies  Current Outpatient Medications   Medication Instructions    albuterol (VENTOLIN HFA) 90 mcg/actuation inhaler 2 puffs, Inhalation, Every 6 hours PRN, Rescue    amLODIPine (NORVASC) 10 mg, Oral, Daily    ARIPiprazole (ABILIFY) 2 mg, Oral, Daily    azelastine (ASTELIN) 274 mcg, Nasal, 2 times daily    budesonide-formoterol 80-4.5 mcg (SYMBICORT) 80-4.5 mcg/actuation HFAA 2 puffs, Inhalation, 2 times daily    chlorthalidone (HYGROTEN) 25 mg, Oral, Daily    famotidine (PEPCID) 20 mg, Oral, Nightly PRN    FLUoxetine 20 mg, Oral, Daily    fluticasone propionate (FLONASE) 100 mcg, Each Nostril, Daily    ibuprofen (ADVIL,MOTRIN) 800 mg, Oral, 3 times daily    loratadine (CLARITIN) 10 mg, Oral, Daily    methocarbamoL (ROBAXIN) 500 MG Tab TAKE 1 TABLET BY MOUTH 3 TIMES DAILY FOR 7 DAYS.    sertraline (ZOLOFT) 150 mg, Oral, Daily    traZODone (DESYREL) 150 mg, Oral, Nightly      I have reviewed the PMH, social history, FamilyHx, surgical history, allergies and medications documented / confirmed by the patient at the time of this visit.  Review of Systems   Constitutional:  Negative for chills, fatigue, fever and unexpected weight change.   HENT:  Positive for congestion and sinus pressure. Negative for ear pain, sinus pain and sore throat.    Eyes:  Negative for redness and visual disturbance.   Respiratory:  Negative for  "cough and shortness of breath.    Cardiovascular:  Negative for chest pain and palpitations.   Gastrointestinal:  Negative for nausea and vomiting.   Endocrine: Negative for cold intolerance and heat intolerance.   Genitourinary:  Negative for difficulty urinating and hematuria.   Musculoskeletal:  Negative for arthralgias and myalgias.   Skin:  Negative for rash and wound.   Allergic/Immunologic: Negative for environmental allergies and food allergies.   Neurological:  Negative for weakness and headaches.   Hematological:  Negative for adenopathy. Does not bruise/bleed easily.   Psychiatric/Behavioral:  Negative for sleep disturbance. The patient is not nervous/anxious.    Objective:   /88   Pulse 82   Temp 98.2 °F (36.8 °C)   Resp 18   Ht 5' 10" (1.778 m)   Wt 91.8 kg (202 lb 6.4 oz)   SpO2 97%   BMI 29.04 kg/m²   Physical Exam  Vitals and nursing note reviewed.   Constitutional:       General: He is not in acute distress.     Appearance: He is well-developed. He is not diaphoretic.   HENT:      Head: Normocephalic and atraumatic.      Right Ear: Tympanic membrane, ear canal and external ear normal. There is no impacted cerumen.      Left Ear: Tympanic membrane, ear canal and external ear normal. There is no impacted cerumen.      Nose: Congestion present. No rhinorrhea.      Mouth/Throat:      Pharynx: No oropharyngeal exudate or posterior oropharyngeal erythema.   Eyes:      Extraocular Movements: Extraocular movements intact.      Pupils: Pupils are equal, round, and reactive to light.   Neck:      Vascular: No carotid bruit.   Cardiovascular:      Rate and Rhythm: Normal rate.      Pulses: Normal pulses.   Pulmonary:      Effort: Pulmonary effort is normal. No respiratory distress.      Breath sounds: Normal breath sounds.   Abdominal:      General: Bowel sounds are normal.      Palpations: Abdomen is soft.   Musculoskeletal:         General: Normal range of motion.      Cervical back: Normal " range of motion and neck supple.   Lymphadenopathy:      Cervical: No cervical adenopathy.   Skin:     General: Skin is warm and dry.      Capillary Refill: Capillary refill takes less than 2 seconds.      Findings: No rash.   Neurological:      General: No focal deficit present.      Mental Status: He is alert and oriented to person, place, and time. Mental status is at baseline.      Cranial Nerves: No cranial nerve deficit.      Motor: No weakness.      Gait: Gait normal.   Psychiatric:         Attention and Perception: He is attentive.         Mood and Affect: Mood normal. Mood is not anxious or depressed. Affect is not labile, blunt, angry or inappropriate.         Speech: He is communicative. Speech is not rapid and pressured, delayed, slurred or tangential.         Behavior: Behavior normal. Behavior is not agitated, slowed, aggressive, withdrawn, hyperactive or combative.         Thought Content: Thought content normal. Thought content is not paranoid or delusional. Thought content does not include homicidal or suicidal ideation. Thought content does not include homicidal or suicidal plan.         Cognition and Memory: Memory is not impaired.         Judgment: Judgment normal. Judgment is not impulsive or inappropriate.       Assessment:     1. Hypertension, essential    2. Encounter for long-term (current) use of medications    3. Encounter for lipid screening for cardiovascular disease    4. Sinus problem      MDM:   Moderate medical complexity.  Moderate risk.    Total time: 32 minutes.  This includes total time spent on the encounter, which includes face to face time and non-face to face time preparing to see the patient (eg, review of previous medical records, tests), Obtaining and/or reviewing separately obtained history, documenting clinical information in the electronic or other health record, independently interpreting results (not separately reported)/communicating results to the  patient/family/caregiver, and/or care coordination (not separately reported).    I have Reviewed and summarized old records.  I have performed thorough medication reconciliation today and discussed risk and benefits of medications.  I have reviewed labs and discussed with patient.  All questions were answered.  I am requesting old records and will review them once they are available. Psychiatry    I have signed for the following orders AND/OR meds.  Orders Placed This Encounter   Procedures    CBC Without Differential     Standing Status:   Future     Standing Expiration Date:   7/8/2024    Comprehensive Metabolic Panel     Standing Status:   Future     Standing Expiration Date:   7/8/2024    TSH     Standing Status:   Future     Standing Expiration Date:   7/8/2024    Hemoglobin A1C     Standing Status:   Future     Standing Expiration Date:   7/8/2024    Lipid Panel     Standing Status:   Future     Standing Expiration Date:   7/8/2024    Ambulatory referral/consult to ENT     Standing Status:   Future     Standing Expiration Date:   6/10/2024     Referral Priority:   Routine     Referral Type:   Consultation     Referral Reason:   Specialty Services Required     Requested Specialty:   Otolaryngology     Number of Visits Requested:   1    Hypertension Digital Medicine (HDMP) Enrollment Order     I. PURPOSE  To provide [facility] patients with innovative, specialized blood pressure monitoring and optimal dosing of antihypertensive therapy to improve health outcomes and decrease microvascular and macrovascular complications    II. GOALS  To maintain a systematic, coordinated and cost-effective process to monitor blood pressure in patients with hypertension  To attain and maintain optimal antihypertensive therapy while ensuring patient safety using the most recent evidence-based guidelines for the management of hypertension as reported by AHA/ACC in 2017.   Provide consultative services to providers,  patients and caregivers regarding optimal antihypertensive therapy  To improve patient/caregiver understanding and compliance related to antihypertensive therapies by providing continuous patient and caregiver education about their prescribed medications and associated disease state  To provide education, guidance and reinforcement regarding lifestyle modifications including weight loss, adopting and maintaining the Dietary Approaches to Stop Hypertension or DASH diet, sodium restriction, physical activity, and moderation of alcohol consumption to patients and caregivers  Allow providers increased availability of clinic time for direct patient care   To provide patient care and education within an interdisciplinary framework and enhance partnering with other members of health care team  Improve continuity of care for high blood pressure patients and improve patient engagement  Collect and utilize pharmacy related outcomes to improve quality of patient care    III. COLLABORATIVE PRACTICE AGREEMENT    A.  Under this collaborative practice agreement, an [facility] pharmacist according to and in compliance with [place] Board of Pharmacy Title 46, Part LIII, section 523 and [place] Board of Medical Examiners definition of the Collaborative Drug Therapy Management (CDTM) may initiate, implement, alter and monitor a therapeutic drug plan intended to manage antihypertensive therapy.  Services offered by the hypertension pharmacy specialist may include education on disease state and lifestyle modification, in addition to the drug therapy services listed above. Written and audio/visual educational materials and patient specific information may be provided to improve quality of care.    B. Primary Collaborating Physician:    Primary Physician: Sanchez Evans M.D., [facility], [place]  , Cardiology  License number: [id number]  CDTM number:  [id number]  Telephone number: [phone]   E-mail address:  [email or web address]  Emergency Contact Information: [phone]    Back-Up Physician:  Luis Eason M.D.  Cardiology  License number:  [id number]  CDTM number:  [id number]  Telephone number:  [phone]  E-mail address: [email or web address]  Emergency Contact Information: [phone]    C.  Pharmacists:   Each of the following pharmacists will serve as the primary pharmacist on CDTM and any pharmacist may serve as the secondary pharmacist for another pharmacists agreement.  Each of the pharmacists listed below has a Pharm.D degree, with completion of an accredited pharmacy practice residency and as well as experience with managing patients along with a physician.  The outpatient monitoring service will be provided under the Cardiology Department at [facility] [place] location in [place] at [address] [place] [address]. The pharmacist will contact patients via telephone from a remote location.    Pharmacist: Desiree Muniz PharmD  This pharmacist has completed a pharmacy practice residency and has practiced clinical pharmacy for 7 years. She has experience in the areas of cardiology, acute care, and managed care. She started a pharmacist run hypertension clinic at [facility] during her residency and was published in The American Journal of Health-System Pharmacists.     [place] Pharmacist License Number: [id number]  CDTM number:  [id number]  Contact Number:  [phone]  Contact E-mail: [email or web address]  Emergency Contact Number:  [phone]    Pharmacist:  Lauren Thomas PharmD  This pharmacist has completed a pharmacy practice residency and has practiced clinical pharmacy for 17 years in the areas of cardiology, geriatric medicine, anticoagulation management, retail and ambulatory care.  She served as a pharmacy practice clinical preceptor and lead pharmacist in anticoagulation clinic for 10 years.  [place] Pharmacist License #  [id number]  CDTM number:  [id number]  Contact Number:  [phone]  Contact E-mail:  [email or web address]   Emergency Contact Number:  [phone]    Pharmacist: Lizet Gil PharmD, BCACP, CDE  This pharmacist has completed a pharmacy practice residency with emphasis in ambulatory care and has practiced clinical pharmacy for 8 years in the areas of dyslipidemia, hypertension, diabetes, and anticoagulation. She is also a Certified Diabetes Educator.      [place] Pharmacist License # [id number]  CDTM number: [id number]  Contact number: [phone]  Contact E-mail:  [email or web address]  Emergency Contact Number: [phone]    Pharmacist: Michelle Branch PharmD  This pharmacist started practicing at [facility] in 2011 following her one year residency at [facility]. She serves as an Adjunct Clinical  in the College of Pharmacy at [facility] [employer] [facility] [employer]. She served as the lead pharmacist for the Coumadin Clinic team for 4 years.   [place] Pharmacist License: [id number]  CDTM number: [id number]  Contact number: [phone]  Contact E-mail: [email or web address]   Emergency Contact Number: [phone]    Pharmacist:  Luli Rendon PharmD  This pharmacist has completed a pharmacy practice residency (PGY-1) and has practiced clinical pharmacy for 16  years in the areas of heart and abdominal transplant, retail and ambulatory care.  She was directly involved in the care of congestive heart failure, left ventricular assist device and heart transplant patients from 2003-0244.  She is currently practicing as a digital medicine clinical specialist in heart failure.    [place] Pharmacist License # [id number]  CDTM number:  [id number]  Contact Number:  [phone]  Contact E-mail:  [email or web address]   Emergency Contact Number:  [phone]    Pharmacist: Michelle Degroot PharmD  This pharmacist obtained her Pharm.D. from [employer], and  has completed an Ambulatory Care Pharmacy Practice residency at [facility]. She has practiced clinical pharmacy for 9  years and has experience in the areas of Hypertension and Diabetes.      [place] Pharmacist License: [id number]  CDTM Number: [id number]  Telephone number: [phone]  E-mail: [email or web address]  Emergency Contact Number: [phone]      Pharmacist: Kerry Mckinnon PharmD  This pharmacist has completed a pharmacy practice residency with an emphasis in ambulatory care and has practiced clinical pharmacy for one year. She has experience in the areas of diabetes, hypertension and dyslipidemia.   [place] Pharmacist License: [id number]  CDTM Number: [id number]  Contact Number: [phone]  Contact Email: [email or web address]   Emergency Contact: [phone]    Pharmacist: Karin Appiah PharmD, BCPS  This pharmacist has completed a pharmacy practice residency with an emphasis in ambulatory care and is a Board Certified Pharmacotherapy Specialist (BCPS). She has practiced clinical pharmacy for  years in areas of ambulatory care, internal medicine, cardiology and specialty pharmacy.    [place] Pharmacist License Number: [id number]  CDTM number:  [id number]  Contact Number:  [phone]  Contact E-mail:  [email or web address]   Emergency Contact Number:  [phone] d.  Eligible Patients  Patients whose antihypertensive therapy is managed under this agreement must have a diagnosis of hypertension as documented in the patient record, and have established care with a provider within [facility]. All aspects of the patients hypertension medication management will be followed in collaboration with the physician treating the patients hypertension.  The patient will be seen by his or her physician per their discretion or by the recommendation by the hypertension pharmacist.  The patients case may be reviewed with clinic medical personnel as  needed, but will be reported at least every 30 days to the collaborating physician regarding the patients drug therapy management. All decisions made by the pharmacist will be recorded in [facility] and are readily available for physician review. All issues outside of the scope of antihypertensive therapy shall be reported to the collaborating physician.     E.  Medications in CDTM  This collaborative practice proposal involves the management of patients who are receiving antihypertensive therapy, specifically, thiazide-type diuretics, angiotensin-converting enzyme inhibtors (ACE-I), angiotensin receptor blockers (ARB), calcium channel blockers (CCB), beta-blockers, loop diuretics, potassium-sparing diuretics, potassium supplementation, aldosterone antagonists, direct renin inhibitors, alpha-1 receptor blockers, central alpha-2 agonists, direct arterial vasodilators and peripheral adrenergic antoagonists, or those patients in which hypertension is controlled by lifestyle modifcation alone.      F.  Clinical Procedure  All patients will be notified that a hypertension CDTM exists.  A signed physician order will be required for each patient to be enrolled into the hypertension CDTM.  Informed consent and an electronic signature will be obtained from each patient and documented in the patients record.  This patient signature will be valid for 1 year, requiring a new physician order and patient consent yearly.  The patient must also be enrolled in the electornic communication program ([facility]) to be elegible for the monitoring program.  The following management plan will be utilized for CDTM:    Patients must submit blood pressures at a minimum of once weekly from the patient- purchased wireless blood pressure cuff. Patients who fail to comply with this requirement are subject to removal from [facility].   Evaluate the change in blood pressure, if any, from previous measurements performed on the same cuff and arm.   Determine and document contributing factors for blood pressure change.    Review initial drug therapy made by clinic physician upon program enrollment with patient to ensure compliance.    Identify target blood pressure based on age and comorbidities. Therapeutic changes will be made in collaboration with PharmD and collaborating physician based on the AHA/ACC 2017 guidelines for the treatment of hypertension.  Guide drug optimization based on patient response at 2-4 week intervals until control is achieved.  The PharmD will continue to monitor blood pressure and make adjustments to hypertension medications in order to achieve optimal blood pressure.   Order follow up labs when changing or adding ACE inhibitors and diuretics.    Refer to hypertension specialist if  blood pressure goals cannot be achieved using the noted algorithm.  Notify physician with specific problems or request clinic visit if deemed necessary.  Document antihypertensive therapy changes, interventions, and outcomes in EMR.   Provide patient/caregiver continuous education or reinforcement regarding hypertension management,  medications and lifestyle modifications.    Laboratory Tests  Pharmacists will be authorized to order and evaluate laboratory tests directly related to the disease specific drug therapy being managed. Pharmacists will also be authorized to order additional specific labs based on patient clinical presentation or description. Pharmacists may also review other lab data available in the patient record which may be necessary for the evaluation and assessment of the impact on antihypertensive therapy (i.e. drug interaction, disease interaction, etc.).     b.  Documentation   Documentation of patient encounters and lab results will be permanently placed in the [facility] EMR.  Laboratory results will automatically populate prompting review and assessment of each patient.  Laboratory results obtained from outside laboratories will be  entered into EMR manually.  This documentation will include lab values, medication changes, identification and assessment of adverse events related to therapy, antihypertensive medication dose adjustments, therapeutic management plan and follow up as well as any other information given to the patient during the telemedicine visit.    c.     1.   will be carried out through quarterly reports tracking following statistics:   a. Percent of patients requiring a change to antihypertensive medications   b. Number of emergency visits due to hypertensive urgency or emergency, hypotension or medication side effects for participating patients  Percent of patients who are controlled (BP <130/80 mmHg)  and uncontrolled (BP>130 mmHg)     2.  Patient specific quality indicators will be identified through quarterly review of the following data:   a.  Average change in blood pressure after a dose adjument by the hypertension pharmacist    3.  A random sample of patient records shall be reviewed by the primary physician quarterly to ensure adherence by the hypertension clinical specialists to the collaborative practice agreement.     4.   measures will be reported to Pharmacy & Therapeutics Committee yearly.     References:    EDY Stevens, et al. 2017. 2017. Guidelines for the Prevention, Detection, Evaluation and Management of High Blood Pressure in Adults.      Order Specific Question:   BP Control Goal     Answer:   Current (2017) AHA Guidelines     Medications Ordered This Encounter   Medications    amLODIPine (NORVASC) 10 MG tablet     Sig: Take 1 tablet (10 mg total) by mouth once daily.     Dispense:  90 tablet     Refill:  4     .        Follow up in about 6 months (around 11/10/2023), or if symptoms worsen or fail to improve, for Med refills.    If no improvement in symptoms or symptoms worsen, advised to call/follow-up at clinic or go to ER. Patient voiced understanding  and all questions/concerns were addressed.   DISCLAIMER: This note was compiled by using a speech recognition dictation system and therefore please be aware that typographical / speech recognition errors can and do occur.  Please contact me if you see any errors specifically.    Johnathan Greenwood M.D.       Office: [phone] [address] [place] [address]  FAX: [phone]

## 2023-06-15 DIAGNOSIS — Z79.899 ENCOUNTER FOR LONG-TERM (CURRENT) USE OF MEDICATIONS: ICD-10-CM

## 2023-06-15 DIAGNOSIS — F43.21 SITUATIONAL DEPRESSION: Chronic | ICD-10-CM

## 2023-06-15 DIAGNOSIS — J45.20 MILD INTERMITTENT ASTHMA WITHOUT COMPLICATION: Chronic | ICD-10-CM

## 2023-06-16 RX ORDER — IBUPROFEN 800 MG/1
800 TABLET ORAL 3 TIMES DAILY
Qty: 90 TABLET | Refills: 0 | Status: SHIPPED | OUTPATIENT
Start: 2023-06-16 | End: 2024-03-17 | Stop reason: SDUPTHER

## 2023-06-16 RX ORDER — ALBUTEROL SULFATE 90 UG/1
2 AEROSOL, METERED RESPIRATORY (INHALATION) EVERY 6 HOURS PRN
Qty: 54 G | Refills: 3 | Status: SHIPPED | OUTPATIENT
Start: 2023-06-16 | End: 2023-07-29 | Stop reason: SDUPTHER

## 2023-06-16 RX ORDER — BUDESONIDE AND FORMOTEROL FUMARATE DIHYDRATE 80; 4.5 UG/1; UG/1
2 AEROSOL RESPIRATORY (INHALATION) 2 TIMES DAILY
Qty: 10.2 G | Refills: 12 | Status: SHIPPED | OUTPATIENT
Start: 2023-06-16 | End: 2024-06-15

## 2023-06-16 RX ORDER — FLUOXETINE 10 MG/1
20 CAPSULE ORAL DAILY
Qty: 180 CAPSULE | Refills: 3 | Status: SHIPPED | OUTPATIENT
Start: 2023-06-16 | End: 2024-03-17 | Stop reason: SDUPTHER

## 2023-06-16 NOTE — TELEPHONE ENCOUNTER
Refill Routing Note   Medication(s) are not appropriate for processing by [facility] for the following reason(s):      Medication outside of protocol  Due for refill >6 months ago    [facility] action(s):  Defer  Route  Approve Labs due     Medication Therapy Plan: symbicort due for refill > 6 months; ibuprofen outside protocol    Extended chart review required: Yes     Appointments  past 12m or future 3m with PCP    Date Provider   Last Visit   5/10/2023 Johnathan Greenwood MD   Next Visit   Visit date not found Johnathan Greenwood MD   ED visits in past 90 days: 0        Note composed:9:07 AM 06/16/2023

## 2023-06-16 NOTE — TELEPHONE ENCOUNTER
Approved one time refill with no further refills until required labs are completed.  Make sure patient has follow-up visit after that.

## 2023-06-16 NOTE — TELEPHONE ENCOUNTER
Refill Routing Note   Medication(s) are not appropriate for processing by [facility] for the following reason(s):                                                                   Due for refill >6 months ago : Symbicort  Drug-drug interaction : FLUoxetine, sertraline  Medication outside of protocol : Ibuprofen  Requires labs    [facility] action(s):  Defer  Route  Approve           Appointments  past 12m or future 3m with PCP    Date Provider   Last Visit   5/10/2023 Johnathan Greenwood MD   Next Visit   Visit date not found Johnathan Greenwood MD   ED visits in past 90 days: 0        Note composed:9:01 AM 06/16/2023

## 2023-06-16 NOTE — TELEPHONE ENCOUNTER
Care Due:                  Date            Visit Type   Department     Provider  --------------------------------------------------------------------------------                                EP -                              PRIMARY      [facility]  Last Visit: 05-      CARE ([facility])   MEDICINE       Johnathan Greenwood  Next Visit: None Scheduled  None         None Found                                                            Last  Test          Frequency    Reason                     Performed    Due Date  --------------------------------------------------------------------------------    CMP.........  12 months..  chlorthalidone,            Not Found    Overdue                             famotidine...............    [employer] Embedded Care Due Messages. Reference number: [id number].   6/16/2023 8:34:47 AM CDT

## 2023-07-25 ENCOUNTER — TELEPHONE (OUTPATIENT)
Dept: FAMILY MEDICINE | Facility: CLINIC | Age: 48
End: 2023-07-25
Payer: MEDICAID

## 2023-07-25 ENCOUNTER — PATIENT MESSAGE (OUTPATIENT)
Dept: FAMILY MEDICINE | Facility: CLINIC | Age: 48
End: 2023-07-25
Payer: MEDICAID

## 2023-07-25 DIAGNOSIS — Z56.89 WORK-RELATED CONDITION: Primary | ICD-10-CM

## 2023-07-25 DIAGNOSIS — Z76.89 REFERRAL OF PATIENT: ICD-10-CM

## 2023-07-25 NOTE — TELEPHONE ENCOUNTER
I have signed for the following orders AND/OR meds.  Please call the patient and ask the patient to schedule the testing AND/OR inform about any medications that were sent.      Orders Placed This Encounter   Procedures    Ambulatory referral/consult to Occupational Medicine     Standing Status:   Future     Standing Expiration Date:   8/25/2024     Referral Priority:   Routine     Referral Type:   Occupational Medicine     Referral Reason:   Specialty Services Required     Requested Specialty:   Occupational Medicine     Number of Visits Requested:   1

## 2023-07-29 DIAGNOSIS — Z79.899 ENCOUNTER FOR LONG-TERM (CURRENT) USE OF MEDICATIONS: ICD-10-CM

## 2023-07-29 NOTE — TELEPHONE ENCOUNTER
No care due was identified.  [facility] Embedded Care Due Messages. Reference number: [id number].   7/29/2023 6:44:32 PM CDT   Hatchet Flap Text: The defect edges were debeveled with a #15 scalpel blade.  Given the location of the defect, shape of the defect and the proximity to free margins a hatchet flap was deemed most appropriate.  Using a sterile surgical marker, an appropriate hatchet flap was drawn incorporating the defect and placing the expected incisions within the relaxed skin tension lines where possible.    The area thus outlined was incised deep to adipose tissue with a #15 scalpel blade.  The skin margins were undermined to an appropriate distance in all directions utilizing iris scissors.

## 2023-07-31 RX ORDER — ALBUTEROL SULFATE 90 UG/1
2 AEROSOL, METERED RESPIRATORY (INHALATION) EVERY 6 HOURS PRN
Qty: 54 G | Refills: 3 | Status: SHIPPED | OUTPATIENT
Start: 2023-07-31 | End: 2024-03-17 | Stop reason: SDUPTHER

## 2023-07-31 NOTE — TELEPHONE ENCOUNTER
Refill Decision Note   Reymundo Gutierrez  is requesting a refill authorization.  Brief Assessment and Rationale for Refill:  Approve     Medication Therapy Plan:         Comments:     Note composed:10:36 AM 07/31/2023

## 2023-08-14 PROBLEM — Z13.220 ENCOUNTER FOR LIPID SCREENING FOR CARDIOVASCULAR DISEASE: Status: RESOLVED | Noted: 2021-02-25 | Resolved: 2023-08-14

## 2023-08-14 PROBLEM — Z13.6 ENCOUNTER FOR LIPID SCREENING FOR CARDIOVASCULAR DISEASE: Status: RESOLVED | Noted: 2021-02-25 | Resolved: 2023-08-14

## 2023-08-17 ENCOUNTER — TELEPHONE (OUTPATIENT)
Dept: FAMILY MEDICINE | Facility: CLINIC | Age: 48
End: 2023-08-17
Payer: MEDICAID

## 2023-09-01 ENCOUNTER — OFFICE VISIT (OUTPATIENT)
Dept: PSYCHIATRY | Facility: CLINIC | Age: 48
End: 2023-09-01
Payer: MEDICAID

## 2023-09-01 DIAGNOSIS — F33.1 MDD (MAJOR DEPRESSIVE DISORDER), RECURRENT EPISODE, MODERATE: Primary | ICD-10-CM

## 2023-09-01 PROCEDURE — 90791 PR PSYCHIATRIC DIAGNOSTIC EVALUATION: ICD-10-PCS | Mod: AH,HB,,

## 2023-09-01 PROCEDURE — 90791 PSYCH DIAGNOSTIC EVALUATION: CPT | Mod: AH,HB,,

## 2023-09-01 NOTE — PROGRESS NOTES
Primary Care Behavioral Health: Initial  Patient Name: Reymundo Gutierrez  Date:  09/05/2023  Site:  [facility]  Referral source: Krysten Cosme NP    Chief complaint/reason for encounter: depression, grief     History of present illness:  Mr. Reymundo Gutierrez is a 48 y.o. Black or  male referred by Krysten Cosme NP  Patient was seen, examined and chart was reviewed. Patient reviewed and agreed to informed consent and limits of confidentiality. Patient presents due to complicated grief and bereavement after the passing of his 17 year old son in February 2022. Patient notes his 10 year old son has come to live with him and that has been a difficult adjustment having him full time. He shares that he often overwhelmed by his grief and becomes irritable and withdrawn. Patient notes there have been periods of 1-2 days where he is unable to sleep and has boundless energy. Falling those days, he experiences a deep depression where endorses symptoms including difficulty concentrating, agitation, and racing thoughts.      Past Medical History:   Diagnosis Date    Asthma     Crushing injury of left wrist and hand 7/10/2019    Hypertension          Current Outpatient Medications:     albuterol (VENTOLIN HFA) 90 mcg/actuation inhaler, Inhale 2 puffs into the lungs every 6 (six) hours as needed for Wheezing. Rescue, Disp: 54 g, Rfl: 3    amLODIPine (NORVASC) 10 MG tablet, Take 1 tablet (10 mg total) by mouth once daily., Disp: 90 tablet, Rfl: 4    ARIPiprazole (ABILIFY) 2 MG Tab, Take 1 tablet (2 mg total) by mouth once daily., Disp: 90 tablet, Rfl: 3    azelastine (ASTELIN) 137 mcg (0.1 %) nasal spray, 2 sprays (274 mcg total) by Nasal route 2 (two) times daily., Disp: 30 mL, Rfl: 11    budesonide-formoterol 80-4.5 mcg (SYMBICORT) 80-4.5 mcg/actuation HFAA, Inhale 2 puffs into the lungs 2 (two) times daily., Disp: 10.2 g, Rfl: 12    chlorthalidone (HYGROTEN) 25 MG Tab, Take 1 tablet (25  mg total) by mouth once daily., Disp: 90 tablet, Rfl: 4    famotidine (PEPCID) 20 MG tablet, Take 1 tablet (20 mg total) by mouth nightly as needed for Heartburn., Disp: 90 tablet, Rfl: 4    FLUoxetine 10 MG capsule, Take 2 capsules (20 mg total) by mouth once daily., Disp: 180 capsule, Rfl: 3    fluticasone propionate (FLONASE) 50 mcg/actuation nasal spray, 2 sprays (100 mcg total) by Each Nostril route once daily., Disp: 16 mL, Rfl: 4    ibuprofen (ADVIL,MOTRIN) 800 MG tablet, Take 1 tablet (800 mg total) by mouth 3 (three) times daily., Disp: 90 tablet, Rfl: 0    loratadine (CLARITIN) 10 mg tablet, Take 1 tablet (10 mg total) by mouth once daily., Disp: 30 tablet, Rfl: 4    methocarbamoL (ROBAXIN) 500 MG Tab, TAKE 1 TABLET BY MOUTH 3 TIMES DAILY FOR 7 DAYS., Disp: 21 tablet, Rfl: 3    sertraline (ZOLOFT) 100 MG tablet, Take 1.5 tablets (150 mg total) by mouth once daily., Disp: 135 tablet, Rfl: 4    traZODone (DESYREL) 150 MG tablet, Take 1 tablet (150 mg total) by mouth every evening., Disp: 90 tablet, Rfl: 4    Current Facility-Administered Medications:     omalizumab injection 150 mg, 150 mg, Subcutaneous, Q14 Days, Radha Hernández MD, 150 mg at 01/26/21 1351    omalizumab injection 150 mg, 150 mg, Subcutaneous, Q14 Days, Radha Hernández MD, 150 mg at 01/26/21 1349    omalizumab injection 150 mg, 150 mg, Subcutaneous, 1 time in Clinic/HODEdgar Jo S., MD    omalizumab injection 150 mg, 150 mg, Subcutaneous, 1 time in Clinic/AL, Radha Hernández MD    Psychiatric history:  Previous diagnosis: mdd, anxiety  Psychiatric medication: Patient reports current medications  Previous hospitalizations: 1.5 weeks in inpatient behavioral health in [place], [place] after an attempted overdose  History of outpatient treatment: Patient denies  Previous suicide attempt:  Patient reports two prior suicide attempts via overdose in September 2022 and October 2022  Family history of psychiatric illness: patient denies    Current and past  substance use:  Alcohol:  2-3 times a week, whiskey, couple of glasses Denied history of abuse or dependency.  Drugs:  Denied current use.  Denied history of abuse or dependency.  Tobacco:  1/2-1 pack a day  Caffeine:  2 cups of coffee per day and 2 red bulls per day      Psychiatric symptoms:  Depression:  Patient reports difficulty concentrating, feelings of worthlessness or guilt, hopelessness, appetite changes, or psychomotor agitation  Jaci/Hypomania:  Denied.  He denied periods of elevated mood or abnormally increased energy or goal-directed activity.  Anxiety:  Restlessness and excessive worry, agitation/irritability  Thoughts:  Denied delusions, hallucinations.  Suicidal thoughts/behaviors:  Patient denied current suicidal and homicidal ideation, plan and intent.  Patient noted agreement to call 911/and or present to the ED if he experienced suicidal or homicidal ideation, plan or intent.    Self-injury:  Denied.  Sleep: sleeping good with use of sleep aid (trazodone)   Trauma: unexpected death of 18 y/o son in Feb 2022. Death of grandmother in 2002.      Mental Status Exam:  General appearance:  appears stated age, neatly dressed, well groomed  Speech:  normal rate, normal tone, normal pitch, normal volume  Level of cooperation:  cooperative  Thought processes:  logical, goal-directed  Mood:  euthymic  Thought content:  no illusions, no visual hallucinations, no auditory hallucinations, no delusions, no active or passive homicidal thoughts, no active or passive suicidal ideation, no obsessions, no compulsions, no violence  Affect:  appropriate  Orientation:  oriented to person, place, situation and date  Memory/Attention and Concentration:  No gross deficits made evident during conversation.  Judgment and insight: fair  Language:  intact        Impression: Patient presents today endorsing symptoms of depression and grief. He also reports periods of elevated mood, decreased need for sleep, unusual risky  activity such as excessive spending and hypersexuality. Patient reports the most recent episode with these symptoms was approximately one month ago. He notes he has had instances where he experienced these symptoms a couple of times throughout his adult life. Patient denies having ever been diagnosed with bipolar disorder. Patient describes an significant amount of psychosocial stress. Patient denies current suicidal or homicidal ideation. He identifies his children as his protective factors. Patient would benefit from a complete psychological evaluation to rule out bipolar and related disorders. He is would also benefit from consistent long term therapy to address depression and grief.     Diagnosis:  1. MDD (major depressive disorder), recurrent episode, moderate             Plan:    Patient advised to call 911/and or present to the ED if she experienced suicidal or homicidal ideation, plan or intent.   Discussed grief and loss. Patient encouraged to find supportive people to reach out to and find a way to memorialize his son.  Provided psychoeducation on relaxation and distraction techniques. Encouraged to practice techniques in between sessions.  Discussed long term therapy, will follow up next appointment    Length of Appointment: 60mins         Bethanie Elena PsyD

## 2023-09-19 ENCOUNTER — OFFICE VISIT (OUTPATIENT)
Dept: PSYCHIATRY | Facility: CLINIC | Age: 48
End: 2023-09-19
Payer: MEDICAID

## 2023-09-19 DIAGNOSIS — F33.1 MDD (MAJOR DEPRESSIVE DISORDER), RECURRENT EPISODE, MODERATE: Primary | ICD-10-CM

## 2023-09-19 PROCEDURE — 90834 PR PSYCHOTHERAPY W/PATIENT, 45 MIN: ICD-10-PCS | Mod: AH,HB,95,

## 2023-09-19 PROCEDURE — 90834 PSYTX W PT 45 MINUTES: CPT | Mod: AH,HB,95,

## 2023-09-19 NOTE — PROGRESS NOTES
The patient location is:  [place], [place]  The patient location [place] is: [place]  The patient phone number is: [phone]  Visit type: Virtual visit with synchronous audio and video  Each patient to whom he or she provides medical services by telemedicine is:  (1) informed of the relationship between the provider and patient and the respective role of any other health care provider with respect to management of the patient; and (2) notified that he or she may decline to receive medical services by telemedicine and may withdraw from such care at any time.    Crisis Disclaimer: Patient was informed that due to the virtual nature of the visit, that if a crisis develops, protocols will be implemented to ensure patient safety, including but not limited to: 1) Initiating a welfare check with local Law Enforcement, 2) Calling [facility]/National Crisis Hotline, and/or 3) Initiating PEC/CEC procedures.    Time (Face-to-face): 42 minutes    Time (Non-face-to-face): 10    Primary Care Behavioral Health: follow up  Patient Name: Reymundo Gutierrez  Date:  09/19/2023  Site:  [facility]  Referral source: Krysten Cosme NP    Chief complaint/reason for encounter: depression, grief     Interval history: Patient reports since last appointment, he celebrated what would have been his late son's 18th birthday. He notes this was an extremely difficult day for him. He also reports unfortunately, his 11 year old son returned to live with his mother in [place] after his family did not believe patient was in the right state of mind to care for the child. Patient reports he felt disrespected and now feels like a failure as a parent. He reports spending any free time sleeping in an effort to avoid feeling depressed.     History of present illness:  Mr. Reymundo Gutierrez is a 48 y.o. Black or  male referred by Krysten Cosme NP  Patient was seen, examined and chart was reviewed. Patient reviewed and agreed to  informed consent and limits of confidentiality. Patient presents due to complicated grief and bereavement after the passing of his 17 year old son in February 2022. Patient notes his 10 year old son has come to live with him and that has been a difficult adjustment having him full time. He shares that he often overwhelmed by his grief and becomes irritable and withdrawn. Patient notes there have been periods of 1-2 days where he is unable to sleep and has boundless energy. Falling those days, he experiences a deep depression where endorses symptoms including difficulty concentrating, agitation, and racing thoughts.      Past Medical History:   Diagnosis Date    Asthma     Crushing injury of left wrist and hand 7/10/2019    Hypertension          Current Outpatient Medications:     albuterol (VENTOLIN HFA) 90 mcg/actuation inhaler, Inhale 2 puffs into the lungs every 6 (six) hours as needed for Wheezing. Rescue, Disp: 54 g, Rfl: 3    amLODIPine (NORVASC) 10 MG tablet, Take 1 tablet (10 mg total) by mouth once daily., Disp: 90 tablet, Rfl: 4    ARIPiprazole (ABILIFY) 2 MG Tab, Take 1 tablet (2 mg total) by mouth once daily., Disp: 90 tablet, Rfl: 3    azelastine (ASTELIN) 137 mcg (0.1 %) nasal spray, 2 sprays (274 mcg total) by Nasal route 2 (two) times daily., Disp: 30 mL, Rfl: 11    budesonide-formoterol 80-4.5 mcg (SYMBICORT) 80-4.5 mcg/actuation HFAA, Inhale 2 puffs into the lungs 2 (two) times daily., Disp: 10.2 g, Rfl: 12    chlorthalidone (HYGROTEN) 25 MG Tab, Take 1 tablet (25 mg total) by mouth once daily., Disp: 90 tablet, Rfl: 4    famotidine (PEPCID) 20 MG tablet, Take 1 tablet (20 mg total) by mouth nightly as needed for Heartburn., Disp: 90 tablet, Rfl: 4    FLUoxetine 10 MG capsule, Take 2 capsules (20 mg total) by mouth once daily., Disp: 180 capsule, Rfl: 3    fluticasone propionate (FLONASE) 50 mcg/actuation nasal spray, 2 sprays (100 mcg total) by Each Nostril route once daily., Disp: 16 mL, Rfl: 4     ibuprofen (ADVIL,MOTRIN) 800 MG tablet, Take 1 tablet (800 mg total) by mouth 3 (three) times daily., Disp: 90 tablet, Rfl: 0    loratadine (CLARITIN) 10 mg tablet, Take 1 tablet (10 mg total) by mouth once daily., Disp: 30 tablet, Rfl: 4    methocarbamoL (ROBAXIN) 500 MG Tab, TAKE 1 TABLET BY MOUTH 3 TIMES DAILY FOR 7 DAYS., Disp: 21 tablet, Rfl: 3    sertraline (ZOLOFT) 100 MG tablet, Take 1.5 tablets (150 mg total) by mouth once daily., Disp: 135 tablet, Rfl: 4    traZODone (DESYREL) 150 MG tablet, Take 1 tablet (150 mg total) by mouth every evening., Disp: 90 tablet, Rfl: 4    Current Facility-Administered Medications:     omalizumab injection 150 mg, 150 mg, Subcutaneous, Q14 Days, Radha Hernández MD, 150 mg at 01/26/21 1351    omalizumab injection 150 mg, 150 mg, Subcutaneous, Q14 Days, Radha Hernández MD, 150 mg at 01/26/21 1349    omalizumab injection 150 mg, 150 mg, Subcutaneous, 1 time in Clinic/HOD, Radha Hernández MD    omalizumab injection 150 mg, 150 mg, Subcutaneous, 1 time in Clinic/HOD, Radha Hernández MD      Psychiatric symptoms:  Depression:  Patient reports difficulty concentrating, feelings of worthlessness or guilt, hopelessness, appetite changes, or psychomotor agitation  Jaci/Hypomania:  Denied.  He denied recent episodes of elevated mood or abnormally increased energy or goal-directed activity.  Anxiety:  Restlessness and excessive worry, agitation/irritability  Thoughts:  Denied delusions, hallucinations.  Suicidal thoughts/behaviors:  Patient denied current suicidal and homicidal ideation, plan and intent.  Patient noted agreement to call 911/and or present to the ED if he experienced suicidal or homicidal ideation, plan or intent.    Self-injury:  Denied.  Sleep: sleeping good with use of sleep aid (trazodone)   Trauma: unexpected death of 18 y/o son in Feb 2022. Death of grandmother in 2002.      Mental Status Exam:  General appearance:  appears stated age, neatly dressed, well groomed  Speech:   normal rate, normal tone, normal pitch, normal volume  Level of cooperation:  cooperative  Thought processes:  logical, goal-directed  Mood:  euthymic  Thought content:  no illusions, no visual hallucinations, no auditory hallucinations, no delusions, no active or passive homicidal thoughts, no active or passive suicidal ideation, no obsessions, no compulsions, no violence  Affect:  appropriate  Orientation:  oriented to person, place, situation and date  Memory/Attention and Concentration:  No gross deficits made evident during conversation.  Judgment and insight: fair  Language:  intact        9/19/2023     2:20 PM   Results of the PHQ8   Little interest or pleasure in doing things Nearly every day   Feeling down, depressed, or hopeless Nearly every day   Trouble falling or staying asleep, or sleeping too much Several days   Feeling tired or having little energy More than half the days   Poor appetite or overeating Several days   Feeling bad about yourself - or that you are a failure or have let yourself or your family down Nearly every day   Trouble concentrating on things, such as reading the newspaper or watching television Nearly every day   Moving or speaking so slowly that other people could have noticed. Or the opposite - being so fidgety or restless that you have been moving around a lot more than usual More than half the days   Total Score  18          9/19/2023     2:20 PM   GAD7   1. Feeling nervous, anxious, or on edge? 1   2. Not being able to stop or control worrying? 3   3. Worrying too much about different things? 3   4. Trouble relaxing? 3   5. Being so restless that it is hard to sit still? 3   6. Becoming easily annoyed or irritable? 3   7. Feeling afraid as if something awful might happen? 1   BRENNON-7 Score 17        Impression: Patient presents today endorsing symptoms of depression and grief. He recently celebrated his late sons 18th birthday which brought up a lot of unresolved feelings and  thoughts about himself as a man and as a father. He shares that he feels like a burden and failure being unable to provide for his children financially or emotionally. We discussed his expectations of being a father and a man. Identified unhelpful thinking patterns. During further discussion, patient revealed he actually lost a son in 1997. He reports his son was born premature and passed away after 2months in the hospital. He notes several other incidents of significant loss. This writer discussed with patient the benefits of consistent, long term therapy. Patient was open to a referral for long term therapy.     Diagnosis:  1. MDD (major depressive disorder), recurrent episode, moderate               Plan:    Patient advised to call 911/and or present to the ED if she experienced suicidal or homicidal ideation, plan or intent.   Discussed grief and loss. Patient encouraged to find supportive people to reach out to and find a way to memorialize his son.  Provided psychoeducation on relaxation and distraction techniques. Encouraged to practice techniques in between sessions.  Discussed long term therapy, will follow up next appointment    Length of Appointment: 42 min face to face, 10mins clinical         Bethanie Elena PsyD

## 2023-10-20 ENCOUNTER — PATIENT MESSAGE (OUTPATIENT)
Dept: PSYCHIATRY | Facility: CLINIC | Age: 48
End: 2023-10-20
Payer: MEDICAID

## 2024-03-17 DIAGNOSIS — F43.21 SITUATIONAL DEPRESSION: Chronic | ICD-10-CM

## 2024-03-17 DIAGNOSIS — H66.002 NON-RECURRENT ACUTE SUPPURATIVE OTITIS MEDIA OF LEFT EAR WITHOUT SPONTANEOUS RUPTURE OF TYMPANIC MEMBRANE: ICD-10-CM

## 2024-03-17 DIAGNOSIS — Z79.899 ENCOUNTER FOR LONG-TERM (CURRENT) USE OF MEDICATIONS: ICD-10-CM

## 2024-03-18 ENCOUNTER — PATIENT MESSAGE (OUTPATIENT)
Dept: FAMILY MEDICINE | Facility: CLINIC | Age: 49
End: 2024-03-18
Payer: MEDICAID

## 2024-03-18 RX ORDER — ALBUTEROL SULFATE 90 UG/1
2 AEROSOL, METERED RESPIRATORY (INHALATION) EVERY 6 HOURS PRN
Qty: 54 G | Refills: 0 | Status: SHIPPED | OUTPATIENT
Start: 2024-03-18

## 2024-03-18 NOTE — TELEPHONE ENCOUNTER
Care Due:                  Date            Visit Type   Department     Provider  --------------------------------------------------------------------------------                                EP -                              PRIMARY      [facility] FAMILY  Last Visit: 05-      CARE ([facility])   MEDICINE       Johnathan Greenwood  Next Visit: None Scheduled  None         None Found                                                            Last  Test          Frequency    Reason                     Performed    Due Date  --------------------------------------------------------------------------------    Office Visit  12 months..  ibuprofen................  05-   05-    CBC.........  12 months..  ibuprofen................  Not Found    Overdue    CMP.........  12 months..  chlorthalidone,            Not Found    Overdue                             famotidine, ibuprofen....    [employer] Embedded Care Due Messages. Reference number: [id number].   3/17/2024 8:10:17 PM CDT

## 2024-03-19 RX ORDER — IBUPROFEN 800 MG/1
800 TABLET ORAL 3 TIMES DAILY
Qty: 90 TABLET | Refills: 0 | Status: SHIPPED | OUTPATIENT
Start: 2024-03-19

## 2024-03-19 RX ORDER — LORATADINE 10 MG/1
10 TABLET ORAL DAILY
Qty: 30 TABLET | Refills: 0 | Status: SHIPPED | OUTPATIENT
Start: 2024-03-19 | End: 2024-04-18

## 2024-03-19 RX ORDER — FLUTICASONE PROPIONATE 50 MCG
2 SPRAY, SUSPENSION (ML) NASAL DAILY
Qty: 16 ML | Refills: 0 | Status: SHIPPED | OUTPATIENT
Start: 2024-03-19

## 2024-03-19 RX ORDER — FLUOXETINE 10 MG/1
20 CAPSULE ORAL DAILY
Qty: 180 CAPSULE | Refills: 0 | Status: SHIPPED | OUTPATIENT
Start: 2024-03-19

## 2024-03-19 RX ORDER — ARIPIPRAZOLE 2 MG/1
2 TABLET ORAL DAILY
Qty: 90 TABLET | Refills: 0 | Status: SHIPPED | OUTPATIENT
Start: 2024-03-19 | End: 2024-06-17

## 2024-03-19 RX ORDER — SERTRALINE HYDROCHLORIDE 100 MG/1
150 TABLET, FILM COATED ORAL DAILY
Qty: 135 TABLET | Refills: 0 | Status: SHIPPED | OUTPATIENT
Start: 2024-03-19 | End: 2024-06-17

## 2024-03-19 NOTE — TELEPHONE ENCOUNTER
Refill Routing Note   Medication(s) are not appropriate for processing by [facility] for the following reason(s):        Due for refill >6 months ago  Clarification of medication (Rx) details- note from Krysten Cosme NP states: Please clarify what he is taking- zoloft or prozac?      [facility] action(s):  Defer     Requires labs : Yes             Appointments  past 12m or future 3m with PCP    Date Provider   Last Visit   5/10/2023 Johnathan Greenwood MD   Next Visit   Visit date not found Johnathan Greenwood MD   ED visits in past 90 days: 0        Note composed:7:40 PM 03/18/2024

## 2024-03-29 ENCOUNTER — PATIENT MESSAGE (OUTPATIENT)
Dept: FAMILY MEDICINE | Facility: CLINIC | Age: 49
End: 2024-03-29
Payer: MEDICAID

## 2024-05-15 DIAGNOSIS — Z79.899 ENCOUNTER FOR LONG-TERM (CURRENT) USE OF MEDICATIONS: ICD-10-CM

## 2024-05-15 RX ORDER — ALBUTEROL SULFATE 90 UG/1
2 AEROSOL, METERED RESPIRATORY (INHALATION) EVERY 6 HOURS PRN
Refills: 0 | OUTPATIENT
Start: 2024-05-15

## 2024-05-15 NOTE — TELEPHONE ENCOUNTER
Refill Routing Note   Medication(s) are not appropriate for processing by [facility] for the following reason(s):        Other    [facility] action(s):  Defer      Medication Therapy Plan: Pharmacy comment: Alternative Requested:NOT COVERED.    Pharmacist review requested: Yes     Appointments  past 12m or future 3m with PCP    Date Provider   Last Visit   5/10/2023 Johnathan Greenwood MD   Next Visit   Visit date not found Johnathan Greenwood MD   ED visits in past 90 days: 0        Note composed:1:22 PM 05/15/2024

## 2024-05-15 NOTE — TELEPHONE ENCOUNTER
Refill Decision Note   Reymundo Gutierrez  is requesting a refill authorization.  Brief Assessment and Rationale for Refill:  Quick Discontinue     Medication Therapy Plan:  Pharmacy requesting for alternative to Albuterol; however, script is covered by insurance.      Pharmacist review requested: Yes   Comments:     Note composed:3:13 PM 05/15/2024

## 2024-05-15 NOTE — TELEPHONE ENCOUNTER
No care due was identified.  [facility] Embedded Care Due Messages. Reference number: [id number].   5/15/2024 10:50:14 AM CDT

## 2024-05-18 ENCOUNTER — PATIENT MESSAGE (OUTPATIENT)
Dept: FAMILY MEDICINE | Facility: CLINIC | Age: 49
End: 2024-05-18
Payer: MEDICAID

## 2024-05-29 NOTE — PROGRESS NOTES
Primary Care Telemedicine Note  The patient location is:  Patient's Home - [place]  The chief complaint leading to consultation is:   Chief Complaint   Patient presents with    URI      Total time:  see MDM below. The total time spent on the encounter, which includes face to face time and non-face to face time preparing to see the patient (eg, review of previous medical records, tests), Obtaining and/or reviewing separately obtained history, documenting clinical information in the electronic or other health record, independently interpreting results (not separately reported)/communicating results to the patient/family/caregiver, and/or care coordination (not separately reported).    Visit type: Virtual visit with synchronous audio only and video  Each patient to whom he or she provides medical services by telemedicine is:  (1) informed of the relationship between the physician and patient and the respective role of any other health care provider with respect to management of the patient; and (2) notified that he or she may decline to receive medical services by telemedicine and may withdraw from such care at any time.  =================================================================    Assessment/Plan:  Problem List Items Addressed This Visit        ID    Bacterial infection    Overview     Subacute.  Started few weeks ago.  Patient was treated with a steroid pack and allergy medications.  Patient reports no improvement.  He did start to get better but is now worse.  Significant sinus pressure and tenderness.  Sore throat from nasal drip.  Denies any loss of taste or smell fever nausea vomiting diarrhea headaches           Relevant Medications    azithromycin (Z-STONEY) 250 MG tablet      Other Visit Diagnoses     Acute upper respiratory infection    -  Primary    Sore throat        Relevant Medications    diphenhydrAMINE-aluminum-magnesium hydroxide-simethicone-LIDOcaine HCl 2%        1. Start ABX as prescribed    2. Trial of magic mouthwash as prescribed   3. Supportive care- rest, increase hydration with water, OTC Tylenol/Ibuprofen for pain/fever.   4. Follow up with PCP if no improvement in symptoms   5. ER precautions for severe or worsening of symptoms     Follow up if symptoms worsen or fail to improve.    Krysten Cosme NP  _____________________________________________________________________________________________________________________________________________________    CC: sinus problem    HPI: Patient is a 47-year-old male who presents today as an established patient here for sinus problem. This is a new problem. The current episode started a few days ago. The problem is gradually worsening. There has been no fever. Associated symptoms include congestion, postnasal drip, sore throat, ear pain, cough, headache. Primarily bothered by sore throat. Pertinent negatives include no chills, diaphoresis, hoarse voice, neck pain, shortness of breath, sneezing, or swollen glands. He reports that he went to urgent care a couple days ago. He was negative for covid. He was given steroids and has had minimal improvement in symptoms. He has had no known sick contacts. He is fully vaccinated for covid.     Past Medical History:  Past Medical History:   Diagnosis Date    Asthma     Crushing injury of left wrist and hand 7/10/2019    Hypertension      Past Surgical History:   Procedure Laterality Date    BONE GRAFT Left 08/06/2019    Procedure: BONE GRAFT LEG;  Surgeon: Escobar Covlin MD;  Location: [facility] OR;  Service: Orthopedics;  Laterality: Left;    FRACTURE SURGERY      INJECTION OF ANESTHETIC AGENT AROUND NERVE Left 10/22/2019    Procedure: Block, Nerve STELLATE GANGLION;  Surgeon: Vincent Alejandre MD;  Location: [facility] OR;  Service: Pain Management;  Laterality: Left;    INJECTION OF ANESTHETIC AGENT AROUND NERVE Left 12/10/2019    Procedure: Block, Nerve STELLATE GANGLION;  Surgeon: Vincnet Alejandre MD;   Location: [facility] OR;  Service: Pain Management;  Laterality: Left;    MAXILLARY ANTROSTOMY Left 12/11/2019    Procedure: MAXILLARY ANTROSTOMY;  Surgeon: Tomy Medrano MD;  Location: [facility] OR;  Service: ENT;  Laterality: Left;    NASAL SEPTOPLASTY Bilateral 12/11/2019    Procedure: SEPTOPLASTY, NASAL;  Surgeon: Tomy Medrano MD;  Location: [facility] OR;  Service: ENT;  Laterality: Bilateral;    NASAL TURBINATE REDUCTION Bilateral 12/11/2019    Procedure: REDUCTION, NASAL TURBINATE;  Surgeon: Tomy Medrano MD;  Location: [facility] OR;  Service: ENT;  Laterality: Bilateral;    WRIST SURGERY Left     x3     Review of patient's allergies indicates:  No Known Allergies  Social History     Tobacco Use    Smoking status: Former Smoker     Packs/day: 1.00     Years: 5.00     Pack years: 5.00     Types: Cigarettes    Smokeless tobacco: Never Used   Substance Use Topics    Alcohol use: No    Drug use: No     Family History   Problem Relation Age of Onset    Asthma Daughter      Current Outpatient Medications on File Prior to Visit   Medication Sig Dispense Refill    albuterol (VENTOLIN HFA) 90 mcg/actuation inhaler Inhale 2 puffs into the lungs every 6 (six) hours as needed for Wheezing. Rescue 18 g 11    amLODIPine (NORVASC) 10 MG tablet TAKE 1 TABLET BY MOUTH EVERY DAY 90 tablet 1    azelastine (ASTELIN) 137 mcg (0.1 %) nasal spray 2 sprays (274 mcg total) by Nasal route 2 (two) times daily. 30 mL 11    budesonide-formoterol 80-4.5 mcg (SYMBICORT) 80-4.5 mcg/actuation HFAA Inhale 2 puffs into the lungs 2 (two) times daily. 10.2 g 12    busPIRone (BUSPAR) 15 MG tablet Take 1 tablet (15 mg total) by mouth 3 (three) times daily. 90 tablet 2    chlorthalidone (HYGROTEN) 25 MG Tab Take 1 tablet (25 mg total) by mouth once daily. 90 tablet 3    diclofenac (VOLTAREN) 75 MG EC tablet Take 1 tablet (75 mg total) by mouth 2 (two) times daily for 7 days 14 tablet 0    famotidine (PEPCID) 20 MG tablet Take 1 tablet (20  mg total) by mouth nightly as needed for Heartburn. 90 tablet 4    fluticasone propionate (FLONASE) 50 mcg/actuation nasal spray 2 sprays (100 mcg total) by Each Nostril route once daily. 16 mL 12    ibuprofen (ADVIL,MOTRIN) 800 MG tablet Take 1 tablet (800 mg total) by mouth 3 (three) times daily. 90 tablet 4    loratadine (CLARITIN) 10 mg tablet Take 1 tablet (10 mg total) by mouth once daily. 30 tablet 11    montelukast (SINGULAIR) 10 mg tablet Take 1 tablet (10 mg total) by mouth every evening. 30 tablet 11    omalizumab (XOLAIR) 150 mg injection Inject 300 mg into the skin every 14 (fourteen) days. 4 vial 11    sertraline (ZOLOFT) 100 MG tablet Take 1.5 tablets (150 mg total) by mouth once daily. 135 tablet 4    traZODone (DESYREL) 100 MG tablet Take 1 tablet (100 mg total) by mouth every evening. 90 tablet 0     Current Facility-Administered Medications on File Prior to Visit   Medication Dose Route Frequency Provider Last Rate Last Admin    omalizumab injection 150 mg  150 mg Subcutaneous Q14 Days Radha Hernández MD   150 mg at 01/26/21 1351    omalizumab injection 150 mg  150 mg Subcutaneous Q14 Days Radha Hernández MD   150 mg at 01/26/21 1349    omalizumab injection 150 mg  150 mg Subcutaneous 1 time in Clinic/HOD Radha Hernández MD        omalizumab injection 150 mg  150 mg Subcutaneous 1 time in Clinic/HOD Radha Hernández MD         Review of Systems   Constitutional: Negative for appetite change, chills, fatigue and fever.   HENT: Positive for congestion, postnasal drip and sore throat. Negative for ear discharge and rhinorrhea.    Eyes: Negative for visual disturbance.   Respiratory: Positive for cough. Negative for shortness of breath.    Cardiovascular: Negative for chest pain, palpitations and leg swelling.   Gastrointestinal: Negative for abdominal pain, diarrhea and vomiting.   Genitourinary: Negative for difficulty urinating, dysuria and hematuria.   Musculoskeletal: Negative for arthralgias and  myalgias.   Skin: Negative for rash and wound.   Neurological: Positive for headaches. Negative for dizziness.   Psychiatric/Behavioral: Negative for behavioral problems. The patient is not nervous/anxious.      There were no vitals filed for this visit.    Wt Readings from Last 3 Encounters:   06/10/22 83.2 kg (183 lb 5 oz)   04/12/22 88 kg (194 lb 1.6 oz)   05/18/21 97.3 kg (214 lb 8.1 oz)     Physical Exam  Vitals reviewed.   Constitutional:       General: He is awake. He is not in acute distress.     Appearance: Normal appearance. He is not ill-appearing.   HENT:      Head: Normocephalic and atraumatic.      Right Ear: External ear normal.      Left Ear: External ear normal.      Nose: Congestion present.   Eyes:      Extraocular Movements: Extraocular movements intact.      Conjunctiva/sclera: Conjunctivae normal.   Pulmonary:      Effort: Pulmonary effort is normal. No respiratory distress.   Musculoskeletal:      Cervical back: Normal range of motion.   Skin:     General: Skin is warm and dry.      Coloration: Skin is not pale.      Findings: No rash.   Neurological:      Mental Status: He is alert and oriented to person, place, and time. Mental status is at baseline.      GCS: GCS eye subscore is 4. GCS verbal subscore is 5. GCS motor subscore is 6.   Psychiatric:         Mood and Affect: Mood normal.         Speech: Speech normal.         Behavior: Behavior normal. Behavior is cooperative.       Health Maintenance   Topic Date Due    Lipid Panel  09/27/2024    TETANUS VACCINE  12/27/2027    Hepatitis C Screening  Completed        Satisfactory

## 2024-06-14 ENCOUNTER — PATIENT MESSAGE (OUTPATIENT)
Dept: FAMILY MEDICINE | Facility: CLINIC | Age: 49
End: 2024-06-14

## 2024-06-14 ENCOUNTER — E-VISIT (OUTPATIENT)
Dept: FAMILY MEDICINE | Facility: CLINIC | Age: 49
End: 2024-06-14
Payer: MEDICAID

## 2024-06-14 DIAGNOSIS — N52.9 ERECTILE DYSFUNCTION, UNSPECIFIED ERECTILE DYSFUNCTION TYPE: Primary | ICD-10-CM

## 2024-06-14 DIAGNOSIS — R06.83 SNORING: ICD-10-CM

## 2024-06-14 DIAGNOSIS — R51.9 SINUS HEADACHE: ICD-10-CM

## 2024-06-14 RX ORDER — MONTELUKAST SODIUM 10 MG/1
10 TABLET ORAL NIGHTLY
Qty: 30 TABLET | Refills: 0 | Status: SHIPPED | OUTPATIENT
Start: 2024-06-14 | End: 2024-07-14

## 2024-06-14 NOTE — PROGRESS NOTES
Patient ID: Reymundo Gutierrez is a 48 y.o. male.    Chief Complaint: Erectile Dysfunction, Sinus Problem, and Snoring    The patient initiated a request through [employer] on 6/14/2024 for evaluation and management with a chief complaint of Erectile Dysfunction, Sinus Problem, and Snoring     I evaluated the questionnaire submission on 06/14/2024  .    [facility] Evisit General    6/14/2024 12:18 AM CDT - Filed by Patient   Do you agree to participate in an E-Visit? Yes   If you have any of the following symptoms, please present to your local emergency room or call 911:  I acknowledge   What is the main issue you would like addressed today? My sinuses, snoring problem, and a erectile problem..   Please describe your symptoms Sinus headaches, loud snoring, plus can't keep a erection, plus fast climax   Where is your problem located? Head, private area, bedroom   How severe are your symptoms? Severe   Have you had these symptoms before? No   How long have you been having these symptoms? For more than a month   Please list any medications or treatments you have used for your condition and indicate if it was effective or not. N/a   What makes this feel better? N/a   What makes this feel worse? N/a   Are these symptoms related to a condition that you currently have? No   Please describe any probable cause for these symptoms I don't know   Provide any additional information you feel is important. N/a   Please attach any relevant images or files    Are you able to take your vital signs? No         Encounter Diagnoses   Name Primary?    Erectile dysfunction, unspecified erectile dysfunction type Yes    Sinus headache     Snoring         Orders Placed This Encounter   Procedures    Ambulatory referral/consult to Urology     Standing Status:   Future     Standing Expiration Date:   7/14/2025     Referral Priority:   Routine     Referral Type:   Consultation     Referral Reason:   Specialty Services Required     Requested  Specialty:   Urology     Number of Visits Requested:   1    Ambulatory referral/consult to ENT     Standing Status:   Future     Standing Expiration Date:   7/14/2025     Referral Priority:   Routine     Referral Type:   Consultation     Referral Reason:   Specialty Services Required     Requested Specialty:   Otolaryngology     Number of Visits Requested:   1      Medications Ordered This Encounter   Medications    montelukast (SINGULAIR) 10 mg tablet     Sig: Take 1 tablet (10 mg total) by mouth every evening.     Dispense:  30 tablet     Refill:  0        Follow up in about 4 weeks (around 7/12/2024), or if symptoms worsen or fail to improve, for Follow-up on condition.      E-Visit Time Tracking:    Day 1 Time (in minutes): 7    Total Time (in minutes): 7

## 2024-06-28 DIAGNOSIS — R51.9 SINUS HEADACHE: ICD-10-CM

## 2024-06-29 RX ORDER — MONTELUKAST SODIUM 10 MG/1
10 TABLET ORAL NIGHTLY
Qty: 90 TABLET | Refills: 0 | Status: SHIPPED | OUTPATIENT
Start: 2024-06-29

## 2024-06-29 NOTE — TELEPHONE ENCOUNTER
Refill Routing Note   Medication(s) are not appropriate for processing by [facility] for the following reason(s):        New or recently adjusted medication    [facility] action(s):  Defer   Requires appointment : Yes     Requires labs : Yes             Appointments  past 12m or future 3m with PCP    Date Provider   Last Visit   6/14/2024 Johnathan Greenwood MD   Next Visit   Visit date not found Johnathan Greenwood MD   ED visits in past 90 days: 0        Note composed:10:51 PM 06/28/2024

## 2024-06-29 NOTE — TELEPHONE ENCOUNTER
Care Due:                  Date            Visit Type   Department     Provider  --------------------------------------------------------------------------------                                EP -                              PRIMARY      [facility] FAMILY  Last Visit: 05-      CARE ([facility])   MEDICINE       Johnathan Greenwood  Next Visit: None Scheduled  None         None Found                                                            Last  Test          Frequency    Reason                     Performed    Due Date  --------------------------------------------------------------------------------    Office Visit  12 months..  FLUoxetine, albuterol,     05-   05-                             amLODIPine,                             chlorthalidone,                             famotidine, ibuprofen,                             loratadine, montelukast,                             sertraline, traZODone....    CBC.........  12 months..  ibuprofen................  Not Found    Overdue    CMP.........  12 months..  chlorthalidone,            Not Found    Overdue                             famotidine, ibuprofen....    [employer] Embedded Care Due Messages. Reference number: [id number].   6/28/2024 7:18:36 PM CDT

## 2024-07-24 RX ORDER — SERTRALINE HYDROCHLORIDE 100 MG/1
150 TABLET, FILM COATED ORAL DAILY
Qty: 135 TABLET | Refills: 0 | Status: SHIPPED | OUTPATIENT
Start: 2024-07-24

## 2024-07-24 NOTE — TELEPHONE ENCOUNTER
Refill Routing Note   Medication(s) are not appropriate for processing by [facility] for the following reason(s):      Drug-drug interaction    [facility] action(s):  Defer Care Due:  None identified     Medication Therapy Plan: Duplicate Therapy: FLUoxetine, sertraline; LAST PHISICAL IN OFFICE VIST WAS 5/10/23    Pharmacist review requested: Yes     Appointments  past 12m or future 3m with PCP    Date Provider   Last Visit   5/10/2023 Johnathan Greenwood MD   Next Visit   Visit date not found Johnathan Greenwood MD   ED visits in past 90 days: 0        Note composed:9:27 AM 07/24/2024

## 2024-07-24 NOTE — TELEPHONE ENCOUNTER
Refill Decision Note   Reymundo Gutierrez  is requesting a refill authorization.  Brief Assessment and Rationale for Refill:  Approve     Medication Therapy Plan:         Pharmacist review requested: Yes   Extended chart review required: Yes   Comments:     Note composed:12:43 PM 07/24/2024

## 2024-07-24 NOTE — TELEPHONE ENCOUNTER
No care due was identified.  [facility] Embedded Care Due Messages. Reference number: [id number].   7/24/2024 12:44:58 AM CDT

## 2024-09-11 ENCOUNTER — PATIENT MESSAGE (OUTPATIENT)
Dept: FAMILY MEDICINE | Facility: CLINIC | Age: 49
End: 2024-09-11
Payer: MEDICAID

## 2024-12-19 DIAGNOSIS — R51.9 SINUS HEADACHE: ICD-10-CM

## 2024-12-19 RX ORDER — MONTELUKAST SODIUM 10 MG/1
10 TABLET ORAL NIGHTLY
Qty: 90 TABLET | Refills: 1 | Status: SHIPPED | OUTPATIENT
Start: 2024-12-19

## 2024-12-19 NOTE — TELEPHONE ENCOUNTER
"Tried contacting pt to schedule an appt, number on file "not reachable". Last login in on My chart, 06/19/2024.  "

## 2024-12-19 NOTE — TELEPHONE ENCOUNTER
Care Due:                  Date            Visit Type   Department     Provider  --------------------------------------------------------------------------------                                EP -                              PRIMARY      [facility] FAMILY  Last Visit: 05-      CARE ([facility])   MEDICINE       Johnathan Greenwood  Next Visit: None Scheduled  None         None Found                                                            Last  Test          Frequency    Reason                     Performed    Due Date  --------------------------------------------------------------------------------    Office Visit  12 months..  FLUoxetine, albuterol,     05-   05-                             amLODIPine, ibuprofen,                             loratadine, montelukast,                             sertraline...............    CBC.........  12 months..  ibuprofen................  Not Found    Overdue    Cr..........  12 months..  ibuprofen................  Not Found    Overdue    [employer] Embedded Care Due Messages. Reference number: [id number].   12/19/2024 12:48:30 AM CST

## 2024-12-19 NOTE — TELEPHONE ENCOUNTER
Refill Routing Note   Medication(s) are not appropriate for processing by [facility] for the following reason(s):        Patient not seen by provider within 15 months    [facility] action(s):  Defer   Requires appointment : Yes     Requires labs : Yes             Appointments  past 12m or future 3m with PCP    Date Provider   Last Visit   6/14/2024 Johnathan Greenwood MD   Next Visit   Visit date not found Johnathan Greenwood MD   ED visits in past 90 days: 0        Note composed:9:39 AM 12/19/2024

## (undated) DEVICE — DRAPE EXTREMITY 90X124IN STRL

## (undated) DEVICE — NEPTUNE 4 PORT MANIFOLD

## (undated) DEVICE — DRESSING TRANS 4X4 TEGADERM

## (undated) DEVICE — BLADE TRICUT 3.5MM

## (undated) DEVICE — NDL HYPO 27G X 1 1/2

## (undated) DEVICE — SEE MEDLINE ITEM 146313

## (undated) DEVICE — SUT 2-0 VICRYL / SH (J417)

## (undated) DEVICE — BIT BRILL 2.5

## (undated) DEVICE — GOWN SMARTGOWN LVL4 X-LONG XL

## (undated) DEVICE — GAUZE SPONGE 4X4 12PLY

## (undated) DEVICE — GLOVE PROTEXIS LTX MICRO 8

## (undated) DEVICE — SHEET EENT SPLIT

## (undated) DEVICE — Device

## (undated) DEVICE — ELECTRODE REM PLYHSV RETURN 9

## (undated) DEVICE — DRESSING XEROFORM FOIL PK 1X8

## (undated) DEVICE — ALCOHOL 70% ISOP RUBBING 4OZ

## (undated) DEVICE — TOURNIQUET SB QC DP 18X4IN

## (undated) DEVICE — SYR 10CC LUER LOCK

## (undated) DEVICE — APPLICATOR CHLORAPREP ORN 26ML

## (undated) DEVICE — FORCEP STRAIGHT DISP

## (undated) DEVICE — BIT DRILL 2.0MM D DEPTH 110MM

## (undated) DEVICE — SPONGE PATTY SURGICAL .5X3IN

## (undated) DEVICE — GLOVE PROTEXIS LTX MICRO  7.5

## (undated) DEVICE — DRAPE STERI-DRAPE 1000 17X11IN

## (undated) DEVICE — SUCTION COAGULATOR 10FR 6IN

## (undated) DEVICE — SPLINT NASAL AIRWAY SEPTAL SIL

## (undated) DEVICE — SUT PLN GUT 4-0 SC-1SC-1 1

## (undated) DEVICE — TOURNIQUET SB QC SP 30X4IN

## (undated) DEVICE — PENCIL ROCKER SWITCH 10FT CORD

## (undated) DEVICE — SEE L#120831

## (undated) DEVICE — SHEET DRAPE FAN-FOLDED 3/4

## (undated) DEVICE — GLOVE SURGICAL LATEX SZ 7

## (undated) DEVICE — SUT ETHILON 2 BLK MONO

## (undated) DEVICE — SUT ETHILON 3-0 PS2 18 BLK

## (undated) DEVICE — BANDAGE ESMARK LATEX FREE 4INX

## (undated) DEVICE — BLADE SURG #15 CARBON STEEL

## (undated) DEVICE — SUT ETHILON 2-0 BLK MONO PS

## (undated) DEVICE — SUT VICRYL 0 SH

## (undated) DEVICE — SUT 3-0 VICRYL / SH (J416)

## (undated) DEVICE — CORD BIPOLAR 12 FOOT

## (undated) DEVICE — SEE MEDLINE ITEM 157173

## (undated) DEVICE — SEE MEDLINE ITEM 153151

## (undated) DEVICE — SEE MEDLINE ITEM 152622

## (undated) DEVICE — KIT ANTIFOG

## (undated) DEVICE — SEE MEDLINE ITEM 152487

## (undated) DEVICE — SEE MEDLINE ITEM 146308

## (undated) DEVICE — SEE MEDLINE ITEM 157216

## (undated) DEVICE — SEE MEDLINE ITEM 157131